# Patient Record
Sex: FEMALE | Race: WHITE | Employment: FULL TIME | URBAN - NONMETROPOLITAN AREA
[De-identification: names, ages, dates, MRNs, and addresses within clinical notes are randomized per-mention and may not be internally consistent; named-entity substitution may affect disease eponyms.]

---

## 2017-04-08 ENCOUNTER — APPOINTMENT (OUTPATIENT)
Dept: GENERAL RADIOLOGY | Age: 42
DRG: 189 | End: 2017-04-08
Payer: MEDICAID

## 2017-04-08 ENCOUNTER — HOSPITAL ENCOUNTER (INPATIENT)
Age: 42
LOS: 2 days | Discharge: HOME | DRG: 189 | End: 2017-04-10
Attending: EMERGENCY MEDICINE | Admitting: FAMILY MEDICINE
Payer: MEDICAID

## 2017-04-08 DIAGNOSIS — J96.01 ACUTE RESPIRATORY FAILURE WITH HYPOXEMIA (HCC): Primary | ICD-10-CM

## 2017-04-08 DIAGNOSIS — R06.02 SHORTNESS OF BREATH: ICD-10-CM

## 2017-04-08 DIAGNOSIS — Z72.0 TOBACCO ABUSE: ICD-10-CM

## 2017-04-08 DIAGNOSIS — J20.9 ACUTE BRONCHITIS WITH BRONCHOSPASM: ICD-10-CM

## 2017-04-08 PROBLEM — R09.02 HYPOXEMIA: Status: ACTIVE | Noted: 2017-04-08

## 2017-04-08 LAB
ALBUMIN SERPL-MCNC: 3.5 G/DL (ref 3.5–5.2)
ALP BLD-CCNC: 74 U/L (ref 35–104)
ALT SERPL-CCNC: 15 U/L (ref 5–33)
ANION GAP SERPL CALCULATED.3IONS-SCNC: 14 MMOL/L (ref 7–19)
AST SERPL-CCNC: 18 U/L (ref 5–32)
BASE EXCESS ARTERIAL: 3.4 MMOL/L (ref -2–2)
BASOPHILS ABSOLUTE: 0.1 K/UL (ref 0–0.2)
BASOPHILS RELATIVE PERCENT: 0.4 % (ref 0–1)
BILIRUB SERPL-MCNC: 0.4 MG/DL (ref 0.2–1.2)
BUN BLDV-MCNC: 12 MG/DL (ref 6–20)
CALCIUM SERPL-MCNC: 8.9 MG/DL (ref 8.6–10)
CARBOXYHEMOGLOBIN ARTERIAL: 4.8 % (ref 0–5)
CHLORIDE BLD-SCNC: 94 MMOL/L (ref 98–111)
CO2: 24 MMOL/L (ref 22–29)
CREAT SERPL-MCNC: 0.7 MG/DL (ref 0.5–0.9)
EOSINOPHILS ABSOLUTE: 0.3 K/UL (ref 0–0.6)
EOSINOPHILS RELATIVE PERCENT: 2.2 % (ref 0–5)
GFR NON-AFRICAN AMERICAN: >60
GLOBULIN: 3.7 G/DL
GLUCOSE BLD-MCNC: 135 MG/DL (ref 74–109)
HCO3 ARTERIAL: 28.5 MMOL/L (ref 22–26)
HCT VFR BLD CALC: 37.8 % (ref 37–47)
HEMOGLOBIN, ART, EXTENDED: 13.3 G/DL (ref 12–16)
HEMOGLOBIN: 13 G/DL (ref 12–16)
LACTIC ACID: 1.5 MG/DL (ref 0.5–1.9)
LYMPHOCYTES ABSOLUTE: 3.5 K/UL (ref 1.1–4.5)
LYMPHOCYTES RELATIVE PERCENT: 25.2 % (ref 20–40)
MAGNESIUM: 2 MG/DL (ref 1.6–2.6)
MCH RBC QN AUTO: 29.5 PG (ref 27–31)
MCHC RBC AUTO-ENTMCNC: 34.4 G/DL (ref 33–37)
MCV RBC AUTO: 85.7 FL (ref 81–99)
METHEMOGLOBIN ARTERIAL: 1.1 %
MONOCYTES ABSOLUTE: 0.6 K/UL (ref 0–0.9)
MONOCYTES RELATIVE PERCENT: 4.3 % (ref 0–10)
NEUTROPHILS ABSOLUTE: 9.4 K/UL (ref 1.5–7.5)
NEUTROPHILS RELATIVE PERCENT: 67.9 % (ref 50–65)
O2 CONTENT ARTERIAL: 16.4 ML/DL
O2 SAT, ARTERIAL: 88 %
O2 THERAPY: ABNORMAL
PCO2 ARTERIAL: 44 MMHG (ref 35–45)
PDW BLD-RTO: 13.3 % (ref 11.5–14.5)
PH ARTERIAL: 7.42 (ref 7.35–7.45)
PLATELET # BLD: 376 K/UL (ref 130–400)
PMV BLD AUTO: 8.8 FL (ref 7.4–10.4)
PO2 ARTERIAL: 57 MMHG (ref 80–100)
POTASSIUM SERPL-SCNC: 3.5 MMOL/L (ref 3.5–5)
POTASSIUM, WHOLE BLOOD: 3.4
PRO-BNP: 14 PG/ML (ref 0–450)
RAPID INFLUENZA  B AGN: NEGATIVE
RAPID INFLUENZA A AGN: NEGATIVE
RBC # BLD: 4.41 M/UL (ref 4.2–5.4)
SODIUM BLD-SCNC: 132 MMOL/L (ref 136–145)
TOTAL PROTEIN: 7.2 G/DL (ref 6.6–8.7)
TROPONIN: <0.01 NG/ML (ref 0–0.03)
WBC # BLD: 13.8 K/UL (ref 4.8–10.8)

## 2017-04-08 PROCEDURE — 2580000003 HC RX 258: Performed by: HOSPITALIST

## 2017-04-08 PROCEDURE — 6360000002 HC RX W HCPCS: Performed by: HOSPITALIST

## 2017-04-08 PROCEDURE — 99284 EMERGENCY DEPT VISIT MOD MDM: CPT | Performed by: EMERGENCY MEDICINE

## 2017-04-08 PROCEDURE — 6360000002 HC RX W HCPCS: Performed by: PHYSICIAN ASSISTANT

## 2017-04-08 PROCEDURE — 84132 ASSAY OF SERUM POTASSIUM: CPT

## 2017-04-08 PROCEDURE — 71010 XR CHEST PORTABLE: CPT

## 2017-04-08 PROCEDURE — 94640 AIRWAY INHALATION TREATMENT: CPT

## 2017-04-08 PROCEDURE — 6360000002 HC RX W HCPCS: Performed by: EMERGENCY MEDICINE

## 2017-04-08 PROCEDURE — 99285 EMERGENCY DEPT VISIT HI MDM: CPT

## 2017-04-08 PROCEDURE — 87804 INFLUENZA ASSAY W/OPTIC: CPT

## 2017-04-08 PROCEDURE — 83880 ASSAY OF NATRIURETIC PEPTIDE: CPT

## 2017-04-08 PROCEDURE — 6370000000 HC RX 637 (ALT 250 FOR IP): Performed by: HOSPITALIST

## 2017-04-08 PROCEDURE — 83735 ASSAY OF MAGNESIUM: CPT

## 2017-04-08 PROCEDURE — 2500000003 HC RX 250 WO HCPCS: Performed by: HOSPITALIST

## 2017-04-08 PROCEDURE — 93005 ELECTROCARDIOGRAM TRACING: CPT

## 2017-04-08 PROCEDURE — 1210000000 HC MED SURG R&B

## 2017-04-08 PROCEDURE — 82803 BLOOD GASES ANY COMBINATION: CPT

## 2017-04-08 PROCEDURE — 83605 ASSAY OF LACTIC ACID: CPT

## 2017-04-08 PROCEDURE — 84484 ASSAY OF TROPONIN QUANT: CPT

## 2017-04-08 PROCEDURE — 96374 THER/PROPH/DIAG INJ IV PUSH: CPT

## 2017-04-08 PROCEDURE — 6370000000 HC RX 637 (ALT 250 FOR IP): Performed by: PHYSICIAN ASSISTANT

## 2017-04-08 PROCEDURE — 80053 COMPREHEN METABOLIC PANEL: CPT

## 2017-04-08 PROCEDURE — 85025 COMPLETE CBC W/AUTO DIFF WBC: CPT

## 2017-04-08 PROCEDURE — 2700000000 HC OXYGEN THERAPY PER DAY

## 2017-04-08 PROCEDURE — 36415 COLL VENOUS BLD VENIPUNCTURE: CPT

## 2017-04-08 PROCEDURE — 2580000003 HC RX 258: Performed by: PHYSICIAN ASSISTANT

## 2017-04-08 PROCEDURE — 36600 WITHDRAWAL OF ARTERIAL BLOOD: CPT

## 2017-04-08 PROCEDURE — 87040 BLOOD CULTURE FOR BACTERIA: CPT

## 2017-04-08 PROCEDURE — 99222 1ST HOSP IP/OBS MODERATE 55: CPT | Performed by: HOSPITALIST

## 2017-04-08 RX ORDER — ALBUTEROL SULFATE 2.5 MG/3ML
2.5 SOLUTION RESPIRATORY (INHALATION) ONCE
Status: COMPLETED | OUTPATIENT
Start: 2017-04-08 | End: 2017-04-08

## 2017-04-08 RX ORDER — DULOXETIN HYDROCHLORIDE 60 MG/1
60 CAPSULE, DELAYED RELEASE ORAL DAILY
COMMUNITY

## 2017-04-08 RX ORDER — AMITRIPTYLINE HYDROCHLORIDE 75 MG/1
75 TABLET, FILM COATED ORAL NIGHTLY
COMMUNITY

## 2017-04-08 RX ORDER — IPRATROPIUM BROMIDE AND ALBUTEROL SULFATE 2.5; .5 MG/3ML; MG/3ML
1 SOLUTION RESPIRATORY (INHALATION)
Status: DISCONTINUED | OUTPATIENT
Start: 2017-04-08 | End: 2017-04-10 | Stop reason: HOSPADM

## 2017-04-08 RX ORDER — GABAPENTIN 300 MG/1
300 CAPSULE ORAL 3 TIMES DAILY
Status: DISCONTINUED | OUTPATIENT
Start: 2017-04-08 | End: 2017-04-10 | Stop reason: HOSPADM

## 2017-04-08 RX ORDER — DULOXETIN HYDROCHLORIDE 30 MG/1
60 CAPSULE, DELAYED RELEASE ORAL DAILY
Status: DISCONTINUED | OUTPATIENT
Start: 2017-04-08 | End: 2017-04-10 | Stop reason: HOSPADM

## 2017-04-08 RX ORDER — SODIUM CHLORIDE 0.9 % (FLUSH) 0.9 %
10 SYRINGE (ML) INJECTION EVERY 12 HOURS SCHEDULED
Status: DISCONTINUED | OUTPATIENT
Start: 2017-04-08 | End: 2017-04-10 | Stop reason: HOSPADM

## 2017-04-08 RX ORDER — GABAPENTIN 300 MG/1
300 CAPSULE ORAL 3 TIMES DAILY
COMMUNITY
End: 2017-11-28 | Stop reason: SDUPTHER

## 2017-04-08 RX ORDER — ACETAMINOPHEN 325 MG/1
650 TABLET ORAL EVERY 4 HOURS PRN
Status: DISCONTINUED | OUTPATIENT
Start: 2017-04-08 | End: 2017-04-10 | Stop reason: HOSPADM

## 2017-04-08 RX ORDER — METHYLPREDNISOLONE SODIUM SUCCINATE 40 MG/ML
40 INJECTION, POWDER, LYOPHILIZED, FOR SOLUTION INTRAMUSCULAR; INTRAVENOUS EVERY 6 HOURS
Status: DISCONTINUED | OUTPATIENT
Start: 2017-04-08 | End: 2017-04-09

## 2017-04-08 RX ORDER — METHYLPREDNISOLONE SODIUM SUCCINATE 125 MG/2ML
125 INJECTION, POWDER, LYOPHILIZED, FOR SOLUTION INTRAMUSCULAR; INTRAVENOUS ONCE
Status: COMPLETED | OUTPATIENT
Start: 2017-04-08 | End: 2017-04-08

## 2017-04-08 RX ORDER — LEVOFLOXACIN 5 MG/ML
750 INJECTION, SOLUTION INTRAVENOUS ONCE
Status: COMPLETED | OUTPATIENT
Start: 2017-04-08 | End: 2017-04-08

## 2017-04-08 RX ORDER — SODIUM CHLORIDE 0.9 % (FLUSH) 0.9 %
10 SYRINGE (ML) INJECTION PRN
Status: DISCONTINUED | OUTPATIENT
Start: 2017-04-08 | End: 2017-04-10 | Stop reason: HOSPADM

## 2017-04-08 RX ORDER — ONDANSETRON 2 MG/ML
4 INJECTION INTRAMUSCULAR; INTRAVENOUS EVERY 6 HOURS PRN
Status: DISCONTINUED | OUTPATIENT
Start: 2017-04-08 | End: 2017-04-10 | Stop reason: HOSPADM

## 2017-04-08 RX ADMIN — METHYLPREDNISOLONE SODIUM SUCCINATE 40 MG: 40 INJECTION, POWDER, FOR SOLUTION INTRAMUSCULAR; INTRAVENOUS at 17:08

## 2017-04-08 RX ADMIN — ACETAMINOPHEN 650 MG: 325 TABLET, FILM COATED ORAL at 18:59

## 2017-04-08 RX ADMIN — CEFTRIAXONE SODIUM 1 G: 1 INJECTION, POWDER, FOR SOLUTION INTRAMUSCULAR; INTRAVENOUS at 17:37

## 2017-04-08 RX ADMIN — METHYLPREDNISOLONE SODIUM SUCCINATE 125 MG: 125 INJECTION, POWDER, FOR SOLUTION INTRAMUSCULAR; INTRAVENOUS at 14:07

## 2017-04-08 RX ADMIN — GABAPENTIN 300 MG: 300 CAPSULE ORAL at 21:25

## 2017-04-08 RX ADMIN — DOXYCYCLINE 100 MG: 100 INJECTION, POWDER, LYOPHILIZED, FOR SOLUTION INTRAVENOUS at 17:57

## 2017-04-08 RX ADMIN — IPRATROPIUM BROMIDE AND ALBUTEROL SULFATE 1 AMPULE: .5; 3 SOLUTION RESPIRATORY (INHALATION) at 19:27

## 2017-04-08 RX ADMIN — IPRATROPIUM BROMIDE 0.5 MG: 0.5 SOLUTION RESPIRATORY (INHALATION) at 13:42

## 2017-04-08 RX ADMIN — ALBUTEROL SULFATE 2.5 MG: 2.5 SOLUTION RESPIRATORY (INHALATION) at 13:42

## 2017-04-08 RX ADMIN — METHYLPREDNISOLONE SODIUM SUCCINATE 40 MG: 40 INJECTION, POWDER, FOR SOLUTION INTRAMUSCULAR; INTRAVENOUS at 22:20

## 2017-04-08 RX ADMIN — AMITRIPTYLINE HYDROCHLORIDE 75 MG: 50 TABLET, FILM COATED ORAL at 21:25

## 2017-04-08 RX ADMIN — ALBUTEROL SULFATE 2.5 MG: 2.5 SOLUTION RESPIRATORY (INHALATION) at 13:41

## 2017-04-08 RX ADMIN — ONDANSETRON 4 MG: 2 INJECTION INTRAMUSCULAR; INTRAVENOUS at 18:59

## 2017-04-08 RX ADMIN — ENOXAPARIN SODIUM 40 MG: 40 INJECTION SUBCUTANEOUS at 18:54

## 2017-04-08 RX ADMIN — LEVOFLOXACIN 750 MG: 5 INJECTION, SOLUTION INTRAVENOUS at 15:55

## 2017-04-08 RX ADMIN — Medication 10 ML: at 21:25

## 2017-04-08 ASSESSMENT — ENCOUNTER SYMPTOMS
WHEEZING: 1
EYES NEGATIVE: 1
BACK PAIN: 1
DIARRHEA: 0
CHEST TIGHTNESS: 0
NAUSEA: 0
VOMITING: 0
RHINORRHEA: 0
SHORTNESS OF BREATH: 1
COUGH: 1

## 2017-04-08 ASSESSMENT — PAIN SCALES - GENERAL
PAINLEVEL_OUTOF10: 5
PAINLEVEL_OUTOF10: 6
PAINLEVEL_OUTOF10: 7

## 2017-04-09 ENCOUNTER — APPOINTMENT (OUTPATIENT)
Dept: GENERAL RADIOLOGY | Age: 42
DRG: 189 | End: 2017-04-09
Payer: MEDICAID

## 2017-04-09 LAB
ANION GAP SERPL CALCULATED.3IONS-SCNC: 14 MMOL/L (ref 7–19)
BUN BLDV-MCNC: 10 MG/DL (ref 6–20)
CALCIUM SERPL-MCNC: 9.1 MG/DL (ref 8.6–10)
CHLORIDE BLD-SCNC: 97 MMOL/L (ref 98–111)
CO2: 23 MMOL/L (ref 22–29)
CREAT SERPL-MCNC: 0.6 MG/DL (ref 0.5–0.9)
GFR NON-AFRICAN AMERICAN: >60
GLUCOSE BLD-MCNC: 181 MG/DL (ref 74–109)
HCT VFR BLD CALC: 41.2 % (ref 37–47)
HEMOGLOBIN: 13.7 G/DL (ref 12–16)
MCH RBC QN AUTO: 29 PG (ref 27–31)
MCHC RBC AUTO-ENTMCNC: 33.3 G/DL (ref 33–37)
MCV RBC AUTO: 87.1 FL (ref 81–99)
PDW BLD-RTO: 13.2 % (ref 11.5–14.5)
PLATELET # BLD: 368 K/UL (ref 130–400)
PMV BLD AUTO: 9.3 FL (ref 7.4–10.4)
POTASSIUM SERPL-SCNC: 4.2 MMOL/L (ref 3.5–5)
RBC # BLD: 4.73 M/UL (ref 4.2–5.4)
SODIUM BLD-SCNC: 134 MMOL/L (ref 136–145)
WBC # BLD: 13.8 K/UL (ref 4.8–10.8)

## 2017-04-09 PROCEDURE — 94664 DEMO&/EVAL PT USE INHALER: CPT

## 2017-04-09 PROCEDURE — 99232 SBSQ HOSP IP/OBS MODERATE 35: CPT | Performed by: HOSPITALIST

## 2017-04-09 PROCEDURE — 6370000000 HC RX 637 (ALT 250 FOR IP): Performed by: PHYSICIAN ASSISTANT

## 2017-04-09 PROCEDURE — 6360000002 HC RX W HCPCS: Performed by: PHYSICIAN ASSISTANT

## 2017-04-09 PROCEDURE — 94640 AIRWAY INHALATION TREATMENT: CPT

## 2017-04-09 PROCEDURE — 6360000002 HC RX W HCPCS: Performed by: HOSPITALIST

## 2017-04-09 PROCEDURE — 1210000000 HC MED SURG R&B

## 2017-04-09 PROCEDURE — 85027 COMPLETE CBC AUTOMATED: CPT

## 2017-04-09 PROCEDURE — 6370000000 HC RX 637 (ALT 250 FOR IP): Performed by: HOSPITALIST

## 2017-04-09 PROCEDURE — 80048 BASIC METABOLIC PNL TOTAL CA: CPT

## 2017-04-09 PROCEDURE — 2700000000 HC OXYGEN THERAPY PER DAY

## 2017-04-09 PROCEDURE — 36415 COLL VENOUS BLD VENIPUNCTURE: CPT

## 2017-04-09 PROCEDURE — 2580000003 HC RX 258: Performed by: PHYSICIAN ASSISTANT

## 2017-04-09 PROCEDURE — 2500000003 HC RX 250 WO HCPCS: Performed by: HOSPITALIST

## 2017-04-09 PROCEDURE — 71020 XR CHEST STANDARD TWO VW: CPT

## 2017-04-09 PROCEDURE — 2580000003 HC RX 258: Performed by: HOSPITALIST

## 2017-04-09 RX ORDER — FUROSEMIDE 10 MG/ML
40 INJECTION INTRAMUSCULAR; INTRAVENOUS ONCE
Status: COMPLETED | OUTPATIENT
Start: 2017-04-09 | End: 2017-04-09

## 2017-04-09 RX ORDER — METHYLPREDNISOLONE SODIUM SUCCINATE 40 MG/ML
40 INJECTION, POWDER, LYOPHILIZED, FOR SOLUTION INTRAMUSCULAR; INTRAVENOUS EVERY 8 HOURS
Status: DISCONTINUED | OUTPATIENT
Start: 2017-04-09 | End: 2017-04-10

## 2017-04-09 RX ADMIN — DOXYCYCLINE 100 MG: 100 INJECTION, POWDER, LYOPHILIZED, FOR SOLUTION INTRAVENOUS at 17:19

## 2017-04-09 RX ADMIN — GABAPENTIN 300 MG: 300 CAPSULE ORAL at 10:04

## 2017-04-09 RX ADMIN — GABAPENTIN 300 MG: 300 CAPSULE ORAL at 21:10

## 2017-04-09 RX ADMIN — ACETAMINOPHEN 650 MG: 325 TABLET, FILM COATED ORAL at 03:00

## 2017-04-09 RX ADMIN — FUROSEMIDE 40 MG: 10 INJECTION, SOLUTION INTRAMUSCULAR; INTRAVENOUS at 17:19

## 2017-04-09 RX ADMIN — IPRATROPIUM BROMIDE AND ALBUTEROL SULFATE 1 AMPULE: .5; 3 SOLUTION RESPIRATORY (INHALATION) at 11:06

## 2017-04-09 RX ADMIN — METHYLPREDNISOLONE SODIUM SUCCINATE 40 MG: 40 INJECTION, POWDER, FOR SOLUTION INTRAMUSCULAR; INTRAVENOUS at 21:11

## 2017-04-09 RX ADMIN — DOXYCYCLINE 100 MG: 100 INJECTION, POWDER, LYOPHILIZED, FOR SOLUTION INTRAVENOUS at 06:08

## 2017-04-09 RX ADMIN — ACETAMINOPHEN 650 MG: 325 TABLET, FILM COATED ORAL at 21:12

## 2017-04-09 RX ADMIN — METHYLPREDNISOLONE SODIUM SUCCINATE 40 MG: 40 INJECTION, POWDER, FOR SOLUTION INTRAMUSCULAR; INTRAVENOUS at 13:22

## 2017-04-09 RX ADMIN — ACETAMINOPHEN 650 MG: 325 TABLET, FILM COATED ORAL at 10:04

## 2017-04-09 RX ADMIN — Medication 10 ML: at 21:16

## 2017-04-09 RX ADMIN — IPRATROPIUM BROMIDE AND ALBUTEROL SULFATE 1 AMPULE: .5; 3 SOLUTION RESPIRATORY (INHALATION) at 07:28

## 2017-04-09 RX ADMIN — ONDANSETRON 4 MG: 2 INJECTION INTRAMUSCULAR; INTRAVENOUS at 10:03

## 2017-04-09 RX ADMIN — AMITRIPTYLINE HYDROCHLORIDE 75 MG: 50 TABLET, FILM COATED ORAL at 21:10

## 2017-04-09 RX ADMIN — ENOXAPARIN SODIUM 40 MG: 40 INJECTION SUBCUTANEOUS at 10:04

## 2017-04-09 RX ADMIN — DULOXETINE HYDROCHLORIDE 60 MG: 30 CAPSULE, DELAYED RELEASE ORAL at 10:04

## 2017-04-09 RX ADMIN — Medication 10 ML: at 10:05

## 2017-04-09 RX ADMIN — IPRATROPIUM BROMIDE AND ALBUTEROL SULFATE 1 AMPULE: .5; 3 SOLUTION RESPIRATORY (INHALATION) at 20:36

## 2017-04-09 RX ADMIN — ONDANSETRON 4 MG: 2 INJECTION INTRAMUSCULAR; INTRAVENOUS at 22:50

## 2017-04-09 RX ADMIN — IPRATROPIUM BROMIDE AND ALBUTEROL SULFATE 1 AMPULE: .5; 3 SOLUTION RESPIRATORY (INHALATION) at 14:50

## 2017-04-09 RX ADMIN — CEFTRIAXONE SODIUM 1 G: 1 INJECTION, POWDER, FOR SOLUTION INTRAMUSCULAR; INTRAVENOUS at 16:24

## 2017-04-09 RX ADMIN — METHYLPREDNISOLONE SODIUM SUCCINATE 40 MG: 40 INJECTION, POWDER, FOR SOLUTION INTRAMUSCULAR; INTRAVENOUS at 05:28

## 2017-04-09 RX ADMIN — GABAPENTIN 300 MG: 300 CAPSULE ORAL at 13:22

## 2017-04-09 ASSESSMENT — PAIN SCALES - GENERAL
PAINLEVEL_OUTOF10: 6
PAINLEVEL_OUTOF10: 9
PAINLEVEL_OUTOF10: 3
PAINLEVEL_OUTOF10: 9
PAINLEVEL_OUTOF10: 6

## 2017-04-10 VITALS
TEMPERATURE: 98.4 F | BODY MASS INDEX: 40.09 KG/M2 | SYSTOLIC BLOOD PRESSURE: 119 MMHG | DIASTOLIC BLOOD PRESSURE: 69 MMHG | HEIGHT: 70 IN | OXYGEN SATURATION: 93 % | HEART RATE: 102 BPM | RESPIRATION RATE: 16 BRPM | WEIGHT: 280 LBS

## 2017-04-10 PROCEDURE — 94640 AIRWAY INHALATION TREATMENT: CPT

## 2017-04-10 PROCEDURE — 6360000002 HC RX W HCPCS: Performed by: HOSPITALIST

## 2017-04-10 PROCEDURE — 6370000000 HC RX 637 (ALT 250 FOR IP): Performed by: HOSPITALIST

## 2017-04-10 PROCEDURE — 94761 N-INVAS EAR/PLS OXIMETRY MLT: CPT

## 2017-04-10 PROCEDURE — 2580000003 HC RX 258: Performed by: PHYSICIAN ASSISTANT

## 2017-04-10 PROCEDURE — 6370000000 HC RX 637 (ALT 250 FOR IP): Performed by: PHYSICIAN ASSISTANT

## 2017-04-10 PROCEDURE — 2500000003 HC RX 250 WO HCPCS: Performed by: HOSPITALIST

## 2017-04-10 PROCEDURE — 6360000002 HC RX W HCPCS: Performed by: PHYSICIAN ASSISTANT

## 2017-04-10 PROCEDURE — 2580000003 HC RX 258: Performed by: HOSPITALIST

## 2017-04-10 PROCEDURE — 99239 HOSP IP/OBS DSCHRG MGMT >30: CPT | Performed by: HOSPITALIST

## 2017-04-10 PROCEDURE — 2700000000 HC OXYGEN THERAPY PER DAY

## 2017-04-10 RX ORDER — PREDNISONE 10 MG/1
TABLET ORAL
Qty: 21 TABLET | Refills: 0 | Status: SHIPPED | OUTPATIENT
Start: 2017-04-10 | End: 2017-08-20 | Stop reason: ALTCHOICE

## 2017-04-10 RX ORDER — CEFDINIR 300 MG/1
300 CAPSULE ORAL 2 TIMES DAILY
Qty: 10 CAPSULE | Refills: 0 | Status: SHIPPED | OUTPATIENT
Start: 2017-04-10 | End: 2017-04-15

## 2017-04-10 RX ORDER — ALBUTEROL SULFATE 90 UG/1
2 AEROSOL, METERED RESPIRATORY (INHALATION) EVERY 6 HOURS PRN
Qty: 1 INHALER | Refills: 0 | Status: SHIPPED | OUTPATIENT
Start: 2017-04-10 | End: 2017-11-28 | Stop reason: ALTCHOICE

## 2017-04-10 RX ORDER — METHYLPREDNISOLONE SODIUM SUCCINATE 40 MG/ML
40 INJECTION, POWDER, LYOPHILIZED, FOR SOLUTION INTRAMUSCULAR; INTRAVENOUS EVERY 12 HOURS
Status: DISCONTINUED | OUTPATIENT
Start: 2017-04-11 | End: 2017-04-10 | Stop reason: HOSPADM

## 2017-04-10 RX ORDER — DOXYCYCLINE HYCLATE 100 MG
100 TABLET ORAL 2 TIMES DAILY
Qty: 10 TABLET | Refills: 0 | Status: SHIPPED | OUTPATIENT
Start: 2017-04-10 | End: 2017-04-15

## 2017-04-10 RX ORDER — PANTOPRAZOLE SODIUM 40 MG/1
40 TABLET, DELAYED RELEASE ORAL DAILY
Qty: 30 TABLET | Refills: 0 | Status: SHIPPED | OUTPATIENT
Start: 2017-04-10 | End: 2018-02-05 | Stop reason: ALTCHOICE

## 2017-04-10 RX ADMIN — ACETAMINOPHEN 650 MG: 325 TABLET, FILM COATED ORAL at 03:11

## 2017-04-10 RX ADMIN — GABAPENTIN 300 MG: 300 CAPSULE ORAL at 14:27

## 2017-04-10 RX ADMIN — METHYLPREDNISOLONE SODIUM SUCCINATE 40 MG: 40 INJECTION, POWDER, FOR SOLUTION INTRAMUSCULAR; INTRAVENOUS at 06:09

## 2017-04-10 RX ADMIN — METHYLPREDNISOLONE SODIUM SUCCINATE 40 MG: 40 INJECTION, POWDER, FOR SOLUTION INTRAMUSCULAR; INTRAVENOUS at 14:27

## 2017-04-10 RX ADMIN — DOXYCYCLINE 100 MG: 100 INJECTION, POWDER, LYOPHILIZED, FOR SOLUTION INTRAVENOUS at 06:08

## 2017-04-10 RX ADMIN — IPRATROPIUM BROMIDE AND ALBUTEROL SULFATE 1 AMPULE: .5; 3 SOLUTION RESPIRATORY (INHALATION) at 15:05

## 2017-04-10 RX ADMIN — Medication 10 ML: at 09:14

## 2017-04-10 RX ADMIN — DULOXETINE HYDROCHLORIDE 60 MG: 30 CAPSULE, DELAYED RELEASE ORAL at 09:14

## 2017-04-10 RX ADMIN — GABAPENTIN 300 MG: 300 CAPSULE ORAL at 09:14

## 2017-04-10 RX ADMIN — ENOXAPARIN SODIUM 40 MG: 40 INJECTION SUBCUTANEOUS at 09:14

## 2017-04-10 RX ADMIN — IPRATROPIUM BROMIDE AND ALBUTEROL SULFATE 1 AMPULE: .5; 3 SOLUTION RESPIRATORY (INHALATION) at 07:23

## 2017-04-10 RX ADMIN — IPRATROPIUM BROMIDE AND ALBUTEROL SULFATE 1 AMPULE: .5; 3 SOLUTION RESPIRATORY (INHALATION) at 10:47

## 2017-04-10 ASSESSMENT — PAIN SCALES - GENERAL
PAINLEVEL_OUTOF10: 9
PAINLEVEL_OUTOF10: 0

## 2017-04-11 LAB
EKG P AXIS: 67 DEGREES
EKG P-R INTERVAL: 164 MS
EKG Q-T INTERVAL: 374 MS
EKG QRS DURATION: 78 MS
EKG QTC CALCULATION (BAZETT): 431 MS
EKG T AXIS: 49 DEGREES

## 2017-04-13 LAB
BLOOD CULTURE, ROUTINE: NORMAL
CULTURE, BLOOD 2: NORMAL

## 2017-08-20 ENCOUNTER — HOSPITAL ENCOUNTER (EMERGENCY)
Age: 42
Discharge: HOME | End: 2017-08-20
Payer: MEDICAID

## 2017-08-20 ENCOUNTER — APPOINTMENT (OUTPATIENT)
Dept: CT IMAGING | Age: 42
End: 2017-08-20
Payer: MEDICAID

## 2017-08-20 VITALS
TEMPERATURE: 98.3 F | BODY MASS INDEX: 41.95 KG/M2 | DIASTOLIC BLOOD PRESSURE: 93 MMHG | SYSTOLIC BLOOD PRESSURE: 135 MMHG | HEART RATE: 98 BPM | WEIGHT: 293 LBS | OXYGEN SATURATION: 93 % | HEIGHT: 70 IN

## 2017-08-20 DIAGNOSIS — S05.01XA CONJUNCTIVAL ABRASION, RIGHT, INITIAL ENCOUNTER: ICD-10-CM

## 2017-08-20 DIAGNOSIS — H11.31 SUBCONJUNCTIVAL HEMORRHAGE OF RIGHT EYE: ICD-10-CM

## 2017-08-20 DIAGNOSIS — R51.9 ACUTE NONINTRACTABLE HEADACHE, UNSPECIFIED HEADACHE TYPE: Primary | ICD-10-CM

## 2017-08-20 PROCEDURE — 99282 EMERGENCY DEPT VISIT SF MDM: CPT | Performed by: NURSE PRACTITIONER

## 2017-08-20 PROCEDURE — 70450 CT HEAD/BRAIN W/O DYE: CPT

## 2017-08-20 PROCEDURE — 96372 THER/PROPH/DIAG INJ SC/IM: CPT

## 2017-08-20 PROCEDURE — 99284 EMERGENCY DEPT VISIT MOD MDM: CPT

## 2017-08-20 PROCEDURE — 6360000002 HC RX W HCPCS: Performed by: NURSE PRACTITIONER

## 2017-08-20 RX ORDER — PROMETHAZINE HYDROCHLORIDE 25 MG/ML
25 INJECTION, SOLUTION INTRAMUSCULAR; INTRAVENOUS ONCE
Status: COMPLETED | OUTPATIENT
Start: 2017-08-20 | End: 2017-08-20

## 2017-08-20 RX ORDER — TOBRAMYCIN 3 MG/ML
1 SOLUTION/ DROPS OPHTHALMIC EVERY 4 HOURS
Qty: 5 ML | Refills: 0 | Status: SHIPPED | OUTPATIENT
Start: 2017-08-20 | End: 2017-08-30

## 2017-08-20 RX ADMIN — HYDROMORPHONE HYDROCHLORIDE 1 MG: 1 INJECTION, SOLUTION INTRAMUSCULAR; INTRAVENOUS; SUBCUTANEOUS at 19:56

## 2017-08-20 RX ADMIN — PROMETHAZINE HYDROCHLORIDE 25 MG: 25 INJECTION INTRAMUSCULAR; INTRAVENOUS at 19:56

## 2017-08-20 ASSESSMENT — PAIN DESCRIPTION - LOCATION: LOCATION: EYE

## 2017-08-20 ASSESSMENT — PAIN SCALES - GENERAL
PAINLEVEL_OUTOF10: 9
PAINLEVEL_OUTOF10: 9
PAINLEVEL_OUTOF10: 8

## 2017-08-20 ASSESSMENT — PAIN DESCRIPTION - DESCRIPTORS: DESCRIPTORS: ACHING;BURNING

## 2017-08-20 ASSESSMENT — ENCOUNTER SYMPTOMS: RESPIRATORY NEGATIVE: 1

## 2017-08-20 ASSESSMENT — PAIN DESCRIPTION - PAIN TYPE: TYPE: ACUTE PAIN

## 2017-11-28 ENCOUNTER — OFFICE VISIT (OUTPATIENT)
Dept: NEUROSURGERY | Age: 42
End: 2017-11-28
Payer: MEDICAID

## 2017-11-28 VITALS
DIASTOLIC BLOOD PRESSURE: 87 MMHG | HEIGHT: 69 IN | BODY MASS INDEX: 43.4 KG/M2 | OXYGEN SATURATION: 92 % | WEIGHT: 293 LBS | SYSTOLIC BLOOD PRESSURE: 128 MMHG | HEART RATE: 94 BPM

## 2017-11-28 DIAGNOSIS — G62.9 POLYNEUROPATHY: Primary | ICD-10-CM

## 2017-11-28 PROCEDURE — 99204 OFFICE O/P NEW MOD 45 MIN: CPT | Performed by: PSYCHIATRY & NEUROLOGY

## 2017-11-28 RX ORDER — IBUPROFEN 200 MG
200 TABLET ORAL DAILY
Status: ON HOLD | COMMUNITY
End: 2018-04-09 | Stop reason: HOSPADM

## 2017-11-28 RX ORDER — GABAPENTIN 300 MG/1
600 CAPSULE ORAL 3 TIMES DAILY
Qty: 180 CAPSULE | Refills: 5 | Status: SHIPPED | OUTPATIENT
Start: 2017-11-28 | End: 2018-04-28 | Stop reason: SDUPTHER

## 2017-11-28 RX ORDER — POTASSIUM CHLORIDE 750 MG/1
1 TABLET, FILM COATED, EXTENDED RELEASE ORAL DAILY
Refills: 5 | COMMUNITY
Start: 2017-11-22 | End: 2021-11-14

## 2017-11-28 RX ORDER — FUROSEMIDE 20 MG/1
1 TABLET ORAL 2 TIMES DAILY PRN
Refills: 5 | COMMUNITY
Start: 2017-11-22

## 2017-11-28 NOTE — PROGRESS NOTES
nystagmus, conjugate eye movements, no ptosis  [x]Face symmetric  [x]Facial sensation intact  [x]Tongue midline no atrophy or fasciculations present  [x]Palate midline, hearing to finger rub normal bilaterally  [x]Shoulder shrug and SCM testing normal bilaterally  COMMENTS:   Motor   [x]5/5 strength x 4 extremities  [x]Normal bulk and tone  [x]No tremor present  [x]No rigidity or bradykinesia noted  COMMENTS:   Sensory  []Sensation intact to light touch, pin prick, vibration, and proprioception BLE  []Sensation intact to light touch, pin prick, vibration, and proprioception BUE  COMMENTS:Decreased LT, PP, VIB BLE   Coordination [x]FTN normal bilaterally   []HTS normal bilaterally  []CHER normal bilaterally. COMMENTS:   Reflexes  [x]Symmetric and non-pathological  [x]Toes down going bilaterally  [x]No clonus present  COMMENTS:   Gait                  [x]Normal steady gait    []Ataxic    []Spastic     []Magnetic     []Shuffling  COMMENTS:       LABS RECORD AND IMAGING REVIEW (As below and per HPI)      Lab Results   Component Value Date    WBC 13.8 (H) 04/09/2017    HGB 13.7 04/09/2017    HCT 41.2 04/09/2017    MCV 87.1 04/09/2017     04/09/2017     Lab Results   Component Value Date     (L) 04/09/2017    K 4.2 04/09/2017    CL 97 (L) 04/09/2017    CO2 23 04/09/2017    BUN 10 04/09/2017    CREATININE 0.6 04/09/2017    GLUCOSE 181 (H) 04/09/2017    CALCIUM 9.1 04/09/2017    PROT 7.2 04/08/2017    LABALBU 3.5 04/08/2017    BILITOT 0.4 04/08/2017    ALKPHOS 74 04/08/2017    AST 18 04/08/2017    ALT 15 04/08/2017    LABGLOM >60 04/09/2017    GLOB 3.7 04/08/2017       Ct Head Wo Contrast    Result Date: 8/20/2017  History: 72-year-old female evaluated for headache. Right eye bloodshot. Reference: None. Technique: Unenhanced CT imaging of the head/brain performed.  For this CT exam, one or more of the following dose reduction techniques was employed: -automated exposure control -mA and/or kVp adjustment for patient size -iterative reconstruction Total exam DLP 9139 mGy-cm Findings: Acute infarction - negative Hemorrhage - negative Mass - negative No acute infarction, hemorrhage, or extra-axial collection is identified. No mass or abnormal mass effect. The ventricles are normal in size and configuration. The basal cisterns are clear. There is no acute osseous abnormality and the visualized paranasal sinuses and mastoid air cells are clear. Impression: Negative noncontrast head CT. Signed by Dr Ileene Cheadle on 8/20/2017 7:00 PM      ASSESSMENT:    Frankie Ellis is a 38y.o. year old female here for neuropathy. PLAN:  1. Request NCS/EMG report from [facility]. 2.  Send additional labs. 3.  Continue cymbalta 60 mg, continue elavil 75 mg, increase neurontin to 600 mg tid monitor pedal edema. RLS felt less likely, will reassess at follow up, increasing neurontin will cover that as well at any rate.      Travis Gibbons DO  Board Certified Neurology

## 2017-12-01 ENCOUNTER — TELEPHONE (OUTPATIENT)
Dept: NEUROSURGERY | Age: 42
End: 2017-12-01

## 2017-12-01 DIAGNOSIS — G62.9 POLYNEUROPATHY: ICD-10-CM

## 2017-12-01 LAB
ALBUMIN SERPL-MCNC: 4.2 G/DL (ref 3.5–5.2)
ALP BLD-CCNC: 67 U/L (ref 35–104)
ALT SERPL-CCNC: 17 U/L (ref 5–33)
ANION GAP SERPL CALCULATED.3IONS-SCNC: 14 MMOL/L (ref 7–19)
AST SERPL-CCNC: 22 U/L (ref 5–32)
BASOPHILS ABSOLUTE: 0.1 K/UL (ref 0–0.2)
BASOPHILS RELATIVE PERCENT: 0.5 % (ref 0–1)
BILIRUB SERPL-MCNC: 0.4 MG/DL (ref 0.2–1.2)
BUN BLDV-MCNC: 9 MG/DL (ref 6–20)
C-REACTIVE PROTEIN: 0.99 MG/DL (ref 0–0.5)
CALCIUM SERPL-MCNC: 9 MG/DL (ref 8.6–10)
CHLORIDE BLD-SCNC: 99 MMOL/L (ref 98–111)
CO2: 26 MMOL/L (ref 22–29)
CREAT SERPL-MCNC: 0.7 MG/DL (ref 0.5–0.9)
EOSINOPHILS ABSOLUTE: 0.3 K/UL (ref 0–0.6)
EOSINOPHILS RELATIVE PERCENT: 2.7 % (ref 0–5)
GFR NON-AFRICAN AMERICAN: >60
GLUCOSE BLD-MCNC: 100 MG/DL (ref 74–109)
GLUCOSE FASTING: 98 MG/DL (ref 74–109)
GLUCOSE TOLERANCE TEST 1 HOUR: 194 MG/DL
GLUCOSE TOLERANCE TEST 2 HOUR: 169 MG/DL
HCT VFR BLD CALC: 42.2 % (ref 37–47)
HEMOGLOBIN: 14.6 G/DL (ref 12–16)
LYMPHOCYTES ABSOLUTE: 2.8 K/UL (ref 1.1–4.5)
LYMPHOCYTES RELATIVE PERCENT: 28.3 % (ref 20–40)
MCH RBC QN AUTO: 29.8 PG (ref 27–31)
MCHC RBC AUTO-ENTMCNC: 34.6 G/DL (ref 33–37)
MCV RBC AUTO: 86.1 FL (ref 81–99)
MONOCYTES ABSOLUTE: 0.4 K/UL (ref 0–0.9)
MONOCYTES RELATIVE PERCENT: 4.1 % (ref 0–10)
NEUTROPHILS ABSOLUTE: 6.3 K/UL (ref 1.5–7.5)
NEUTROPHILS RELATIVE PERCENT: 64.2 % (ref 50–65)
PDW BLD-RTO: 12.7 % (ref 11.5–14.5)
PLATELET # BLD: 328 K/UL (ref 130–400)
PMV BLD AUTO: 8.8 FL (ref 9.4–12.3)
POTASSIUM SERPL-SCNC: 3.9 MMOL/L (ref 3.5–5)
RBC # BLD: 4.9 M/UL (ref 4.2–5.4)
RHEUMATOID FACTOR: <10 IU/ML
SEDIMENTATION RATE, ERYTHROCYTE: 5 MM/HR (ref 0–20)
SODIUM BLD-SCNC: 139 MMOL/L (ref 136–145)
T4 FREE: 1.3 NG/DL (ref 0.9–1.7)
TOTAL PROTEIN: 7.3 G/DL (ref 6.6–8.7)
TSH SERPL DL<=0.05 MIU/L-ACNC: 0.17 UIU/ML (ref 0.27–4.2)
VITAMIN B-12: 459 PG/ML (ref 211–946)
WBC # BLD: 9.7 K/UL (ref 4.8–10.8)

## 2017-12-03 LAB
ALBUMIN SERPL-MCNC: 4.4 G/DL (ref 3.75–5.01)
ALPHA-1-GLOBULIN: 0.35 G/DL (ref 0.19–0.46)
ALPHA-2-GLOBULIN: 0.77 G/DL (ref 0.48–1.05)
ANA IGG, ELISA: NORMAL
BETA GLOBULIN: 1.06 G/DL (ref 0.48–1.1)
GAMMA GLOBULIN: 1.32 G/DL (ref 0.62–1.51)
LYME (B. BURGDORFERI) AB IGG WB: NEGATIVE
LYME AB IGM BY WB:: NEGATIVE
PROTEIN ELECTROPHORESIS, SERUM: NORMAL
SPE/IFE INTERPRETATION: NORMAL
TOTAL PROTEIN: 7.9 G/DL (ref 6–8.3)

## 2017-12-04 LAB — RPR: NORMAL

## 2017-12-05 LAB
ARSENIC BLOOD: <10 UG/L (ref 0–13)
LEAD LEVEL BLOOD: <2 UG/DL (ref 0–4.9)
MERCURY BLOOD: <3 UG/L (ref 0–10)

## 2018-02-05 ENCOUNTER — OFFICE VISIT (OUTPATIENT)
Dept: NEUROSURGERY | Age: 43
End: 2018-02-05
Payer: MEDICAID

## 2018-02-05 VITALS
SYSTOLIC BLOOD PRESSURE: 139 MMHG | OXYGEN SATURATION: 94 % | DIASTOLIC BLOOD PRESSURE: 80 MMHG | WEIGHT: 290 LBS | HEIGHT: 70 IN | BODY MASS INDEX: 41.52 KG/M2 | HEART RATE: 103 BPM

## 2018-02-05 DIAGNOSIS — G62.9 POLYNEUROPATHY: Primary | ICD-10-CM

## 2018-02-05 PROCEDURE — 99214 OFFICE O/P EST MOD 30 MIN: CPT | Performed by: PSYCHIATRY & NEUROLOGY
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
## 2018-02-08 ENCOUNTER — TELEPHONE (OUTPATIENT)
Dept: NEUROLOGY | Age: 43
End: 2018-02-08

## 2018-04-04 ENCOUNTER — APPOINTMENT (OUTPATIENT)
Dept: GENERAL RADIOLOGY | Age: 43
DRG: 193 | End: 2018-04-04
Payer: MEDICAID

## 2018-04-04 ENCOUNTER — HOSPITAL ENCOUNTER (INPATIENT)
Age: 43
LOS: 5 days | Discharge: HOME | DRG: 193 | End: 2018-04-09
Attending: FAMILY MEDICINE | Admitting: HOSPITALIST
Payer: MEDICAID

## 2018-04-04 DIAGNOSIS — R09.02 HYPOXIA: ICD-10-CM

## 2018-04-04 DIAGNOSIS — J18.9 PNEUMONIA DUE TO ORGANISM: Primary | ICD-10-CM

## 2018-04-04 PROBLEM — J96.90 RESPIRATORY FAILURE (HCC): Status: ACTIVE | Noted: 2018-04-04

## 2018-04-04 LAB
APTT: 26.7 SEC (ref 26–36.2)
BASE EXCESS ARTERIAL: 1.3 MMOL/L (ref -2–2)
BASOPHILS ABSOLUTE: 0 K/UL (ref 0–0.2)
BASOPHILS RELATIVE PERCENT: 0.3 % (ref 0–1)
CARBOXYHEMOGLOBIN ARTERIAL: 6.3 % (ref 0–5)
EOSINOPHILS ABSOLUTE: 0.3 K/UL (ref 0–0.6)
EOSINOPHILS RELATIVE PERCENT: 1.6 % (ref 0–5)
HCO3 ARTERIAL: 25.9 MMOL/L (ref 22–26)
HCT VFR BLD CALC: 39.3 % (ref 37–47)
HEMOGLOBIN, ART, EXTENDED: 12.9 G/DL (ref 12–16)
HEMOGLOBIN: 12.9 G/DL (ref 12–16)
INR BLD: 0.9 (ref 0.88–1.18)
LACTIC ACID: 2.7 MMOL/L (ref 0.5–1.9)
LYMPHOCYTES ABSOLUTE: 2.3 K/UL (ref 1.1–4.5)
LYMPHOCYTES RELATIVE PERCENT: 15.1 % (ref 20–40)
MCH RBC QN AUTO: 29.3 PG (ref 27–31)
MCHC RBC AUTO-ENTMCNC: 32.8 G/DL (ref 33–37)
MCV RBC AUTO: 89.1 FL (ref 81–99)
METHEMOGLOBIN ARTERIAL: 1.1 %
MONOCYTES ABSOLUTE: 0.6 K/UL (ref 0–0.9)
MONOCYTES RELATIVE PERCENT: 3.8 % (ref 0–10)
NEUTROPHILS ABSOLUTE: 12.1 K/UL (ref 1.5–7.5)
NEUTROPHILS RELATIVE PERCENT: 78.6 % (ref 50–65)
O2 CONTENT ARTERIAL: 15.2 ML/DL
O2 SAT, ARTERIAL: 83.9 %
O2 THERAPY: ABNORMAL
PCO2 ARTERIAL: 40 MMHG (ref 35–45)
PDW BLD-RTO: 13.5 % (ref 11.5–14.5)
PH ARTERIAL: 7.42 (ref 7.35–7.45)
PLATELET # BLD: 349 K/UL (ref 130–400)
PMV BLD AUTO: 9.2 FL (ref 9.4–12.3)
PO2 ARTERIAL: 48 MMHG (ref 80–100)
POTASSIUM, WHOLE BLOOD: 3.7
PROTHROMBIN TIME: 12 SEC (ref 12–14.6)
RBC # BLD: 4.41 M/UL (ref 4.2–5.4)
WBC # BLD: 15.4 K/UL (ref 4.8–10.8)

## 2018-04-04 PROCEDURE — 82803 BLOOD GASES ANY COMBINATION: CPT

## 2018-04-04 PROCEDURE — 85730 THROMBOPLASTIN TIME PARTIAL: CPT

## 2018-04-04 PROCEDURE — 94640 AIRWAY INHALATION TREATMENT: CPT

## 2018-04-04 PROCEDURE — 93005 ELECTROCARDIOGRAM TRACING: CPT

## 2018-04-04 PROCEDURE — 96365 THER/PROPH/DIAG IV INF INIT: CPT

## 2018-04-04 PROCEDURE — 36600 WITHDRAWAL OF ARTERIAL BLOOD: CPT

## 2018-04-04 PROCEDURE — 99285 EMERGENCY DEPT VISIT HI MDM: CPT | Performed by: FAMILY MEDICINE

## 2018-04-04 PROCEDURE — 83605 ASSAY OF LACTIC ACID: CPT

## 2018-04-04 PROCEDURE — 6360000002 HC RX W HCPCS: Performed by: FAMILY MEDICINE

## 2018-04-04 PROCEDURE — 96375 TX/PRO/DX INJ NEW DRUG ADDON: CPT

## 2018-04-04 PROCEDURE — 84132 ASSAY OF SERUM POTASSIUM: CPT

## 2018-04-04 PROCEDURE — 99285 EMERGENCY DEPT VISIT HI MDM: CPT

## 2018-04-04 PROCEDURE — 85610 PROTHROMBIN TIME: CPT

## 2018-04-04 PROCEDURE — 2140000000 HC CCU INTERMEDIATE R&B

## 2018-04-04 PROCEDURE — 2580000003 HC RX 258: Performed by: FAMILY MEDICINE

## 2018-04-04 PROCEDURE — 85025 COMPLETE CBC W/AUTO DIFF WBC: CPT

## 2018-04-04 PROCEDURE — 71045 X-RAY EXAM CHEST 1 VIEW: CPT

## 2018-04-04 PROCEDURE — 36415 COLL VENOUS BLD VENIPUNCTURE: CPT

## 2018-04-04 RX ORDER — ALBUTEROL SULFATE 2.5 MG/3ML
2.5 SOLUTION RESPIRATORY (INHALATION) EVERY 4 HOURS PRN
Status: DISCONTINUED | OUTPATIENT
Start: 2018-04-04 | End: 2018-04-09 | Stop reason: HOSPADM

## 2018-04-04 RX ORDER — METHYLPREDNISOLONE SODIUM SUCCINATE 125 MG/2ML
125 INJECTION, POWDER, LYOPHILIZED, FOR SOLUTION INTRAMUSCULAR; INTRAVENOUS ONCE
Status: COMPLETED | OUTPATIENT
Start: 2018-04-04 | End: 2018-04-04

## 2018-04-04 RX ORDER — ALBUTEROL SULFATE 2.5 MG/3ML
7.5 SOLUTION RESPIRATORY (INHALATION) ONCE
Status: COMPLETED | OUTPATIENT
Start: 2018-04-05 | End: 2018-04-05

## 2018-04-04 RX ADMIN — CEFTRIAXONE 1 G: 1 INJECTION, POWDER, FOR SOLUTION INTRAMUSCULAR; INTRAVENOUS at 23:34

## 2018-04-04 RX ADMIN — IPRATROPIUM BROMIDE 0.5 MG: 0.5 SOLUTION RESPIRATORY (INHALATION) at 22:49

## 2018-04-04 RX ADMIN — ALBUTEROL SULFATE 2.5 MG: 2.5 SOLUTION RESPIRATORY (INHALATION) at 22:49

## 2018-04-04 RX ADMIN — ALBUTEROL SULFATE 7.5 MG: 2.5 SOLUTION RESPIRATORY (INHALATION) at 23:35

## 2018-04-04 RX ADMIN — METHYLPREDNISOLONE SODIUM SUCCINATE 125 MG: 125 INJECTION, POWDER, FOR SOLUTION INTRAMUSCULAR; INTRAVENOUS at 23:07

## 2018-04-04 RX ADMIN — AZITHROMYCIN MONOHYDRATE 500 MG: 500 INJECTION, POWDER, LYOPHILIZED, FOR SOLUTION INTRAVENOUS at 23:34

## 2018-04-04 ASSESSMENT — ENCOUNTER SYMPTOMS
APNEA: 0
TROUBLE SWALLOWING: 0
ABDOMINAL DISTENTION: 0
BACK PAIN: 0
COUGH: 0
CONSTIPATION: 0
WHEEZING: 1
DIARRHEA: 0
ABDOMINAL PAIN: 0
VOMITING: 0
SHORTNESS OF BREATH: 1
SORE THROAT: 0
CHEST TIGHTNESS: 0

## 2018-04-04 ASSESSMENT — PAIN DESCRIPTION - PAIN TYPE: TYPE: ACUTE PAIN

## 2018-04-04 ASSESSMENT — PAIN SCALES - GENERAL: PAINLEVEL_OUTOF10: 8

## 2018-04-05 ENCOUNTER — APPOINTMENT (OUTPATIENT)
Dept: GENERAL RADIOLOGY | Age: 43
DRG: 193 | End: 2018-04-05
Payer: MEDICAID

## 2018-04-05 PROBLEM — E55.9 VITAMIN D DEFICIENCY: Status: ACTIVE | Noted: 2018-04-05

## 2018-04-05 PROBLEM — E03.9 HYPOTHYROIDISM: Status: ACTIVE | Noted: 2018-04-05

## 2018-04-05 PROBLEM — R73.9 HYPERGLYCEMIA: Status: ACTIVE | Noted: 2018-04-05

## 2018-04-05 LAB
ALBUMIN SERPL-MCNC: 3.9 G/DL (ref 3.5–5.2)
ALP BLD-CCNC: 79 U/L (ref 35–104)
ALT SERPL-CCNC: 9 U/L (ref 5–33)
ANION GAP SERPL CALCULATED.3IONS-SCNC: 10 MMOL/L (ref 7–19)
AST SERPL-CCNC: 8 U/L (ref 5–32)
BASE EXCESS ARTERIAL: -1.2 MMOL/L (ref -2–2)
BASE EXCESS ARTERIAL: -1.8 MMOL/L (ref -2–2)
BASOPHILS ABSOLUTE: 0 K/UL (ref 0–0.2)
BASOPHILS RELATIVE PERCENT: 0.2 % (ref 0–1)
BILIRUB SERPL-MCNC: 0.4 MG/DL (ref 0.2–1.2)
BILIRUBIN URINE: NEGATIVE
BLOOD, URINE: NEGATIVE
BUN BLDV-MCNC: 10 MG/DL (ref 6–20)
CALCIUM SERPL-MCNC: 9.4 MG/DL (ref 8.6–10)
CARBOXYHEMOGLOBIN ARTERIAL: 2.7 % (ref 0–5)
CARBOXYHEMOGLOBIN ARTERIAL: 3.4 % (ref 0–5)
CHLORIDE BLD-SCNC: 98 MMOL/L (ref 98–111)
CLARITY: ABNORMAL
CO2: 28 MMOL/L (ref 22–29)
COLOR: YELLOW
CREAT SERPL-MCNC: 0.7 MG/DL (ref 0.5–0.9)
EOSINOPHILS ABSOLUTE: 0.1 K/UL (ref 0–0.6)
EOSINOPHILS RELATIVE PERCENT: 0.5 % (ref 0–5)
GFR NON-AFRICAN AMERICAN: >60
GLUCOSE BLD-MCNC: 153 MG/DL (ref 74–109)
GLUCOSE URINE: NEGATIVE MG/DL
HCO3 ARTERIAL: 23.7 MMOL/L (ref 22–26)
HCO3 ARTERIAL: 25.3 MMOL/L (ref 22–26)
HCT VFR BLD CALC: 41.6 % (ref 37–47)
HEMOGLOBIN, ART, EXTENDED: 13.9 G/DL (ref 12–16)
HEMOGLOBIN, ART, EXTENDED: 14.1 G/DL (ref 12–16)
HEMOGLOBIN: 13.5 G/DL (ref 12–16)
KETONES, URINE: NEGATIVE MG/DL
LEUKOCYTE ESTERASE, URINE: NEGATIVE
LYMPHOCYTES ABSOLUTE: 1.7 K/UL (ref 1.1–4.5)
LYMPHOCYTES RELATIVE PERCENT: 9.5 % (ref 20–40)
MCH RBC QN AUTO: 28.8 PG (ref 27–31)
MCHC RBC AUTO-ENTMCNC: 32.5 G/DL (ref 33–37)
MCV RBC AUTO: 88.7 FL (ref 81–99)
METHEMOGLOBIN ARTERIAL: 1.1 %
METHEMOGLOBIN ARTERIAL: 1.3 %
MONOCYTES ABSOLUTE: 0.4 K/UL (ref 0–0.9)
MONOCYTES RELATIVE PERCENT: 2.3 % (ref 0–10)
NEUTROPHILS ABSOLUTE: 15.2 K/UL (ref 1.5–7.5)
NEUTROPHILS RELATIVE PERCENT: 86.6 % (ref 50–65)
NITRITE, URINE: NEGATIVE
O2 CONTENT ARTERIAL: 17.4 ML/DL
O2 CONTENT ARTERIAL: 17.8 ML/DL
O2 SAT, ARTERIAL: 87.9 %
O2 SAT, ARTERIAL: 91.2 %
O2 THERAPY: ABNORMAL
O2 THERAPY: ABNORMAL
PCO2 ARTERIAL: 42 MMHG (ref 35–45)
PCO2 ARTERIAL: 48 MMHG (ref 35–45)
PDW BLD-RTO: 13.5 % (ref 11.5–14.5)
PH ARTERIAL: 7.33 (ref 7.35–7.45)
PH ARTERIAL: 7.36 (ref 7.35–7.45)
PH UA: 6
PLATELET # BLD: 347 K/UL (ref 130–400)
PMV BLD AUTO: 8.9 FL (ref 9.4–12.3)
PO2 ARTERIAL: 56 MMHG (ref 80–100)
PO2 ARTERIAL: 60 MMHG (ref 80–100)
POTASSIUM REFLEX MAGNESIUM: 4.1 MMOL/L (ref 3.5–5)
POTASSIUM, WHOLE BLOOD: 4.2
POTASSIUM, WHOLE BLOOD: 4.7
PRO-BNP: 62 PG/ML (ref 0–450)
PROTEIN UA: NEGATIVE MG/DL
RBC # BLD: 4.69 M/UL (ref 4.2–5.4)
SODIUM BLD-SCNC: 136 MMOL/L (ref 136–145)
SPECIFIC GRAVITY UA: 1.02
TOTAL PROTEIN: 7 G/DL (ref 6.6–8.7)
TROPONIN: <0.01 NG/ML (ref 0–0.03)
UROBILINOGEN, URINE: 0.2 E.U./DL
WBC # BLD: 17.5 K/UL (ref 4.8–10.8)

## 2018-04-05 PROCEDURE — 6370000000 HC RX 637 (ALT 250 FOR IP): Performed by: HOSPITALIST

## 2018-04-05 PROCEDURE — 2580000003 HC RX 258: Performed by: INTERNAL MEDICINE

## 2018-04-05 PROCEDURE — 6360000002 HC RX W HCPCS: Performed by: INTERNAL MEDICINE

## 2018-04-05 PROCEDURE — 80053 COMPREHEN METABOLIC PANEL: CPT

## 2018-04-05 PROCEDURE — 87040 BLOOD CULTURE FOR BACTERIA: CPT

## 2018-04-05 PROCEDURE — 94640 AIRWAY INHALATION TREATMENT: CPT

## 2018-04-05 PROCEDURE — 99223 1ST HOSP IP/OBS HIGH 75: CPT | Performed by: INTERNAL MEDICINE

## 2018-04-05 PROCEDURE — 36415 COLL VENOUS BLD VENIPUNCTURE: CPT

## 2018-04-05 PROCEDURE — 2140000000 HC CCU INTERMEDIATE R&B

## 2018-04-05 PROCEDURE — 71046 X-RAY EXAM CHEST 2 VIEWS: CPT

## 2018-04-05 PROCEDURE — 84484 ASSAY OF TROPONIN QUANT: CPT

## 2018-04-05 PROCEDURE — 85025 COMPLETE CBC W/AUTO DIFF WBC: CPT

## 2018-04-05 PROCEDURE — 6360000002 HC RX W HCPCS: Performed by: HOSPITALIST

## 2018-04-05 PROCEDURE — 94644 CONT INHLJ TX 1ST HOUR: CPT

## 2018-04-05 PROCEDURE — 2700000000 HC OXYGEN THERAPY PER DAY

## 2018-04-05 PROCEDURE — 36600 WITHDRAWAL OF ARTERIAL BLOOD: CPT

## 2018-04-05 PROCEDURE — 81003 URINALYSIS AUTO W/O SCOPE: CPT

## 2018-04-05 PROCEDURE — 6370000000 HC RX 637 (ALT 250 FOR IP): Performed by: INTERNAL MEDICINE

## 2018-04-05 PROCEDURE — 84132 ASSAY OF SERUM POTASSIUM: CPT

## 2018-04-05 PROCEDURE — 82803 BLOOD GASES ANY COMBINATION: CPT

## 2018-04-05 PROCEDURE — 83880 ASSAY OF NATRIURETIC PEPTIDE: CPT

## 2018-04-05 RX ORDER — IPRATROPIUM BROMIDE AND ALBUTEROL SULFATE 2.5; .5 MG/3ML; MG/3ML
1 SOLUTION RESPIRATORY (INHALATION)
Status: DISCONTINUED | OUTPATIENT
Start: 2018-04-05 | End: 2018-04-09 | Stop reason: HOSPADM

## 2018-04-05 RX ORDER — METHYLPREDNISOLONE SODIUM SUCCINATE 125 MG/2ML
125 INJECTION, POWDER, LYOPHILIZED, FOR SOLUTION INTRAMUSCULAR; INTRAVENOUS EVERY 8 HOURS
Status: DISCONTINUED | OUTPATIENT
Start: 2018-04-05 | End: 2018-04-05

## 2018-04-05 RX ORDER — SODIUM CHLORIDE 0.9 % (FLUSH) 0.9 %
10 SYRINGE (ML) INJECTION EVERY 12 HOURS SCHEDULED
Status: DISCONTINUED | OUTPATIENT
Start: 2018-04-05 | End: 2018-04-06 | Stop reason: SDUPTHER

## 2018-04-05 RX ORDER — IBUPROFEN 200 MG
200 TABLET ORAL DAILY
Status: DISCONTINUED | OUTPATIENT
Start: 2018-04-05 | End: 2018-04-09 | Stop reason: HOSPADM

## 2018-04-05 RX ORDER — FUROSEMIDE 20 MG/1
20 TABLET ORAL 2 TIMES DAILY
Status: DISCONTINUED | OUTPATIENT
Start: 2018-04-05 | End: 2018-04-09 | Stop reason: HOSPADM

## 2018-04-05 RX ORDER — METHYLPREDNISOLONE SODIUM SUCCINATE 40 MG/ML
80 INJECTION, POWDER, LYOPHILIZED, FOR SOLUTION INTRAMUSCULAR; INTRAVENOUS EVERY 8 HOURS
Status: DISCONTINUED | OUTPATIENT
Start: 2018-04-06 | End: 2018-04-07

## 2018-04-05 RX ORDER — ZOLPIDEM TARTRATE 5 MG/1
5 TABLET ORAL NIGHTLY PRN
Status: DISCONTINUED | OUTPATIENT
Start: 2018-04-05 | End: 2018-04-09 | Stop reason: HOSPADM

## 2018-04-05 RX ORDER — METHYLPREDNISOLONE SODIUM SUCCINATE 125 MG/2ML
80 INJECTION, POWDER, LYOPHILIZED, FOR SOLUTION INTRAMUSCULAR; INTRAVENOUS EVERY 8 HOURS
Status: DISCONTINUED | OUTPATIENT
Start: 2018-04-05 | End: 2018-04-05 | Stop reason: SDUPTHER

## 2018-04-05 RX ORDER — GABAPENTIN 600 MG/1
600 TABLET ORAL 3 TIMES DAILY
Status: DISCONTINUED | OUTPATIENT
Start: 2018-04-05 | End: 2018-04-09 | Stop reason: HOSPADM

## 2018-04-05 RX ORDER — DULOXETIN HYDROCHLORIDE 60 MG/1
60 CAPSULE, DELAYED RELEASE ORAL DAILY
Status: DISCONTINUED | OUTPATIENT
Start: 2018-04-05 | End: 2018-04-09 | Stop reason: HOSPADM

## 2018-04-05 RX ORDER — NICOTINE 21 MG/24HR
1 PATCH, TRANSDERMAL 24 HOURS TRANSDERMAL DAILY
Status: DISCONTINUED | OUTPATIENT
Start: 2018-04-05 | End: 2018-04-09 | Stop reason: HOSPADM

## 2018-04-05 RX ORDER — ONDANSETRON 2 MG/ML
4 INJECTION INTRAMUSCULAR; INTRAVENOUS EVERY 6 HOURS PRN
Status: DISCONTINUED | OUTPATIENT
Start: 2018-04-05 | End: 2018-04-06 | Stop reason: SDUPTHER

## 2018-04-05 RX ORDER — ACETAMINOPHEN 325 MG/1
650 TABLET ORAL EVERY 4 HOURS PRN
Status: DISCONTINUED | OUTPATIENT
Start: 2018-04-05 | End: 2018-04-06 | Stop reason: SDUPTHER

## 2018-04-05 RX ORDER — GUAIFENESIN 600 MG/1
600 TABLET, EXTENDED RELEASE ORAL 4 TIMES DAILY
Status: DISCONTINUED | OUTPATIENT
Start: 2018-04-05 | End: 2018-04-09 | Stop reason: HOSPADM

## 2018-04-05 RX ORDER — SODIUM CHLORIDE 0.9 % (FLUSH) 0.9 %
10 SYRINGE (ML) INJECTION PRN
Status: DISCONTINUED | OUTPATIENT
Start: 2018-04-05 | End: 2018-04-06 | Stop reason: SDUPTHER

## 2018-04-05 RX ADMIN — ZOLPIDEM TARTRATE 5 MG: 5 TABLET ORAL at 21:42

## 2018-04-05 RX ADMIN — GUAIFENESIN 600 MG: 600 TABLET, EXTENDED RELEASE ORAL at 16:38

## 2018-04-05 RX ADMIN — FUROSEMIDE 20 MG: 20 TABLET ORAL at 16:38

## 2018-04-05 RX ADMIN — METHYLPREDNISOLONE SODIUM SUCCINATE 125 MG: 125 INJECTION, POWDER, FOR SOLUTION INTRAMUSCULAR; INTRAVENOUS at 15:28

## 2018-04-05 RX ADMIN — GABAPENTIN 600 MG: 600 TABLET, FILM COATED ORAL at 21:42

## 2018-04-05 RX ADMIN — GABAPENTIN 600 MG: 600 TABLET, FILM COATED ORAL at 13:45

## 2018-04-05 RX ADMIN — ACETAMINOPHEN 650 MG: 325 TABLET ORAL at 02:15

## 2018-04-05 RX ADMIN — IPRATROPIUM BROMIDE AND ALBUTEROL SULFATE 1 AMPULE: .5; 3 SOLUTION RESPIRATORY (INHALATION) at 11:12

## 2018-04-05 RX ADMIN — IPRATROPIUM BROMIDE AND ALBUTEROL SULFATE 1 AMPULE: .5; 3 SOLUTION RESPIRATORY (INHALATION) at 07:37

## 2018-04-05 RX ADMIN — ACETAMINOPHEN 650 MG: 325 TABLET ORAL at 13:45

## 2018-04-05 RX ADMIN — IBUPROFEN 200 MG: 200 TABLET, FILM COATED ORAL at 08:08

## 2018-04-05 RX ADMIN — IPRATROPIUM BROMIDE AND ALBUTEROL SULFATE 1 AMPULE: .5; 3 SOLUTION RESPIRATORY (INHALATION) at 15:50

## 2018-04-05 RX ADMIN — DULOXETINE HYDROCHLORIDE 60 MG: 60 CAPSULE, DELAYED RELEASE ORAL at 08:08

## 2018-04-05 RX ADMIN — AMITRIPTYLINE HYDROCHLORIDE 75 MG: 50 TABLET, FILM COATED ORAL at 21:42

## 2018-04-05 RX ADMIN — Medication 10 ML: at 08:09

## 2018-04-05 RX ADMIN — IPRATROPIUM BROMIDE AND ALBUTEROL SULFATE 1 AMPULE: .5; 3 SOLUTION RESPIRATORY (INHALATION) at 20:04

## 2018-04-05 RX ADMIN — Medication 10 ML: at 21:44

## 2018-04-05 RX ADMIN — METHYLPREDNISOLONE SODIUM SUCCINATE 80 MG: 125 INJECTION, POWDER, FOR SOLUTION INTRAMUSCULAR; INTRAVENOUS at 08:09

## 2018-04-05 RX ADMIN — GABAPENTIN 600 MG: 600 TABLET, FILM COATED ORAL at 08:07

## 2018-04-05 RX ADMIN — ENOXAPARIN SODIUM 40 MG: 100 INJECTION SUBCUTANEOUS at 08:08

## 2018-04-05 RX ADMIN — GUAIFENESIN 600 MG: 600 TABLET, EXTENDED RELEASE ORAL at 21:42

## 2018-04-05 RX ADMIN — METHYLPREDNISOLONE SODIUM SUCCINATE 80 MG: 40 INJECTION, POWDER, FOR SOLUTION INTRAMUSCULAR; INTRAVENOUS at 23:50

## 2018-04-05 ASSESSMENT — PAIN SCALES - GENERAL
PAINLEVEL_OUTOF10: 0
PAINLEVEL_OUTOF10: 8
PAINLEVEL_OUTOF10: 6
PAINLEVEL_OUTOF10: 0
PAINLEVEL_OUTOF10: 6
PAINLEVEL_OUTOF10: 0
PAINLEVEL_OUTOF10: 0

## 2018-04-06 LAB
ANION GAP SERPL CALCULATED.3IONS-SCNC: 12 MMOL/L (ref 7–19)
BASOPHILS ABSOLUTE: 0 K/UL (ref 0–0.2)
BASOPHILS RELATIVE PERCENT: 0.1 % (ref 0–1)
BUN BLDV-MCNC: 11 MG/DL (ref 6–20)
CALCIUM SERPL-MCNC: 9.3 MG/DL (ref 8.6–10)
CHLORIDE BLD-SCNC: 95 MMOL/L (ref 98–111)
CHOLESTEROL, TOTAL: 180 MG/DL (ref 160–199)
CO2: 26 MMOL/L (ref 22–29)
CREAT SERPL-MCNC: 0.7 MG/DL (ref 0.5–0.9)
EKG P AXIS: 75 DEGREES
EKG P-R INTERVAL: 160 MS
EKG Q-T INTERVAL: 308 MS
EKG QRS DURATION: 74 MS
EKG QTC CALCULATION (BAZETT): 402 MS
EKG T AXIS: 65 DEGREES
EOSINOPHILS ABSOLUTE: 0 K/UL (ref 0–0.6)
EOSINOPHILS RELATIVE PERCENT: 0 % (ref 0–5)
GFR NON-AFRICAN AMERICAN: >60
GLUCOSE BLD-MCNC: 282 MG/DL (ref 74–109)
GLUCOSE BLD-MCNC: 321 MG/DL (ref 70–99)
GLUCOSE BLD-MCNC: 350 MG/DL (ref 70–99)
GLUCOSE BLD-MCNC: 463 MG/DL (ref 70–99)
HBA1C MFR BLD: 6.2 %
HCG(URINE) PREGNANCY TEST: NEGATIVE
HCT VFR BLD CALC: 38.3 % (ref 37–47)
HDLC SERPL-MCNC: 41 MG/DL (ref 65–121)
HEMOGLOBIN: 12.6 G/DL (ref 12–16)
LDL CHOLESTEROL CALCULATED: ABNORMAL MG/DL
LDL CHOLESTEROL DIRECT: 93 MG/DL
LYMPHOCYTES ABSOLUTE: 1.8 K/UL (ref 1.1–4.5)
LYMPHOCYTES RELATIVE PERCENT: 6.9 % (ref 20–40)
MCH RBC QN AUTO: 29.2 PG (ref 27–31)
MCHC RBC AUTO-ENTMCNC: 32.9 G/DL (ref 33–37)
MCV RBC AUTO: 88.7 FL (ref 81–99)
MONOCYTES ABSOLUTE: 0.7 K/UL (ref 0–0.9)
MONOCYTES RELATIVE PERCENT: 2.8 % (ref 0–10)
NEUTROPHILS ABSOLUTE: 23.6 K/UL (ref 1.5–7.5)
NEUTROPHILS RELATIVE PERCENT: 88.4 % (ref 50–65)
PDW BLD-RTO: 13.4 % (ref 11.5–14.5)
PERFORMED ON: ABNORMAL
PLATELET # BLD: 380 K/UL (ref 130–400)
PMV BLD AUTO: 9.2 FL (ref 9.4–12.3)
POTASSIUM SERPL-SCNC: 4.5 MMOL/L (ref 3.5–5)
RBC # BLD: 4.32 M/UL (ref 4.2–5.4)
SODIUM BLD-SCNC: 133 MMOL/L (ref 136–145)
TRIGL SERPL-MCNC: 509 MG/DL (ref 0–149)
TROPONIN: <0.01 NG/ML (ref 0–0.03)
TROPONIN: <0.01 NG/ML (ref 0–0.03)
WBC # BLD: 26.7 K/UL (ref 4.8–10.8)

## 2018-04-06 PROCEDURE — 87086 URINE CULTURE/COLONY COUNT: CPT

## 2018-04-06 PROCEDURE — 2500000003 HC RX 250 WO HCPCS

## 2018-04-06 PROCEDURE — 82948 REAGENT STRIP/BLOOD GLUCOSE: CPT

## 2018-04-06 PROCEDURE — 36415 COLL VENOUS BLD VENIPUNCTURE: CPT

## 2018-04-06 PROCEDURE — 51702 INSERT TEMP BLADDER CATH: CPT

## 2018-04-06 PROCEDURE — 83721 ASSAY OF BLOOD LIPOPROTEIN: CPT

## 2018-04-06 PROCEDURE — 6360000002 HC RX W HCPCS: Performed by: FAMILY MEDICINE

## 2018-04-06 PROCEDURE — 80048 BASIC METABOLIC PNL TOTAL CA: CPT

## 2018-04-06 PROCEDURE — 85025 COMPLETE CBC W/AUTO DIFF WBC: CPT

## 2018-04-06 PROCEDURE — 2100000000 HC CCU R&B

## 2018-04-06 PROCEDURE — 6360000002 HC RX W HCPCS: Performed by: HOSPITALIST

## 2018-04-06 PROCEDURE — 93454 CORONARY ARTERY ANGIO S&I: CPT | Performed by: INTERNAL MEDICINE

## 2018-04-06 PROCEDURE — 2720000001 HC MISC SURG SUPPLY STERILE $51-500

## 2018-04-06 PROCEDURE — 2580000003 HC RX 258: Performed by: INTERNAL MEDICINE

## 2018-04-06 PROCEDURE — 6360000002 HC RX W HCPCS: Performed by: INTERNAL MEDICINE

## 2018-04-06 PROCEDURE — 4A023N7 MEASUREMENT OF CARDIAC SAMPLING AND PRESSURE, LEFT HEART, PERCUTANEOUS APPROACH: ICD-10-PCS | Performed by: INTERNAL MEDICINE

## 2018-04-06 PROCEDURE — 99254 IP/OBS CNSLTJ NEW/EST MOD 60: CPT | Performed by: INTERNAL MEDICINE

## 2018-04-06 PROCEDURE — C1894 INTRO/SHEATH, NON-LASER: HCPCS

## 2018-04-06 PROCEDURE — 2709999900 HC NON-CHARGEABLE SUPPLY

## 2018-04-06 PROCEDURE — 2720000000 HC MISC SURG SUPPLY STERILE $0-50

## 2018-04-06 PROCEDURE — 99291 CRITICAL CARE FIRST HOUR: CPT | Performed by: HOSPITALIST

## 2018-04-06 PROCEDURE — 80061 LIPID PANEL: CPT

## 2018-04-06 PROCEDURE — C1887 CATHETER, GUIDING: HCPCS

## 2018-04-06 PROCEDURE — 94640 AIRWAY INHALATION TREATMENT: CPT

## 2018-04-06 PROCEDURE — 6360000002 HC RX W HCPCS

## 2018-04-06 PROCEDURE — 2700000000 HC OXYGEN THERAPY PER DAY

## 2018-04-06 PROCEDURE — 6370000000 HC RX 637 (ALT 250 FOR IP): Performed by: INTERNAL MEDICINE

## 2018-04-06 PROCEDURE — 6370000000 HC RX 637 (ALT 250 FOR IP): Performed by: HOSPITALIST

## 2018-04-06 PROCEDURE — B2111ZZ FLUOROSCOPY OF MULTIPLE CORONARY ARTERIES USING LOW OSMOLAR CONTRAST: ICD-10-PCS | Performed by: INTERNAL MEDICINE

## 2018-04-06 PROCEDURE — 83036 HEMOGLOBIN GLYCOSYLATED A1C: CPT

## 2018-04-06 PROCEDURE — 81025 URINE PREGNANCY TEST: CPT

## 2018-04-06 PROCEDURE — C1769 GUIDE WIRE: HCPCS

## 2018-04-06 PROCEDURE — 84484 ASSAY OF TROPONIN QUANT: CPT

## 2018-04-06 PROCEDURE — 2500000003 HC RX 250 WO HCPCS: Performed by: HOSPITALIST

## 2018-04-06 RX ORDER — SODIUM CHLORIDE 0.9 % (FLUSH) 0.9 %
10 SYRINGE (ML) INJECTION EVERY 12 HOURS SCHEDULED
Status: DISCONTINUED | OUTPATIENT
Start: 2018-04-06 | End: 2018-04-06 | Stop reason: SDUPTHER

## 2018-04-06 RX ORDER — DEXTROSE MONOHYDRATE 50 MG/ML
100 INJECTION, SOLUTION INTRAVENOUS PRN
Status: DISCONTINUED | OUTPATIENT
Start: 2018-04-06 | End: 2018-04-09 | Stop reason: HOSPADM

## 2018-04-06 RX ORDER — DEXTROSE MONOHYDRATE 25 G/50ML
12.5 INJECTION, SOLUTION INTRAVENOUS PRN
Status: DISCONTINUED | OUTPATIENT
Start: 2018-04-06 | End: 2018-04-09 | Stop reason: HOSPADM

## 2018-04-06 RX ORDER — NITROGLYCERIN 0.4 MG/1
0.4 TABLET SUBLINGUAL EVERY 5 MIN PRN
Status: DISCONTINUED | OUTPATIENT
Start: 2018-04-06 | End: 2018-04-09 | Stop reason: HOSPADM

## 2018-04-06 RX ORDER — ASPIRIN 81 MG/1
324 TABLET, CHEWABLE ORAL DAILY
Status: DISCONTINUED | OUTPATIENT
Start: 2018-04-06 | End: 2018-04-09 | Stop reason: HOSPADM

## 2018-04-06 RX ORDER — SODIUM CHLORIDE 0.9 % (FLUSH) 0.9 %
10 SYRINGE (ML) INJECTION EVERY 12 HOURS SCHEDULED
Status: DISCONTINUED | OUTPATIENT
Start: 2018-04-06 | End: 2018-04-09 | Stop reason: HOSPADM

## 2018-04-06 RX ORDER — NITROGLYCERIN 20 MG/100ML
5 INJECTION INTRAVENOUS CONTINUOUS
Status: DISCONTINUED | OUTPATIENT
Start: 2018-04-06 | End: 2018-04-09 | Stop reason: HOSPADM

## 2018-04-06 RX ORDER — ACETAMINOPHEN 325 MG/1
650 TABLET ORAL EVERY 4 HOURS PRN
Status: DISCONTINUED | OUTPATIENT
Start: 2018-04-06 | End: 2018-04-09 | Stop reason: HOSPADM

## 2018-04-06 RX ORDER — ONDANSETRON 2 MG/ML
4 INJECTION INTRAMUSCULAR; INTRAVENOUS EVERY 6 HOURS PRN
Status: DISCONTINUED | OUTPATIENT
Start: 2018-04-06 | End: 2018-04-09 | Stop reason: HOSPADM

## 2018-04-06 RX ORDER — ASPIRIN 81 MG/1
TABLET, CHEWABLE ORAL
Status: DISCONTINUED
Start: 2018-04-06 | End: 2018-04-07

## 2018-04-06 RX ORDER — NITROGLYCERIN 0.4 MG/1
TABLET SUBLINGUAL
Status: DISCONTINUED
Start: 2018-04-06 | End: 2018-04-07

## 2018-04-06 RX ORDER — NICOTINE POLACRILEX 4 MG
15 LOZENGE BUCCAL PRN
Status: DISCONTINUED | OUTPATIENT
Start: 2018-04-06 | End: 2018-04-09 | Stop reason: HOSPADM

## 2018-04-06 RX ORDER — DIPHENHYDRAMINE HCL 25 MG
25 TABLET ORAL
Status: COMPLETED | OUTPATIENT
Start: 2018-04-06 | End: 2018-04-06

## 2018-04-06 RX ORDER — SODIUM CHLORIDE 0.9 % (FLUSH) 0.9 %
10 SYRINGE (ML) INJECTION PRN
Status: DISCONTINUED | OUTPATIENT
Start: 2018-04-06 | End: 2018-04-06 | Stop reason: SDUPTHER

## 2018-04-06 RX ORDER — DIAZEPAM 5 MG/1
5 TABLET ORAL
Status: COMPLETED | OUTPATIENT
Start: 2018-04-06 | End: 2018-04-06

## 2018-04-06 RX ORDER — FUROSEMIDE 10 MG/ML
40 INJECTION INTRAMUSCULAR; INTRAVENOUS ONCE
Status: COMPLETED | OUTPATIENT
Start: 2018-04-06 | End: 2018-04-06

## 2018-04-06 RX ORDER — PANTOPRAZOLE SODIUM 40 MG/1
40 TABLET, DELAYED RELEASE ORAL
Status: DISCONTINUED | OUTPATIENT
Start: 2018-04-06 | End: 2018-04-09 | Stop reason: HOSPADM

## 2018-04-06 RX ORDER — SODIUM CHLORIDE 9 MG/ML
INJECTION, SOLUTION INTRAVENOUS CONTINUOUS
Status: DISCONTINUED | OUTPATIENT
Start: 2018-04-06 | End: 2018-04-09 | Stop reason: HOSPADM

## 2018-04-06 RX ORDER — SODIUM CHLORIDE 0.9 % (FLUSH) 0.9 %
10 SYRINGE (ML) INJECTION PRN
Status: DISCONTINUED | OUTPATIENT
Start: 2018-04-06 | End: 2018-04-09 | Stop reason: HOSPADM

## 2018-04-06 RX ADMIN — ALBUTEROL SULFATE 2.5 MG: 2.5 SOLUTION RESPIRATORY (INHALATION) at 13:26

## 2018-04-06 RX ADMIN — GUAIFENESIN 600 MG: 600 TABLET, EXTENDED RELEASE ORAL at 14:27

## 2018-04-06 RX ADMIN — GABAPENTIN 600 MG: 600 TABLET, FILM COATED ORAL at 14:27

## 2018-04-06 RX ADMIN — Medication 10 ML: at 21:05

## 2018-04-06 RX ADMIN — METHYLPREDNISOLONE SODIUM SUCCINATE 80 MG: 40 INJECTION, POWDER, FOR SOLUTION INTRAMUSCULAR; INTRAVENOUS at 08:52

## 2018-04-06 RX ADMIN — FUROSEMIDE 20 MG: 20 TABLET ORAL at 08:52

## 2018-04-06 RX ADMIN — DULOXETINE HYDROCHLORIDE 60 MG: 60 CAPSULE, DELAYED RELEASE ORAL at 08:52

## 2018-04-06 RX ADMIN — ZOLPIDEM TARTRATE 5 MG: 5 TABLET ORAL at 21:11

## 2018-04-06 RX ADMIN — PANTOPRAZOLE SODIUM 40 MG: 40 TABLET, DELAYED RELEASE ORAL at 17:29

## 2018-04-06 RX ADMIN — FUROSEMIDE 40 MG: 10 INJECTION, SOLUTION INTRAMUSCULAR; INTRAVENOUS at 14:26

## 2018-04-06 RX ADMIN — IPRATROPIUM BROMIDE AND ALBUTEROL SULFATE 1 AMPULE: .5; 3 SOLUTION RESPIRATORY (INHALATION) at 06:56

## 2018-04-06 RX ADMIN — GABAPENTIN 600 MG: 600 TABLET, FILM COATED ORAL at 08:52

## 2018-04-06 RX ADMIN — AMITRIPTYLINE HYDROCHLORIDE 75 MG: 50 TABLET, FILM COATED ORAL at 21:05

## 2018-04-06 RX ADMIN — ACETAMINOPHEN 650 MG: 325 TABLET ORAL at 14:26

## 2018-04-06 RX ADMIN — GUAIFENESIN 600 MG: 600 TABLET, EXTENDED RELEASE ORAL at 08:52

## 2018-04-06 RX ADMIN — DIAZEPAM 5 MG: 5 TABLET ORAL at 15:27

## 2018-04-06 RX ADMIN — GABAPENTIN 600 MG: 600 TABLET, FILM COATED ORAL at 21:05

## 2018-04-06 RX ADMIN — INSULIN LISPRO 18.5 UNITS: 100 INJECTION, SOLUTION INTRAVENOUS; SUBCUTANEOUS at 11:55

## 2018-04-06 RX ADMIN — NITROGLYCERIN 5 MCG/MIN: 20 INJECTION INTRAVENOUS at 14:19

## 2018-04-06 RX ADMIN — METHYLPREDNISOLONE SODIUM SUCCINATE 80 MG: 40 INJECTION, POWDER, FOR SOLUTION INTRAMUSCULAR; INTRAVENOUS at 17:29

## 2018-04-06 RX ADMIN — IPRATROPIUM BROMIDE AND ALBUTEROL SULFATE 1 AMPULE: .5; 3 SOLUTION RESPIRATORY (INHALATION) at 18:18

## 2018-04-06 RX ADMIN — IBUPROFEN 200 MG: 200 TABLET, FILM COATED ORAL at 08:52

## 2018-04-06 RX ADMIN — Medication 30 ML: at 17:55

## 2018-04-06 RX ADMIN — ASPIRIN 81 MG CHEWABLE TABLET 324 MG: 81 TABLET CHEWABLE at 14:57

## 2018-04-06 RX ADMIN — ENOXAPARIN SODIUM 40 MG: 100 INJECTION SUBCUTANEOUS at 08:52

## 2018-04-06 RX ADMIN — INSULIN LISPRO 11 UNITS: 100 INJECTION, SOLUTION INTRAVENOUS; SUBCUTANEOUS at 21:03

## 2018-04-06 RX ADMIN — Medication 10 ML: at 08:52

## 2018-04-06 RX ADMIN — FUROSEMIDE 20 MG: 20 TABLET ORAL at 17:29

## 2018-04-06 RX ADMIN — DIPHENHYDRAMINE HCL 25 MG: 25 TABLET ORAL at 15:27

## 2018-04-06 RX ADMIN — GUAIFENESIN 600 MG: 600 TABLET, EXTENDED RELEASE ORAL at 17:29

## 2018-04-06 RX ADMIN — IPRATROPIUM BROMIDE AND ALBUTEROL SULFATE 1 AMPULE: .5; 3 SOLUTION RESPIRATORY (INHALATION) at 10:32

## 2018-04-06 RX ADMIN — GUAIFENESIN 600 MG: 600 TABLET, EXTENDED RELEASE ORAL at 21:05

## 2018-04-06 RX ADMIN — SODIUM CHLORIDE: 9 INJECTION, SOLUTION INTRAVENOUS at 15:15

## 2018-04-06 ASSESSMENT — PAIN DESCRIPTION - LOCATION: LOCATION: BACK

## 2018-04-06 ASSESSMENT — PAIN SCALES - GENERAL
PAINLEVEL_OUTOF10: 8
PAINLEVEL_OUTOF10: 0
PAINLEVEL_OUTOF10: 0
PAINLEVEL_OUTOF10: 4

## 2018-04-06 ASSESSMENT — PAIN DESCRIPTION - PAIN TYPE: TYPE: CHRONIC PAIN

## 2018-04-07 ENCOUNTER — APPOINTMENT (OUTPATIENT)
Dept: GENERAL RADIOLOGY | Age: 43
DRG: 193 | End: 2018-04-07
Payer: MEDICAID

## 2018-04-07 LAB
ANION GAP SERPL CALCULATED.3IONS-SCNC: 12 MMOL/L (ref 7–19)
BASOPHILS ABSOLUTE: 0 K/UL (ref 0–0.2)
BASOPHILS RELATIVE PERCENT: 0.1 % (ref 0–1)
BUN BLDV-MCNC: 19 MG/DL (ref 6–20)
CALCIUM SERPL-MCNC: 9.1 MG/DL (ref 8.6–10)
CHLORIDE BLD-SCNC: 98 MMOL/L (ref 98–111)
CO2: 29 MMOL/L (ref 22–29)
CREAT SERPL-MCNC: 0.7 MG/DL (ref 0.5–0.9)
EOSINOPHILS ABSOLUTE: 0 K/UL (ref 0–0.6)
EOSINOPHILS RELATIVE PERCENT: 0 % (ref 0–5)
GFR NON-AFRICAN AMERICAN: >60
GLUCOSE BLD-MCNC: 196 MG/DL (ref 70–99)
GLUCOSE BLD-MCNC: 208 MG/DL (ref 74–109)
GLUCOSE BLD-MCNC: 325 MG/DL (ref 70–99)
GLUCOSE BLD-MCNC: 390 MG/DL (ref 70–99)
GLUCOSE BLD-MCNC: 436 MG/DL (ref 70–99)
HCT VFR BLD CALC: 39.4 % (ref 37–47)
HEMOGLOBIN: 12.9 G/DL (ref 12–16)
LV EF: 54 %
LVEF MODALITY: NORMAL
LYMPHOCYTES ABSOLUTE: 2.1 K/UL (ref 1.1–4.5)
LYMPHOCYTES RELATIVE PERCENT: 9.6 % (ref 20–40)
MCH RBC QN AUTO: 29.2 PG (ref 27–31)
MCHC RBC AUTO-ENTMCNC: 32.7 G/DL (ref 33–37)
MCV RBC AUTO: 89.1 FL (ref 81–99)
MONOCYTES ABSOLUTE: 0.7 K/UL (ref 0–0.9)
MONOCYTES RELATIVE PERCENT: 3.2 % (ref 0–10)
NEUTROPHILS ABSOLUTE: 19.1 K/UL (ref 1.5–7.5)
NEUTROPHILS RELATIVE PERCENT: 85.1 % (ref 50–65)
PDW BLD-RTO: 13.4 % (ref 11.5–14.5)
PERFORMED ON: ABNORMAL
PLATELET # BLD: 417 K/UL (ref 130–400)
PMV BLD AUTO: 9 FL (ref 9.4–12.3)
POTASSIUM SERPL-SCNC: 4.3 MMOL/L (ref 3.5–5)
RBC # BLD: 4.42 M/UL (ref 4.2–5.4)
SODIUM BLD-SCNC: 139 MMOL/L (ref 136–145)
TROPONIN: <0.01 NG/ML (ref 0–0.03)
WBC # BLD: 22.4 K/UL (ref 4.8–10.8)

## 2018-04-07 PROCEDURE — 36415 COLL VENOUS BLD VENIPUNCTURE: CPT

## 2018-04-07 PROCEDURE — 84484 ASSAY OF TROPONIN QUANT: CPT

## 2018-04-07 PROCEDURE — 71046 X-RAY EXAM CHEST 2 VIEWS: CPT

## 2018-04-07 PROCEDURE — 99233 SBSQ HOSP IP/OBS HIGH 50: CPT | Performed by: HOSPITALIST

## 2018-04-07 PROCEDURE — 93005 ELECTROCARDIOGRAM TRACING: CPT

## 2018-04-07 PROCEDURE — 2580000003 HC RX 258: Performed by: INTERNAL MEDICINE

## 2018-04-07 PROCEDURE — 6370000000 HC RX 637 (ALT 250 FOR IP): Performed by: INTERNAL MEDICINE

## 2018-04-07 PROCEDURE — 80048 BASIC METABOLIC PNL TOTAL CA: CPT

## 2018-04-07 PROCEDURE — 1210000000 HC MED SURG R&B

## 2018-04-07 PROCEDURE — 85025 COMPLETE CBC W/AUTO DIFF WBC: CPT

## 2018-04-07 PROCEDURE — 94375 RESPIRATORY FLOW VOLUME LOOP: CPT

## 2018-04-07 PROCEDURE — 6360000002 HC RX W HCPCS: Performed by: HOSPITALIST

## 2018-04-07 PROCEDURE — 2700000000 HC OXYGEN THERAPY PER DAY

## 2018-04-07 PROCEDURE — 99232 SBSQ HOSP IP/OBS MODERATE 35: CPT | Performed by: INTERNAL MEDICINE

## 2018-04-07 PROCEDURE — 93306 TTE W/DOPPLER COMPLETE: CPT

## 2018-04-07 PROCEDURE — 94640 AIRWAY INHALATION TREATMENT: CPT

## 2018-04-07 PROCEDURE — 6370000000 HC RX 637 (ALT 250 FOR IP): Performed by: HOSPITALIST

## 2018-04-07 PROCEDURE — 82948 REAGENT STRIP/BLOOD GLUCOSE: CPT

## 2018-04-07 RX ORDER — AZITHROMYCIN 250 MG/1
250 TABLET, FILM COATED ORAL DAILY
Status: DISCONTINUED | OUTPATIENT
Start: 2018-04-07 | End: 2018-04-09 | Stop reason: HOSPADM

## 2018-04-07 RX ORDER — CEFDINIR 300 MG/1
300 CAPSULE ORAL EVERY 12 HOURS SCHEDULED
Status: DISCONTINUED | OUTPATIENT
Start: 2018-04-07 | End: 2018-04-09 | Stop reason: HOSPADM

## 2018-04-07 RX ORDER — PREDNISONE 20 MG/1
20 TABLET ORAL 2 TIMES DAILY
Status: DISCONTINUED | OUTPATIENT
Start: 2018-04-07 | End: 2018-04-09 | Stop reason: HOSPADM

## 2018-04-07 RX ORDER — FLUCONAZOLE 150 MG/1
150 TABLET ORAL NIGHTLY
Status: DISCONTINUED | OUTPATIENT
Start: 2018-04-07 | End: 2018-04-09 | Stop reason: HOSPADM

## 2018-04-07 RX ORDER — FUROSEMIDE 10 MG/ML
40 INJECTION INTRAMUSCULAR; INTRAVENOUS ONCE
Status: DISCONTINUED | OUTPATIENT
Start: 2018-04-07 | End: 2018-04-07

## 2018-04-07 RX ADMIN — Medication 10 ML: at 21:19

## 2018-04-07 RX ADMIN — IPRATROPIUM BROMIDE AND ALBUTEROL SULFATE 1 AMPULE: .5; 3 SOLUTION RESPIRATORY (INHALATION) at 14:24

## 2018-04-07 RX ADMIN — GABAPENTIN 600 MG: 600 TABLET, FILM COATED ORAL at 21:18

## 2018-04-07 RX ADMIN — FUROSEMIDE 20 MG: 20 TABLET ORAL at 07:57

## 2018-04-07 RX ADMIN — PREDNISONE 20 MG: 20 TABLET ORAL at 21:19

## 2018-04-07 RX ADMIN — PANTOPRAZOLE SODIUM 40 MG: 40 TABLET, DELAYED RELEASE ORAL at 05:35

## 2018-04-07 RX ADMIN — PREDNISONE 20 MG: 20 TABLET ORAL at 17:30

## 2018-04-07 RX ADMIN — GUAIFENESIN 600 MG: 600 TABLET, EXTENDED RELEASE ORAL at 11:43

## 2018-04-07 RX ADMIN — CEFDINIR 300 MG: 300 CAPSULE ORAL at 21:19

## 2018-04-07 RX ADMIN — FLUCONAZOLE 150 MG: 150 TABLET ORAL at 23:21

## 2018-04-07 RX ADMIN — AZITHROMYCIN 250 MG: 250 TABLET, FILM COATED ORAL at 17:29

## 2018-04-07 RX ADMIN — GABAPENTIN 600 MG: 600 TABLET, FILM COATED ORAL at 14:46

## 2018-04-07 RX ADMIN — INSULIN LISPRO 16 UNITS: 100 INJECTION, SOLUTION INTRAVENOUS; SUBCUTANEOUS at 17:30

## 2018-04-07 RX ADMIN — IPRATROPIUM BROMIDE AND ALBUTEROL SULFATE 1 AMPULE: .5; 3 SOLUTION RESPIRATORY (INHALATION) at 18:45

## 2018-04-07 RX ADMIN — AMITRIPTYLINE HYDROCHLORIDE 75 MG: 50 TABLET, FILM COATED ORAL at 21:19

## 2018-04-07 RX ADMIN — GUAIFENESIN 600 MG: 600 TABLET, EXTENDED RELEASE ORAL at 07:56

## 2018-04-07 RX ADMIN — IPRATROPIUM BROMIDE AND ALBUTEROL SULFATE 1 AMPULE: .5; 3 SOLUTION RESPIRATORY (INHALATION) at 11:17

## 2018-04-07 RX ADMIN — PANTOPRAZOLE SODIUM 40 MG: 40 TABLET, DELAYED RELEASE ORAL at 15:46

## 2018-04-07 RX ADMIN — IBUPROFEN 200 MG: 200 TABLET, FILM COATED ORAL at 08:43

## 2018-04-07 RX ADMIN — METHYLPREDNISOLONE SODIUM SUCCINATE 80 MG: 40 INJECTION, POWDER, FOR SOLUTION INTRAMUSCULAR; INTRAVENOUS at 08:30

## 2018-04-07 RX ADMIN — Medication 30 ML: at 05:35

## 2018-04-07 RX ADMIN — Medication 10 ML: at 08:00

## 2018-04-07 RX ADMIN — ZOLPIDEM TARTRATE 5 MG: 5 TABLET ORAL at 21:19

## 2018-04-07 RX ADMIN — IPRATROPIUM BROMIDE AND ALBUTEROL SULFATE 1 AMPULE: .5; 3 SOLUTION RESPIRATORY (INHALATION) at 06:37

## 2018-04-07 RX ADMIN — GUAIFENESIN 600 MG: 600 TABLET, EXTENDED RELEASE ORAL at 17:12

## 2018-04-07 RX ADMIN — INSULIN LISPRO 11 UNITS: 100 INJECTION, SOLUTION INTRAVENOUS; SUBCUTANEOUS at 21:21

## 2018-04-07 RX ADMIN — METHYLPREDNISOLONE SODIUM SUCCINATE 80 MG: 40 INJECTION, POWDER, FOR SOLUTION INTRAMUSCULAR; INTRAVENOUS at 00:00

## 2018-04-07 RX ADMIN — INSULIN LISPRO 14 UNITS: 100 INJECTION, SOLUTION INTRAVENOUS; SUBCUTANEOUS at 11:43

## 2018-04-07 RX ADMIN — GABAPENTIN 600 MG: 600 TABLET, FILM COATED ORAL at 07:57

## 2018-04-07 RX ADMIN — DULOXETINE HYDROCHLORIDE 60 MG: 60 CAPSULE, DELAYED RELEASE ORAL at 07:56

## 2018-04-07 RX ADMIN — FUROSEMIDE 20 MG: 20 TABLET ORAL at 17:12

## 2018-04-07 RX ADMIN — GUAIFENESIN 600 MG: 600 TABLET, EXTENDED RELEASE ORAL at 21:18

## 2018-04-07 ASSESSMENT — PAIN DESCRIPTION - ONSET: ONSET: SUDDEN

## 2018-04-07 ASSESSMENT — PAIN DESCRIPTION - PROGRESSION: CLINICAL_PROGRESSION: RAPIDLY IMPROVING

## 2018-04-07 ASSESSMENT — PAIN SCALES - GENERAL
PAINLEVEL_OUTOF10: 10
PAINLEVEL_OUTOF10: 0
PAINLEVEL_OUTOF10: 0
PAINLEVEL_OUTOF10: 3

## 2018-04-07 ASSESSMENT — PAIN DESCRIPTION - ORIENTATION: ORIENTATION: RIGHT;MID

## 2018-04-07 ASSESSMENT — PAIN DESCRIPTION - FREQUENCY: FREQUENCY: CONTINUOUS

## 2018-04-07 ASSESSMENT — PAIN DESCRIPTION - LOCATION: LOCATION: CHEST

## 2018-04-07 ASSESSMENT — PAIN DESCRIPTION - DESCRIPTORS: DESCRIPTORS: SQUEEZING

## 2018-04-07 ASSESSMENT — PAIN DESCRIPTION - PAIN TYPE: TYPE: ACUTE PAIN

## 2018-04-08 LAB
GLUCOSE BLD-MCNC: 156 MG/DL (ref 70–99)
GLUCOSE BLD-MCNC: 170 MG/DL (ref 70–99)
GLUCOSE BLD-MCNC: 241 MG/DL (ref 70–99)
GLUCOSE BLD-MCNC: 268 MG/DL (ref 70–99)
PERFORMED ON: ABNORMAL
URINE CULTURE, ROUTINE: NORMAL

## 2018-04-08 PROCEDURE — 6370000000 HC RX 637 (ALT 250 FOR IP): Performed by: INTERNAL MEDICINE

## 2018-04-08 PROCEDURE — 99233 SBSQ HOSP IP/OBS HIGH 50: CPT | Performed by: HOSPITALIST

## 2018-04-08 PROCEDURE — 2700000000 HC OXYGEN THERAPY PER DAY

## 2018-04-08 PROCEDURE — 1210000000 HC MED SURG R&B

## 2018-04-08 PROCEDURE — 82948 REAGENT STRIP/BLOOD GLUCOSE: CPT

## 2018-04-08 PROCEDURE — 2580000003 HC RX 258: Performed by: INTERNAL MEDICINE

## 2018-04-08 PROCEDURE — 6370000000 HC RX 637 (ALT 250 FOR IP): Performed by: HOSPITALIST

## 2018-04-08 PROCEDURE — 94640 AIRWAY INHALATION TREATMENT: CPT

## 2018-04-08 RX ADMIN — GUAIFENESIN 600 MG: 600 TABLET, EXTENDED RELEASE ORAL at 10:01

## 2018-04-08 RX ADMIN — AMITRIPTYLINE HYDROCHLORIDE 75 MG: 50 TABLET, FILM COATED ORAL at 22:11

## 2018-04-08 RX ADMIN — GABAPENTIN 600 MG: 600 TABLET, FILM COATED ORAL at 14:40

## 2018-04-08 RX ADMIN — CEFDINIR 300 MG: 300 CAPSULE ORAL at 22:12

## 2018-04-08 RX ADMIN — PREDNISONE 20 MG: 20 TABLET ORAL at 22:12

## 2018-04-08 RX ADMIN — INSULIN LISPRO 8 UNITS: 100 INJECTION, SOLUTION INTRAVENOUS; SUBCUTANEOUS at 18:16

## 2018-04-08 RX ADMIN — PANTOPRAZOLE SODIUM 40 MG: 40 TABLET, DELAYED RELEASE ORAL at 14:40

## 2018-04-08 RX ADMIN — PREDNISONE 20 MG: 20 TABLET ORAL at 10:02

## 2018-04-08 RX ADMIN — FUROSEMIDE 20 MG: 20 TABLET ORAL at 18:15

## 2018-04-08 RX ADMIN — Medication 10 ML: at 22:13

## 2018-04-08 RX ADMIN — Medication 10 ML: at 10:05

## 2018-04-08 RX ADMIN — CEFDINIR 300 MG: 300 CAPSULE ORAL at 10:01

## 2018-04-08 RX ADMIN — FUROSEMIDE 20 MG: 20 TABLET ORAL at 10:01

## 2018-04-08 RX ADMIN — INSULIN LISPRO 4 UNITS: 100 INJECTION, SOLUTION INTRAVENOUS; SUBCUTANEOUS at 14:43

## 2018-04-08 RX ADMIN — FLUCONAZOLE 150 MG: 150 TABLET ORAL at 22:13

## 2018-04-08 RX ADMIN — ZOLPIDEM TARTRATE 5 MG: 5 TABLET ORAL at 22:19

## 2018-04-08 RX ADMIN — IBUPROFEN 200 MG: 200 TABLET, FILM COATED ORAL at 09:59

## 2018-04-08 RX ADMIN — DULOXETINE HYDROCHLORIDE 60 MG: 60 CAPSULE, DELAYED RELEASE ORAL at 09:59

## 2018-04-08 RX ADMIN — GUAIFENESIN 600 MG: 600 TABLET, EXTENDED RELEASE ORAL at 14:41

## 2018-04-08 RX ADMIN — IPRATROPIUM BROMIDE AND ALBUTEROL SULFATE 1 AMPULE: .5; 3 SOLUTION RESPIRATORY (INHALATION) at 20:46

## 2018-04-08 RX ADMIN — GUAIFENESIN 600 MG: 600 TABLET, EXTENDED RELEASE ORAL at 22:12

## 2018-04-08 RX ADMIN — IPRATROPIUM BROMIDE AND ALBUTEROL SULFATE 1 AMPULE: .5; 3 SOLUTION RESPIRATORY (INHALATION) at 06:58

## 2018-04-08 RX ADMIN — AZITHROMYCIN 250 MG: 250 TABLET, FILM COATED ORAL at 10:01

## 2018-04-08 RX ADMIN — IPRATROPIUM BROMIDE AND ALBUTEROL SULFATE 1 AMPULE: .5; 3 SOLUTION RESPIRATORY (INHALATION) at 14:56

## 2018-04-08 RX ADMIN — GUAIFENESIN 600 MG: 600 TABLET, EXTENDED RELEASE ORAL at 18:16

## 2018-04-08 RX ADMIN — IPRATROPIUM BROMIDE AND ALBUTEROL SULFATE 1 AMPULE: .5; 3 SOLUTION RESPIRATORY (INHALATION) at 11:31

## 2018-04-08 RX ADMIN — GABAPENTIN 600 MG: 600 TABLET, FILM COATED ORAL at 22:12

## 2018-04-08 RX ADMIN — GABAPENTIN 600 MG: 600 TABLET, FILM COATED ORAL at 09:59

## 2018-04-08 RX ADMIN — PANTOPRAZOLE SODIUM 40 MG: 40 TABLET, DELAYED RELEASE ORAL at 10:01

## 2018-04-08 ASSESSMENT — PAIN SCALES - GENERAL
PAINLEVEL_OUTOF10: 3
PAINLEVEL_OUTOF10: 4

## 2018-04-09 VITALS
TEMPERATURE: 98.7 F | RESPIRATION RATE: 17 BRPM | BODY MASS INDEX: 41.95 KG/M2 | DIASTOLIC BLOOD PRESSURE: 79 MMHG | HEIGHT: 70 IN | OXYGEN SATURATION: 95 % | HEART RATE: 96 BPM | SYSTOLIC BLOOD PRESSURE: 132 MMHG | WEIGHT: 293 LBS

## 2018-04-09 LAB
ANION GAP SERPL CALCULATED.3IONS-SCNC: 16 MMOL/L (ref 7–19)
BUN BLDV-MCNC: 17 MG/DL (ref 6–20)
CALCIUM SERPL-MCNC: 8.7 MG/DL (ref 8.6–10)
CHLORIDE BLD-SCNC: 92 MMOL/L (ref 98–111)
CO2: 27 MMOL/L (ref 22–29)
CREAT SERPL-MCNC: 0.7 MG/DL (ref 0.5–0.9)
EKG P AXIS: 71 DEGREES
EKG P AXIS: 76 DEGREES
EKG P-R INTERVAL: 162 MS
EKG P-R INTERVAL: 164 MS
EKG Q-T INTERVAL: 306 MS
EKG Q-T INTERVAL: 352 MS
EKG QRS DURATION: 76 MS
EKG QRS DURATION: 78 MS
EKG QTC CALCULATION (BAZETT): 393 MS
EKG QTC CALCULATION (BAZETT): 397 MS
EKG T AXIS: 53 DEGREES
EKG T AXIS: 63 DEGREES
GFR NON-AFRICAN AMERICAN: >60
GLUCOSE BLD-MCNC: 210 MG/DL (ref 70–99)
GLUCOSE BLD-MCNC: 284 MG/DL (ref 74–109)
PERFORMED ON: ABNORMAL
POTASSIUM SERPL-SCNC: 4 MMOL/L (ref 3.5–5)
SODIUM BLD-SCNC: 135 MMOL/L (ref 136–145)

## 2018-04-09 PROCEDURE — 94640 AIRWAY INHALATION TREATMENT: CPT

## 2018-04-09 PROCEDURE — 94761 N-INVAS EAR/PLS OXIMETRY MLT: CPT

## 2018-04-09 PROCEDURE — 6370000000 HC RX 637 (ALT 250 FOR IP): Performed by: HOSPITALIST

## 2018-04-09 PROCEDURE — 36415 COLL VENOUS BLD VENIPUNCTURE: CPT

## 2018-04-09 PROCEDURE — 6370000000 HC RX 637 (ALT 250 FOR IP): Performed by: INTERNAL MEDICINE

## 2018-04-09 PROCEDURE — 99239 HOSP IP/OBS DSCHRG MGMT >30: CPT | Performed by: HOSPITALIST

## 2018-04-09 PROCEDURE — 2580000003 HC RX 258: Performed by: INTERNAL MEDICINE

## 2018-04-09 PROCEDURE — 99231 SBSQ HOSP IP/OBS SF/LOW 25: CPT | Performed by: INTERNAL MEDICINE

## 2018-04-09 PROCEDURE — 82948 REAGENT STRIP/BLOOD GLUCOSE: CPT

## 2018-04-09 PROCEDURE — 80048 BASIC METABOLIC PNL TOTAL CA: CPT

## 2018-04-09 PROCEDURE — 2700000000 HC OXYGEN THERAPY PER DAY

## 2018-04-09 RX ORDER — NICOTINE 21 MG/24HR
1 PATCH, TRANSDERMAL 24 HOURS TRANSDERMAL DAILY
Qty: 30 PATCH | Refills: 3 | COMMUNITY
Start: 2018-04-09 | End: 2021-11-14

## 2018-04-09 RX ORDER — PREDNISONE 20 MG/1
20 TABLET ORAL 2 TIMES DAILY
Qty: 20 TABLET | Refills: 0 | Status: SHIPPED | OUTPATIENT
Start: 2018-04-09 | End: 2018-04-19

## 2018-04-09 RX ORDER — AZITHROMYCIN 250 MG/1
250 TABLET, FILM COATED ORAL DAILY
Qty: 5 TABLET | Refills: 0 | Status: SHIPPED | OUTPATIENT
Start: 2018-04-09 | End: 2018-04-19

## 2018-04-09 RX ORDER — GUAIFENESIN 600 MG/1
600 TABLET, EXTENDED RELEASE ORAL 4 TIMES DAILY
Qty: 20 TABLET | Refills: 0 | Status: SHIPPED | OUTPATIENT
Start: 2018-04-09 | End: 2021-11-14

## 2018-04-09 RX ORDER — ZOLPIDEM TARTRATE 5 MG/1
5 TABLET ORAL NIGHTLY PRN
Qty: 3 TABLET | Refills: 0 | Status: SHIPPED | OUTPATIENT
Start: 2018-04-09 | End: 2018-04-23

## 2018-04-09 RX ORDER — CEFDINIR 300 MG/1
300 CAPSULE ORAL EVERY 12 HOURS SCHEDULED
Qty: 20 CAPSULE | Refills: 0 | Status: SHIPPED | OUTPATIENT
Start: 2018-04-09 | End: 2018-04-19

## 2018-04-09 RX ORDER — ASPIRIN 81 MG/1
324 TABLET, CHEWABLE ORAL DAILY
Qty: 30 TABLET | Refills: 3 | Status: SHIPPED | OUTPATIENT
Start: 2018-04-09 | End: 2021-11-14

## 2018-04-09 RX ADMIN — PREDNISONE 20 MG: 20 TABLET ORAL at 08:35

## 2018-04-09 RX ADMIN — METFORMIN HYDROCHLORIDE 500 MG: 500 TABLET, FILM COATED ORAL at 08:47

## 2018-04-09 RX ADMIN — PANTOPRAZOLE SODIUM 40 MG: 40 TABLET, DELAYED RELEASE ORAL at 06:12

## 2018-04-09 RX ADMIN — IBUPROFEN 200 MG: 200 TABLET, FILM COATED ORAL at 08:35

## 2018-04-09 RX ADMIN — AZITHROMYCIN 250 MG: 250 TABLET, FILM COATED ORAL at 08:35

## 2018-04-09 RX ADMIN — Medication 10 ML: at 08:36

## 2018-04-09 RX ADMIN — IPRATROPIUM BROMIDE AND ALBUTEROL SULFATE 1 AMPULE: .5; 3 SOLUTION RESPIRATORY (INHALATION) at 07:29

## 2018-04-09 RX ADMIN — INSULIN LISPRO 6 UNITS: 100 INJECTION, SOLUTION INTRAVENOUS; SUBCUTANEOUS at 08:37

## 2018-04-09 RX ADMIN — ASPIRIN 81 MG CHEWABLE TABLET 324 MG: 81 TABLET CHEWABLE at 08:35

## 2018-04-09 RX ADMIN — GABAPENTIN 600 MG: 600 TABLET, FILM COATED ORAL at 08:35

## 2018-04-09 RX ADMIN — CEFDINIR 300 MG: 300 CAPSULE ORAL at 08:35

## 2018-04-09 RX ADMIN — IPRATROPIUM BROMIDE AND ALBUTEROL SULFATE 1 AMPULE: .5; 3 SOLUTION RESPIRATORY (INHALATION) at 11:18

## 2018-04-09 RX ADMIN — GUAIFENESIN 600 MG: 600 TABLET, EXTENDED RELEASE ORAL at 08:35

## 2018-04-09 RX ADMIN — DULOXETINE HYDROCHLORIDE 60 MG: 60 CAPSULE, DELAYED RELEASE ORAL at 08:35

## 2018-04-09 RX ADMIN — FUROSEMIDE 20 MG: 20 TABLET ORAL at 08:36

## 2018-04-09 ASSESSMENT — PAIN SCALES - GENERAL: PAINLEVEL_OUTOF10: 2

## 2018-04-10 LAB
BLOOD CULTURE, ROUTINE: NORMAL
BLOOD CULTURE, ROUTINE: NORMAL
CULTURE, BLOOD 2: NORMAL
CULTURE, BLOOD 2: NORMAL

## 2018-04-30 RX ORDER — GABAPENTIN 300 MG/1
CAPSULE ORAL
Qty: 180 CAPSULE | Refills: 5 | Status: SHIPPED | OUTPATIENT
Start: 2018-04-30 | End: 2018-10-24 | Stop reason: SDUPTHER

## 2018-10-01 ENCOUNTER — OFFICE VISIT (OUTPATIENT)
Dept: NEUROSURGERY | Age: 43
End: 2018-10-01
Payer: MEDICAID

## 2018-10-01 VITALS
WEIGHT: 293 LBS | OXYGEN SATURATION: 98 % | SYSTOLIC BLOOD PRESSURE: 124 MMHG | HEIGHT: 70 IN | DIASTOLIC BLOOD PRESSURE: 76 MMHG | HEART RATE: 95 BPM | BODY MASS INDEX: 41.95 KG/M2

## 2018-10-01 DIAGNOSIS — G62.9 POLYNEUROPATHY: Primary | ICD-10-CM

## 2018-10-01 PROCEDURE — 99214 OFFICE O/P EST MOD 30 MIN: CPT | Performed by: PSYCHIATRY & NEUROLOGY

## 2019-04-06 DIAGNOSIS — G60.9 IDIOPATHIC POLYNEUROPATHY: ICD-10-CM

## 2019-04-08 ENCOUNTER — OFFICE VISIT (OUTPATIENT)
Dept: NEUROSURGERY | Age: 44
End: 2019-04-08
Payer: MEDICAID

## 2019-04-08 VITALS
SYSTOLIC BLOOD PRESSURE: 124 MMHG | WEIGHT: 293 LBS | HEIGHT: 70 IN | OXYGEN SATURATION: 96 % | DIASTOLIC BLOOD PRESSURE: 76 MMHG | HEART RATE: 95 BPM | BODY MASS INDEX: 41.95 KG/M2

## 2019-04-08 DIAGNOSIS — G60.9 IDIOPATHIC POLYNEUROPATHY: ICD-10-CM

## 2019-04-08 DIAGNOSIS — G62.9 POLYNEUROPATHY: Primary | ICD-10-CM

## 2019-04-08 PROCEDURE — 99214 OFFICE O/P EST MOD 30 MIN: CPT | Performed by: PSYCHIATRY & NEUROLOGY

## 2019-04-08 RX ORDER — GABAPENTIN 300 MG/1
CAPSULE ORAL
Qty: 180 CAPSULE | Refills: 5 | Status: SHIPPED | OUTPATIENT
Start: 2019-04-08 | End: 2021-11-14

## 2019-04-08 RX ORDER — GABAPENTIN 300 MG/1
CAPSULE ORAL
Qty: 210 CAPSULE | Refills: 5 | Status: SHIPPED | OUTPATIENT
Start: 2019-04-08 | End: 2019-09-28 | Stop reason: SDUPTHER

## 2019-04-08 NOTE — TELEPHONE ENCOUNTER
Requested Prescriptions     Pending Prescriptions Disp Refills    gabapentin (NEURONTIN) 300 MG capsule [Pharmacy Med Name: GABAPENTIN 300 MG CAPS 300 CAP] 180 capsule 5     Sig: TAKE TWO CAPSULES THREE TIMES A DAY     Last OV  none  Next OV  today  Last Rx  10/24/18  Pauline Leonora   4/6/19
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
## 2019-09-28 DIAGNOSIS — G60.9 IDIOPATHIC POLYNEUROPATHY: ICD-10-CM

## 2019-09-30 RX ORDER — GABAPENTIN 300 MG/1
CAPSULE ORAL
Qty: 210 CAPSULE | Refills: 5 | Status: SHIPPED | OUTPATIENT
Start: 2019-10-05 | End: 2020-03-24

## 2020-03-23 NOTE — TELEPHONE ENCOUNTER
Requested Prescriptions     Pending Prescriptions Disp Refills    gabapentin (NEURONTIN) 300 MG capsule [Pharmacy Med Name: GABAPENTIN 300 MG CAPS 300 CAP] 210 capsule 5     Sig: TAKE 2 CAPSULES BY MOUTH IN THE MORNING AND AT NOON. TAKE TAKE 3 CAPSULES BY MOUTH IN THE EVENING.        Last Office Visit:  4/8/2019  Next Office Visit:  None   Last Medication Refill: 10/5/19 with 5 refills   Vaughn Foy up to date:  3/23/20    *RX updated to reflect  3/23/20 fill date*

## 2020-03-24 RX ORDER — GABAPENTIN 300 MG/1
CAPSULE ORAL
Qty: 210 CAPSULE | Refills: 5 | Status: SHIPPED | OUTPATIENT
Start: 2020-03-24 | End: 2020-09-09

## 2020-06-15 ENCOUNTER — TELEPHONE (OUTPATIENT)
Dept: BARIATRICS/WEIGHT MGMT | Facility: CLINIC | Age: 45
End: 2020-06-15

## 2020-06-16 ENCOUNTER — OFFICE VISIT (OUTPATIENT)
Dept: BARIATRICS/WEIGHT MGMT | Facility: CLINIC | Age: 45
End: 2020-06-16

## 2020-06-16 ENCOUNTER — OFFICE VISIT (OUTPATIENT)
Dept: BARIATRICS/WEIGHT MGMT | Facility: HOSPITAL | Age: 45
End: 2020-06-16

## 2020-06-16 VITALS
SYSTOLIC BLOOD PRESSURE: 149 MMHG | OXYGEN SATURATION: 92 % | TEMPERATURE: 98.7 F | HEART RATE: 107 BPM | BODY MASS INDEX: 47.09 KG/M2 | WEIGHT: 293 LBS | DIASTOLIC BLOOD PRESSURE: 82 MMHG | HEIGHT: 66 IN

## 2020-06-16 DIAGNOSIS — E66.01 CLASS 3 SEVERE OBESITY DUE TO EXCESS CALORIES WITHOUT SERIOUS COMORBIDITY WITH BODY MASS INDEX (BMI) OF 50.0 TO 59.9 IN ADULT (HCC): Primary | ICD-10-CM

## 2020-06-16 DIAGNOSIS — G47.33 OBSTRUCTIVE SLEEP APNEA SYNDROME: ICD-10-CM

## 2020-06-16 DIAGNOSIS — I10 ESSENTIAL HYPERTENSION: ICD-10-CM

## 2020-06-16 PROBLEM — G47.30 SLEEP APNEA: Status: ACTIVE | Noted: 2020-06-16

## 2020-06-16 PROBLEM — E66.813 CLASS 3 SEVERE OBESITY DUE TO EXCESS CALORIES WITHOUT SERIOUS COMORBIDITY WITH BODY MASS INDEX (BMI) OF 50.0 TO 59.9 IN ADULT: Status: ACTIVE | Noted: 2020-06-16

## 2020-06-16 PROCEDURE — 99204 OFFICE O/P NEW MOD 45 MIN: CPT | Performed by: SURGERY

## 2020-06-16 RX ORDER — GABAPENTIN 300 MG/1
600 CAPSULE ORAL 3 TIMES DAILY
COMMUNITY

## 2020-06-16 RX ORDER — FUROSEMIDE 20 MG/1
20 TABLET ORAL 2 TIMES DAILY
COMMUNITY

## 2020-06-16 RX ORDER — DULOXETIN HYDROCHLORIDE 60 MG/1
60 CAPSULE, DELAYED RELEASE ORAL DAILY
COMMUNITY
End: 2023-03-02

## 2020-06-16 RX ORDER — LISINOPRIL 10 MG/1
10 TABLET ORAL 2 TIMES DAILY
COMMUNITY

## 2020-06-16 NOTE — PROGRESS NOTES
"Nutrition Services    Patient Name:  Donna Swain  YOB: 1975  MRN: [id number]  Admit Date:  (Not on file)    NUTRITION BARIATRIC/MWL NOTE     Visit 1  Initial Assessment   BA#   MWL    Anthropometrics   Height: 66.25 in  Weight: 319 lbs 9.6 oz  BMI: 51.2    Nutrition Recall  Eating _1-2_meals daily- only 1 most of the time  Meeting protein daily goal  Snacking-sometimes  Limited sweet intake-not a big sweet eater  Drinking carbonated beverages-several per day  Drinking less than 64 fluid ounces-seldom drinks any  Replacing __________ meal with protein shake daily     Education    Goal Setting and Information Packet  4 meal per day diet plan  4 meal per day sample menu  \"Perfect Protein, Fiber in Foods, and Reducing Fat\"  Reinforce Nutritional Needs for Surgery  Reinforce Nutritional Needs for MWL    Nutrition Goals   Eat __4___ meals per day with protein  Eat protein first at meals  Protein goal: 65gms   discussed protein guidelines for shakes and bar  Eliminate snacks  Healthier food choices  Portion control / Use smaller plate or measuring cup   Eliminate soda   Increase fluid intake to 64 ounces per day    Electronically signed by:  Cinthia Lee  06/16/20 11:32   "

## 2020-06-16 NOTE — PROGRESS NOTES
Patient Care Team:  Lorenzo Ramires MD as PCP - General (General Practice)    Reason for Visit:  Surgical Weight loss    Subjective     Patient is a 45 y.o. female presents with morbid obesity and her Body mass index is 51.2 kg/m².     She is here for discussion of surgical weight loss options.  She stated she has been with the disease of obesity for year(s).  She stated she suffers from obstructive sleep apnea, hypertension and morbid obesity due to her weight gain.  She stated that weight loss helps alleviate these symptoms.   She stated that she has tried multiple diet regimens including counting calories to help with weight loss.  She stated that she has attempted these conservative methods for weight loss without maintaining long term success.  Today she would like to discuss surgical weight loss options such as the Laparoscopic Sleeve Gastrectomy or the Laparoscopic R - Y Gastric Bypass.     Review of Systems  General ROS: positive for  - fatigue and weight gain  Psychological ROS: negative  Ophthalmic ROS: negative  Allergy and Immunology ROS: negative  Hematological and Lymphatic ROS: negative  Endocrine ROS: negative  Respiratory ROS: no cough, shortness of breath, or wheezing  Cardiovascular ROS: no chest pain or dyspnea on exertion  positive for - edema  Gastrointestinal ROS: no abdominal pain, change in bowel habits, or black or bloody stools  Genito-Urinary ROS: no dysuria, trouble voiding, or hematuria  Musculoskeletal ROS: positive for - joint pain    History  Past Medical History:   Diagnosis Date   • Ankle swelling    • Back pain    • Hemorrhoids    • History of stomach ulcers    • Hypertension    • Hyperthyroidism    • Neuropathy    • Sleep apnea     doesn't use c pap. feels like she is suffocating   • Thyroid goiter      Past Surgical History:   Procedure Laterality Date   • CHOLECYSTECTOMY      lap   • MULTIPLE TOOTH EXTRACTIONS     • OVARY SURGERY     • TUBAL ABDOMINAL LIGATION       Family  History   Problem Relation Age of Onset   • Cancer Mother    • Cancer Father    • COPD Father    • Diabetes Maternal Grandfather      Social History     Tobacco Use   • Smoking status: Current Every Day Smoker     Packs/day: 1.00     Types: Cigarettes   • Smokeless tobacco: Never Used   Substance Use Topics   • Alcohol use: Not Currently   • Drug use: Not Currently       (Not in a hospital admission)  Allergies:  Patient has no known allergies.      Current Outpatient Medications:   •  Amitriptyline HCl (ELAVIL PO), Take  by mouth., Disp: , Rfl:   •  DULoxetine (CYMBALTA) 60 MG capsule, Take 60 mg by mouth Daily., Disp: , Rfl:   •  furosemide (LASIX) 20 MG tablet, Take 20 mg by mouth 2 (Two) Times a Day., Disp: , Rfl:   •  gabapentin (NEURONTIN) 300 MG capsule, Take 300 mg by mouth., Disp: , Rfl:   •  lisinopril (PRINIVIL,ZESTRIL) 10 MG tablet, Take 10 mg by mouth Daily. Takes 2 daily, Disp: , Rfl:     Objective     Vital Signs  Temp:  [98.7 °F (37.1 °C)] 98.7 °F (37.1 °C)  Heart Rate:  [107] 107  BP: (149)/(82) 149/82  Body mass index is 51.2 kg/m².      06/16/20  0841   Weight: (!) 145 kg (319 lb 9.6 oz)       General Appearance:  awake, alert, oriented, in no acute distress  Lungs:  Normal expansion.  Clear to auscultation.  No rales, rhonchi, or wheezing.  Heart:  Heart sounds are normal.  Regular rate and rhythm without murmur, gallop or rub.  Abdomen:  Soft, non-tender, normal bowel sounds; no bruits, organomegaly or masses.  Abnormal shape: obese  Extremities: trace pedal edema and no calf pain      Results Review:   None        Assessment/Plan   Encounter Diagnoses   Name Primary?   • Class 3 severe obesity due to excess calories without serious comorbidity with body mass index (BMI) of 50.0 to 59.9 in adult (CMS/HCC) Yes   • Obstructive sleep apnea syndrome    • Essential hypertension        I believe this patient will be a good candidate for weight loss surgery.    She has chosen laparoscopic sleeve  "gastrectomy. I agree with this decision.  I have discussed the Alvaro - Y Gastric Bypass, laparoscopic sleeve gastrectomy and the Laparoscopic Gastric Band procedures to provide the alternatives which also includes non surgical weight loss options as well.  We discussed the benefits of the surgeries including the benefit of weight loss and the possible reversal of co-morbid conditions associated with morbid obesity. I explained to her that prior to making a definitive decision on the type of surgery she will require a study of the upper GI system.  I explained to her why the EGD is recommended.  She has been provided a structured dietary regimen based off of her behavior.  I discussed with the patient the etiology of the disease of obesity and the potential comorbid conditions associated with this disease.  She was instructed to follow the dietary regimen and follow-up with our program in 1 month's time with any additional questions as they may arise during this time.  We emphasized on focusing on proteins and meals high in fiber as well as adequate hydration that exceed 64 ounces of water daily.    I explained that I anticipate the patient to lose 8 pounds prior to her next monthly visit.  I have also explained that they need to record or document when they are going to have the \"cheat day\".  The patient states that her obstructive sleep apnea and hypertension have remained stable on the current treatment regimen.  I anticipate improvement if not reversal of these comorbid conditions with reversal of her morbid obesity.  I discussed the patient's findings and my recommendations with patient.     I have also recommended that she obtain completion of a medically supervised weight loss program prior to surgery consideration.    Dr. Luis Bates MD [facility]    06/16/20  09:24  Patient Care Team:  Lorenzo Ramires MD as PCP - General (General Practice)    "

## 2020-07-13 ENCOUNTER — OFFICE VISIT (OUTPATIENT)
Dept: BARIATRICS/WEIGHT MGMT | Facility: CLINIC | Age: 45
End: 2020-07-13

## 2020-07-13 ENCOUNTER — HOSPITAL ENCOUNTER (OUTPATIENT)
Dept: CARDIOLOGY | Facility: HOSPITAL | Age: 45
Discharge: HOME | End: 2020-07-13
Admitting: NURSE PRACTITIONER

## 2020-07-13 VITALS
OXYGEN SATURATION: 98 % | HEIGHT: 66 IN | SYSTOLIC BLOOD PRESSURE: 123 MMHG | WEIGHT: 293 LBS | HEART RATE: 91 BPM | DIASTOLIC BLOOD PRESSURE: 77 MMHG | TEMPERATURE: 99.3 F | BODY MASS INDEX: 47.09 KG/M2

## 2020-07-13 DIAGNOSIS — I10 ESSENTIAL HYPERTENSION: ICD-10-CM

## 2020-07-13 DIAGNOSIS — Z71.6 ENCOUNTER FOR TOBACCO USE CESSATION COUNSELING: ICD-10-CM

## 2020-07-13 DIAGNOSIS — E66.01 CLASS 3 SEVERE OBESITY DUE TO EXCESS CALORIES WITHOUT SERIOUS COMORBIDITY WITH BODY MASS INDEX (BMI) OF 50.0 TO 59.9 IN ADULT (HCC): ICD-10-CM

## 2020-07-13 DIAGNOSIS — E66.01 CLASS 3 SEVERE OBESITY DUE TO EXCESS CALORIES WITHOUT SERIOUS COMORBIDITY WITH BODY MASS INDEX (BMI) OF 50.0 TO 59.9 IN ADULT (HCC): Primary | ICD-10-CM

## 2020-07-13 DIAGNOSIS — F17.200 NICOTINE DEPENDENCE WITH CURRENT USE: ICD-10-CM

## 2020-07-13 DIAGNOSIS — Z01.818 ENCOUNTER FOR OTHER PREPROCEDURAL EXAMINATION: ICD-10-CM

## 2020-07-13 DIAGNOSIS — G47.33 OBSTRUCTIVE SLEEP APNEA SYNDROME: ICD-10-CM

## 2020-07-13 PROCEDURE — 93005 ELECTROCARDIOGRAM TRACING: CPT | Performed by: NURSE PRACTITIONER

## 2020-07-13 PROCEDURE — 99214 OFFICE O/P EST MOD 30 MIN: CPT | Performed by: NURSE PRACTITIONER

## 2020-07-13 PROCEDURE — 93010 ELECTROCARDIOGRAM REPORT: CPT | Performed by: INTERNAL MEDICINE

## 2020-07-13 NOTE — PROGRESS NOTES
Patient Care Team:  Lorenzo Ramires MD as PCP - General (General Practice)    Reason for Visit: Surgical Weight Loss (Visit 2)       Subjective        Donna Swain is a 45 y.o. year old female who is here today for follow-up and continued medical management of morbid obesity. Her current Body mass index is 50.43 kg/m². She states she suffers from high blood pressure, sleep apnea, and morbid obesity due to weight gain. She is currently following the 4 meals/day diet prescription. oDnna Swain previously agreed to incorporate the prescription as provided, while also increasing physical exercise. Patient states she has been successful at avoiding sodas, eating 3 meals per day, eliminate carbohydrates after lunch, and getting adequate water intake. She has struggled with eating 4th meal daily as she is not hungry. She currently smokes one pack of cigarettes per day. She has not been exercising. Donna Swain also states she has been drinking 80 ounces of water and getting 30 grams of protein intake per day. She has lost 5 lbs of weight since her last visit.    Review of Systems   Constitutional: Negative.    HENT: Negative.    Eyes: Negative.    Respiratory:        Snoring   Cardiovascular: Positive for leg swelling.   Gastrointestinal: Negative.    Endocrine: Negative.    Genitourinary: Negative.    Musculoskeletal: Negative.    Skin: Negative.    Neurological: Negative.    Hematological: Negative.    Psychiatric/Behavioral: Negative.         History  Past Medical History:   Diagnosis Date   • Ankle swelling    • Back pain    • Hemorrhoids    • History of stomach ulcers    • Hypertension    • Hyperthyroidism    • Neuropathy    • Sleep apnea     doesn't use c pap. feels like she is suffocating   • Thyroid goiter      Past Surgical History:   Procedure Laterality Date   • CHOLECYSTECTOMY      lap   • MULTIPLE TOOTH EXTRACTIONS     • OVARY SURGERY     • TUBAL ABDOMINAL LIGATION       Family History   Problem Relation Age of Onset    • Cancer Mother    • Cancer Father    • COPD Father    • Diabetes Maternal Grandfather      Social History     Tobacco Use   • Smoking status: Current Every Day Smoker     Packs/day: 1.00     Types: Cigarettes   • Smokeless tobacco: Never Used   Substance Use Topics   • Alcohol use: Not Currently   • Drug use: Not Currently       (Not in a hospital admission)  Allergies:  Patient has no known allergies.      Current Outpatient Medications:   •  Amitriptyline HCl (ELAVIL PO), Take  by mouth., Disp: , Rfl:   •  DULoxetine (CYMBALTA) 60 MG capsule, Take 60 mg by mouth Daily., Disp: , Rfl:   •  furosemide (LASIX) 20 MG tablet, Take 20 mg by mouth 2 (Two) Times a Day., Disp: , Rfl:   •  gabapentin (NEURONTIN) 300 MG capsule, Take 300 mg by mouth., Disp: , Rfl:   •  lisinopril (PRINIVIL,ZESTRIL) 10 MG tablet, Take 10 mg by mouth Daily. Takes 2 daily, Disp: , Rfl:     Objective     Vital Signs  Temp:  [99.3 °F (37.4 °C)] 99.3 °F (37.4 °C)  Heart Rate:  [91] 91  BP: (123)/(77) 123/77  Body mass index is 50.43 kg/m².      07/13/20  0921   Weight: (!) 143 kg (314 lb 12.8 oz)       Physical Exam   Constitutional: She is oriented to person, place, and time. She appears well-developed and well-nourished.   HENT:   Head: Normocephalic and atraumatic.   Eyes: Conjunctivae and EOM are normal.   Neck: Normal range of motion. Neck supple.   Cardiovascular: Normal rate, regular rhythm and normal heart sounds.   Pulmonary/Chest: Effort normal. She has wheezes (expiratory in all lobes, anterior and posterior).   Abdominal: Soft. Bowel sounds are normal.   Obese   Musculoskeletal: Normal range of motion. She exhibits edema (bilateral lower extremities with 1+ pitting edema).   Neurological: She is alert and oriented to person, place, and time.   Skin: Skin is warm and dry.   Psychiatric: She has a normal mood and affect. Her behavior is normal.       Results Review:   None      Assessment/Plan   Encounter Diagnoses   Name Primary?   •  Class 3 severe obesity due to excess calories without serious comorbidity with body mass index (BMI) of 50.0 to 59.9 in adult (CMS/[facility]) Yes   • Essential hypertension    • Obstructive sleep apnea syndrome    • Nicotine dependence with current use    • Encounter for tobacco use cessation counseling    • Encounter for other preprocedural examination          1. Donna Swain was seen today for follow-up, obesity, nutrition counseling and weight loss. She has lost 5 lbs of weight since her last visit, making her Body mass index is 50.43 kg/m².. Today we discussed consistency with healthy changes in lifestyle, diet, and exercise for long term success. Donna Swain had received handouts to her explaining the recommendation on portion sizes/appetite control/reading nutrition labels. Intensive behavioral therapy for obesity was done today as well.    Goals for this month are: get EKG today, get EGD records sent to us, increase protein intake, and continue to work towards following the meal prescription as provided focusing on eating 4 meals/day with vegetables at every meal, eliminating carbohydrates after lunch, and getting adequate water and protein intake. Patient encouraged to call with questions and/or struggles as they may arise prior to next scheduled appointment.    2. Current comorbid conditions of hypertension and DEBRA associated with her morbid obesity are reported to be stable on her current treatment regimen and medications. We anticipate the comorbid conditions to improve as we address her morbid obesity.    3. Donna Swain  reports that she has been smoking cigarettes. She has been smoking about 1.00 pack per day. She has never used smokeless tobacco.. I have educated her on the risk of diseases from using tobacco products such as cancer, COPD and heart diease. I advised her to quit and she is willing to quit. We have discussed the following method/s for tobacco cessation:  Counseling OTC Cessation Products  Prescription Medicaiton.  Together we have set a quit date for 3 months.  She will follow up with me in 1 month or sooner to check on her progress. I spent 3  minutes counseling the patient.    4. Pre-procedural Examination - EKG and Psychology evaluation ordered today.    A total of 25 minutes was spent face to face with this patient and over half of the time was spent on counseling and coordination of care for the disease of obesity. We specifically reviewed the dietary prescription and I made recommendations toward increasing exercise as tolerated as well as focusing on training their behavior toward storing less.    Pre-op testing:    Seminar - Completed  EGD - had in [place], she will get us records  H. Pylori - will look at records   H. Pylori Stool -  N/A    Psychological Evaluation - Ordered, but not yet completed    EKG - Ordered, but not yet completed    Cardiology - If EKG abnormal, cardiology will be ordered     TSH - Needed, but not yet ordered  Nicotine - Needed, but not yet ordered    Pulmonary Clearance - N/A   Drug Tests - N/A    Dietitian - Completed     Follow up in 1 month for a weight recheck.    Lizbeth Valdovinos, LORENA    07/13/20  10:00  Patient Care Team:  Lorenzo Ramires MD as PCP - General (General Practice)

## 2020-07-17 DIAGNOSIS — R94.31 ECG ABNORMALITY: ICD-10-CM

## 2020-07-17 DIAGNOSIS — E66.01 CLASS 3 SEVERE OBESITY DUE TO EXCESS CALORIES WITHOUT SERIOUS COMORBIDITY WITH BODY MASS INDEX (BMI) OF 50.0 TO 59.9 IN ADULT (HCC): ICD-10-CM

## 2020-07-17 DIAGNOSIS — Z01.818 ENCOUNTER FOR OTHER PREPROCEDURAL EXAMINATION: Primary | ICD-10-CM

## 2020-07-17 NOTE — PROGRESS NOTES
Patient is already established with a cardiologist in [place]. So we updated the referral to Dr Aries Strange. Patient voiced an understanding.

## 2020-08-12 ENCOUNTER — TELEPHONE (OUTPATIENT)
Dept: BARIATRICS/WEIGHT MGMT | Facility: CLINIC | Age: 45
End: 2020-08-12

## 2020-08-21 ENCOUNTER — TELEPHONE (OUTPATIENT)
Dept: BARIATRICS/WEIGHT MGMT | Facility: CLINIC | Age: 45
End: 2020-08-21

## 2020-08-21 NOTE — TELEPHONE ENCOUNTER
Attempted to call the patient August 21, 2020 at 11:30 AM.  The patient had no identifying information on her voice message and therefore could not leave a message.

## 2020-09-09 RX ORDER — GABAPENTIN 300 MG/1
CAPSULE ORAL
Qty: 210 CAPSULE | Refills: 5 | Status: SHIPPED | OUTPATIENT
Start: 2020-09-11 | End: 2021-03-05

## 2020-09-09 NOTE — TELEPHONE ENCOUNTER
Requested Prescriptions     Pending Prescriptions Disp Refills    gabapentin (NEURONTIN) 300 MG capsule [Pharmacy Med Name: GABAPENTIN 300 MG CAPS 300 CAP] 210 capsule 5     Sig: TAKE 2 CAPSULES BY MOUTH IN THE MORNING AND AT NOON. TAKE TAKE 3 CAPSULES BY MOUTH IN THE EVENING.        Last Office Visit:  10/15/2019  Next Office Visit:  Visit date not found  Last Medication Refill:  3/24/20 with 5 refills   Kamron Winkler up to date: 9/9/20     *RX updated to reflect  9/11/20  fill date*

## 2021-03-05 DIAGNOSIS — G60.9 IDIOPATHIC POLYNEUROPATHY: ICD-10-CM

## 2021-03-05 NOTE — TELEPHONE ENCOUNTER
Requested Prescriptions     Pending Prescriptions Disp Refills    gabapentin (NEURONTIN) 300 MG capsule [Pharmacy Med Name: GABAPENTIN 300 MG CAPS 300 Capsule] 210 capsule 5     Sig: TAKE 2 CAPSULES BY MOUTH IN THE MORNING AND AT NOON.  THEN TAKE 3 CAPSULES BY MOUTH IN THE EVENING       Last Office Visit:  4/8/19  Next Office Visit:  Visit date not found  Last Medication Refill: 9/11/20 with 5 refills    Natacha Bright up to date:  3//21    *RX updated to reflect   3/5/21  fill date*

## 2021-03-06 RX ORDER — GABAPENTIN 300 MG/1
CAPSULE ORAL
Qty: 210 CAPSULE | Refills: 5 | Status: ON HOLD | OUTPATIENT
Start: 2021-03-06 | End: 2021-10-22 | Stop reason: HOSPADM

## 2021-10-19 ENCOUNTER — APPOINTMENT (OUTPATIENT)
Dept: CT IMAGING | Age: 46
DRG: 074 | End: 2021-10-19
Payer: MEDICAID

## 2021-10-19 ENCOUNTER — APPOINTMENT (OUTPATIENT)
Dept: GENERAL RADIOLOGY | Age: 46
DRG: 074 | End: 2021-10-19
Payer: MEDICAID

## 2021-10-19 ENCOUNTER — HOSPITAL ENCOUNTER (INPATIENT)
Age: 46
LOS: 3 days | Discharge: HOME | DRG: 074 | End: 2021-10-22
Attending: EMERGENCY MEDICINE | Admitting: INTERNAL MEDICINE
Payer: MEDICAID

## 2021-10-19 DIAGNOSIS — M86.172 ACUTE OSTEOMYELITIS OF LEFT FOOT (HCC): Primary | ICD-10-CM

## 2021-10-19 PROBLEM — M86.9 OSTEOMYELITIS (HCC): Status: ACTIVE | Noted: 2021-10-19

## 2021-10-19 LAB
ALBUMIN SERPL-MCNC: 3.5 G/DL (ref 3.5–5.2)
ALP BLD-CCNC: 86 U/L (ref 35–104)
ALT SERPL-CCNC: 7 U/L (ref 5–33)
ANION GAP SERPL CALCULATED.3IONS-SCNC: 13 MMOL/L (ref 7–19)
AST SERPL-CCNC: 7 U/L (ref 5–32)
BASOPHILS ABSOLUTE: 0 K/UL (ref 0–0.2)
BASOPHILS RELATIVE PERCENT: 0.2 % (ref 0–1)
BILIRUB SERPL-MCNC: 0.3 MG/DL (ref 0.2–1.2)
BUN BLDV-MCNC: 6 MG/DL (ref 6–20)
C-REACTIVE PROTEIN: 16.42 MG/DL (ref 0–0.5)
CALCIUM SERPL-MCNC: 9.3 MG/DL (ref 8.6–10)
CHLORIDE BLD-SCNC: 95 MMOL/L (ref 98–111)
CO2: 26 MMOL/L (ref 22–29)
CREAT SERPL-MCNC: 0.6 MG/DL (ref 0.5–0.9)
EOSINOPHILS ABSOLUTE: 0.4 K/UL (ref 0–0.6)
EOSINOPHILS RELATIVE PERCENT: 2.9 % (ref 0–5)
GFR AFRICAN AMERICAN: >59
GFR NON-AFRICAN AMERICAN: >60
GLUCOSE BLD-MCNC: 119 MG/DL (ref 70–99)
GLUCOSE BLD-MCNC: 155 MG/DL (ref 70–99)
GLUCOSE BLD-MCNC: 177 MG/DL (ref 74–109)
HBA1C MFR BLD: 7.2 % (ref 4–6)
HCT VFR BLD CALC: 36.2 % (ref 37–47)
HEMOGLOBIN: 11.7 G/DL (ref 12–16)
IMMATURE GRANULOCYTES #: 0 K/UL
LACTIC ACID, SEPSIS: 2.5 MG/DL (ref 0.5–1.9)
LYMPHOCYTES ABSOLUTE: 2.7 K/UL (ref 1.1–4.5)
LYMPHOCYTES RELATIVE PERCENT: 21.3 % (ref 20–40)
MCH RBC QN AUTO: 27.8 PG (ref 27–31)
MCHC RBC AUTO-ENTMCNC: 32.3 G/DL (ref 33–37)
MCV RBC AUTO: 86 FL (ref 81–99)
MONOCYTES ABSOLUTE: 0.6 K/UL (ref 0–0.9)
MONOCYTES RELATIVE PERCENT: 5 % (ref 0–10)
NEUTROPHILS ABSOLUTE: 8.8 K/UL (ref 1.5–7.5)
NEUTROPHILS RELATIVE PERCENT: 70.3 % (ref 50–65)
PDW BLD-RTO: 14.6 % (ref 11.5–14.5)
PERFORMED ON: ABNORMAL
PERFORMED ON: ABNORMAL
PLATELET # BLD: 425 K/UL (ref 130–400)
PMV BLD AUTO: 8.8 FL (ref 9.4–12.3)
POTASSIUM SERPL-SCNC: 3.7 MMOL/L (ref 3.5–5)
RBC # BLD: 4.21 M/UL (ref 4.2–5.4)
SARS-COV-2, NAAT: NOT DETECTED
SEDIMENTATION RATE, ERYTHROCYTE: 83 MM/HR (ref 0–20)
SODIUM BLD-SCNC: 134 MMOL/L (ref 136–145)
TOTAL PROTEIN: 7.5 G/DL (ref 6.6–8.7)
WBC # BLD: 12.5 K/UL (ref 4.8–10.8)

## 2021-10-19 PROCEDURE — 6360000004 HC RX CONTRAST MEDICATION: Performed by: EMERGENCY MEDICINE

## 2021-10-19 PROCEDURE — 87040 BLOOD CULTURE FOR BACTERIA: CPT

## 2021-10-19 PROCEDURE — 2580000003 HC RX 258: Performed by: EMERGENCY MEDICINE

## 2021-10-19 PROCEDURE — 2700000000 HC OXYGEN THERAPY PER DAY

## 2021-10-19 PROCEDURE — 86140 C-REACTIVE PROTEIN: CPT

## 2021-10-19 PROCEDURE — 6360000002 HC RX W HCPCS: Performed by: EMERGENCY MEDICINE

## 2021-10-19 PROCEDURE — 82947 ASSAY GLUCOSE BLOOD QUANT: CPT

## 2021-10-19 PROCEDURE — 85652 RBC SED RATE AUTOMATED: CPT

## 2021-10-19 PROCEDURE — 6370000000 HC RX 637 (ALT 250 FOR IP): Performed by: NURSE PRACTITIONER

## 2021-10-19 PROCEDURE — 80053 COMPREHEN METABOLIC PANEL: CPT

## 2021-10-19 PROCEDURE — 73701 CT LOWER EXTREMITY W/DYE: CPT

## 2021-10-19 PROCEDURE — 85025 COMPLETE CBC W/AUTO DIFF WBC: CPT

## 2021-10-19 PROCEDURE — 2580000003 HC RX 258: Performed by: NURSE PRACTITIONER

## 2021-10-19 PROCEDURE — 1210000000 HC MED SURG R&B

## 2021-10-19 PROCEDURE — 99283 EMERGENCY DEPT VISIT LOW MDM: CPT

## 2021-10-19 PROCEDURE — 83036 HEMOGLOBIN GLYCOSYLATED A1C: CPT

## 2021-10-19 PROCEDURE — 73630 X-RAY EXAM OF FOOT: CPT

## 2021-10-19 PROCEDURE — 36415 COLL VENOUS BLD VENIPUNCTURE: CPT

## 2021-10-19 PROCEDURE — 87635 SARS-COV-2 COVID-19 AMP PRB: CPT

## 2021-10-19 PROCEDURE — 6360000002 HC RX W HCPCS: Performed by: NURSE PRACTITIONER

## 2021-10-19 PROCEDURE — 73600 X-RAY EXAM OF ANKLE: CPT

## 2021-10-19 PROCEDURE — 83605 ASSAY OF LACTIC ACID: CPT

## 2021-10-19 RX ORDER — HYDROCODONE BITARTRATE AND ACETAMINOPHEN 5; 325 MG/1; MG/1
1 TABLET ORAL EVERY 4 HOURS PRN
Status: DISCONTINUED | OUTPATIENT
Start: 2021-10-19 | End: 2021-10-22

## 2021-10-19 RX ORDER — SODIUM CHLORIDE 9 MG/ML
25 INJECTION, SOLUTION INTRAVENOUS PRN
Status: DISCONTINUED | OUTPATIENT
Start: 2021-10-19 | End: 2021-10-22 | Stop reason: HOSPADM

## 2021-10-19 RX ORDER — NICOTINE POLACRILEX 4 MG
15 LOZENGE BUCCAL PRN
Status: DISCONTINUED | OUTPATIENT
Start: 2021-10-19 | End: 2021-10-22 | Stop reason: HOSPADM

## 2021-10-19 RX ORDER — DEXTROSE MONOHYDRATE 50 MG/ML
100 INJECTION, SOLUTION INTRAVENOUS PRN
Status: DISCONTINUED | OUTPATIENT
Start: 2021-10-19 | End: 2021-10-22 | Stop reason: HOSPADM

## 2021-10-19 RX ORDER — POTASSIUM CHLORIDE 750 MG/1
10 TABLET, EXTENDED RELEASE ORAL DAILY
Status: DISCONTINUED | OUTPATIENT
Start: 2021-10-19 | End: 2021-10-19

## 2021-10-19 RX ORDER — SODIUM CHLORIDE 0.9 % (FLUSH) 0.9 %
5-40 SYRINGE (ML) INJECTION EVERY 12 HOURS SCHEDULED
Status: DISCONTINUED | OUTPATIENT
Start: 2021-10-19 | End: 2021-10-22 | Stop reason: HOSPADM

## 2021-10-19 RX ORDER — DEXTROSE MONOHYDRATE 25 G/50ML
12.5 INJECTION, SOLUTION INTRAVENOUS PRN
Status: DISCONTINUED | OUTPATIENT
Start: 2021-10-19 | End: 2021-10-22 | Stop reason: HOSPADM

## 2021-10-19 RX ORDER — GUAIFENESIN 600 MG/1
600 TABLET, EXTENDED RELEASE ORAL 4 TIMES DAILY
Status: DISCONTINUED | OUTPATIENT
Start: 2021-10-19 | End: 2021-10-19

## 2021-10-19 RX ORDER — DULOXETIN HYDROCHLORIDE 30 MG/1
60 CAPSULE, DELAYED RELEASE ORAL DAILY
Status: DISCONTINUED | OUTPATIENT
Start: 2021-10-19 | End: 2021-10-22 | Stop reason: HOSPADM

## 2021-10-19 RX ORDER — MORPHINE SULFATE 2 MG/ML
2 INJECTION, SOLUTION INTRAMUSCULAR; INTRAVENOUS
Status: DISCONTINUED | OUTPATIENT
Start: 2021-10-19 | End: 2021-10-21

## 2021-10-19 RX ORDER — ASPIRIN 81 MG/1
324 TABLET, CHEWABLE ORAL DAILY
Status: DISCONTINUED | OUTPATIENT
Start: 2021-10-19 | End: 2021-10-19

## 2021-10-19 RX ORDER — MORPHINE SULFATE 4 MG/ML
4 INJECTION, SOLUTION INTRAMUSCULAR; INTRAVENOUS
Status: DISCONTINUED | OUTPATIENT
Start: 2021-10-19 | End: 2021-10-21

## 2021-10-19 RX ORDER — ACETAMINOPHEN 325 MG/1
650 TABLET ORAL EVERY 6 HOURS PRN
Status: DISCONTINUED | OUTPATIENT
Start: 2021-10-19 | End: 2021-10-22 | Stop reason: HOSPADM

## 2021-10-19 RX ORDER — POLYETHYLENE GLYCOL 3350 17 G/17G
17 POWDER, FOR SOLUTION ORAL DAILY PRN
Status: DISCONTINUED | OUTPATIENT
Start: 2021-10-19 | End: 2021-10-22 | Stop reason: HOSPADM

## 2021-10-19 RX ORDER — HYDROMORPHONE HYDROCHLORIDE 1 MG/ML
1 INJECTION, SOLUTION INTRAMUSCULAR; INTRAVENOUS; SUBCUTANEOUS ONCE
Status: COMPLETED | OUTPATIENT
Start: 2021-10-19 | End: 2021-10-19

## 2021-10-19 RX ORDER — SODIUM CHLORIDE 0.9 % (FLUSH) 0.9 %
5-40 SYRINGE (ML) INJECTION PRN
Status: DISCONTINUED | OUTPATIENT
Start: 2021-10-19 | End: 2021-10-22 | Stop reason: HOSPADM

## 2021-10-19 RX ORDER — ACETAMINOPHEN 650 MG/1
650 SUPPOSITORY RECTAL EVERY 6 HOURS PRN
Status: DISCONTINUED | OUTPATIENT
Start: 2021-10-19 | End: 2021-10-22 | Stop reason: HOSPADM

## 2021-10-19 RX ORDER — HYDROCODONE BITARTRATE AND ACETAMINOPHEN 5; 325 MG/1; MG/1
2 TABLET ORAL EVERY 4 HOURS PRN
Status: DISCONTINUED | OUTPATIENT
Start: 2021-10-19 | End: 2021-10-22

## 2021-10-19 RX ORDER — NICOTINE 21 MG/24HR
1 PATCH, TRANSDERMAL 24 HOURS TRANSDERMAL DAILY
Status: DISCONTINUED | OUTPATIENT
Start: 2021-10-19 | End: 2021-10-22 | Stop reason: HOSPADM

## 2021-10-19 RX ORDER — ONDANSETRON 2 MG/ML
4 INJECTION INTRAMUSCULAR; INTRAVENOUS EVERY 6 HOURS PRN
Status: DISCONTINUED | OUTPATIENT
Start: 2021-10-19 | End: 2021-10-22 | Stop reason: HOSPADM

## 2021-10-19 RX ORDER — ONDANSETRON 4 MG/1
4 TABLET, ORALLY DISINTEGRATING ORAL EVERY 8 HOURS PRN
Status: DISCONTINUED | OUTPATIENT
Start: 2021-10-19 | End: 2021-10-22 | Stop reason: HOSPADM

## 2021-10-19 RX ORDER — GABAPENTIN 300 MG/1
600 CAPSULE ORAL 3 TIMES DAILY
Status: DISCONTINUED | OUTPATIENT
Start: 2021-10-19 | End: 2021-10-22 | Stop reason: HOSPADM

## 2021-10-19 RX ORDER — AMITRIPTYLINE HYDROCHLORIDE 75 MG/1
75 TABLET, FILM COATED ORAL NIGHTLY
Status: DISCONTINUED | OUTPATIENT
Start: 2021-10-19 | End: 2021-10-22 | Stop reason: HOSPADM

## 2021-10-19 RX ORDER — INSULIN GLARGINE 100 [IU]/ML
15 INJECTION, SOLUTION SUBCUTANEOUS NIGHTLY
Status: DISCONTINUED | OUTPATIENT
Start: 2021-10-19 | End: 2021-10-22 | Stop reason: HOSPADM

## 2021-10-19 RX ADMIN — MORPHINE SULFATE 4 MG: 4 INJECTION, SOLUTION INTRAMUSCULAR; INTRAVENOUS at 21:45

## 2021-10-19 RX ADMIN — INSULIN GLARGINE 15 UNITS: 100 INJECTION, SOLUTION SUBCUTANEOUS at 21:37

## 2021-10-19 RX ADMIN — AMITRIPTYLINE HYDROCHLORIDE 75 MG: 75 TABLET, FILM COATED ORAL at 21:37

## 2021-10-19 RX ADMIN — PIPERACILLIN AND TAZOBACTAM 3375 MG: 3; .375 INJECTION, POWDER, LYOPHILIZED, FOR SOLUTION INTRAVENOUS at 19:59

## 2021-10-19 RX ADMIN — IOPAMIDOL 90 ML: 755 INJECTION, SOLUTION INTRAVENOUS at 08:20

## 2021-10-19 RX ADMIN — PIPERACILLIN SODIUM AND TAZOBACTAM SODIUM 4500 MG: 4; .5 INJECTION, POWDER, LYOPHILIZED, FOR SOLUTION INTRAVENOUS at 11:09

## 2021-10-19 RX ADMIN — SODIUM CHLORIDE, PRESERVATIVE FREE 10 ML: 5 INJECTION INTRAVENOUS at 21:39

## 2021-10-19 RX ADMIN — HYDROMORPHONE HYDROCHLORIDE 1 MG: 1 INJECTION, SOLUTION INTRAMUSCULAR; INTRAVENOUS; SUBCUTANEOUS at 11:12

## 2021-10-19 RX ADMIN — HYDROCODONE BITARTRATE AND ACETAMINOPHEN 2 TABLET: 5; 325 TABLET ORAL at 19:59

## 2021-10-19 RX ADMIN — MORPHINE SULFATE 2 MG: 2 INJECTION, SOLUTION INTRAMUSCULAR; INTRAVENOUS at 16:37

## 2021-10-19 RX ADMIN — DAPTOMYCIN 600 MG: 500 INJECTION, POWDER, LYOPHILIZED, FOR SOLUTION INTRAVENOUS at 11:46

## 2021-10-19 RX ADMIN — GABAPENTIN 600 MG: 300 CAPSULE ORAL at 21:37

## 2021-10-19 ASSESSMENT — ENCOUNTER SYMPTOMS
ABDOMINAL DISTENTION: 0
COLOR CHANGE: 0
NAUSEA: 0
PHOTOPHOBIA: 0
ALLERGIC/IMMUNOLOGIC NEGATIVE: 1
CONSTIPATION: 0
SHORTNESS OF BREATH: 0
EYES NEGATIVE: 1
ABDOMINAL PAIN: 0
RESPIRATORY NEGATIVE: 1
CHEST TIGHTNESS: 0
ABDOMINAL PAIN: 1
VOMITING: 0
DIARRHEA: 0

## 2021-10-19 ASSESSMENT — PAIN SCALES - GENERAL
PAINLEVEL_OUTOF10: 9
PAINLEVEL_OUTOF10: 10
PAINLEVEL_OUTOF10: 6
PAINLEVEL_OUTOF10: 10
PAINLEVEL_OUTOF10: 6

## 2021-10-19 ASSESSMENT — PAIN DESCRIPTION - LOCATION: LOCATION: FOOT

## 2021-10-19 ASSESSMENT — PAIN DESCRIPTION - ORIENTATION: ORIENTATION: LEFT
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
## 2021-10-19 NOTE — PROGRESS NOTES
Pharmacy Consult      Mary Kay Bal is a 42 y.o. female for whom pharmacy has been consulted to dose Daptomycin. Patient Active Problem List   Diagnosis    Acute respiratory failure with hypoxemia (HCC)    Tobacco abuse    Acute bronchitis    Respiratory failure (HCC)    Vitamin D deficiency    Hypothyroidism    Hyperglycemia    Pneumonia due to organism    Hypoxia    Chest pain    Osteomyelitis (HCC)       Allergies:  Vancomycin     Recent Labs     10/19/21  0736   CREATININE 0.6       Ht/Wt:   Ht Readings from Last 1 Encounters:   10/19/21 5' 10\" (1.778 m)        Wt Readings from Last 1 Encounters:   10/19/21 300 lb (136.1 kg)         Estimated Creatinine Clearance: 180 mL/min (based on SCr of 0.6 mg/dL). Assessment/Plan:    Daptomycin 600mg (6mg/kg x Adj. Body Weight 95.5kg = 573mg rounded to nearest 100) IVPB every 24 hours. Thank you for the consult. Will continue to follow.     Electronically signed by James Parikh, Pharmacy Intern on 10/19/2021 at 10:52 AM
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
## 2021-10-20 ENCOUNTER — APPOINTMENT (OUTPATIENT)
Dept: ULTRASOUND IMAGING | Age: 46
DRG: 074 | End: 2021-10-20
Payer: MEDICAID

## 2021-10-20 ENCOUNTER — APPOINTMENT (OUTPATIENT)
Dept: MRI IMAGING | Age: 46
DRG: 074 | End: 2021-10-20
Payer: MEDICAID

## 2021-10-20 LAB
ALBUMIN SERPL-MCNC: 3.7 G/DL (ref 3.5–5.2)
ALP BLD-CCNC: 88 U/L (ref 35–104)
ALT SERPL-CCNC: 7 U/L (ref 5–33)
ANION GAP SERPL CALCULATED.3IONS-SCNC: 10 MMOL/L (ref 7–19)
AST SERPL-CCNC: 9 U/L (ref 5–32)
BASOPHILS ABSOLUTE: 0 K/UL (ref 0–0.2)
BASOPHILS RELATIVE PERCENT: 0.4 % (ref 0–1)
BILIRUB SERPL-MCNC: 0.3 MG/DL (ref 0.2–1.2)
BUN BLDV-MCNC: 8 MG/DL (ref 6–20)
CALCIUM SERPL-MCNC: 9 MG/DL (ref 8.6–10)
CHLORIDE BLD-SCNC: 95 MMOL/L (ref 98–111)
CO2: 31 MMOL/L (ref 22–29)
CREAT SERPL-MCNC: 0.6 MG/DL (ref 0.5–0.9)
EOSINOPHILS ABSOLUTE: 0.5 K/UL (ref 0–0.6)
EOSINOPHILS RELATIVE PERCENT: 4.3 % (ref 0–5)
GFR AFRICAN AMERICAN: >59
GFR NON-AFRICAN AMERICAN: >60
GLUCOSE BLD-MCNC: 130 MG/DL (ref 70–99)
GLUCOSE BLD-MCNC: 150 MG/DL (ref 74–109)
GLUCOSE BLD-MCNC: 159 MG/DL (ref 70–99)
GLUCOSE BLD-MCNC: 163 MG/DL (ref 70–99)
GLUCOSE BLD-MCNC: 182 MG/DL (ref 70–99)
HBA1C MFR BLD: 7.2 % (ref 4–6)
HCT VFR BLD CALC: 37.6 % (ref 37–47)
HEMOGLOBIN: 11.6 G/DL (ref 12–16)
IMMATURE GRANULOCYTES #: 0 K/UL
LYMPHOCYTES ABSOLUTE: 2.4 K/UL (ref 1.1–4.5)
LYMPHOCYTES RELATIVE PERCENT: 21.3 % (ref 20–40)
MCH RBC QN AUTO: 27.5 PG (ref 27–31)
MCHC RBC AUTO-ENTMCNC: 30.9 G/DL (ref 33–37)
MCV RBC AUTO: 89.1 FL (ref 81–99)
MONOCYTES ABSOLUTE: 0.6 K/UL (ref 0–0.9)
MONOCYTES RELATIVE PERCENT: 5 % (ref 0–10)
NEUTROPHILS ABSOLUTE: 7.6 K/UL (ref 1.5–7.5)
NEUTROPHILS RELATIVE PERCENT: 68.7 % (ref 50–65)
PDW BLD-RTO: 14.5 % (ref 11.5–14.5)
PERFORMED ON: ABNORMAL
PLATELET # BLD: 423 K/UL (ref 130–400)
PMV BLD AUTO: 8.9 FL (ref 9.4–12.3)
POTASSIUM REFLEX MAGNESIUM: 4.2 MMOL/L (ref 3.5–5)
RBC # BLD: 4.22 M/UL (ref 4.2–5.4)
SODIUM BLD-SCNC: 136 MMOL/L (ref 136–145)
TOTAL PROTEIN: 7.2 G/DL (ref 6.6–8.7)
WBC # BLD: 11.1 K/UL (ref 4.8–10.8)

## 2021-10-20 PROCEDURE — 0S9N3ZX DRAINAGE OF LEFT METATARSAL-PHALANGEAL JOINT, PERCUTANEOUS APPROACH, DIAGNOSTIC: ICD-10-PCS | Performed by: RADIOLOGY

## 2021-10-20 PROCEDURE — 87075 CULTR BACTERIA EXCEPT BLOOD: CPT

## 2021-10-20 PROCEDURE — 6360000004 HC RX CONTRAST MEDICATION: Performed by: PODIATRIST

## 2021-10-20 PROCEDURE — 97165 OT EVAL LOW COMPLEX 30 MIN: CPT

## 2021-10-20 PROCEDURE — 87015 SPECIMEN INFECT AGNT CONCNTJ: CPT

## 2021-10-20 PROCEDURE — 80053 COMPREHEN METABOLIC PANEL: CPT

## 2021-10-20 PROCEDURE — 82947 ASSAY GLUCOSE BLOOD QUANT: CPT

## 2021-10-20 PROCEDURE — A9577 INJ MULTIHANCE: HCPCS | Performed by: PODIATRIST

## 2021-10-20 PROCEDURE — 6370000000 HC RX 637 (ALT 250 FOR IP): Performed by: NURSE PRACTITIONER

## 2021-10-20 PROCEDURE — 6360000002 HC RX W HCPCS: Performed by: EMERGENCY MEDICINE

## 2021-10-20 PROCEDURE — 83036 HEMOGLOBIN GLYCOSYLATED A1C: CPT

## 2021-10-20 PROCEDURE — 85025 COMPLETE CBC W/AUTO DIFF WBC: CPT

## 2021-10-20 PROCEDURE — 1210000000 HC MED SURG R&B

## 2021-10-20 PROCEDURE — 97535 SELF CARE MNGMENT TRAINING: CPT

## 2021-10-20 PROCEDURE — 36415 COLL VENOUS BLD VENIPUNCTURE: CPT

## 2021-10-20 PROCEDURE — 2580000003 HC RX 258: Performed by: EMERGENCY MEDICINE

## 2021-10-20 PROCEDURE — 6360000002 HC RX W HCPCS: Performed by: NURSE PRACTITIONER

## 2021-10-20 PROCEDURE — 2580000003 HC RX 258: Performed by: NURSE PRACTITIONER

## 2021-10-20 PROCEDURE — C1729 CATH, DRAINAGE: HCPCS

## 2021-10-20 PROCEDURE — 2700000000 HC OXYGEN THERAPY PER DAY

## 2021-10-20 PROCEDURE — 87205 SMEAR GRAM STAIN: CPT

## 2021-10-20 PROCEDURE — 87070 CULTURE OTHR SPECIMN AEROBIC: CPT

## 2021-10-20 PROCEDURE — 73720 MRI LWR EXTREMITY W/O&W/DYE: CPT

## 2021-10-20 RX ORDER — LISINOPRIL 10 MG/1
10 TABLET ORAL 2 TIMES DAILY
COMMUNITY

## 2021-10-20 RX ADMIN — DULOXETINE HYDROCHLORIDE 60 MG: 30 CAPSULE, DELAYED RELEASE ORAL at 08:46

## 2021-10-20 RX ADMIN — HYDROCODONE BITARTRATE AND ACETAMINOPHEN 2 TABLET: 5; 325 TABLET ORAL at 19:44

## 2021-10-20 RX ADMIN — MORPHINE SULFATE 4 MG: 4 INJECTION, SOLUTION INTRAMUSCULAR; INTRAVENOUS at 21:03

## 2021-10-20 RX ADMIN — SODIUM CHLORIDE, PRESERVATIVE FREE 10 ML: 5 INJECTION INTRAVENOUS at 08:46

## 2021-10-20 RX ADMIN — AMITRIPTYLINE HYDROCHLORIDE 75 MG: 75 TABLET, FILM COATED ORAL at 19:44

## 2021-10-20 RX ADMIN — GABAPENTIN 600 MG: 300 CAPSULE ORAL at 19:44

## 2021-10-20 RX ADMIN — HYDROCODONE BITARTRATE AND ACETAMINOPHEN 2 TABLET: 5; 325 TABLET ORAL at 11:08

## 2021-10-20 RX ADMIN — DAPTOMYCIN 600 MG: 500 INJECTION, POWDER, LYOPHILIZED, FOR SOLUTION INTRAVENOUS at 11:12

## 2021-10-20 RX ADMIN — HYDROCODONE BITARTRATE AND ACETAMINOPHEN 2 TABLET: 5; 325 TABLET ORAL at 02:22

## 2021-10-20 RX ADMIN — PIPERACILLIN AND TAZOBACTAM 3375 MG: 3; .375 INJECTION, POWDER, LYOPHILIZED, FOR SOLUTION INTRAVENOUS at 11:09

## 2021-10-20 RX ADMIN — PIPERACILLIN AND TAZOBACTAM 3375 MG: 3; .375 INJECTION, POWDER, LYOPHILIZED, FOR SOLUTION INTRAVENOUS at 02:22

## 2021-10-20 RX ADMIN — SODIUM CHLORIDE, PRESERVATIVE FREE 10 ML: 5 INJECTION INTRAVENOUS at 19:45

## 2021-10-20 RX ADMIN — INSULIN GLARGINE 15 UNITS: 100 INJECTION, SOLUTION SUBCUTANEOUS at 19:45

## 2021-10-20 RX ADMIN — GABAPENTIN 600 MG: 300 CAPSULE ORAL at 15:05

## 2021-10-20 RX ADMIN — PIPERACILLIN AND TAZOBACTAM 3375 MG: 3; .375 INJECTION, POWDER, LYOPHILIZED, FOR SOLUTION INTRAVENOUS at 19:45

## 2021-10-20 RX ADMIN — MORPHINE SULFATE 2 MG: 2 INJECTION, SOLUTION INTRAMUSCULAR; INTRAVENOUS at 15:59

## 2021-10-20 RX ADMIN — GADOBENATE DIMEGLUMINE 20 ML: 529 INJECTION, SOLUTION INTRAVENOUS at 10:26

## 2021-10-20 RX ADMIN — GABAPENTIN 600 MG: 300 CAPSULE ORAL at 08:46

## 2021-10-20 ASSESSMENT — ENCOUNTER SYMPTOMS
NAUSEA: 0
DIARRHEA: 0
CHEST TIGHTNESS: 0
VOMITING: 0
ABDOMINAL PAIN: 0
PHOTOPHOBIA: 0
SHORTNESS OF BREATH: 0
COLOR CHANGE: 0

## 2021-10-20 ASSESSMENT — PAIN DESCRIPTION - LOCATION: LOCATION: FOOT

## 2021-10-20 ASSESSMENT — PAIN SCALES - GENERAL
PAINLEVEL_OUTOF10: 8
PAINLEVEL_OUTOF10: 7
PAINLEVEL_OUTOF10: 8
PAINLEVEL_OUTOF10: 7

## 2021-10-20 ASSESSMENT — PAIN - FUNCTIONAL ASSESSMENT: PAIN_FUNCTIONAL_ASSESSMENT: PREVENTS OR INTERFERES SOME ACTIVE ACTIVITIES AND ADLS

## 2021-10-20 ASSESSMENT — PAIN DESCRIPTION - ORIENTATION: ORIENTATION: LEFT

## 2021-10-20 ASSESSMENT — PAIN DESCRIPTION - DESCRIPTORS: DESCRIPTORS: DISCOMFORT;ACHING

## 2021-10-20 NOTE — PROGRESS NOTES
[facility] Hospitalists      Patient:  Reymundo Salcedo  YOB: 1975  Date of Service: 10/20/2021  MRN: [id number]   Acct: [de-identified]   Primary Care Physician: Andrade Williamson  Advance Directive: Full Code  Admit Date: 10/19/2021       Hospital Day: 1  Portions of this note have been copied forward, however, changed to reflect the most current clinical status of this patient. CHIEF COMPLAINT Increased pain, swelling, and drainage from left foot wound    SUBJECTIVE:  Patient is worried about blood pressure medication. Pain and swelling unchanged. CUMULATIVE HOSPITAL COURSE:  The patient is a 42 y.o. female with an ongoing foot wound who presented to [facility] ED complaining of increased pain, swelling, and drainage from left foot wound. The patient stated she first injured her left foot years ago and required repair with hardware. In Feb 2021 she noted repeat injury and her hardware come loose and worked its way out. At that time the patient stated she was treated with IV antibiotics for MRSA infection. Since then the patient has had multiple procedures with Dr. Anum Greenfield. The patient stated she has been seeing wound care every Monday. She also stated she has discussed the worsening pain and swelling. She stated the morning of admission she alarmed when she could not get her socks and shoes on as her foot was more swollen. She also stated that the drainage seemed like more than normal. She rated her pain as 5/10 at rest, with any movement she stated it increased to 8/10. ED work up revealed Multiple foot and ankle abscesses with findings of septic arthritis and osteomyelitis. Patient admitted to hospitalist service with orthopedic consult. Orthopedic surgery ordered MRI which again is worrisome for osteomylitis and abscesses. Ultrasound guided aspiration ordered. Review of Systems:   Review of Systems   Constitutional: Negative for activity change, chills and fever. Eyes: Negative for photophobia and visual disturbance. Respiratory: Negative for chest tightness and shortness of breath. Cardiovascular: Positive for leg swelling. Negative for chest pain and palpitations. Gastrointestinal: Negative for abdominal pain, diarrhea, nausea and vomiting. Endocrine: Negative for polyphagia and polyuria. Genitourinary: Negative for difficulty urinating, frequency and urgency. Musculoskeletal: Positive for arthralgias and joint swelling. Skin: Positive for wound. Negative for color change. Neurological: Negative for dizziness, light-headedness and headaches. Psychiatric/Behavioral: Negative for agitation and confusion. 14 point review of systems is negative except as specifically addressed above. Objective:   VITALS:  /82   Pulse 98   Temp 97.7 °F (36.5 °C) (Temporal)   Resp 20   Ht 5' 10\" (1.778 m)   Wt 300 lb (136.1 kg)   SpO2 91%   BMI 43.05 kg/m²   24HR INTAKE/OUTPUT:    Intake/Output Summary (Last 24 hours) at 10/20/2021 1311  Last data filed at 10/20/2021 1118  Gross per 24 hour   Intake 10 ml   Output 600 ml   Net -590 ml       Physical Exam  Constitutional:       Appearance: She is obese. HENT:      Head: Normocephalic. Mouth/Throat:      Mouth: Mucous membranes are moist.      Pharynx: Oropharynx is clear. Eyes:      Extraocular Movements: Extraocular movements intact. Conjunctiva/sclera: Conjunctivae normal.      Pupils: Pupils are equal, round, and reactive to light. Cardiovascular:      Rate and Rhythm: Normal rate and regular rhythm. Pulses: Normal pulses. Heart sounds: Normal heart sounds. Pulmonary:      Effort: Pulmonary effort is normal.      Breath sounds: Normal breath sounds. Abdominal:      General: Bowel sounds are normal. There is no distension. Palpations: Abdomen is soft. Tenderness: There is no abdominal tenderness. There is no guarding. Musculoskeletal:         General: Swelling and tenderness present.       Cervical back: Normal last 72 hours. INR: No results for input(s): INR in the last 72 hours. UA:No results for input(s): NITRITE, COLORU, PHUR, LABCAST, WBCUA, RBCUA, MUCUS, TRICHOMONAS, YEAST, BACTERIA, CLARITYU, SPECGRAV, LEUKOCYTESUR, UROBILINOGEN, BILIRUBINUR, BLOODU, GLUCOSEU, AMORPHOUS in the last 72 hours. Invalid input(s): Pinkie Mcburney  A1C:   Recent Labs     10/20/21  0218   LABA1C 7.2*     ABG:No results for input(s): PHART, WOL7KFF, PO2ART, AAP3CYK, BEART, HGBAE, A8AFAMZX, CARBOXHGBART in the last 72 hours. RAD:   XR ANKLE LEFT (2 VIEWS)    Result Date: 10/19/2021  1. Findings highly concerning for osteomyelitis and septic arthritis of the second-fourth tarsometatarsal joints with bony destruction and debris. 2. There appears to be an oblique fracture of the second metatarsal base involving the proximal articular surface with associated widening of the space between the first and second metatarsals, concerning for ligamentous injury. 3. Marked diffuse soft tissue swelling. There is subcutaneous emphysema/tract at the plantar lateral foot. Correlate with ability to probe to bone. 4. See separately dictated same-day CT. Signed by Dr Jayce Mckeon    XR FOOT LEFT (MIN 3 VIEWS)    Result Date: 10/19/2021  1. Findings highly concerning for osteomyelitis and septic arthritis of the second-fourth tarsometatarsal joints with bony destruction and debris. 2. There appears to be an oblique fracture of the second metatarsal base involving the proximal articular surface with associated widening of the space between the first and second metatarsals, concerning for ligamentous injury. 3. Marked diffuse soft tissue swelling. There is subcutaneous emphysema/tract at the plantar lateral foot. Correlate with ability to probe to bone. 4. See separately dictated same-day CT.  Signed by Dr Lyndon Urbina    Result Date: 10/19/2021  Impression: Multiple foot and ankle abscesses with findings of septic arthritis and osteomyelitis as described above. Signed by Dr Seda Shaw    MRI FOOT LEFT W WO CONTRAST    Result Date: 10/20/2021  1. Extensive osteomyelitis, abscesses, tenosynovitis and myositis as described above. Most proximal aspect of the osteomyelitis appears to be the distal calcaneus and talus. 2. Tibiotalar joint effusion could be reactive or seen with septic arthritis. Signed by Dr Mahesh Hilton       Micro:   10/20/2021 11:18 AM - JamesEusebio Incoming Lab Results From [facility]    Specimen Information: Blood        Component Collected Lab   Blood Culture, Routine 10/19/2021  7:30 AM [facility]   No Growth to date. Tariq Bass change in status will be called. 10/20/2021 11:18 AM - Eusebio Ramirez Incoming Lab Results From [facility]    Specimen Information: Blood        Component Collected Lab   Culture, Blood 2 10/19/2021  7:36 AM [facility]   No Growth to date. Tariq Bass change in status will be called. 10/20/2021  3:10 PM - Eusebio Ramirez Incoming Lab Results From [facility] [place] [facility], Body Fluid - 10/20/2021  Result Information    Status:  In process (Collected: 10/20/2021 14:30)         Assessment/Plan   Active Problems:    Osteomyelitis ([address]              -ortho on board              -CT and xrays very concerning for extensive osteomylitis   -MRI indaicating the same   -US guided aspiration ordered per ortho              -continue Datomycin and Zosyn              -Monitor and control glucose                          -A1c elevated                          -Accu checks                          -SSI                          -Lantus                          -carb controlled diet              -follow blood cultures obtained in ED              -monitor CBC and CMP closely     Antibiotic: daptomycin    DVT Prophylaxis: JOVI Pedroza - CNP, 10/20/2021 1:11 PM
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
## 2021-10-20 NOTE — PROGRESS NOTES
Orthopedic Foot/Ankle Progress Note    Subjective      Hospital Course: Stable        Objective  Left foot exam remains unchanged. She does have an open wound laterally which does probe to bone. She has no fluctuance no crepitus of soft tissue no odor or new drainage. Vital signs in last 24 hours:  /82   Pulse 98   Temp 97.7 °F (36.5 °C) (Temporal)   Resp 20   Ht 5' 10\" (1.778 m)   Wt 300 lb (136.1 kg)   SpO2 91%   BMI 43.05 kg/m²     .   Data Review    Recent Results (from the past 24 hour(s))   POCT Glucose    Collection Time: 10/19/21  4:59 PM   Result Value Ref Range    POC Glucose 119 (H) 70 - 99 mg/dl    Performed on [employer]    POCT Glucose    Collection Time: 10/19/21  9:16 PM   Result Value Ref Range    POC Glucose 155 (H) 70 - 99 mg/dl    Performed on [employer]    Comprehensive Metabolic Panel w/ Reflex to MG    Collection Time: 10/20/21  2:18 AM   Result Value Ref Range    Sodium 136 136 - 145 mmol/L    Potassium reflex Magnesium 4.2 3.5 - 5.0 mmol/L    Chloride 95 (L) 98 - 111 mmol/L    CO2 31 (H) 22 - 29 mmol/L    Anion Gap 10 7 - 19 mmol/L    Glucose 150 (H) 74 - 109 mg/dL    BUN 8 6 - 20 mg/dL    CREATININE 0.6 0.5 - 0.9 mg/dL    GFR Non-African American >60 >60    GFR African American >59 >59    Calcium 9.0 8.6 - 10.0 mg/dL    Total Protein 7.2 6.6 - 8.7 g/dL    Albumin 3.7 3.5 - 5.2 g/dL    Total Bilirubin 0.3 0.2 - 1.2 mg/dL    Alkaline Phosphatase 88 35 - 104 U/L    ALT 7 5 - 33 U/L    AST 9 5 - 32 U/L   CBC auto differential    Collection Time: 10/20/21  2:18 AM   Result Value Ref Range    WBC 11.1 (H) 4.8 - 10.8 K/uL    RBC 4.22 4.20 - 5.40 M/uL    Hemoglobin 11.6 (L) 12.0 - 16.0 g/dL    Hematocrit 37.6 37.0 - 47.0 %    MCV 89.1 81.0 - 99.0 fL    MCH 27.5 27.0 - 31.0 pg    MCHC 30.9 (L) 33.0 - 37.0 g/dL    RDW 14.5 11.5 - 14.5 %    Platelets 147 (H) 725 - 400 K/uL    MPV 8.9 (L) 9.4 - 12.3 fL    Neutrophils % 68.7 (H) 50.0 - 65.0 %    Lymphocytes % 21.3 20.0 - 40.0 %    Monocytes % 5.0 0.0 - 10.0 %    Eosinophils % 4.3 0.0 - 5.0 %    Basophils % 0.4 0.0 - 1.0 %    Neutrophils Absolute 7.6 (H) 1.5 - 7.5 K/uL    Immature Granulocytes # 0.0 K/uL    Lymphocytes Absolute 2.4 1.1 - 4.5 K/uL    Monocytes Absolute 0.60 0.00 - 0.90 K/uL    Eosinophils Absolute 0.50 0.00 - 0.60 K/uL    Basophils Absolute 0.00 0.00 - 0.20 K/uL   Hemoglobin A1C    Collection Time: 10/20/21  2:18 AM   Result Value Ref Range    Hemoglobin A1C 7.2 (H) 4.0 - 6.0 %            Assessment      Open wound with osteomyelitis fifth metatarsal left  Charcot neuroarthropathy left versus extensive osteomyelitis through midfoot left    Neuropathy    Plan    Nonweightbearing. I did discuss the MRI with radiology and they are willing to do an ultrasound-guided aspiration to see if it is actually purulence through her midfoot. Again if she has extensive osteomyelitis through her midfoot I would I would recommend amputation. She has extensive bone destruction through her midfoot and surgical reconstruction would be lengthy and high risk in her situation. We will follow closely.     Weight Bearing: None     LOS: 1 day     Elan Gaming II, DPM  10/20/2021, 12:22 PM
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
## 2021-10-20 NOTE — PROGRESS NOTES
Comprehensive Nutrition Assessment    Type and Reason for Visit:  Initial, Positive Nutrition Screen, Wound    Nutrition Recommendations/Plan:   Monitor for diet advance and need of diet    Nutrition Assessment:  +NS for non-healing wound and poor appetite. Pt high risk for nutrition compromise d/t poor appetite and current NPO status. Wt hx stable. Will monitor for diet advance and need of nutrition intervention. Malnutrition Assessment:  Malnutrition Status:   At risk for malnutrition (Comment)    Context:  Acute Illness     Findings of the 6 clinical characteristics of malnutrition:  Energy Intake:  Mild decrease in energy intake (Comment)  Weight Loss:  No significant weight loss     Body Fat Loss:  Unable to assess     Muscle Mass Loss:  Unable to assess    Fluid Accumulation:  7 - Moderate to Severe Extremities   Strength:  Not Performed    Estimated Daily Nutrient Needs:  Energy (kcal):  1394-2408 kcals/day; Weight Used for Energy Requirements:  Current (8-11 kcals/day)     Protein (g):  102-136 g/pro/day; Weight Used for Protein Requirements:  Ideal (1.5-2 g/kg)        Fluid (ml/day):  9267-6139 mL/fluid/day; Method Used for Fluid Requirements:  1 ml/kcal      Nutrition Related Findings:  +4 LLE pitting edema      Wounds:  Deep Tissue Injury       Current Nutrition Therapies:    Diet NPO    Anthropometric Measures:  · Height: 5' 10\" (177.8 cm)  · Current Body Weight: 300 lb (136.1 kg)   · Admission Body Weight: 300 lb (136.1 kg)    · Usual Body Weight: 314 lb (142.4 kg) (7/2020)     · Ideal Body Weight: 150 lbs  · BMI: 43   · BMI Categories: Obese Class 3 (BMI 40.0 or greater)       Nutrition Diagnosis:   · Inadequate oral intake related to increase demand for energy/nutrients as evidenced by wounds    Nutrition Interventions:   Food and/or Nutrient Delivery:  Continue NPO  Nutrition Education/Counseling:  Education not indicated   Coordination of Nutrition Care:  Continue to monitor while inpatient    Goals:  Pt will tolerate advance to po diet to meet nutritional needs. Nutrition Monitoring and Evaluation:   Food/Nutrient Intake Outcomes:  Diet Advancement/Tolerance  Physical Signs/Symptoms Outcomes:  Biochemical Data, Nutrition Focused Physical Findings, Weight, Fluid Status or Edema, Skin     Discharge Planning:     Too soon to determine     Electronically signed by Mario Roberto MS, RD, LD on 10/20/21 at 10:10 AM CDT    Contact: [phone]

## 2021-10-20 NOTE — PLAN OF CARE
Nutrition Problem #1: Inadequate oral intake  Intervention: Food and/or Nutrient Delivery: Continue NPO  Nutritional Goals: Pt will tolerate advance to po diet to meet nutritional needs.

## 2021-10-21 LAB
ALBUMIN SERPL-MCNC: 3.4 G/DL (ref 3.5–5.2)
ALP BLD-CCNC: 83 U/L (ref 35–104)
ALT SERPL-CCNC: 6 U/L (ref 5–33)
ANION GAP SERPL CALCULATED.3IONS-SCNC: 8 MMOL/L (ref 7–19)
AST SERPL-CCNC: 9 U/L (ref 5–32)
BASOPHILS ABSOLUTE: 0 K/UL (ref 0–0.2)
BASOPHILS RELATIVE PERCENT: 0.3 % (ref 0–1)
BILIRUB SERPL-MCNC: <0.2 MG/DL (ref 0.2–1.2)
BUN BLDV-MCNC: 10 MG/DL (ref 6–20)
CALCIUM SERPL-MCNC: 8.6 MG/DL (ref 8.6–10)
CHLORIDE BLD-SCNC: 98 MMOL/L (ref 98–111)
CO2: 30 MMOL/L (ref 22–29)
CREAT SERPL-MCNC: 0.5 MG/DL (ref 0.5–0.9)
EOSINOPHILS ABSOLUTE: 0.5 K/UL (ref 0–0.6)
EOSINOPHILS RELATIVE PERCENT: 5.7 % (ref 0–5)
GFR AFRICAN AMERICAN: >59
GFR NON-AFRICAN AMERICAN: >60
GLUCOSE BLD-MCNC: 135 MG/DL (ref 74–109)
GLUCOSE BLD-MCNC: 164 MG/DL (ref 70–99)
GLUCOSE BLD-MCNC: 171 MG/DL (ref 70–99)
GLUCOSE BLD-MCNC: 185 MG/DL (ref 70–99)
GLUCOSE BLD-MCNC: 195 MG/DL (ref 70–99)
HCT VFR BLD CALC: 35.6 % (ref 37–47)
HEMOGLOBIN: 10.9 G/DL (ref 12–16)
IMMATURE GRANULOCYTES #: 0 K/UL
LYMPHOCYTES ABSOLUTE: 2.3 K/UL (ref 1.1–4.5)
LYMPHOCYTES RELATIVE PERCENT: 26.1 % (ref 20–40)
MCH RBC QN AUTO: 27.5 PG (ref 27–31)
MCHC RBC AUTO-ENTMCNC: 30.6 G/DL (ref 33–37)
MCV RBC AUTO: 89.7 FL (ref 81–99)
MONOCYTES ABSOLUTE: 0.5 K/UL (ref 0–0.9)
MONOCYTES RELATIVE PERCENT: 5.5 % (ref 0–10)
NEUTROPHILS ABSOLUTE: 5.4 K/UL (ref 1.5–7.5)
NEUTROPHILS RELATIVE PERCENT: 61.9 % (ref 50–65)
PDW BLD-RTO: 14.3 % (ref 11.5–14.5)
PERFORMED ON: ABNORMAL
PLATELET # BLD: 395 K/UL (ref 130–400)
PMV BLD AUTO: 8.7 FL (ref 9.4–12.3)
POTASSIUM REFLEX MAGNESIUM: 4.6 MMOL/L (ref 3.5–5)
RBC # BLD: 3.97 M/UL (ref 4.2–5.4)
SODIUM BLD-SCNC: 136 MMOL/L (ref 136–145)
TOTAL PROTEIN: 6.9 G/DL (ref 6.6–8.7)
WBC # BLD: 8.8 K/UL (ref 4.8–10.8)

## 2021-10-21 PROCEDURE — 36415 COLL VENOUS BLD VENIPUNCTURE: CPT

## 2021-10-21 PROCEDURE — 85025 COMPLETE CBC W/AUTO DIFF WBC: CPT

## 2021-10-21 PROCEDURE — 2580000003 HC RX 258: Performed by: EMERGENCY MEDICINE

## 2021-10-21 PROCEDURE — 1210000000 HC MED SURG R&B

## 2021-10-21 PROCEDURE — 6360000002 HC RX W HCPCS: Performed by: EMERGENCY MEDICINE

## 2021-10-21 PROCEDURE — 2700000000 HC OXYGEN THERAPY PER DAY

## 2021-10-21 PROCEDURE — 82947 ASSAY GLUCOSE BLOOD QUANT: CPT

## 2021-10-21 PROCEDURE — 80053 COMPREHEN METABOLIC PANEL: CPT

## 2021-10-21 PROCEDURE — 6360000002 HC RX W HCPCS: Performed by: NURSE PRACTITIONER

## 2021-10-21 PROCEDURE — 6370000000 HC RX 637 (ALT 250 FOR IP): Performed by: NURSE PRACTITIONER

## 2021-10-21 PROCEDURE — 2580000003 HC RX 258: Performed by: NURSE PRACTITIONER

## 2021-10-21 RX ORDER — MORPHINE SULFATE 4 MG/ML
4 INJECTION, SOLUTION INTRAMUSCULAR; INTRAVENOUS EVERY 4 HOURS PRN
Status: DISCONTINUED | OUTPATIENT
Start: 2021-10-21 | End: 2021-10-22

## 2021-10-21 RX ORDER — MORPHINE SULFATE 2 MG/ML
2 INJECTION, SOLUTION INTRAMUSCULAR; INTRAVENOUS EVERY 4 HOURS PRN
Status: DISCONTINUED | OUTPATIENT
Start: 2021-10-21 | End: 2021-10-22

## 2021-10-21 RX ADMIN — MORPHINE SULFATE 4 MG: 4 INJECTION, SOLUTION INTRAMUSCULAR; INTRAVENOUS at 18:23

## 2021-10-21 RX ADMIN — MORPHINE SULFATE 4 MG: 4 INJECTION, SOLUTION INTRAMUSCULAR; INTRAVENOUS at 22:22

## 2021-10-21 RX ADMIN — GABAPENTIN 600 MG: 300 CAPSULE ORAL at 13:18

## 2021-10-21 RX ADMIN — GABAPENTIN 600 MG: 300 CAPSULE ORAL at 08:56

## 2021-10-21 RX ADMIN — DAPTOMYCIN 600 MG: 500 INJECTION, POWDER, LYOPHILIZED, FOR SOLUTION INTRAVENOUS at 13:18

## 2021-10-21 RX ADMIN — HYDROCODONE BITARTRATE AND ACETAMINOPHEN 2 TABLET: 5; 325 TABLET ORAL at 21:20

## 2021-10-21 RX ADMIN — AMITRIPTYLINE HYDROCHLORIDE 75 MG: 75 TABLET, FILM COATED ORAL at 21:20

## 2021-10-21 RX ADMIN — HYDROCODONE BITARTRATE AND ACETAMINOPHEN 2 TABLET: 5; 325 TABLET ORAL at 01:25

## 2021-10-21 RX ADMIN — INSULIN LISPRO 1 UNITS: 100 INJECTION, SOLUTION INTRAVENOUS; SUBCUTANEOUS at 08:56

## 2021-10-21 RX ADMIN — DULOXETINE HYDROCHLORIDE 60 MG: 30 CAPSULE, DELAYED RELEASE ORAL at 08:56

## 2021-10-21 RX ADMIN — PIPERACILLIN AND TAZOBACTAM 3375 MG: 3; .375 INJECTION, POWDER, LYOPHILIZED, FOR SOLUTION INTRAVENOUS at 18:20

## 2021-10-21 RX ADMIN — HYDROCODONE BITARTRATE AND ACETAMINOPHEN 2 TABLET: 5; 325 TABLET ORAL at 06:27

## 2021-10-21 RX ADMIN — HYDROCODONE BITARTRATE AND ACETAMINOPHEN 2 TABLET: 5; 325 TABLET ORAL at 10:44

## 2021-10-21 RX ADMIN — HYDROCODONE BITARTRATE AND ACETAMINOPHEN 2 TABLET: 5; 325 TABLET ORAL at 17:12

## 2021-10-21 RX ADMIN — INSULIN LISPRO 1 UNITS: 100 INJECTION, SOLUTION INTRAVENOUS; SUBCUTANEOUS at 17:15

## 2021-10-21 RX ADMIN — PIPERACILLIN AND TAZOBACTAM 3375 MG: 3; .375 INJECTION, POWDER, LYOPHILIZED, FOR SOLUTION INTRAVENOUS at 10:31

## 2021-10-21 RX ADMIN — MORPHINE SULFATE 4 MG: 4 INJECTION, SOLUTION INTRAMUSCULAR; INTRAVENOUS at 13:18

## 2021-10-21 RX ADMIN — ENOXAPARIN SODIUM 40 MG: 40 INJECTION SUBCUTANEOUS at 15:33

## 2021-10-21 RX ADMIN — SODIUM CHLORIDE, PRESERVATIVE FREE 10 ML: 5 INJECTION INTRAVENOUS at 08:57

## 2021-10-21 RX ADMIN — INSULIN GLARGINE 15 UNITS: 100 INJECTION, SOLUTION SUBCUTANEOUS at 22:15

## 2021-10-21 RX ADMIN — PIPERACILLIN AND TAZOBACTAM 3375 MG: 3; .375 INJECTION, POWDER, LYOPHILIZED, FOR SOLUTION INTRAVENOUS at 02:29

## 2021-10-21 RX ADMIN — GABAPENTIN 600 MG: 300 CAPSULE ORAL at 21:20

## 2021-10-21 RX ADMIN — MORPHINE SULFATE 2 MG: 2 INJECTION, SOLUTION INTRAMUSCULAR; INTRAVENOUS at 09:08

## 2021-10-21 ASSESSMENT — ENCOUNTER SYMPTOMS
GASTROINTESTINAL NEGATIVE: 1
CHEST TIGHTNESS: 0
PHOTOPHOBIA: 0
DIARRHEA: 0
RESPIRATORY NEGATIVE: 1
SHORTNESS OF BREATH: 0
ABDOMINAL PAIN: 0
COLOR CHANGE: 0
VOMITING: 0
NAUSEA: 0
EYES NEGATIVE: 1

## 2021-10-21 ASSESSMENT — PAIN SCALES - GENERAL
PAINLEVEL_OUTOF10: 8
PAINLEVEL_OUTOF10: 8
PAINLEVEL_OUTOF10: 6
PAINLEVEL_OUTOF10: 8
PAINLEVEL_OUTOF10: 7
PAINLEVEL_OUTOF10: 8
PAINLEVEL_OUTOF10: 8
PAINLEVEL_OUTOF10: 9
PAINLEVEL_OUTOF10: 9

## 2021-10-21 NOTE — PROGRESS NOTES
Progress Note  Progress Note  10/21/2021 1:30 PM  Subjective:   Admit Date: 10/19/2021  PCP: Patsy Bartlett    Patient resting in bed. No new complaints. Continues to have swelling to left lower extremity. Continued wound. Aspiration of left foot completed yesterday. No organism seen on culture. Patient non weightbearing to LLE. Knee scooter at bedside. Patient would like to discuss amputation. Is wondering at what level of the extremity the amputation would be. Review of Systems   Constitutional: Negative for appetite change, chills, diaphoresis, fatigue and fever. HENT: Negative. Eyes: Negative. Respiratory: Negative. Gastrointestinal: Negative. Musculoskeletal: Positive for joint swelling. Skin: Positive for wound. Neurological: Negative. Hematological: Negative. Psychiatric/Behavioral: Negative. ADULT DIET;  Regular; 4 carb choices (60 gm/meal)    Intake/Output Summary (Last 24 hours) at 10/21/2021 1330  Last data filed at 10/21/2021 1032  Gross per 24 hour   Intake 570 ml   Output 1000 ml   Net -430 ml     Medications:   dextrose      sodium chloride       Current Facility-Administered Medications   Medication Dose Route Frequency Provider Last Rate Last Admin    morphine (PF) injection 2 mg  2 mg IntraVENous Q4H PRN [facility], APRINGRID - CNP        Or    morphine injection 4 mg  4 mg IntraVENous Q4H PRN [facility] - CNP   4 mg at 10/21/21 1318    DAPTOmycin (CUBICIN) 600 mg in sodium chloride 0.9 % 62 mL IVPB  600 mg IntraVENous Q24H Jojo Hines  mL/hr at 10/21/21 1318 600 mg at 10/21/21 1318    amitriptyline (ELAVIL) tablet 75 mg  75 mg Oral Nightly [facility], APRN - CNP   75 mg at 10/20/21 1944    DULoxetine (CYMBALTA) extended release capsule 60 mg  60 mg Oral Daily [facility], APRN - CNP   60 mg at 10/21/21 0856    gabapentin (NEURONTIN) capsule 600 mg  600 mg Oral TID [facility] JOVI - CNP   600 mg at 10/21/21 1318    nicotine (NICODERM CQ) 14 MG/24HR 1 patch  1 patch TransDERmal Daily Garrisonmatteo Larios APRN - CNP        glucose (GLUTOSE) 40 % oral gel 15 g  15 g Oral PRN Garrisonmatteo Larios, APRN - CNP        dextrose 50 % IV solution  12.5 g IntraVENous PRN Tayler Larios, APRN - CNP        glucagon (rDNA) injection 1 mg  1 mg IntraMUSCular PRN Tayler Larios, APRN - CNP        dextrose 5 % solution  100 mL/hr IntraVENous PRN Garrisonmatteo Dawsone, APRN - CNP        sodium chloride flush 0.9 % injection 5-40 mL  5-40 mL IntraVENous 2 times per day Garrisonmatteo Larios, APRN - CNP   10 mL at 10/21/21 0857    sodium chloride flush 0.9 % injection 5-40 mL  5-40 mL IntraVENous PRN Garrisonmatteo Larios, APRN - CNP        0.9 % sodium chloride infusion  25 mL IntraVENous PRN Tayler Larios, APRN - CNP        enoxaparin (LOVENOX) injection 40 mg  40 mg SubCUTAneous Q24H JOVI Neumann - LATANYA        ondansetron (ZOFRAN-ODT) disintegrating tablet 4 mg  4 mg Oral Q8H PRN Tayler Larios, APRN - CNP        Or    ondansetron (ZOFRAN) injection 4 mg  4 mg IntraVENous Q6H PRN Tayler Larios, APRN - CNP        polyethylene glycol (GLYCOLAX) packet 17 g  17 g Oral Daily PRN Tayler Larios, APRN - CNP        acetaminophen (TYLENOL) tablet 650 mg  650 mg Oral Q6H PRN Tayler Larios, APRN - CNP        Or    acetaminophen (TYLENOL) suppository 650 mg  650 mg Rectal Q6H PRN Garrisonmatteo Larios, APRN - CNP        piperacillin-tazobactam (ZOSYN) 3,375 mg in dextrose 5 % 50 mL IVPB extended infusion (mini-bag)  3,375 mg IntraVENous Q8H Tayler Larios, APRN - CNP 12.5 mL/hr at 10/21/21 1031 3,375 mg at 10/21/21 1031    insulin glargine (LANTUS) injection vial 15 Units  15 Units SubCUTAneous Nightly Keara Miller APRN - CNP   15 Units at 10/20/21 1945    insulin lispro (HUMALOG) injection vial 0-6 Units  0-6 Units SubCUTAneous TID  JOVI Dewitt - CNP   1 Units at 10/21/21 0856    insulin lispro (HUMALOG) injection vial 0-3 Units  0-3 Units SubCUTAneous Nightly Solomon Edgemont, APRN - CNP   1 Units at 10/19/21 2139    HYDROcodone-acetaminophen (NORCO) 5-325 MG per tablet 1 tablet  1 tablet Oral Q4H PRN Solomon Edgemont, APRN - CNP        Or    HYDROcodone-acetaminophen (NORCO) 5-325 MG per tablet 2 tablet  2 tablet Oral Q4H PRN Solomon Edgemont, APRN - CNP   2 tablet at 10/21/21 1044        Labs:   @LABRCNT(WBC:3,RBC:3,HGB:3,HCT:3,MCV:3,MCH:3,MCHC:3,PLT:3)@  @LABRCNT(NA:3,K:3,ANIONGAP:3,CL:3,CO2:3,BUN:3,CREATININE:3,GLUCOSE:3,CALCIUM:3,GFR:3)@  @LABRCNT(MG:3,PHOS:3)@  Recent Labs     10/19/21  0736 10/20/21  0218 10/21/21  0309   AST 7 9 9   ALT 7 7 6   BILITOT 0.3 0.3 <0.2   ALKPHOS 86 88 83     ABGs:No results for input(s): PHART, POK8TCJ, PO2ART, LVQ0NSF, BEART, HGBAE, N1LSBQVR, CARBOXHGBART, 02THERAPY in the last 72 hours. HgBA1c:   Recent Labs     10/19/21  0746 10/20/21  0218   LABA1C 7.2* 7.2*     FLP:  @BRIEFLAB(CHLPL,TRIG,HDL,LDLCALC,LDLDIRECT,LABVLDL)@  TSH:    Lab Results   Component Value Date    TSH 0.168 12/01/2017     Troponin T: No results for input(s): TROPONINI in the last 72 hours. INR: No results for input(s): INR in the last 72 hours. Objective:   Physical Exam  Constitutional:       Appearance: Normal appearance. HENT:      Head: Normocephalic and atraumatic. Mouth/Throat:      Mouth: Mucous membranes are moist.   Eyes:      Extraocular Movements: Extraocular movements intact. Pupils: Pupils are equal, round, and reactive to light. Pulmonary:      Effort: Pulmonary effort is normal.   Musculoskeletal:         General: Deformity present. Left lower leg: Edema present. Neurological:      Mental Status: She is alert. Psychiatric:         Mood and Affect: Mood normal.         Thought Content: Thought content normal.       Moderate edema and deformity LLE. Continued wound at 5th metatarsal base, left foot.       Vitals: BP (!) 140/82   Pulse 87   Temp 97.2 °F (36.2 °C) (Temporal)   Resp 16   Ht 5' 10\" (1.778 m)   Wt 299 lb 5 oz (135.8 kg)   SpO2 96%   BMI 42.95 kg/m²       Assessment and Plan: Active Problems:    Osteomyelitis ([address]  Resolved Problems:    * No resolved hospital problems. *      Advance Directive: Full Code      Patients condition discussed. Labs reviewed. Aspiration results reviewed. Discussed case with Dr. Santiago Davidson and Dr. Obie Marie. Treatment options include continued non weightbearing to the left lower extremity, wound care, and oral versus long term iv antibiotics per Infectious diease versus below the knee amputation. Risk and benefits of each discussed and reviewed.              JOVI Hyde MD

## 2021-10-21 NOTE — PLAN OF CARE
Problem: Falls - Risk of:  Goal: Will remain free from falls  Description: Will remain free from falls  Outcome: Ongoing  Goal: Absence of physical injury  Description: Absence of physical injury  Outcome: Ongoing     Problem: Pain:  Goal: Pain level will decrease  Description: Pain level will decrease  Outcome: Ongoing  Goal: Control of acute pain  Description: Control of acute pain  Outcome: Ongoing  Goal: Control of chronic pain  Description: Control of chronic pain  Outcome: Ongoing  Goal: Patient's pain/discomfort is manageable  Description: Patient's pain/discomfort is manageable  Outcome: Ongoing     Problem: Nutrition  Goal: Optimal nutrition therapy  Outcome: Ongoing     Problem: Infection:  Goal: Will remain free from infection  Description: Will remain free from infection  Outcome: Ongoing     Problem: Safety:  Goal: Free from accidental physical injury  Description: Free from accidental physical injury  Outcome: Ongoing  Goal: Free from intentional harm  Description: Free from intentional harm  Outcome: Ongoing     Problem: Daily Care:  Goal: Daily care needs are met  Description: Daily care needs are met  Outcome: Ongoing     Problem: Skin Integrity:  Goal: Skin integrity will stabilize  Description: Skin integrity will stabilize  Outcome: Ongoing     Problem: Discharge Planning:  Goal: Patients continuum of care needs are met  Description: Patients continuum of care needs are met  Outcome: Ongoing

## 2021-10-21 NOTE — PROGRESS NOTES
mg in dextrose 5 % 50 mL IVPB extended infusion (mini-bag), Q8H  insulin glargine (LANTUS) injection vial 15 Units, Nightly  insulin lispro (HUMALOG) injection vial 0-6 Units, TID WC  insulin lispro (HUMALOG) injection vial 0-3 Units, Nightly  HYDROcodone-acetaminophen (NORCO) 5-325 MG per tablet 1 tablet, Q4H PRN   Or  HYDROcodone-acetaminophen (NORCO) 5-325 MG per tablet 2 tablet, Q4H PRN  morphine (PF) injection 2 mg, Q2H PRN   Or  morphine injection 4 mg, Q2H PRN       Review of Systems see HPI    VitalSigns:  BP (!) 140/82   Pulse 87   Temp 97.2 °F (36.2 °C) (Temporal)   Resp 16   Ht 5' 10\" (1.778 m)   Wt 299 lb 5 oz (135.8 kg)   SpO2 96%   BMI 42.95 kg/m²      Physical Exam  Line/IV (peripheral) site: No erythema, warmth, induration, or tenderness. Lungs without crackles  Foot dressing clean and dry  Skin without rash    Lab Results:  CBC:   Recent Labs     10/19/21  0736 10/20/21  0218 10/21/21  0309   WBC 12.5* 11.1* 8.8   HGB 11.7* 11.6* 10.9*   * 423* 395     BMP:  Recent Labs     10/19/21  0736 10/20/21  0218 10/21/21  0309   * 136 136   K 3.7 4.2 4.6   CL 95* 95* 98   CO2 26 31* 30*   BUN 6 8 10   CREATININE 0.6 0.6 0.5   GLUCOSE 177* 150* 135*     CultureResults:  Blood cultures October 19, 2021-2 sets no growth  Body fluid culture (foot abscess)-no growth to date    Radiology:     Ultrasound-guided aspiration from right foot:  Impression   Impression:   Successful ultrasound-guided of the heterogeneous fluid collection in   the dorsum of the proximal foot. Signed by Dr Juan Munguia     MRI right foot:  Impression   1. Extensive osteomyelitis, abscesses, tenosynovitis and myositis as   described above. Most proximal aspect of the osteomyelitis appears to   be the distal calcaneus and talus. 2. Tibiotalar joint effusion could be reactive or seen with septic   arthritis. Signed by Dr Dinah Ye     Additional Studies Reviewed:  None    Impression:  1.   Open wound with probable osteomyelitis left midfoot area  2.   Possible Charcot changes versus osteomyelitis versus the combination    Recommendations:  Continue empiric daptomycin and Zosyn  Await ultrasound-guided aspirate  Continue nonweightbearing  Continue local care  Podiatry working with her about possible options for management    Hunter Trinh MD

## 2021-10-21 NOTE — PROGRESS NOTES
[facility] Hospitalists      Patient:  Shoaib Chavez  YOB: 1975  Date of Service: 10/21/2021  MRN: [id number]   Acct: [de-identified]   Primary Care Physician: Maddie Canales  Advance Directive: Full Code  Admit Date: 10/19/2021       Hospital Day: 2  Portions of this note have been copied forward, however, changed to reflect the most current clinical status of this patient. CHIEF COMPLAINT Increased pain, swelling, and drainage from left foot wound    SUBJECTIVE:  Patient states her pain is better controlled today. CUMULATIVE HOSPITAL COURSE:  The patient is a 42 y.o. female with an ongoing foot wound who presented to [facility] ED complaining of increased pain, swelling, and drainage from left foot wound. The patient stated she first injured her left foot years ago and required repair with hardware. In Feb 2021 she noted repeat injury and her hardware come loose and worked its way out. At that time the patient stated she was treated with IV antibiotics for MRSA infection. Since then the patient has had multiple procedures with Dr. Zach Lozano. The patient stated she has been seeing wound care every Monday. She also stated she has discussed the worsening pain and swelling. She stated the morning of admission she alarmed when she could not get her socks and shoes on as her foot was more swollen. She also stated that the drainage seemed like more than normal. She rated her pain as 5/10 at rest, with any movement she stated it increased to 8/10. ED work up revealed Multiple foot and ankle abscesses with findings of septic arthritis and osteomyelitis. Patient admitted to hospitalist service with orthopedic consult. Orthopedic surgery ordered MRI which again is worrisome for osteomylitis and abscesses. Ultrasound guided aspiration completed on 10/20, awaiting results. ID folloing and will continue zosyn and daptomycin at this time.       Review of Systems:   Review of Systems   Constitutional: Negative for activity change, chills and fever. Eyes: Negative for photophobia and visual disturbance. Respiratory: Negative for chest tightness and shortness of breath. Cardiovascular: Positive for leg swelling. Negative for chest pain and palpitations. Gastrointestinal: Negative for abdominal pain, diarrhea, nausea and vomiting. Endocrine: Negative for polyphagia and polyuria. Genitourinary: Negative for difficulty urinating, frequency and urgency. Musculoskeletal: Positive for arthralgias and joint swelling. Skin: Positive for wound. Negative for color change. Neurological: Negative for dizziness, light-headedness and headaches. Psychiatric/Behavioral: Negative for agitation and confusion. 14 point review of systems is negative except as specifically addressed above. Objective:   VITALS:  BP (!) 140/82   Pulse 87   Temp 97.2 °F (36.2 °C) (Temporal)   Resp 16   Ht 5' 10\" (1.778 m)   Wt 299 lb 5 oz (135.8 kg)   SpO2 96%   BMI 42.95 kg/m²   24HR INTAKE/OUTPUT:      Intake/Output Summary (Last 24 hours) at 10/21/2021 1036  Last data filed at 10/21/2021 1032  Gross per 24 hour   Intake 570 ml   Output 1600 ml   Net -1030 ml       Physical Exam  Constitutional:       Appearance: She is obese. HENT:      Head: Normocephalic. Mouth/Throat:      Mouth: Mucous membranes are moist.      Pharynx: Oropharynx is clear. Eyes:      Extraocular Movements: Extraocular movements intact. Conjunctiva/sclera: Conjunctivae normal.      Pupils: Pupils are equal, round, and reactive to light. Cardiovascular:      Rate and Rhythm: Normal rate and regular rhythm. Pulses: Normal pulses. Heart sounds: Normal heart sounds. Pulmonary:      Effort: Pulmonary effort is normal.      Breath sounds: Normal breath sounds. Abdominal:      General: Bowel sounds are normal. There is no distension. Palpations: Abdomen is soft. Tenderness: There is no abdominal tenderness. There is no guarding.    Musculoskeletal: General: Swelling and tenderness present. Cervical back: Normal range of motion and neck supple. Right lower le+ Edema present. Left lower leg: 3+ Edema present. Feet:    Feet:      Comments: Non healing wound to the out left foot, no drainage noted at this time, 3+ pitting edema  Skin:     General: Skin is warm and dry. Capillary Refill: Capillary refill takes less than 2 seconds. Neurological:      General: No focal deficit present. Mental Status: She is alert. Psychiatric:         Mood and Affect: Mood is anxious. Affect is not tearful. Behavior: Behavior normal.         Medications:      dextrose      sodium chloride        daptomycin (CUBICIN) IVPB  600 mg IntraVENous Q24H    amitriptyline  75 mg Oral Nightly    DULoxetine  60 mg Oral Daily    gabapentin  600 mg Oral TID    nicotine  1 patch TransDERmal Daily    sodium chloride flush  5-40 mL IntraVENous 2 times per day    enoxaparin  40 mg SubCUTAneous Q24H    piperacillin-tazobactam  3,375 mg IntraVENous Q8H    insulin glargine  15 Units SubCUTAneous Nightly    insulin lispro  0-6 Units SubCUTAneous TID WC    insulin lispro  0-3 Units SubCUTAneous Nightly     glucose, dextrose, glucagon (rDNA), dextrose, sodium chloride flush, sodium chloride, ondansetron **OR** ondansetron, polyethylene glycol, acetaminophen **OR** acetaminophen, HYDROcodone 5 mg - acetaminophen **OR** HYDROcodone 5 mg - acetaminophen, morphine **OR** morphine  ADULT DIET;  Regular; 4 carb choices (60 gm/meal)     Lab and other Data:     Recent Labs     10/19/21  0736 10/20/21  0218 10/21/21  0309   WBC 12.5* 11.1* 8.8   HGB 11.7* 11.6* 10.9*   * 423* 395     Recent Labs     10/19/21  0736 10/20/21  0218 10/21/21  0309   * 136 136   K 3.7 4.2 4.6   CL 95* 95* 98   CO2 26 31* 30*   BUN 6 8 10   CREATININE 0.6 0.6 0.5   GLUCOSE 177* 150* 135*     Recent Labs     10/19/21  0736 10/20/21  0218 10/21/21  0309   AST 7 9 9   ALT 7 7 6   BILITOT 0.3 0.3 <0.2   ALKPHOS 86 88 83     Troponin T: No results for input(s): TROPONINI in the last 72 hours. Pro-BNP: No results for input(s): BNP in the last 72 hours. INR: No results for input(s): INR in the last 72 hours. UA:No results for input(s): NITRITE, COLORU, PHUR, LABCAST, WBCUA, RBCUA, MUCUS, TRICHOMONAS, YEAST, BACTERIA, CLARITYU, SPECGRAV, LEUKOCYTESUR, UROBILINOGEN, BILIRUBINUR, BLOODU, GLUCOSEU, AMORPHOUS in the last 72 hours. Invalid input(s): Lorrie Standing  A1C:   Recent Labs     10/20/21  0218   LABA1C 7.2*     ABG:No results for input(s): PHART, ANI5LMP, PO2ART, KHL6IZL, BEART, HGBAE, D5DTAKEE, CARBOXHGBART in the last 72 hours. RAD:   XR ANKLE LEFT (2 VIEWS)    Result Date: 10/19/2021  1. Findings highly concerning for osteomyelitis and septic arthritis of the second-fourth tarsometatarsal joints with bony destruction and debris. 2. There appears to be an oblique fracture of the second metatarsal base involving the proximal articular surface with associated widening of the space between the first and second metatarsals, concerning for ligamentous injury. 3. Marked diffuse soft tissue swelling. There is subcutaneous emphysema/tract at the plantar lateral foot. Correlate with ability to probe to bone. 4. See separately dictated same-day CT. Signed by Dr Katia Curran    XR FOOT LEFT (MIN 3 VIEWS)    Result Date: 10/19/2021  1. Findings highly concerning for osteomyelitis and septic arthritis of the second-fourth tarsometatarsal joints with bony destruction and debris. 2. There appears to be an oblique fracture of the second metatarsal base involving the proximal articular surface with associated widening of the space between the first and second metatarsals, concerning for ligamentous injury. 3. Marked diffuse soft tissue swelling. There is subcutaneous emphysema/tract at the plantar lateral foot. Correlate with ability to probe to bone.  4. See separately dictated same-day CT. Signed by Dr Sean Jeans    Result Date: 10/19/2021  Impression: Multiple foot and ankle abscesses with findings of septic arthritis and osteomyelitis as described above. Signed by Dr Karyn Flores    MRI FOOT LEFT W WO CONTRAST    Result Date: 10/20/2021  1. Extensive osteomyelitis, abscesses, tenosynovitis and myositis as described above. Most proximal aspect of the osteomyelitis appears to be the distal calcaneus and talus. 2. Tibiotalar joint effusion could be reactive or seen with septic arthritis. Signed by Dr Beth Bartholomew       Micro:   10/20/2021 11:18 AM - James, Kyin Incoming Lab Results From [facility]    Specimen Information: Blood        Component Collected Lab   Blood Culture, Routine 10/19/2021  7:30 AM [facility]   No Growth to date. Jacy Cast change in status will be called. 10/20/2021 11:18 AM - James, Kyin Incoming Lab Results From [facility]    Specimen Information: Blood        Component Collected Lab   Culture, Blood 2 10/19/2021  7:36 AM [facility]   No Growth to date. Jacy Cast change in status will be called. 10/20/2021  3:10 PM - James, Kyin Incoming Lab Results From [employer] [facility], Body Fluid - 10/20/2021  Result Information    Status:  In process (Collected: 10/20/2021 14:30)         Assessment/Plan   Active Problems:    Osteomyelitis ([address]              -ortho on board              -CT and xrays very concerning for extensive osteomylitis   -MRI indaicating the same   -US guided aspiration completed 10/20, awaiting culture results              -continue Datomycin and Zosyn              -Monitor and control glucose                          -A1c elevated                          -Accu checks                          -SSI                          -Lantus                          -carb controlled diet              -follow blood cultures obtained in ED              -monitor CBC and CMP closely     Antibiotic: daptomycin    DVT Prophylaxis: Yessi Cowan, JOVI - CNP, 10/21/2021 10:36 AM

## 2021-10-21 NOTE — CONSULTS
INFECTIOUS DISEASES CONSULT NOTE    Patient:  Gigi Orr 42 y.o. female  ROOM # [unfilled]  YOB: 1975  MRN: [id number]  CSN:  [id number]  Admit date: 10/19/2021   Admitting Physician: Haridk Guzman MD  Primary Care Physician: Jean Pop  REFERRING PROVIDER: No ref. provider found    Reason for Consultation: Osteomyelitis    History of Present Illness/Chief Complaint: Pleasant 44-year-old woman. She indicates about 4 years ago she had fractured a bone in the lateral aspect of her left foot. She required placement of hardware. She indicates the hardware had broken and worked its way partially out earlier this year. She has been followed by podiatry at [facility]. She indicates around March the hardware was removed. She describes getting antibiotics for infection for a few months. She has been off antibiotics the past few months. She indicates there is remained an open area that his been debrided and drained periodically. I got the impression she developed some increasing swelling and redness in the foot. She was admitted to [facility].  She has seen podiatry here. She has had MRI scan outlined below which appears to show osteomyelitis and abscess. She has been on treatment with daptomycin and Zosyn. Infectious disease asked to evaluate and offer recommendations. She indicates podiatry saw her today and will reevaluate her tomorrow. They are considering surgery.     Current Scheduled Medications:    daptomycin (CUBICIN) IVPB  600 mg IntraVENous Q24H    amitriptyline  75 mg Oral Nightly    DULoxetine  60 mg Oral Daily    gabapentin  600 mg Oral TID    nicotine  1 patch TransDERmal Daily    sodium chloride flush  5-40 mL IntraVENous 2 times per day    enoxaparin  40 mg SubCUTAneous Q24H    piperacillin-tazobactam  3,375 mg IntraVENous Q8H    insulin glargine  15 Units SubCUTAneous Nightly    insulin lispro  0-6 Units SubCUTAneous TID     insulin lispro 0-3 Units SubCUTAneous Nightly     Current PRN Medications:  glucose, dextrose, glucagon (rDNA), dextrose, sodium chloride flush, sodium chloride, ondansetron **OR** ondansetron, polyethylene glycol, acetaminophen **OR** acetaminophen, HYDROcodone 5 mg - acetaminophen **OR** HYDROcodone 5 mg - acetaminophen, morphine **OR** morphine    Allergies: Allergies   Allergen Reactions    Vancomycin Hives       Past Medical History: Thyroid dysfunction. Neuropathy. Tobacco use. Thyroid nodule. She reports no history of diabetes or heart disease. Past Surgical History:  section. Cholecystectomy. Previous foot surgery as outlined above. Ovary removal.  Tubal ligation. Social History: She smokes 1 pack/day. She reports no alcohol use or illicit drug use. Family History: Colon cancer. High blood pressure. Diabetes. Exposure History: No close contacts have been ill. Review of Systems: No chest pain or chest pressure  No cough or shortness of breath  No nausea or vomiting  No diarrhea  No urinary symptoms    Vital Signs:  /75   Pulse 97   Temp 97.5 °F (36.4 °C) (Temporal)   Resp 20   Ht 5' 10\" (1.778 m)   Wt 300 lb (136.1 kg)   SpO2 95%   BMI 43.05 kg/m²  Temp (24hrs), Av.1 °F (36.2 °C), Min:96.4 °F (35.8 °C), Max:97.7 °F (36.5 °C)    Physical Exam:   Vital signs reviewed. Alert, pleasant, no distress  Lungs clear to auscultation with crackles  Heart regular rhythm without murmur  Abdomen is soft and nontender. Obese. Left foot with open area midfoot. There is a little bit of slightly cloudy serous drainage. There was some faint erythema. No crepitance. No discrete area of fluctuance. No lymphangitis.   Trace bilateral lower extremity lymphedema    Lab Results:  CBC:   Recent Labs     10/19/21  0736 10/20/21  0218   WBC 12.5* 11.1*   HGB 11.7* 11.6*   HCT 36.2* 37.6   * 423*   LYMPHOPCT 21.3 21.3   MONOPCT 5.0 5.0     CMP:   Recent Labs     10/19/21  0736 10/20/21  0218   * 136   K 3.7 4.2   CL 95* 95*   CO2 26 31*   BUN 6 8   CREATININE 0.6 0.6   CALCIUM 9.3 9.0   BILITOT 0.3 0.3   ALKPHOS 86 88   ALT 7 7   AST 7 9   GLUCOSE 177* 150*     Culture:   Blood cultures x2 yesterday no growth    Radiology:   MRI of the left foot done today:  Impression   1. Extensive osteomyelitis, abscesses, tenosynovitis and myositis as   described above. Most proximal aspect of the osteomyelitis appears to   be the distal calcaneus and talus. 2. Tibiotalar joint effusion could be reactive or seen with septic   arthritis.    Signed by Dr Puneet Alvarado       Additional Studies Reviewed:     Impression:   Open wound with osteomyelitis left midfoot area  Possible Charcot changes versus osteomyelitis versus combination in the left midfoot    Recommendations:    Continue daptomycin  Continue Zosyn  Review with podiatry  Continue nonweightbearing  Continue to follow    Elena Harkins MD  10/20/21  7:37 PM

## 2021-10-22 VITALS
WEIGHT: 293 LBS | HEART RATE: 85 BPM | OXYGEN SATURATION: 94 % | HEIGHT: 70 IN | TEMPERATURE: 96.8 F | DIASTOLIC BLOOD PRESSURE: 87 MMHG | SYSTOLIC BLOOD PRESSURE: 138 MMHG | RESPIRATION RATE: 18 BRPM | BODY MASS INDEX: 41.95 KG/M2

## 2021-10-22 LAB
ALBUMIN SERPL-MCNC: 3.3 G/DL (ref 3.5–5.2)
ALP BLD-CCNC: 91 U/L (ref 35–104)
ALT SERPL-CCNC: 7 U/L (ref 5–33)
ANION GAP SERPL CALCULATED.3IONS-SCNC: 9 MMOL/L (ref 7–19)
AST SERPL-CCNC: 9 U/L (ref 5–32)
BASOPHILS ABSOLUTE: 0 K/UL (ref 0–0.2)
BASOPHILS RELATIVE PERCENT: 0.5 % (ref 0–1)
BILIRUB SERPL-MCNC: <0.2 MG/DL (ref 0.2–1.2)
BUN BLDV-MCNC: 9 MG/DL (ref 6–20)
CALCIUM SERPL-MCNC: 8.9 MG/DL (ref 8.6–10)
CHLORIDE BLD-SCNC: 98 MMOL/L (ref 98–111)
CO2: 31 MMOL/L (ref 22–29)
CREAT SERPL-MCNC: 0.7 MG/DL (ref 0.5–0.9)
EOSINOPHILS ABSOLUTE: 0.5 K/UL (ref 0–0.6)
EOSINOPHILS RELATIVE PERCENT: 7.2 % (ref 0–5)
GFR AFRICAN AMERICAN: >59
GFR NON-AFRICAN AMERICAN: >60
GLUCOSE BLD-MCNC: 134 MG/DL (ref 70–99)
GLUCOSE BLD-MCNC: 152 MG/DL (ref 74–109)
GLUCOSE BLD-MCNC: 157 MG/DL (ref 70–99)
HCT VFR BLD CALC: 35.7 % (ref 37–47)
HEMOGLOBIN: 11 G/DL (ref 12–16)
IMMATURE GRANULOCYTES #: 0 K/UL
LYMPHOCYTES ABSOLUTE: 2.4 K/UL (ref 1.1–4.5)
LYMPHOCYTES RELATIVE PERCENT: 31.5 % (ref 20–40)
MCH RBC QN AUTO: 27.5 PG (ref 27–31)
MCHC RBC AUTO-ENTMCNC: 30.8 G/DL (ref 33–37)
MCV RBC AUTO: 89.3 FL (ref 81–99)
MONOCYTES ABSOLUTE: 0.4 K/UL (ref 0–0.9)
MONOCYTES RELATIVE PERCENT: 4.7 % (ref 0–10)
NEUTROPHILS ABSOLUTE: 4.2 K/UL (ref 1.5–7.5)
NEUTROPHILS RELATIVE PERCENT: 55.7 % (ref 50–65)
PDW BLD-RTO: 14.1 % (ref 11.5–14.5)
PERFORMED ON: ABNORMAL
PERFORMED ON: ABNORMAL
PLATELET # BLD: 419 K/UL (ref 130–400)
PMV BLD AUTO: 8.7 FL (ref 9.4–12.3)
POTASSIUM REFLEX MAGNESIUM: 4.3 MMOL/L (ref 3.5–5)
RBC # BLD: 4 M/UL (ref 4.2–5.4)
SODIUM BLD-SCNC: 138 MMOL/L (ref 136–145)
TOTAL PROTEIN: 6.7 G/DL (ref 6.6–8.7)
WBC # BLD: 7.5 K/UL (ref 4.8–10.8)

## 2021-10-22 PROCEDURE — 82947 ASSAY GLUCOSE BLOOD QUANT: CPT

## 2021-10-22 PROCEDURE — 80053 COMPREHEN METABOLIC PANEL: CPT

## 2021-10-22 PROCEDURE — 6370000000 HC RX 637 (ALT 250 FOR IP): Performed by: INTERNAL MEDICINE

## 2021-10-22 PROCEDURE — 2580000003 HC RX 258: Performed by: EMERGENCY MEDICINE

## 2021-10-22 PROCEDURE — 6360000002 HC RX W HCPCS: Performed by: EMERGENCY MEDICINE

## 2021-10-22 PROCEDURE — 85025 COMPLETE CBC W/AUTO DIFF WBC: CPT

## 2021-10-22 PROCEDURE — 93971 EXTREMITY STUDY: CPT

## 2021-10-22 PROCEDURE — 6370000000 HC RX 637 (ALT 250 FOR IP): Performed by: NURSE PRACTITIONER

## 2021-10-22 PROCEDURE — 6360000002 HC RX W HCPCS: Performed by: NURSE PRACTITIONER

## 2021-10-22 PROCEDURE — 2580000003 HC RX 258: Performed by: NURSE PRACTITIONER

## 2021-10-22 PROCEDURE — 36415 COLL VENOUS BLD VENIPUNCTURE: CPT

## 2021-10-22 RX ORDER — HYDROCODONE BITARTRATE AND ACETAMINOPHEN 5; 325 MG/1; MG/1
1 TABLET ORAL EVERY 6 HOURS PRN
Qty: 12 TABLET | Refills: 0 | Status: SHIPPED | OUTPATIENT
Start: 2021-10-22 | End: 2021-10-25

## 2021-10-22 RX ORDER — CLINDAMYCIN HYDROCHLORIDE 300 MG/1
600 CAPSULE ORAL EVERY 8 HOURS SCHEDULED
Status: DISCONTINUED | OUTPATIENT
Start: 2021-10-22 | End: 2021-10-22 | Stop reason: HOSPADM

## 2021-10-22 RX ORDER — LEVOFLOXACIN 750 MG/1
750 TABLET ORAL DAILY
Qty: 14 TABLET | Refills: 1 | Status: SHIPPED | OUTPATIENT
Start: 2021-10-22 | End: 2021-11-19

## 2021-10-22 RX ORDER — CLINDAMYCIN HYDROCHLORIDE 300 MG/1
600 CAPSULE ORAL EVERY 8 HOURS SCHEDULED
Qty: 84 CAPSULE | Refills: 1 | Status: SHIPPED | OUTPATIENT
Start: 2021-10-22 | End: 2021-11-19

## 2021-10-22 RX ORDER — HYDROCODONE BITARTRATE AND ACETAMINOPHEN 5; 325 MG/1; MG/1
1 TABLET ORAL EVERY 6 HOURS PRN
Status: DISCONTINUED | OUTPATIENT
Start: 2021-10-22 | End: 2021-10-22 | Stop reason: HOSPADM

## 2021-10-22 RX ORDER — LEVOFLOXACIN 750 MG/1
750 TABLET ORAL DAILY
Status: DISCONTINUED | OUTPATIENT
Start: 2021-10-22 | End: 2021-10-22 | Stop reason: HOSPADM

## 2021-10-22 RX ADMIN — HYDROCODONE BITARTRATE AND ACETAMINOPHEN 1 TABLET: 5; 325 TABLET ORAL at 14:39

## 2021-10-22 RX ADMIN — HYDROCODONE BITARTRATE AND ACETAMINOPHEN 2 TABLET: 5; 325 TABLET ORAL at 10:31

## 2021-10-22 RX ADMIN — SODIUM CHLORIDE, PRESERVATIVE FREE 10 ML: 5 INJECTION INTRAVENOUS at 08:36

## 2021-10-22 RX ADMIN — DULOXETINE HYDROCHLORIDE 60 MG: 30 CAPSULE, DELAYED RELEASE ORAL at 08:36

## 2021-10-22 RX ADMIN — MORPHINE SULFATE 4 MG: 4 INJECTION, SOLUTION INTRAMUSCULAR; INTRAVENOUS at 08:36

## 2021-10-22 RX ADMIN — GABAPENTIN 600 MG: 300 CAPSULE ORAL at 14:39

## 2021-10-22 RX ADMIN — GABAPENTIN 600 MG: 300 CAPSULE ORAL at 08:36

## 2021-10-22 RX ADMIN — DAPTOMYCIN 600 MG: 500 INJECTION, POWDER, LYOPHILIZED, FOR SOLUTION INTRAVENOUS at 10:27

## 2021-10-22 RX ADMIN — HYDROCODONE BITARTRATE AND ACETAMINOPHEN 2 TABLET: 5; 325 TABLET ORAL at 06:35

## 2021-10-22 RX ADMIN — ENOXAPARIN SODIUM 40 MG: 40 INJECTION SUBCUTANEOUS at 14:39

## 2021-10-22 RX ADMIN — PIPERACILLIN AND TAZOBACTAM 3375 MG: 3; .375 INJECTION, POWDER, LYOPHILIZED, FOR SOLUTION INTRAVENOUS at 11:09

## 2021-10-22 RX ADMIN — PIPERACILLIN AND TAZOBACTAM 3375 MG: 3; .375 INJECTION, POWDER, LYOPHILIZED, FOR SOLUTION INTRAVENOUS at 03:07

## 2021-10-22 ASSESSMENT — PAIN SCALES - GENERAL
PAINLEVEL_OUTOF10: 8

## 2021-10-22 NOTE — PLAN OF CARE
Problem: Falls - Risk of:  Goal: Will remain free from falls  Description: Will remain free from falls  Outcome: Ongoing  Goal: Absence of physical injury  Description: Absence of physical injury  Outcome: Ongoing     Problem: Pain:  Goal: Pain level will decrease  Description: Pain level will decrease  Outcome: Ongoing  Goal: Control of acute pain  Description: Control of acute pain  Outcome: Ongoing  Goal: Control of chronic pain  Description: Control of chronic pain  Outcome: Ongoing  Goal: Patient's pain/discomfort is manageable  Description: Patient's pain/discomfort is manageable  Outcome: Ongoing     Problem: Nutrition  Goal: Optimal nutrition therapy  Outcome: Ongoing     Problem: Infection:  Goal: Will remain free from infection  Description: Will remain free from infection  Outcome: Ongoing     Problem: Safety:  Goal: Free from accidental physical injury  Description: Free from accidental physical injury  Outcome: Ongoing  Goal: Free from intentional harm  Description: Free from intentional harm  Outcome: Ongoing     Problem: Daily Care:  Goal: Daily care needs are met  Description: Daily care needs are met  Outcome: Ongoing     Problem: Skin Integrity:  Goal: Skin integrity will stabilize  Description: Skin integrity will stabilize  Outcome: Ongoing     Problem: Discharge Planning:  Goal: Patients continuum of care needs are met  Description: Patients continuum of care needs are met  Outcome: Ongoing     Problem: ABCDS Injury Assessment  Goal: Absence of physical injury  Outcome: Ongoing

## 2021-10-22 NOTE — DISCHARGE SUMMARY
[place], [place], [address]  DEPARTMENT OF HOSPITALIST MEDICINE    DISCHARGE SUMMARY:        Matthias Gonzales  :  1975  MRN:  [id number]    Admit date:  10/19/2021  Discharge date:  10/22/2021      Admitting Physician:  Zachary Phillips MD    Advance Directive: Full Code    Consults Made:   IP CONSULT TO ORTHOPEDIC SURGERY  IP CONSULT TO INFECTIOUS DISEASES      Primary Care Physician:  Marlin Mckeon    Discharge Diagnoses: Active Problems:    Osteomyelitis ([address]  Resolved Problems:    * No resolved hospital problems. *          Pertinent Labs:  CBC with DIFF:  Recent Labs     10/20/21  0218 10/21/21  0309 10/22/21  0328   WBC 11.1* 8.8 7.5   RBC 4.22 3.97* 4.00*   HGB 11.6* 10.9* 11.0*   HCT 37.6 35.6* 35.7*   MCV 89.1 89.7 89.3   MCH 27.5 27.5 27.5   MCHC 30.9* 30.6* 30.8*   RDW 14.5 14.3 14.1   * 395 419*   MPV 8.9* 8.7* 8.7*   NEUTOPHILPCT 68.7* 61.9 55.7   LYMPHOPCT 21.3 26.1 31.5   MONOPCT 5.0 5.5 4.7   EOSRELPCT 4.3 5.7* 7.2*   BASOPCT 0.4 0.3 0.5   NEUTROABS 7.6* 5.4 4.2   LYMPHSABS 2.4 2.3 2.4   MONOSABS 0.60 0.50 0.40   EOSABS 0.50 0.50 0.50   BASOSABS 0.00 0.00 0.00       CMP/BMP:  Recent Labs     10/20/21  0218 10/21/21  0309 10/22/21  0328    136 138   K 4.2 4.6 4.3   CL 95* 98 98   CO2 31* 30* 31*   ANIONGAP 10 8 9   GLUCOSE 150* 135* 152*   BUN 8 10 9   CREATININE 0.6 0.5 0.7   LABGLOM >60 >60 >60   CALCIUM 9.0 8.6 8.9   PROT 7.2 6.9 6.7   LABALBU 3.7 3.4* 3.3*   BILITOT 0.3 <0.2 <0.2   ALKPHOS 88 83 91   ALT 7 6 7   AST 9 9 9         CRP:  No results for input(s): CRP in the last 72 hours. Sed Rate:  No results for input(s): SEDRATE in the last 72 hours.       HgBA1c:  No components found for: HGBA1C  FLP:    Lab Results   Component Value Date    TRIG 509 2018    HDL 41 2018    LDLCALC see below 2018    LDLDIRECT 93 2018     TSH:    Lab Results   Component Value Date    TSH 0.168 2017     Troponin T: No results for input(s): TROPONINI in the last 72 hours. Pro-BNP: No results for input(s): BNP in the last 72 hours. INR: No results for input(s): INR in the last 72 hours. ABGs:   Lab Results   Component Value Date    PHART 7.360 04/05/2018    PO2ART 60.0 04/05/2018    QXY5PQS 42.0 04/05/2018     UA:No results for input(s): NITRITE, COLORU, PHUR, LABCAST, WBCUA, RBCUA, MUCUS, TRICHOMONAS, YEAST, BACTERIA, CLARITYU, SPECGRAV, LEUKOCYTESUR, UROBILINOGEN, BILIRUBINUR, BLOODU, GLUCOSEU, AMORPHOUS in the last 72 hours. Invalid input(s): Willaim Furnish      Culture Results:    No results for input(s): CXSURG in the last 720 hours. Blood Culture Recent:   Recent Labs     10/19/21  0730   BC No Growth to date. Any change in status will be called. Cultures:   No results for input(s): CULTURE in the last 72 hours. No results for input(s): BC, Elige Lal in the last 72 hours. No results for input(s): CXSURG in the last 72 hours. No results for input(s): MG, PHOS in the last 72 hours. Recent Labs     10/20/21  0218 10/21/21  0309 10/22/21  0328   AST 9 9 9   ALT 7 6 7   BILITOT 0.3 <0.2 <0.2   ALKPHOS 88 83 91           Significant Diagnostic Studies:   XR ANKLE LEFT (2 VIEWS)    Result Date: 10/19/2021  EXAM: 1. XR ANKLE LEFT (2 VIEWS), 2. XR FOOT LEFT (MIN 3 VIEWS) DATE: 10/19/2021 1:30 PM HISTORY: 46 years, Female, infection, left foot pain and swelling. History of hardware removal, infection, wound at the lateral aspect that continues to drain. COMPARISON: Same day CT TECHNIQUE:  1. Three-view left ankle. AP, oblique, lateral. 3 images. 2. Three-view left foot. DP, oblique, lateral. 3 images. FINDINGS:  Left ankle. Left foot. Distal tibia and fibula appear intact. Talar dome appears intact. Ankle mortise anatomic. Limited evaluation for joint effusion due to diffuse soft tissue swelling. No radiodense foreign body at the ankle. Subcutaneous emphysema/tract at the lateral plantar foot.  There is bony erosion and debris at the second-fifth tarsometatarsal joints. There appears to be an oblique fracture of the second metatarsal base involving the proximal articular surface. Widening of the space between the first and second metatarsals. Marked soft tissue swelling of the distal foot. No radiodense foreign body. Suspected postoperative changes of the first interphalangeal joint. 1. Findings highly concerning for osteomyelitis and septic arthritis of the second-fourth tarsometatarsal joints with bony destruction and debris. 2. There appears to be an oblique fracture of the second metatarsal base involving the proximal articular surface with associated widening of the space between the first and second metatarsals, concerning for ligamentous injury. 3. Marked diffuse soft tissue swelling. There is subcutaneous emphysema/tract at the plantar lateral foot. Correlate with ability to probe to bone. 4. See separately dictated same-day CT. Signed by Dr Katia Curran    XR FOOT LEFT (MIN 3 VIEWS)    Result Date: 10/19/2021  EXAM: 1. XR ANKLE LEFT (2 VIEWS), 2. XR FOOT LEFT (MIN 3 VIEWS) DATE: 10/19/2021 1:30 PM HISTORY: 46 years, Female, infection, left foot pain and swelling. History of hardware removal, infection, wound at the lateral aspect that continues to drain. COMPARISON: Same day CT TECHNIQUE:  1. Three-view left ankle. AP, oblique, lateral. 3 images. 2. Three-view left foot. DP, oblique, lateral. 3 images. FINDINGS:  Left ankle. Left foot. Distal tibia and fibula appear intact. Talar dome appears intact. Ankle mortise anatomic. Limited evaluation for joint effusion due to diffuse soft tissue swelling. No radiodense foreign body at the ankle. Subcutaneous emphysema/tract at the lateral plantar foot. There is bony erosion and debris at the second-fifth tarsometatarsal joints. There appears to be an oblique fracture of the second metatarsal base involving the proximal articular surface. Widening of the space between the first and second metatarsals. Marked soft tissue swelling of the distal foot. No radiodense foreign body. Suspected postoperative changes of the first interphalangeal joint. 1. Findings highly concerning for osteomyelitis and septic arthritis of the second-fourth tarsometatarsal joints with bony destruction and debris. 2. There appears to be an oblique fracture of the second metatarsal base involving the proximal articular surface with associated widening of the space between the first and second metatarsals, concerning for ligamentous injury. 3. Marked diffuse soft tissue swelling. There is subcutaneous emphysema/tract at the plantar lateral foot. Correlate with ability to probe to bone. 4. See separately dictated same-day CT. Signed by Dr Shabana Eduardo    Result Date: 10/19/2021  Exam:   CT FOOT LEFT W CONTRAST  Date:  10/19/2021 History:  Female, age  42 years; infection COMPARISON:  None. Radiation dose equals  mGy-cm. Automated exposure control dose reduction technique was implemented. Findings : There are multiple peripherally enhancing fluid collections in the foot predominantly involving the dorsum and lateral aspect. For instance, dorsolaterally to the talus, there is a 3.2 x 2 cm fluid collection and a 1.6 x 1.1 cm abscess (image 37, series 8). Anterior to the inferior tibia, there is also a loculated 3.2 x 1.3 cm (image 50, series 8). The medial mid foot also shows loculated fluid signal overlying the medial cuneiform, measuring 1.7 x 1.1 cm (image 65, series 8). The base of the fourth metatarsal bone is associated with the located fluid signal measuring 2.6 x 1.6 cm dorsally (image 33, series 8). These findings are associated with lytic changes involving the second, third and fourth metatarsal bases. Lytic changes are also seen in the medial, intermediate, lateral cuneiforms. These are most consistent with septic arthritis and osteomyelitis.  There is a soft tissue defect along the plantar aspect lateral foot (image 23, series 8). Diffuse skin thickening and edema. Impression: Multiple foot and ankle abscesses with findings of septic arthritis and osteomyelitis as described above. Signed by Dr Sandy Torrez    MRI FOOT LEFT W WO CONTRAST    Result Date: 10/20/2021  EXAM: MRI FOOT LEFT W WO CONTRAST -- 10/20/2021 10:25 AM HISTORY: 42 years, Female, abscess, osteomyelitis COMPARISON: Foot radiographs 10/19/2021 TECHNIQUE: Unenhanced and enhanced MRI sequences performed. FINDINGS: Multifocal abscess in the midfoot, predominantly centered at the second and third metatarsophalangeal joints. This is difficult to measure due to its irregular configuration, but largest contiguous area measures about 3.0 x 4.0 x 4.7 cm (AP by transverse by craniocaudal). The next two largest drainable collections are at the lateral dorsal foot measuring 3 cm and 2.7 cm. These appear to track toward the subtalar joint. Additional foci of abscesses throughout the foot. Associated fluid in multiple tendon sheaths suggesting tenosynovitis. Abnormal enhancement and edema of the musculature consistent with myositis. Abnormal bone marrow signal throughout the proximal metatarsals and tarsals. There is decreased T1 signal at the dorsal lateral calcaneus, concerning for osteomyelitis. There is decreased T1 signal of the distal talus, also concerning for osteomyelitis. Normal bone marrow signal in the distal tibia and fibula. Fluid in the tibiotalar joint, could represent septic arthritis or be reactive. 1. Extensive osteomyelitis, abscesses, tenosynovitis and myositis as described above. Most proximal aspect of the osteomyelitis appears to be the distal calcaneus and talus. 2. Tibiotalar joint effusion could be reactive or seen with septic arthritis. Signed by Dr Gerald Rivas    Result Date: 10/20/2021  Exam:   US FINE NEEDLE ASPIRATION  Date:  10/20/2021 History:  Female, age  42 years; abscess. COMPARISON:  MRI foot same day. Findings : The risks and benefits of ultrasound-guided fluid drainage were described to the patient. Questions were answered. Verbal and written consents were obtained. Under ultrasound guidance, a heterogeneous fluid signal overlying the ankle and measuring 4.1 x 1.5 cm was identified. Timeout was taken confirming the patient's name, date of birth, the procedure, the entry site, and list of allergies. The entry site within the left dorsum of the foot was marked. This was sterilely prepped and draped. Local anesthetic was administered. Utilizing a 5 Grenadian 10 cm catheter, the fluid collection was entered. Despite multiple attempts, no fluid could be aspirated through the syringe. Subsequently, sterile saline was injected with a syringe into the fluid collection. Subsequently, 3 syringes of approximately 3 mL each were aspirated from fluid collection and sent to microbiology. The fluid collection also showed fragments of whitish color particles. Impression: Successful ultrasound-guided of the heterogeneous fluid collection in the dorsum of the proximal foot. Signed by Dr Sandra Delvalle Course:   Cumulative hospital course, as quoted below; \"The patient is a 42 y. o. female with an ongoing foot wound who presented to [facility] ED complaining of increased pain, swelling, and drainage from left foot wound. The patient stated she first injured her left foot years ago and required repair with hardware. In Feb 2021 she noted repeat injury and her hardware come loose and worked its way out. At that time the patient stated she was treated with IV antibiotics for MRSA infection. Since then the patient has had multiple procedures with Dr. Zach Lozano. The patient stated she has been seeing wound care every Monday. She also stated she has discussed the worsening pain and swelling.  She stated the morning of admission she alarmed when she could not get her socks and shoes on as her distress. Appearance: Normal appearance. She is obese. She is not ill-appearing, toxic-appearing or diaphoretic. HENT:      Head: Normocephalic and atraumatic. Right Ear: External ear normal.      Left Ear: External ear normal.      Nose: Nose normal. No congestion or rhinorrhea. Mouth/Throat:      Mouth: Mucous membranes are moist.      Pharynx: Oropharynx is clear. No oropharyngeal exudate or posterior oropharyngeal erythema. Eyes:      General: No scleral icterus. Right eye: No discharge. Left eye: No discharge. Extraocular Movements: Extraocular movements intact. Conjunctiva/sclera: Conjunctivae normal.      Pupils: Pupils are equal, round, and reactive to light. Cardiovascular:      Rate and Rhythm: Normal rate and regular rhythm. Pulses: Normal pulses. Heart sounds: Normal heart sounds. No murmur heard. No friction rub. No gallop. Pulmonary:      Effort: Pulmonary effort is normal. No respiratory distress. Breath sounds: Normal breath sounds. No stridor. No wheezing, rhonchi or rales. Chest:      Chest wall: No tenderness. Abdominal:      General: Bowel sounds are normal. There is no distension. Palpations: Abdomen is soft. Tenderness: There is no abdominal tenderness. There is no guarding or rebound. Musculoskeletal:         General: No swelling, tenderness, deformity or signs of injury. Normal range of motion. Cervical back: Normal range of motion and neck supple. No rigidity or tenderness. No muscular tenderness. Right lower leg: Edema present. Left lower leg: Edema present. Comments: Left lower extremity/ lateral laurie-foot nonhealing wound in place. Skin:     General: Skin is warm and dry. Capillary Refill: Capillary refill takes less than 2 seconds. Coloration: Skin is not jaundiced or pale. Findings: No bruising, erythema, lesion or rash.    Neurological:      General: No focal deficit present. Mental Status: She is alert and oriented to person, place, and time. Cranial Nerves: No cranial nerve deficit. Sensory: No sensory deficit. Motor: No weakness. Coordination: Coordination normal.   Psychiatric:         Mood and Affect: Mood normal.         Behavior: Behavior normal.         Thought Content: Thought content normal.         Judgment: Judgment normal.         Discharge Medications:       Bakari Ambrocio [facility] Medication Instructions [facility]:[id number]    Printed on:10/22/21 0516   Medication Information                      amitriptyline (ELAVIL) 75 MG tablet  Take 75 mg by mouth nightly             aspirin 81 MG chewable tablet  Take 4 tablets by mouth daily             clindamycin (CLEOCIN) 300 MG capsule  Take 2 capsules by mouth every 8 hours for 28 days             DULoxetine (CYMBALTA) 60 MG extended release capsule  Take 60 mg by mouth daily             furosemide (LASIX) 20 MG tablet  Take 1 tablet by mouth 2 times daily as needed              gabapentin (NEURONTIN) 300 MG capsule  TAKE TWO CAPSULES THREE TIMES A DAY             guaiFENesin (MUCINEX) 600 MG extended release tablet  Take 1 tablet by mouth 4 times daily             HYDROcodone-acetaminophen (NORCO) 5-325 MG per tablet  Take 1 tablet by mouth every 6 hours as needed for Pain for up to 3 days. levoFLOXacin (LEVAQUIN) 750 MG tablet  Take 1 tablet by mouth daily for 28 doses             lisinopril (PRINIVIL;ZESTRIL) 10 MG tablet  Take 10 mg by mouth 2 times daily             metFORMIN (GLUCOPHAGE) 500 MG tablet  Take 1 tablet by mouth 2 times daily (with meals)             nicotine (NICODERM CQ) 14 MG/24HR  Place 1 patch onto the skin daily             potassium chloride (KLOR-CON) 10 MEQ extended release tablet  Take 1 tablet by mouth daily                 Discharge Instructions: Follow up with Tariq Alvarado in 7 days. Take medications as directed. Resume activity as tolerated.     Diet: ADULT DIET; Regular; 4 carb choices (60 gm/meal)  ADULT ORAL NUTRITION SUPPLEMENT; Breakfast; Diabetic Oral Supplement        DISCHARGE STATUS:    Condition: Fair  Disposition: Patient is medically stable and will be discharged home    Extended Emergency Contact Information  Primary Emergency Contact: Joana Ambrocio [address] Phone: [phone]  Relation: Parent  Secondary Emergency Contact: Erin Santillan [address] Phone: [phone]  Relation: Child       Time Spent on discharge is  35 mins in the examination, evaluation, counseling and review of medications and discharge plan. Electronically signed by   Edwige Griffin MD, MPH, MD,   Internal Medicine Hospitalist   10/22/2021 4:48 PM      Thank you Jeanine Chavez for the opportunity to be involved in this patient's care. If you have any questions or concerns please feel free to contact me at [phone]        EMR Dragon/Transcription disclaimer:   Much of this encounter note is an electronic transcription/translation of spoken language to printed text.  The electronic translation of spoken language may permit erroneous, or at times, nonsensical words or phrases to be inadvertently transcribed; although attempts have made to review the note for such errors, some may still exist.

## 2021-10-22 NOTE — PLAN OF CARE
Problem: Nutrition  Goal: Optimal nutrition therapy  10/22/2021 1048 by Reese Briseno RD, LD  Outcome: Ongoing  10/22/2021 0100 by Kyara Sorensen RN  Outcome: Ongoing   Nutrition Problem #1: Inadequate oral intake  Intervention: Food and/or Nutrient Delivery: Continue Current Diet, Start Oral Nutrition Supplement  Nutritional Goals: New goal: PO intake >50%; wt stable; wound healing

## 2021-10-22 NOTE — PROGRESS NOTES
Infectious Diseases Progress Note    Patient:  Bob Henson  YOB: 1975  MRN: [id number]   Admit date: 10/19/2021   Admitting Physician: Shravan Wei MD  Primary Care Physician: Aundrea Cleary    Chief Complaint/Interval History: She is without fever or chills. No nausea, diarrhea, or rash. She has had a PICC line before. She knows what it is like to take IV antibiotic treatment at home. Talked at length with Allen Cotter of podiatry last night. He leans toward the changes on plain film to be more Charcot joint than osteomyelitis. Osteomyelitis cannot be excluded definitively. She does have an open area lateral aspect left midfoot, there is no purulent drainage, no induration, and no fluctuance. In/Out    Intake/Output Summary (Last 24 hours) at 10/22/2021 1523  Last data filed at 10/22/2021 1439  Gross per 24 hour   Intake 1641 ml   Output 900 ml   Net 741 ml     Allergies:    Allergies   Allergen Reactions    Vancomycin Hives     Current Meds: HYDROcodone-acetaminophen (NORCO) 5-325 MG per tablet 1 tablet, Q6H PRN  DAPTOmycin (CUBICIN) 600 mg in sodium chloride 0.9 % 62 mL IVPB, Q24H  amitriptyline (ELAVIL) tablet 75 mg, Nightly  DULoxetine (CYMBALTA) extended release capsule 60 mg, Daily  gabapentin (NEURONTIN) capsule 600 mg, TID  nicotine (NICODERM CQ) 14 MG/24HR 1 patch, Daily  glucose (GLUTOSE) 40 % oral gel 15 g, PRN  dextrose 50 % IV solution, PRN  glucagon (rDNA) injection 1 mg, PRN  dextrose 5 % solution, PRN  sodium chloride flush 0.9 % injection 5-40 mL, 2 times per day  sodium chloride flush 0.9 % injection 5-40 mL, PRN  0.9 % sodium chloride infusion, PRN  enoxaparin (LOVENOX) injection 40 mg, Q24H  ondansetron (ZOFRAN-ODT) disintegrating tablet 4 mg, Q8H PRN   Or  ondansetron (ZOFRAN) injection 4 mg, Q6H PRN  polyethylene glycol (GLYCOLAX) packet 17 g, Daily PRN  acetaminophen (TYLENOL) tablet 650 mg, Q6H PRN   Or  acetaminophen (TYLENOL) suppository 650 mg, Q6H PRN  piperacillin-tazobactam (ZOSYN) 3,375 mg in dextrose 5 % 50 mL IVPB extended infusion (mini-bag), Q8H  insulin glargine (LANTUS) injection vial 15 Units, Nightly  insulin lispro (HUMALOG) injection vial 0-6 Units, TID WC  insulin lispro (HUMALOG) injection vial 0-3 Units, Nightly      Review of Systems see HPI. No cardiopulmonary symptoms. VitalSigns:  /87   Pulse 85   Temp 96.8 °F (36 °C) (Temporal)   Resp 18   Ht 5' 10\" (1.778 m)   Wt 300 lb (136.1 kg)   SpO2 94%   BMI 43.05 kg/m²      Physical Exam  Line/IV site: No erythema, warmth, induration, or tenderness. Examination of the left foot shows an open area lateral left midfoot. It appears to be open about 7 mm in diameter. Suspect it will track to bone. With palpation there is no purulent drainage. No induration. No warmth. No significant surrounding erythema. Lab Results:  CBC:   Recent Labs     10/20/21  0218 10/21/21  0309 10/22/21  0328   WBC 11.1* 8.8 7.5   HGB 11.6* 10.9* 11.0*   * 395 419*     BMP:  Recent Labs     10/20/21  0218 10/21/21  0309 10/22/21  0328    136 138   K 4.2 4.6 4.3   CL 95* 98 98   CO2 31* 30* 31*   BUN 8 10 9   CREATININE 0.6 0.5 0.7   GLUCOSE 150* 135* 152*     CultureResults:  Body fluid culture October 20-moderate white blood cells. No organisms. Aerobic and anaerobic cultures no growth to date. Radiology:   MRI left foot with and without contrast:  Impression   1. Extensive osteomyelitis, abscesses, tenosynovitis and myositis as   described above. Most proximal aspect of the osteomyelitis appears to   be the distal calcaneus and talus. 2. Tibiotalar joint effusion could be reactive or seen with septic   arthritis. Signed by Dr Ted Christopher     Additional Studies Reviewed:  None    Impression:  Pleasant 65-year-old woman. She has extensive osteomyelitis/phlegmon left midfoot, versus Charcot joint, versus combination.   Aspirate does not give us any microbiology to guide treatment. She has extensive osteomyelitis I doubt a long course of IV antibiotics in the absence of any type of surgical debridement would offer her a high likelihood of cure. Oral antibiotics with good absorption and good bone penetration would be a consideration, but may not have as much potency as a course of IV antibiotic treatment. The aspirate might suggest Charcot changes as opposed to infection/osteomyelitis, but cannot exclude infection with certainty. Talked with Allen Cotter about debridement with deep culture and histopathology. We discussed possible empiric IV versus oral treatment. Below the knee amputation was also in the discussion as she may not be able to regain. Useful functional foot. Podiatry not recommending any surgery at present. Talked with the patient about proceeding with an empiric course of IV antibiotics and discussed risks and benefits. Also explained oral antibiotics would be a consideration, but most aggressive way to treat infection and standard of care would be IV antibiotics. If she is willing to accept a higher risk of treatment failure from infection standpoint and also is willing to accept the specific risks of fluoroquinolone therapy (tendon soreness, tendon weakening, or tendon rupture) we could consider a course of oral antibiotic treatment levofloxacin and clindamycin. She has had a PICC line previously. She would like to avoid additional IV antibiotic treatment. She accepts risks. She is comfortable with a trial of oral antibiotic treatment.   Her nurse [address] was present throughout my visit with her today    Recommendations:  Discontinue daptomycin and Zosyn  Begin levofloxacin 750 mg orally daily for 2 weeks-1 refill  Begin clindamycin 600 mg orally every 8 hours for 2 weeks-1 refill  She should continue follow-up with podiatry  Like to see her back in 2-3 weeks to see how she is doing with her antibiotic treatment  Would be of see sooner if any new or

## 2021-10-22 NOTE — PROGRESS NOTES
Orthopedic Foot/Ankle Progress Note    Subjective      Hospital Course: stable. Complains of left leg swellng and pain. Has remained non weight bearing        Objective      Vital signs in last 24 hours:  /78   Pulse 91   Temp 97.3 °F (36.3 °C) (Temporal)   Resp 18   Ht 5' 10\" (1.778 m)   Wt 300 lb (136.1 kg)   SpO2 91%   BMI 43.05 kg/m²     . left foot wound probes about 9mm. No purulence or crepitus of soft tissue    Does has edema to the foot and calf and pain with calf palpation.   No definite homans sign    Data Review    Recent Results (from the past 24 hour(s))   POCT Glucose    Collection Time: 10/21/21  7:25 AM   Result Value Ref Range    POC Glucose 171 (H) 70 - 99 mg/dl    Performed on [employer]    POCT Glucose    Collection Time: 10/21/21  4:25 PM   Result Value Ref Range    POC Glucose 164 (H) 70 - 99 mg/dl    Performed on [employer]    POCT Glucose    Collection Time: 10/21/21  8:52 PM   Result Value Ref Range    POC Glucose 185 (H) 70 - 99 mg/dl    Performed on [employer]    Comprehensive Metabolic Panel w/ Reflex to MG    Collection Time: 10/22/21  3:28 AM   Result Value Ref Range    Sodium 138 136 - 145 mmol/L    Potassium reflex Magnesium 4.3 3.5 - 5.0 mmol/L    Chloride 98 98 - 111 mmol/L    CO2 31 (H) 22 - 29 mmol/L    Anion Gap 9 7 - 19 mmol/L    Glucose 152 (H) 74 - 109 mg/dL    BUN 9 6 - 20 mg/dL    CREATININE 0.7 0.5 - 0.9 mg/dL    GFR Non-African American >60 >60    GFR African American >59 >59    Calcium 8.9 8.6 - 10.0 mg/dL    Total Protein 6.7 6.6 - 8.7 g/dL    Albumin 3.3 (L) 3.5 - 5.2 g/dL    Total Bilirubin <0.2 0.2 - 1.2 mg/dL    Alkaline Phosphatase 91 35 - 104 U/L    ALT 7 5 - 33 U/L    AST 9 5 - 32 U/L   CBC auto differential    Collection Time: 10/22/21  3:28 AM   Result Value Ref Range    WBC 7.5 4.8 - 10.8 K/uL    RBC 4.00 (L) 4.20 - 5.40 M/uL    Hemoglobin 11.0 (L) 12.0 - 16.0 g/dL    Hematocrit 35.7 (L) 37.0 - 47.0 %    MCV 89.3 81.0 - 99.0 fL    MCH 27.5 27.0 - 31.0 pg    MCHC 30.8 (L) 33.0 - 37.0 g/dL    RDW 14.1 11.5 - 14.5 %    Platelets 494 (H) 724 - 400 K/uL    MPV 8.7 (L) 9.4 - 12.3 fL    Neutrophils % 55.7 50.0 - 65.0 %    Lymphocytes % 31.5 20.0 - 40.0 %    Monocytes % 4.7 0.0 - 10.0 %    Eosinophils % 7.2 (H) 0.0 - 5.0 %    Basophils % 0.5 0.0 - 1.0 %    Neutrophils Absolute 4.2 1.5 - 7.5 K/uL    Immature Granulocytes # 0.0 K/uL    Lymphocytes Absolute 2.4 1.1 - 4.5 K/uL    Monocytes Absolute 0.40 0.00 - 0.90 K/uL    Eosinophils Absolute 0.50 0.00 - 0.60 K/uL    Basophils Absolute 0.00 0.00 - 0.20 K/uL            Assessment      Wound left foot  Charcot neuroarthropathy  Neuropathy  Possible chronic osteomyelitis 5th metatarsal    Plan    We discussed her options at length this am.  I discussed her case with Dr. Marlo Navas yesterday. I would recommend non weightbearing for charcot and continued local wound care with oral antibiotics. Can continue to be managed by Dr. Beverly Chen in [facility]. We also discussed IV antibiotic course and its risks and benefits. We also discussed Below knee amputation. I do not feel like she needs to progress to that at this time, with the aspiration findings. Will follow from a distance at this point. Happy to perform an operative debridement with bone culture and biopsy if deemed necessary.      LOS: 3 days     Tisha Avendaño II, DPM  10/22/2021, 7:08 AM

## 2021-10-22 NOTE — PROGRESS NOTES
Vascular preliminary results. Left lower extremity venous duplex scan performed today. No evidence of DVT, SVT, or reflux noted in limited areas visualized at this time. Final report pending.

## 2021-10-22 NOTE — PROGRESS NOTES
[facility] Progress Note    Patient:  Gigi Orr  YOB: 1975  Date of Service: 10/22/2021  MRN: [id number]   Acct: [de-identified]   Primary Care Physician: Jean Pop  Advance Directive: Full Code  Admit Date: 10/19/2021       Hospital Day: 3        CHIEF COMPLAINT:     Chief Complaint   Patient presents with    Post-op Problem    Foot Pain     left       10/22/2021 1:57 PM  Subjective / Interval History:   10/22/2021  Patient seen and examined this AM.  Doing well. No new complaints. Denies any left lower extremity pain. Laying comfortably in bed no acute distress. No fever, chills reported. Family at bedside. Review of Systems:   Review of Systems  ROS: 14 point review of systems is negative except as specifically addressed above. ADULT DIET;  Regular; 4 carb choices (60 gm/meal)  ADULT ORAL NUTRITION SUPPLEMENT; Breakfast; Diabetic Oral Supplement    Intake/Output Summary (Last 24 hours) at 10/22/2021 1357  Last data filed at 10/22/2021 1243  Gross per 24 hour   Intake 1401 ml   Output 900 ml   Net 501 ml       Medications:   dextrose      sodium chloride       Current Facility-Administered Medications   Medication Dose Route Frequency Provider Last Rate Last Admin    HYDROcodone-acetaminophen (NORCO) 5-325 MG per tablet 1 tablet  1 tablet Oral Q6H PRN Hardik Guzman MD        DAPTOmycin (CUBICIN) 600 mg in sodium chloride 0.9 % 62 mL IVPB  600 mg IntraVENous Q24H Lucretia Tenorio MD   Stopped at 10/22/21 1057    amitriptyline (ELAVIL) tablet 75 mg  75 mg Oral Nightly Dionne Later, APRN - CNP   75 mg at 10/21/21 2120    DULoxetine (CYMBALTA) extended release capsule 60 mg  60 mg Oral Daily Keara Miller, APRN - CNP   60 mg at 10/22/21 0836    gabapentin (NEURONTIN) capsule 600 mg  600 mg Oral TID Dionne Later, APRN - CNP   600 mg at 10/22/21 0836    nicotine (NICODERM CQ) 14 MG/24HR 1 patch  1 patch TransDERmal Daily Dionne Later, APRN - CNP  glucose (GLUTOSE) 40 % oral gel 15 g  15 g Oral PRN Gaviota Xieus, APRN - CNP        dextrose 50 % IV solution  12.5 g IntraVENous PRN Gavioat Marus, JOVI - CNP        glucagon (rDNA) injection 1 mg  1 mg IntraMUSCular PRN Howardkatja Xieus, APRN - CNP        dextrose 5 % solution  100 mL/hr IntraVENous PRN Gaviota Marus, APRN - CNP        sodium chloride flush 0.9 % injection 5-40 mL  5-40 mL IntraVENous 2 times per day Gaviota Brice, APRN - CNP   10 mL at 10/22/21 0836    sodium chloride flush 0.9 % injection 5-40 mL  5-40 mL IntraVENous PRN Howardkatja Xieus, APRN - CNP        0.9 % sodium chloride infusion  25 mL IntraVENous PRN Howardkatja Xie, JOVI - CNP        enoxaparin (LOVENOX) injection 40 mg  40 mg SubCUTAneous Q24H Gaviota Brice, JOVI - CNP   40 mg at 10/21/21 1533    ondansetron (ZOFRAN-ODT) disintegrating tablet 4 mg  4 mg Oral Q8H PRN Howardkatja Marus, JOVI - CNP        Or    ondansetron (ZOFRAN) injection 4 mg  4 mg IntraVENous Q6H PRN Gaviota Brice, APRN - CNP        polyethylene glycol (GLYCOLAX) packet 17 g  17 g Oral Daily PRN Gaviota Brice, APRN - LATANYA        acetaminophen (TYLENOL) tablet 650 mg  650 mg Oral Q6H PRN Gaviota Brice, APRN - CNP        Or    acetaminophen (TYLENOL) suppository 650 mg  650 mg Rectal Q6H PRN Gaviota Brice, JOVI - CNP        piperacillin-tazobactam (ZOSYN) 3,375 mg in dextrose 5 % 50 mL IVPB extended infusion (mini-bag)  3,375 mg IntraVENous Q8H Gaviota Brice, JOVI Moyer CNP 12.5 mL/hr at 10/22/21 1109 3,375 mg at 10/22/21 1109    insulin glargine (LANTUS) injection vial 15 Units  15 Units SubCUTAneous Nightly JOVI Neumann CNP   15 Units at 10/21/21 2215    insulin lispro (HUMALOG) injection vial 0-6 Units  0-6 Units SubCUTAneous TID  JOVI Che CNP   1 Units at 10/21/21 1715    insulin lispro (HUMALOG) injection vial 0-3 Units  0-3 Units SubCUTAneous Nightly COMPARISON: Same day CT TECHNIQUE:  1. Three-view left ankle. AP, oblique, lateral. 3 images. 2. Three-view left foot. DP, oblique, lateral. 3 images. FINDINGS:  Left ankle. Left foot. Distal tibia and fibula appear intact. Talar dome appears intact. Ankle mortise anatomic. Limited evaluation for joint effusion due to diffuse soft tissue swelling. No radiodense foreign body at the ankle. Subcutaneous emphysema/tract at the lateral plantar foot. There is bony erosion and debris at the second-fifth tarsometatarsal joints. There appears to be an oblique fracture of the second metatarsal base involving the proximal articular surface. Widening of the space between the first and second metatarsals. Marked soft tissue swelling of the distal foot. No radiodense foreign body. Suspected postoperative changes of the first interphalangeal joint. 1. Findings highly concerning for osteomyelitis and septic arthritis of the second-fourth tarsometatarsal joints with bony destruction and debris. 2. There appears to be an oblique fracture of the second metatarsal base involving the proximal articular surface with associated widening of the space between the first and second metatarsals, concerning for ligamentous injury. 3. Marked diffuse soft tissue swelling. There is subcutaneous emphysema/tract at the plantar lateral foot. Correlate with ability to probe to bone. 4. See separately dictated same-day CT. Signed by Dr Dinah Ye    XR FOOT LEFT (MIN 3 VIEWS)    Result Date: 10/19/2021  EXAM: 1. XR ANKLE LEFT (2 VIEWS), 2. XR FOOT LEFT (MIN 3 VIEWS) DATE: 10/19/2021 1:30 PM HISTORY: 46 years, Female, infection, left foot pain and swelling. History of hardware removal, infection, wound at the lateral aspect that continues to drain. COMPARISON: Same day CT TECHNIQUE:  1. Three-view left ankle. AP, oblique, lateral. 3 images. 2. Three-view left foot. DP, oblique, lateral. 3 images. FINDINGS:  Left ankle. Left foot.  Distal tibia and fibula appear intact. Talar dome appears intact. Ankle mortise anatomic. Limited evaluation for joint effusion due to diffuse soft tissue swelling. No radiodense foreign body at the ankle. Subcutaneous emphysema/tract at the lateral plantar foot. There is bony erosion and debris at the second-fifth tarsometatarsal joints. There appears to be an oblique fracture of the second metatarsal base involving the proximal articular surface. Widening of the space between the first and second metatarsals. Marked soft tissue swelling of the distal foot. No radiodense foreign body. Suspected postoperative changes of the first interphalangeal joint. 1. Findings highly concerning for osteomyelitis and septic arthritis of the second-fourth tarsometatarsal joints with bony destruction and debris. 2. There appears to be an oblique fracture of the second metatarsal base involving the proximal articular surface with associated widening of the space between the first and second metatarsals, concerning for ligamentous injury. 3. Marked diffuse soft tissue swelling. There is subcutaneous emphysema/tract at the plantar lateral foot. Correlate with ability to probe to bone. 4. See separately dictated same-day CT. Signed by Dr Jone Shields    Result Date: 10/19/2021  Exam:   CT FOOT LEFT W CONTRAST  Date:  10/19/2021 History:  Female, age  42 years; infection COMPARISON:  None. Radiation dose equals  mGy-cm. Automated exposure control dose reduction technique was implemented. Findings : There are multiple peripherally enhancing fluid collections in the foot predominantly involving the dorsum and lateral aspect. For instance, dorsolaterally to the talus, there is a 3.2 x 2 cm fluid collection and a 1.6 x 1.1 cm abscess (image 37, series 8). Anterior to the inferior tibia, there is also a loculated 3.2 x 1.3 cm (image 50, series 8).  The medial mid foot also shows loculated fluid signal overlying the medial cuneiform, measuring 1.7 x 1.1 cm (image 65, series 8). The base of the fourth metatarsal bone is associated with the located fluid signal measuring 2.6 x 1.6 cm dorsally (image 33, series 8). These findings are associated with lytic changes involving the second, third and fourth metatarsal bases. Lytic changes are also seen in the medial, intermediate, lateral cuneiforms. These are most consistent with septic arthritis and osteomyelitis. There is a soft tissue defect along the plantar aspect lateral foot (image 23, series 8). Diffuse skin thickening and edema. Impression: Multiple foot and ankle abscesses with findings of septic arthritis and osteomyelitis as described above. Signed by Dr Saulo Rodriguez    MRI FOOT LEFT W WO CONTRAST    Result Date: 10/20/2021  EXAM: MRI FOOT LEFT W WO CONTRAST -- 10/20/2021 10:25 AM HISTORY: 42 years, Female, abscess, osteomyelitis COMPARISON: Foot radiographs 10/19/2021 TECHNIQUE: Unenhanced and enhanced MRI sequences performed. FINDINGS: Multifocal abscess in the midfoot, predominantly centered at the second and third metatarsophalangeal joints. This is difficult to measure due to its irregular configuration, but largest contiguous area measures about 3.0 x 4.0 x 4.7 cm (AP by transverse by craniocaudal). The next two largest drainable collections are at the lateral dorsal foot measuring 3 cm and 2.7 cm. These appear to track toward the subtalar joint. Additional foci of abscesses throughout the foot. Associated fluid in multiple tendon sheaths suggesting tenosynovitis. Abnormal enhancement and edema of the musculature consistent with myositis. Abnormal bone marrow signal throughout the proximal metatarsals and tarsals. There is decreased T1 signal at the dorsal lateral calcaneus, concerning for osteomyelitis. There is decreased T1 signal of the distal talus, also concerning for osteomyelitis. Normal bone marrow signal in the distal tibia and fibula.  Fluid in the tibiotalar joint, could represent septic arthritis or be reactive. 1. Extensive osteomyelitis, abscesses, tenosynovitis and myositis as described above. Most proximal aspect of the osteomyelitis appears to be the distal calcaneus and talus. 2. Tibiotalar joint effusion could be reactive or seen with septic arthritis. Signed by Dr Moran Batch    Result Date: 10/20/2021  Exam:   US FINE NEEDLE ASPIRATION  Date:  10/20/2021 History:  Female, age  42 years; abscess. COMPARISON:  MRI foot same day. Findings : The risks and benefits of ultrasound-guided fluid drainage were described to the patient. Questions were answered. Verbal and written consents were obtained. Under ultrasound guidance, a heterogeneous fluid signal overlying the ankle and measuring 4.1 x 1.5 cm was identified. Timeout was taken confirming the patient's name, date of birth, the procedure, the entry site, and list of allergies. The entry site within the left dorsum of the foot was marked. This was sterilely prepped and draped. Local anesthetic was administered. Utilizing a 5 Chinese 10 cm catheter, the fluid collection was entered. Despite multiple attempts, no fluid could be aspirated through the syringe. Subsequently, sterile saline was injected with a syringe into the fluid collection. Subsequently, 3 syringes of approximately 3 mL each were aspirated from fluid collection and sent to microbiology. The fluid collection also showed fragments of whitish color particles. Impression: Successful ultrasound-guided of the heterogeneous fluid collection in the dorsum of the proximal foot.  Signed by Dr Joseph Contreras      Objective:   Vitals:   /87   Pulse 85   Temp 96.8 °F (36 °C) (Temporal)   Resp 18   Ht 5' 10\" (1.778 m)   Wt 300 lb (136.1 kg)   SpO2 94%   BMI 43.05 kg/m²       Patient Vitals for the past 24 hrs:   BP Temp Temp src Pulse Resp SpO2 Weight   10/22/21 1208 138/87 96.8 °F (36 °C) Temporal 85 18 94 % --   10/22/21 0625 128/78 97.3 °F (36.3 °C) Temporal 91 18 91 % --   10/22/21 0111 122/75 97.4 °F (36.3 °C) Temporal 85 18 90 % 300 lb (136.1 kg)   10/21/21 1922 108/72 98.2 °F (36.8 °C) Temporal 84 20 93 % --       24HR INTAKE/OUTPUT:      Intake/Output Summary (Last 24 hours) at 10/22/2021 1357  Last data filed at 10/22/2021 1243  Gross per 24 hour   Intake 1401 ml   Output 900 ml   Net 501 ml       Physical Exam  Vitals and nursing note reviewed. Constitutional:       General: She is not in acute distress. Appearance: Normal appearance. She is obese. She is not ill-appearing, toxic-appearing or diaphoretic. HENT:      Head: Normocephalic and atraumatic. Right Ear: External ear normal.      Left Ear: External ear normal.      Nose: Nose normal. No congestion or rhinorrhea. Mouth/Throat:      Mouth: Mucous membranes are moist.      Pharynx: Oropharynx is clear. No oropharyngeal exudate or posterior oropharyngeal erythema. Eyes:      General: No scleral icterus. Right eye: No discharge. Left eye: No discharge. Extraocular Movements: Extraocular movements intact. Conjunctiva/sclera: Conjunctivae normal.      Pupils: Pupils are equal, round, and reactive to light. Neck:      Vascular: No carotid bruit. Cardiovascular:      Rate and Rhythm: Normal rate and regular rhythm. Pulses: Normal pulses. Heart sounds: Normal heart sounds. No murmur heard. No friction rub. No gallop. Pulmonary:      Effort: Pulmonary effort is normal. No respiratory distress. Breath sounds: Normal breath sounds. No stridor. No wheezing, rhonchi or rales. Chest:      Chest wall: No tenderness. Abdominal:      General: Bowel sounds are normal. There is no distension. Palpations: Abdomen is soft. Tenderness: There is no abdominal tenderness. There is no guarding or rebound.    Musculoskeletal:         General: No swelling, tenderness, deformity or signs of injury. Normal range of motion. Cervical back: Normal range of motion and neck supple. No rigidity. No muscular tenderness. Right lower leg: Edema present. Left lower leg: Edema present. Comments: Left lower extremity/ lateral foot nonhealing wound in place. Skin:     General: Skin is warm and dry. Capillary Refill: Capillary refill takes less than 2 seconds. Coloration: Skin is not jaundiced or pale. Findings: No bruising, erythema, lesion or rash. Neurological:      General: No focal deficit present. Mental Status: She is alert and oriented to person, place, and time. Cranial Nerves: No cranial nerve deficit. Sensory: No sensory deficit. Motor: No weakness. Coordination: Coordination normal.   Psychiatric:         Mood and Affect: Mood normal.         Behavior: Behavior normal.         Thought Content: Thought content normal.         Judgment: Judgment normal.         Assessment/plan:     Patient Active Problem List    Diagnosis Date Noted    Osteomyelitis ([facility] [address] 10/19/2021    Hypoxia     Chest pain     Vitamin D deficiency 04/05/2018    Hypothyroidism 04/05/2018    Hyperglycemia 04/05/2018    Pneumonia due to organism     Respiratory failure ([address] 04/04/2018    Acute respiratory failure with hypoxemia ([address] 04/08/2017    Tobacco abuse 04/08/2017    Acute bronchitis 04/08/2017       Hospital Problems         Last Modified POA    Osteomyelitis ([address] 10/19/2021 Yes          Active Problems:    Osteomyelitis ([address]  Resolved Problems:    * No resolved hospital problems. *        Brief Summary  Cumulative hospital course, as quoted below; \"The patient is a 42 y. o. female with an ongoing foot wound who presented to [facility] ED complaining of increased pain, swelling, and drainage from left foot wound. The patient stated she first injured her left foot years ago and required repair with hardware.    In Feb 2021 she noted repeat injury and her hardware come loose and worked its way out. At that time the patient stated she was treated with IV antibiotics for MRSA infection. Since then the patient has had multiple procedures with Dr. Ana Fernandez. The patient stated she has been seeing wound care every Monday. She also stated she has discussed the worsening pain and swelling. She stated the morning of admission she alarmed when she could not get her socks and shoes on as her foot was more swollen. She also stated that the drainage seemed like more than normal. She rated her pain as 5/10 at rest, with any movement she stated it increased to 8/10. ED work up revealed Multiple foot and ankle abscesses with findings of septic arthritis and osteomyelitis. Patient admitted to hospitalist service with orthopedic consult. Orthopedic surgery ordered MRI which again is worrisome for osteomylitis and abscesses. Ultrasound guided aspiration completed on 10/20, awaiting results. ID folloing and will continue zosyn and daptomycin at this time. \"       Left lower extremity osteomyelitis  · Orthopedic/podiatry on board  · Status post ultrasound-guided aspiration (10/20/2021)-cultures pending  · ID following  · Antibiotics as per ID  · Currently on: Daptomycin and piperacillin-tazobactam    History of Diabetes Mellitus II   Hemoglobin A1C: 7.2% (10/19/2021)   Inpatient Regimens to include;  o - Insulin Glargine (Lantus) 15 units subcu nightly  o - Insulin Lispro (Humalog) on a Low dose sliding scale   Monitor POC glucose, and adjust insulin regimen accordingly based on daily insulin requirement. Continue management of other chronic medical conditions - see above and orders. Advance Directive: Full Code    ADULT DIET;  Regular; 4 carb choices (60 gm/meal)  ADULT ORAL NUTRITION SUPPLEMENT; Breakfast; Diabetic Oral Supplement         Consults Made:   IP CONSULT TO ORTHOPEDIC SURGERY  IP CONSULT TO INFECTIOUS DISEASES    DVT prophylaxis: Enoxaparin      Discharge planning: tbd    Time Spent is 25 mins in the examination, evaluation, counseling and review of medications, assessment and plan.      Electronically signed by   Yolie Rocha MD, MPH, MD,   Internal Medicine Hospitalist   10/22/2021 1:57 PM

## 2021-10-24 LAB
ANAEROBIC CULTURE: NORMAL
BLOOD CULTURE, ROUTINE: NORMAL
BODY FLUID CULTURE, STERILE: NORMAL
CULTURE, BLOOD 2: NORMAL
GRAM STAIN RESULT: NORMAL

## 2021-11-14 ENCOUNTER — HOSPITAL ENCOUNTER (EMERGENCY)
Age: 46
Discharge: HOME | End: 2021-11-14
Attending: EMERGENCY MEDICINE
Payer: MEDICAID

## 2021-11-14 ENCOUNTER — APPOINTMENT (OUTPATIENT)
Dept: GENERAL RADIOLOGY | Age: 46
End: 2021-11-14
Payer: MEDICAID

## 2021-11-14 VITALS
RESPIRATION RATE: 17 BRPM | BODY MASS INDEX: 41.95 KG/M2 | WEIGHT: 293 LBS | TEMPERATURE: 98.1 F | OXYGEN SATURATION: 94 % | SYSTOLIC BLOOD PRESSURE: 134 MMHG | DIASTOLIC BLOOD PRESSURE: 74 MMHG | HEIGHT: 70 IN | HEART RATE: 104 BPM

## 2021-11-14 DIAGNOSIS — M79.672 LEFT FOOT PAIN: Primary | ICD-10-CM

## 2021-11-14 LAB
ALBUMIN SERPL-MCNC: 3.8 G/DL (ref 3.5–5.2)
ALP BLD-CCNC: 76 U/L (ref 35–104)
ALT SERPL-CCNC: 13 U/L (ref 5–33)
ANION GAP SERPL CALCULATED.3IONS-SCNC: 9 MMOL/L (ref 7–19)
AST SERPL-CCNC: 15 U/L (ref 5–32)
BASOPHILS ABSOLUTE: 0 K/UL (ref 0–0.2)
BASOPHILS RELATIVE PERCENT: 0.4 % (ref 0–1)
BILIRUB SERPL-MCNC: <0.2 MG/DL (ref 0.2–1.2)
BUN BLDV-MCNC: 13 MG/DL (ref 6–20)
C-REACTIVE PROTEIN: 1.24 MG/DL (ref 0–0.5)
CALCIUM SERPL-MCNC: 9.1 MG/DL (ref 8.6–10)
CHLORIDE BLD-SCNC: 102 MMOL/L (ref 98–111)
CO2: 26 MMOL/L (ref 22–29)
CREAT SERPL-MCNC: 0.7 MG/DL (ref 0.5–0.9)
EOSINOPHILS ABSOLUTE: 0.4 K/UL (ref 0–0.6)
EOSINOPHILS RELATIVE PERCENT: 4.1 % (ref 0–5)
GFR AFRICAN AMERICAN: >59
GFR NON-AFRICAN AMERICAN: >60
GLUCOSE BLD-MCNC: 175 MG/DL (ref 74–109)
HCT VFR BLD CALC: 40.7 % (ref 37–47)
HEMOGLOBIN: 13.1 G/DL (ref 12–16)
IMMATURE GRANULOCYTES #: 0 K/UL
LYMPHOCYTES ABSOLUTE: 3.3 K/UL (ref 1.1–4.5)
LYMPHOCYTES RELATIVE PERCENT: 32.9 % (ref 20–40)
MCH RBC QN AUTO: 28.1 PG (ref 27–31)
MCHC RBC AUTO-ENTMCNC: 32.2 G/DL (ref 33–37)
MCV RBC AUTO: 87.3 FL (ref 81–99)
MONOCYTES ABSOLUTE: 0.5 K/UL (ref 0–0.9)
MONOCYTES RELATIVE PERCENT: 4.5 % (ref 0–10)
NEUTROPHILS ABSOLUTE: 5.8 K/UL (ref 1.5–7.5)
NEUTROPHILS RELATIVE PERCENT: 57.9 % (ref 50–65)
PDW BLD-RTO: 15 % (ref 11.5–14.5)
PLATELET # BLD: 288 K/UL (ref 130–400)
PMV BLD AUTO: 8.9 FL (ref 9.4–12.3)
POTASSIUM REFLEX MAGNESIUM: 4.2 MMOL/L (ref 3.5–5)
RBC # BLD: 4.66 M/UL (ref 4.2–5.4)
SEDIMENTATION RATE, ERYTHROCYTE: 11 MM/HR (ref 0–20)
SODIUM BLD-SCNC: 137 MMOL/L (ref 136–145)
TOTAL PROTEIN: 7.3 G/DL (ref 6.6–8.7)
WBC # BLD: 10 K/UL (ref 4.8–10.8)

## 2021-11-14 PROCEDURE — 85025 COMPLETE CBC W/AUTO DIFF WBC: CPT

## 2021-11-14 PROCEDURE — 73630 X-RAY EXAM OF FOOT: CPT

## 2021-11-14 PROCEDURE — 86140 C-REACTIVE PROTEIN: CPT

## 2021-11-14 PROCEDURE — 99282 EMERGENCY DEPT VISIT SF MDM: CPT

## 2021-11-14 PROCEDURE — 85652 RBC SED RATE AUTOMATED: CPT

## 2021-11-14 PROCEDURE — 96374 THER/PROPH/DIAG INJ IV PUSH: CPT

## 2021-11-14 PROCEDURE — 6360000002 HC RX W HCPCS: Performed by: EMERGENCY MEDICINE

## 2021-11-14 PROCEDURE — 36415 COLL VENOUS BLD VENIPUNCTURE: CPT

## 2021-11-14 PROCEDURE — 80053 COMPREHEN METABOLIC PANEL: CPT

## 2021-11-14 PROCEDURE — 96375 TX/PRO/DX INJ NEW DRUG ADDON: CPT

## 2021-11-14 RX ORDER — MORPHINE SULFATE 4 MG/ML
4 INJECTION, SOLUTION INTRAMUSCULAR; INTRAVENOUS ONCE
Status: COMPLETED | OUTPATIENT
Start: 2021-11-14 | End: 2021-11-14

## 2021-11-14 RX ORDER — ONDANSETRON 2 MG/ML
4 INJECTION INTRAMUSCULAR; INTRAVENOUS ONCE
Status: COMPLETED | OUTPATIENT
Start: 2021-11-14 | End: 2021-11-14

## 2021-11-14 RX ADMIN — MORPHINE SULFATE 4 MG: 4 INJECTION INTRAVENOUS at 03:08

## 2021-11-14 RX ADMIN — ONDANSETRON 4 MG: 2 INJECTION INTRAMUSCULAR; INTRAVENOUS at 03:07

## 2021-11-14 ASSESSMENT — PAIN DESCRIPTION - DESCRIPTORS: DESCRIPTORS: SHARP;BURNING

## 2021-11-14 ASSESSMENT — PAIN DESCRIPTION - LOCATION: LOCATION: FOOT

## 2021-11-14 ASSESSMENT — ENCOUNTER SYMPTOMS
NAUSEA: 0
BACK PAIN: 0
ROS SKIN COMMENTS: FOOT PAIN
DIARRHEA: 0
VOMITING: 0
RHINORRHEA: 0
SHORTNESS OF BREATH: 0
SORE THROAT: 0
ABDOMINAL PAIN: 0

## 2021-11-14 ASSESSMENT — PAIN DESCRIPTION - ORIENTATION: ORIENTATION: LEFT

## 2021-11-14 ASSESSMENT — PAIN SCALES - GENERAL
PAINLEVEL_OUTOF10: 10
PAINLEVEL_OUTOF10: 10

## 2021-11-14 NOTE — ED NOTES
Upon leaving the ED the patient's daughter approached the [facility] desk and stated she was unhappy with the level of care her mother received. She stated it was unacceptable to be here for 5 hours and only get some xrays and blood work done. I explained that my physician had a ton of patients to see and she was doing the best that she could. Patient's daughter said it was still unacceptable.      Summer Salvage  11/14/21 [id number]
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
## 2022-12-27 ENCOUNTER — HOSPITAL ENCOUNTER (INPATIENT)
Age: 47
LOS: 1 days | Discharge: LEFT BEFORE DISCHARGE | DRG: 872 | End: 2022-12-28
Attending: EMERGENCY MEDICINE | Admitting: HOSPITALIST
Payer: MEDICAID

## 2022-12-27 ENCOUNTER — APPOINTMENT (OUTPATIENT)
Dept: GENERAL RADIOLOGY | Age: 47
DRG: 872 | End: 2022-12-27
Payer: MEDICAID

## 2022-12-27 DIAGNOSIS — S91.302D OPEN WOUND OF LEFT FOOT, SUBSEQUENT ENCOUNTER: Primary | ICD-10-CM

## 2022-12-27 DIAGNOSIS — Z78.9 FAILURE OF OUTPATIENT TREATMENT: ICD-10-CM

## 2022-12-27 DIAGNOSIS — M14.672 CHARCOT'S JOINT OF LEFT FOOT: ICD-10-CM

## 2022-12-27 DIAGNOSIS — A41.9 SEPSIS WITHOUT ACUTE ORGAN DYSFUNCTION, DUE TO UNSPECIFIED ORGANISM (HCC): ICD-10-CM

## 2022-12-27 PROBLEM — Z16.20 THERAPY FAILURE DUE TO ANTIBIOTIC RESISTANCE: Status: ACTIVE | Noted: 2022-12-27

## 2022-12-27 LAB
ALBUMIN SERPL-MCNC: 4.1 G/DL (ref 3.5–5.2)
ALP BLD-CCNC: 90 U/L (ref 35–104)
ALT SERPL-CCNC: 19 U/L (ref 5–33)
ANION GAP SERPL CALCULATED.3IONS-SCNC: 12 MMOL/L (ref 7–19)
AST SERPL-CCNC: 16 U/L (ref 5–32)
BASOPHILS ABSOLUTE: 0 K/UL (ref 0–0.2)
BASOPHILS RELATIVE PERCENT: 0.3 % (ref 0–1)
BILIRUB SERPL-MCNC: <0.2 MG/DL (ref 0.2–1.2)
BUN BLDV-MCNC: 12 MG/DL (ref 6–20)
C-REACTIVE PROTEIN: 1.37 MG/DL (ref 0–0.5)
CALCIUM SERPL-MCNC: 9.2 MG/DL (ref 8.6–10)
CHLORIDE BLD-SCNC: 98 MMOL/L (ref 98–111)
CO2: 27 MMOL/L (ref 22–29)
CREAT SERPL-MCNC: 0.6 MG/DL (ref 0.5–0.9)
EOSINOPHILS ABSOLUTE: 0.3 K/UL (ref 0–0.6)
EOSINOPHILS RELATIVE PERCENT: 1.6 % (ref 0–5)
GFR SERPL CREATININE-BSD FRML MDRD: >60 ML/MIN/{1.73_M2}
GLUCOSE BLD-MCNC: 160 MG/DL (ref 70–99)
GLUCOSE BLD-MCNC: 161 MG/DL (ref 74–109)
HCT VFR BLD CALC: 40.1 % (ref 37–47)
HEMOGLOBIN: 12.8 G/DL (ref 12–16)
IMMATURE GRANULOCYTES #: 0.1 K/UL
LACTIC ACID: 1.8 MMOL/L (ref 0.5–1.9)
LACTIC ACID: 2.9 MMOL/L (ref 0.5–1.9)
LYMPHOCYTES ABSOLUTE: 3.2 K/UL (ref 1.1–4.5)
LYMPHOCYTES RELATIVE PERCENT: 20.6 % (ref 20–40)
MCH RBC QN AUTO: 29.3 PG (ref 27–31)
MCHC RBC AUTO-ENTMCNC: 31.9 G/DL (ref 33–37)
MCV RBC AUTO: 91.8 FL (ref 81–99)
MONOCYTES ABSOLUTE: 0.6 K/UL (ref 0–0.9)
MONOCYTES RELATIVE PERCENT: 3.8 % (ref 0–10)
NEUTROPHILS ABSOLUTE: 11.2 K/UL (ref 1.5–7.5)
NEUTROPHILS RELATIVE PERCENT: 73.2 % (ref 50–65)
PDW BLD-RTO: 15.9 % (ref 11.5–14.5)
PERFORMED ON: ABNORMAL
PLATELET # BLD: 312 K/UL (ref 130–400)
PMV BLD AUTO: 9 FL (ref 9.4–12.3)
POTASSIUM REFLEX MAGNESIUM: 4.5 MMOL/L (ref 3.5–5)
RBC # BLD: 4.37 M/UL (ref 4.2–5.4)
SARS-COV-2, NAAT: NOT DETECTED
SEDIMENTATION RATE, ERYTHROCYTE: 10 MM/HR (ref 0–20)
SODIUM BLD-SCNC: 137 MMOL/L (ref 136–145)
TOTAL PROTEIN: 7.1 G/DL (ref 6.6–8.7)
WBC # BLD: 15.3 K/UL (ref 4.8–10.8)

## 2022-12-27 PROCEDURE — 87635 SARS-COV-2 COVID-19 AMP PRB: CPT

## 2022-12-27 PROCEDURE — 83605 ASSAY OF LACTIC ACID: CPT

## 2022-12-27 PROCEDURE — 73630 X-RAY EXAM OF FOOT: CPT | Performed by: RADIOLOGY

## 2022-12-27 PROCEDURE — 86140 C-REACTIVE PROTEIN: CPT

## 2022-12-27 PROCEDURE — 36415 COLL VENOUS BLD VENIPUNCTURE: CPT

## 2022-12-27 PROCEDURE — 6360000002 HC RX W HCPCS: Performed by: EMERGENCY MEDICINE

## 2022-12-27 PROCEDURE — 73630 X-RAY EXAM OF FOOT: CPT

## 2022-12-27 PROCEDURE — 2580000003 HC RX 258: Performed by: EMERGENCY MEDICINE

## 2022-12-27 PROCEDURE — 6370000000 HC RX 637 (ALT 250 FOR IP): Performed by: HOSPITALIST

## 2022-12-27 PROCEDURE — 2580000003 HC RX 258: Performed by: HOSPITALIST

## 2022-12-27 PROCEDURE — 1210000000 HC MED SURG R&B

## 2022-12-27 PROCEDURE — 87040 BLOOD CULTURE FOR BACTERIA: CPT

## 2022-12-27 PROCEDURE — 80053 COMPREHEN METABOLIC PANEL: CPT

## 2022-12-27 PROCEDURE — 85025 COMPLETE CBC W/AUTO DIFF WBC: CPT

## 2022-12-27 PROCEDURE — 85652 RBC SED RATE AUTOMATED: CPT

## 2022-12-27 PROCEDURE — 6360000002 HC RX W HCPCS: Performed by: HOSPITALIST

## 2022-12-27 PROCEDURE — 99285 EMERGENCY DEPT VISIT HI MDM: CPT

## 2022-12-27 RX ORDER — SODIUM CHLORIDE 0.9 % (FLUSH) 0.9 %
5-40 SYRINGE (ML) INJECTION EVERY 12 HOURS SCHEDULED
Status: DISCONTINUED | OUTPATIENT
Start: 2022-12-27 | End: 2022-12-28 | Stop reason: HOSPADM

## 2022-12-27 RX ORDER — MORPHINE SULFATE 4 MG/ML
4 INJECTION, SOLUTION INTRAMUSCULAR; INTRAVENOUS ONCE
Status: COMPLETED | OUTPATIENT
Start: 2022-12-27 | End: 2022-12-27

## 2022-12-27 RX ORDER — SODIUM CHLORIDE 9 MG/ML
INJECTION, SOLUTION INTRAVENOUS PRN
Status: DISCONTINUED | OUTPATIENT
Start: 2022-12-27 | End: 2022-12-28 | Stop reason: HOSPADM

## 2022-12-27 RX ORDER — ONDANSETRON 4 MG/1
4 TABLET, ORALLY DISINTEGRATING ORAL EVERY 8 HOURS PRN
Status: DISCONTINUED | OUTPATIENT
Start: 2022-12-27 | End: 2022-12-28 | Stop reason: HOSPADM

## 2022-12-27 RX ORDER — SODIUM CHLORIDE 0.9 % (FLUSH) 0.9 %
5-40 SYRINGE (ML) INJECTION PRN
Status: DISCONTINUED | OUTPATIENT
Start: 2022-12-27 | End: 2022-12-28 | Stop reason: HOSPADM

## 2022-12-27 RX ORDER — INSULIN GLARGINE 100 [IU]/ML
15 INJECTION, SOLUTION SUBCUTANEOUS NIGHTLY
Status: DISCONTINUED | OUTPATIENT
Start: 2022-12-27 | End: 2022-12-28 | Stop reason: HOSPADM

## 2022-12-27 RX ORDER — MONTELUKAST SODIUM 10 MG/1
10 TABLET ORAL NIGHTLY
COMMUNITY
Start: 2022-11-19

## 2022-12-27 RX ORDER — ACETAMINOPHEN 325 MG/1
650 TABLET ORAL EVERY 4 HOURS PRN
Status: DISCONTINUED | OUTPATIENT
Start: 2022-12-27 | End: 2022-12-28 | Stop reason: HOSPADM

## 2022-12-27 RX ORDER — INSULIN LISPRO 100 [IU]/ML
0-4 INJECTION, SOLUTION INTRAVENOUS; SUBCUTANEOUS NIGHTLY
Status: DISCONTINUED | OUTPATIENT
Start: 2022-12-27 | End: 2022-12-28 | Stop reason: HOSPADM

## 2022-12-27 RX ORDER — GABAPENTIN 300 MG/1
600 CAPSULE ORAL 3 TIMES DAILY
Status: DISCONTINUED | OUTPATIENT
Start: 2022-12-27 | End: 2022-12-28 | Stop reason: HOSPADM

## 2022-12-27 RX ORDER — SODIUM CHLORIDE, SODIUM LACTATE, POTASSIUM CHLORIDE, AND CALCIUM CHLORIDE .6; .31; .03; .02 G/100ML; G/100ML; G/100ML; G/100ML
2100 INJECTION, SOLUTION INTRAVENOUS ONCE
Status: COMPLETED | OUTPATIENT
Start: 2022-12-27 | End: 2022-12-27

## 2022-12-27 RX ORDER — ONDANSETRON 2 MG/ML
4 INJECTION INTRAMUSCULAR; INTRAVENOUS ONCE
Status: COMPLETED | OUTPATIENT
Start: 2022-12-27 | End: 2022-12-27

## 2022-12-27 RX ORDER — ONDANSETRON 2 MG/ML
4 INJECTION INTRAMUSCULAR; INTRAVENOUS EVERY 6 HOURS PRN
Status: DISCONTINUED | OUTPATIENT
Start: 2022-12-27 | End: 2022-12-28 | Stop reason: HOSPADM

## 2022-12-27 RX ORDER — LINEZOLID 2 MG/ML
600 INJECTION, SOLUTION INTRAVENOUS EVERY 12 HOURS
Status: DISCONTINUED | OUTPATIENT
Start: 2022-12-27 | End: 2022-12-28 | Stop reason: HOSPADM

## 2022-12-27 RX ORDER — MOXIFLOXACIN HYDROCHLORIDE 400 MG/1
400 TABLET ORAL 2 TIMES DAILY
COMMUNITY
Start: 2022-11-30

## 2022-12-27 RX ORDER — LISINOPRIL 10 MG/1
10 TABLET ORAL 2 TIMES DAILY
Status: DISCONTINUED | OUTPATIENT
Start: 2022-12-27 | End: 2022-12-28 | Stop reason: HOSPADM

## 2022-12-27 RX ORDER — INSULIN LISPRO 100 [IU]/ML
0-8 INJECTION, SOLUTION INTRAVENOUS; SUBCUTANEOUS
Status: DISCONTINUED | OUTPATIENT
Start: 2022-12-28 | End: 2022-12-28 | Stop reason: HOSPADM

## 2022-12-27 RX ORDER — POTASSIUM CHLORIDE 7.45 MG/ML
10 INJECTION INTRAVENOUS PRN
Status: DISCONTINUED | OUTPATIENT
Start: 2022-12-27 | End: 2022-12-28 | Stop reason: HOSPADM

## 2022-12-27 RX ORDER — POTASSIUM CHLORIDE 20 MEQ/1
40 TABLET, EXTENDED RELEASE ORAL PRN
Status: DISCONTINUED | OUTPATIENT
Start: 2022-12-27 | End: 2022-12-28 | Stop reason: HOSPADM

## 2022-12-27 RX ORDER — 0.9 % SODIUM CHLORIDE 0.9 %
2100 INTRAVENOUS SOLUTION INTRAVENOUS ONCE
Status: COMPLETED | OUTPATIENT
Start: 2022-12-27 | End: 2022-12-28

## 2022-12-27 RX ORDER — CALCIUM CARBONATE 200(500)MG
500 TABLET,CHEWABLE ORAL 3 TIMES DAILY PRN
Status: DISCONTINUED | OUTPATIENT
Start: 2022-12-27 | End: 2022-12-28 | Stop reason: HOSPADM

## 2022-12-27 RX ORDER — POLYETHYLENE GLYCOL 3350 17 G/17G
17 POWDER, FOR SOLUTION ORAL DAILY PRN
Status: DISCONTINUED | OUTPATIENT
Start: 2022-12-27 | End: 2022-12-28 | Stop reason: HOSPADM

## 2022-12-27 RX ORDER — MECOBALAMIN 5000 MCG
5 TABLET,DISINTEGRATING ORAL NIGHTLY PRN
Status: DISCONTINUED | OUTPATIENT
Start: 2022-12-27 | End: 2022-12-28 | Stop reason: HOSPADM

## 2022-12-27 RX ORDER — ENOXAPARIN SODIUM 100 MG/ML
30 INJECTION SUBCUTANEOUS 2 TIMES DAILY
Status: DISCONTINUED | OUTPATIENT
Start: 2022-12-27 | End: 2022-12-28 | Stop reason: HOSPADM

## 2022-12-27 RX ORDER — ACETAMINOPHEN 650 MG/1
650 SUPPOSITORY RECTAL EVERY 4 HOURS PRN
Status: DISCONTINUED | OUTPATIENT
Start: 2022-12-27 | End: 2022-12-28 | Stop reason: HOSPADM

## 2022-12-27 RX ORDER — DULOXETIN HYDROCHLORIDE 60 MG/1
60 CAPSULE, DELAYED RELEASE ORAL DAILY
Status: DISCONTINUED | OUTPATIENT
Start: 2022-12-28 | End: 2022-12-27

## 2022-12-27 RX ORDER — MAGNESIUM SULFATE IN WATER 40 MG/ML
2000 INJECTION, SOLUTION INTRAVENOUS PRN
Status: DISCONTINUED | OUTPATIENT
Start: 2022-12-27 | End: 2022-12-28 | Stop reason: HOSPADM

## 2022-12-27 RX ADMIN — MORPHINE SULFATE 4 MG: 4 INJECTION, SOLUTION INTRAMUSCULAR; INTRAVENOUS at 21:47

## 2022-12-27 RX ADMIN — SODIUM CHLORIDE 1000 ML: 9 INJECTION, SOLUTION INTRAVENOUS at 22:59

## 2022-12-27 RX ADMIN — ENOXAPARIN SODIUM 30 MG: 100 INJECTION SUBCUTANEOUS at 23:48

## 2022-12-27 RX ADMIN — LISINOPRIL 10 MG: 10 TABLET ORAL at 23:14

## 2022-12-27 RX ADMIN — ONDANSETRON 4 MG: 2 INJECTION INTRAMUSCULAR; INTRAVENOUS at 21:46

## 2022-12-27 RX ADMIN — SODIUM CHLORIDE, POTASSIUM CHLORIDE, SODIUM LACTATE AND CALCIUM CHLORIDE 1000 ML: 600; 310; 30; 20 INJECTION, SOLUTION INTRAVENOUS at 21:28

## 2022-12-27 RX ADMIN — LINEZOLID 600 MG: 600 INJECTION, SOLUTION INTRAVENOUS at 23:46

## 2022-12-27 RX ADMIN — PIPERACILLIN AND TAZOBACTAM 3375 MG: 3; .375 INJECTION, POWDER, FOR SOLUTION INTRAVENOUS at 23:11

## 2022-12-27 ASSESSMENT — ENCOUNTER SYMPTOMS
EYE PAIN: 0
ABDOMINAL PAIN: 0
EYE REDNESS: 0
VOMITING: 0
DIARRHEA: 0
COUGH: 0
SHORTNESS OF BREATH: 0
RHINORRHEA: 0
VOICE CHANGE: 0

## 2022-12-27 ASSESSMENT — PAIN SCALES - GENERAL: PAINLEVEL_OUTOF10: 8

## 2022-12-28 VITALS
SYSTOLIC BLOOD PRESSURE: 143 MMHG | DIASTOLIC BLOOD PRESSURE: 98 MMHG | HEART RATE: 104 BPM | WEIGHT: 293 LBS | HEIGHT: 70 IN | TEMPERATURE: 99.2 F | BODY MASS INDEX: 41.95 KG/M2 | RESPIRATION RATE: 26 BRPM | OXYGEN SATURATION: 92 %

## 2022-12-28 PROBLEM — E04.1 THYROID NODULE: Status: ACTIVE | Noted: 2022-12-28

## 2022-12-28 PROBLEM — Z91.14 NONCOMPLIANCE WITH CPAP TREATMENT: Status: ACTIVE | Noted: 2022-12-28

## 2022-12-28 PROBLEM — E66.01 MORBID OBESITY DUE TO EXCESS CALORIES (HCC): Status: ACTIVE | Noted: 2022-12-28

## 2022-12-28 PROBLEM — G47.33 OSA ON CPAP: Status: ACTIVE | Noted: 2022-12-28

## 2022-12-28 PROBLEM — Z99.89 OSA ON CPAP: Status: ACTIVE | Noted: 2022-12-28

## 2022-12-28 PROBLEM — G62.9 NEUROPATHY: Status: ACTIVE | Noted: 2022-12-28

## 2022-12-28 PROBLEM — Z91.199 NONCOMPLIANCE WITH CPAP TREATMENT: Status: ACTIVE | Noted: 2022-12-28

## 2022-12-28 LAB
ANION GAP SERPL CALCULATED.3IONS-SCNC: 10 MMOL/L (ref 7–19)
BASE EXCESS ARTERIAL: 1.5 MMOL/L (ref -2–2)
BASOPHILS ABSOLUTE: 0.1 K/UL (ref 0–0.2)
BASOPHILS RELATIVE PERCENT: 0.4 % (ref 0–1)
BUN BLDV-MCNC: 15 MG/DL (ref 6–20)
CALCIUM SERPL-MCNC: 9.1 MG/DL (ref 8.6–10)
CARBOXYHEMOGLOBIN ARTERIAL: 3.5 % (ref 0–5)
CHLORIDE BLD-SCNC: 96 MMOL/L (ref 98–111)
CO2: 27 MMOL/L (ref 22–29)
CREAT SERPL-MCNC: 0.7 MG/DL (ref 0.5–0.9)
EOSINOPHILS ABSOLUTE: 0.2 K/UL (ref 0–0.6)
EOSINOPHILS RELATIVE PERCENT: 1.6 % (ref 0–5)
GFR SERPL CREATININE-BSD FRML MDRD: >60 ML/MIN/{1.73_M2}
GLUCOSE BLD-MCNC: 107 MG/DL (ref 70–99)
GLUCOSE BLD-MCNC: 189 MG/DL (ref 70–99)
GLUCOSE BLD-MCNC: 211 MG/DL (ref 74–109)
HCO3 ARTERIAL: 33.1 MMOL/L (ref 22–26)
HCT VFR BLD CALC: 40.2 % (ref 37–47)
HEMOGLOBIN, ART, EXTENDED: 12.8 G/DL (ref 12–16)
HEMOGLOBIN: 12.7 G/DL (ref 12–16)
IMMATURE GRANULOCYTES #: 0.1 K/UL
LYMPHOCYTES ABSOLUTE: 2.5 K/UL (ref 1.1–4.5)
LYMPHOCYTES RELATIVE PERCENT: 18 % (ref 20–40)
MCH RBC QN AUTO: 29.4 PG (ref 27–31)
MCHC RBC AUTO-ENTMCNC: 31.6 G/DL (ref 33–37)
MCV RBC AUTO: 93.1 FL (ref 81–99)
METHEMOGLOBIN ARTERIAL: 1 %
MONOCYTES ABSOLUTE: 0.6 K/UL (ref 0–0.9)
MONOCYTES RELATIVE PERCENT: 4.4 % (ref 0–10)
NEUTROPHILS ABSOLUTE: 10.5 K/UL (ref 1.5–7.5)
NEUTROPHILS RELATIVE PERCENT: 75 % (ref 50–65)
O2 CONTENT ARTERIAL: 15.8 ML/DL
O2 SAT, ARTERIAL: 87.9 %
O2 THERAPY: ABNORMAL
PCO2 ARTERIAL: 95 MMHG (ref 35–45)
PDW BLD-RTO: 15.9 % (ref 11.5–14.5)
PERFORMED ON: ABNORMAL
PERFORMED ON: ABNORMAL
PH ARTERIAL: 7.15 (ref 7.35–7.45)
PLATELET # BLD: 304 K/UL (ref 130–400)
PMV BLD AUTO: 9.2 FL (ref 9.4–12.3)
PO2 ARTERIAL: 65 MMHG (ref 80–100)
POTASSIUM REFLEX MAGNESIUM: 4.9 MMOL/L (ref 3.5–5)
POTASSIUM, WHOLE BLOOD: 5.1
RBC # BLD: 4.32 M/UL (ref 4.2–5.4)
SODIUM BLD-SCNC: 133 MMOL/L (ref 136–145)
WBC # BLD: 14 K/UL (ref 4.8–10.8)

## 2022-12-28 PROCEDURE — 94660 CPAP INITIATION&MGMT: CPT

## 2022-12-28 PROCEDURE — 82947 ASSAY GLUCOSE BLOOD QUANT: CPT

## 2022-12-28 PROCEDURE — 6360000002 HC RX W HCPCS: Performed by: HOSPITALIST

## 2022-12-28 PROCEDURE — 82803 BLOOD GASES ANY COMBINATION: CPT

## 2022-12-28 PROCEDURE — 85025 COMPLETE CBC W/AUTO DIFF WBC: CPT

## 2022-12-28 PROCEDURE — 94760 N-INVAS EAR/PLS OXIMETRY 1: CPT

## 2022-12-28 PROCEDURE — 6360000002 HC RX W HCPCS

## 2022-12-28 PROCEDURE — 1210000000 HC MED SURG R&B

## 2022-12-28 PROCEDURE — 2700000000 HC OXYGEN THERAPY PER DAY

## 2022-12-28 PROCEDURE — 36415 COLL VENOUS BLD VENIPUNCTURE: CPT

## 2022-12-28 PROCEDURE — 80048 BASIC METABOLIC PNL TOTAL CA: CPT

## 2022-12-28 PROCEDURE — 36600 WITHDRAWAL OF ARTERIAL BLOOD: CPT

## 2022-12-28 RX ORDER — HALOPERIDOL 5 MG/ML
5 INJECTION INTRAMUSCULAR ONCE
Status: COMPLETED | OUTPATIENT
Start: 2022-12-28 | End: 2022-12-28

## 2022-12-28 RX ORDER — HALOPERIDOL 5 MG/ML
5 INJECTION INTRAMUSCULAR EVERY 6 HOURS PRN
Status: DISCONTINUED | OUTPATIENT
Start: 2022-12-28 | End: 2022-12-28 | Stop reason: HOSPADM

## 2022-12-28 RX ORDER — HALOPERIDOL 5 MG/ML
INJECTION INTRAMUSCULAR
Status: COMPLETED
Start: 2022-12-28 | End: 2022-12-28

## 2022-12-28 RX ADMIN — HALOPERIDOL LACTATE 5 MG: 5 INJECTION, SOLUTION INTRAMUSCULAR at 08:35

## 2022-12-28 RX ADMIN — ONDANSETRON 4 MG: 2 INJECTION INTRAMUSCULAR; INTRAVENOUS at 08:42

## 2022-12-28 RX ADMIN — ONDANSETRON 4 MG: 2 INJECTION INTRAMUSCULAR; INTRAVENOUS at 01:47

## 2022-12-28 RX ADMIN — HALOPERIDOL 5 MG: 5 INJECTION INTRAMUSCULAR at 08:35

## 2022-12-28 ASSESSMENT — ENCOUNTER SYMPTOMS
BACK PAIN: 1
NAUSEA: 1
SHORTNESS OF BREATH: 0
DIARRHEA: 0
RHINORRHEA: 1
WHEEZING: 0
CONSTIPATION: 0
COUGH: 1
ABDOMINAL PAIN: 0
VOMITING: 0

## 2022-12-28 NOTE — ED NOTES
Pt placed on 2L NC due to sats of 89% on RA--states she wears O2 at home sometimes     Deana Arrington, KARL  12/27/22 8606
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
## 2022-12-28 NOTE — PROGRESS NOTES
Patient continuously setting off bed alarm getting out of bed without assistance, and repeatedly pulling off oxygen. SpO2 dropped, had to increase O2 to 4L.  Electronically signed by Calixto Franco RN on 12/28/2022 at 6:19 AM

## 2022-12-28 NOTE — PROGRESS NOTES
4 Eyes Skin Assessment    Christine Saenz is being assessed upon: Admission    I agree that I, Diana Zhang RN, along with Kasandra Casey RN (either 2 RN's or 1 LPN and 1 RN) have performed a thorough Head to Toe Skin Assessment on the patient. ALL assessment sites listed below have been assessed. Areas assessed by both nurses:     [x]   Head, Face, and Ears   [x]   Shoulders, Back, and Chest  [x]   Arms, Elbows, and Hands   [x]   Coccyx, Sacrum, and Ischium  [x]   Legs, Feet, and Heels    Does the Patient have Skin Breakdown? Yes, wound(s) noted upon assessment. It is the responsibility of the Primary Nurse to assure that the following documentation, preventions, orders, and consults are complete on the above noted wound(s): Wound LDA initiated. LDA Flowsheet Documentation includes the [place]-wound, Wound Assessment, Measurements, Dressing Treatment, Drainage, and Color. Picture of Left Foot wound uploaded in chart.     Kamran Prevention initiated: NA  Wound Care Orders initiated: Yes    [address]  nurse consulted for Pressure Injury (Stage 3,4, Unstageable, DTI, NWPT, and Complex wounds) and New or Established Ostomies: Yes        Primary Nurse eSignature: Diana Zhang RN on 12/28/2022 at 12:54 AM      Co-Signer eSignature: Electronically signed by Kasandra Casey RN on 12/28/22 at 1:31 AM CST

## 2022-12-28 NOTE — PROGRESS NOTES
Melvin Christianson arrived to room # 502. Presented with: therapy failure due to antibiotic resistance  Mental Status: Patient is oriented, alert, coherent, logical, thought processes intact, and able to concentrate and follow conversation. Vitals:    12/27/22 2219   BP: 128/79   Pulse: 95   Resp: 20   Temp: 98.4 °F (36.9 °C)   SpO2: 93%     Patient safety contract and falls prevention contract reviewed with patient Yes. Oriented Patient to room. Call light within reach. Yes.   Needs, issues or concerns expressed at this time: no.      Electronically signed by Ivania Damon RN on 12/28/2022 at 1:14 AM

## 2022-12-28 NOTE — DISCHARGE SUMMARY
[address] [facility] AMA    Per H&P    Mrs. Jay Crump is a pleasant but apparently disinterested  American lady of 41 years. She suffers from Morbid Obesity and Charcot Foot. She states that she is disabled as per her Charcot Foot, she used to work as a . She is a smoker of 1 PPD since age 17 and not interested in cessation. She states that she has had hives from vancomycin. Her past medical history is most significant for the Charcot Foot as while she has not had a recent surgery for this she has undergone multiple antibiotic courses for chronic wound infection. She is followed by ID and most recently completed a course of Vancomycin. She is no longer on TCA or SSRI, and has not had Zyvox before for her infection. She did recently have a Bone Scan and MRI as per EP report, and these were reportedly negative for Osteo Myelitis. Her ED team ordered her Zosyn and Vancomycin. As she was unable to have Vancomycin she was started on to Zyvox, The ID specialist was consulted for assistance with the treatment of her infection. She has not had surgery within the last year. Her family called in to report that she needs CPAP and that she ha had problems in the past from not wearing it. The patient reports that she uses it sometimes at home. (When first seen in the ED she glanced at the examiner quickly and then went back to texting on phone. While arriving on the medical [place], rapid response was called as patient was noted to be hypoxic in the 60s and refusing to wear her BiPAP. Patient was obtunded with incoherent speech with some agitation. Haldol 5 mg IV was given to help with agitation, ABG was obtained which showed severe respiratory acidosis with pH of 7.15 and CO2 of 95. Patient was subsequently able to come down, and BiPAP was placed on. Couple hours after on BiPAP, patient was more alert and conversant, as well as persistent on leaving the hospital AMA.     Discussion was held with myself as well as nurse present, explained potential damage related to patient's old if not adequately treated medically. Patient can have reaccumulation of CO2, worsening mentation, respiratory distress and possible death. Also evaluation for reason for admission has not been completed at this time. Patient stated she understands all of that in the regardless of what was told she is still leaving the hospital AMA. Patient signed [facility] paperwork in the hospital.      Physical examination  General, obese, coherent and able to make a decision. Respiratory: Some mild distress with some wheezing. Abdomen: No use of accessory muscles with breathing. Truncal obesity.   Lower extremity: In boot

## 2022-12-28 NOTE — PROGRESS NOTES
12/28/22 0846   Encounter Summary   Encounter Overview/Reason  Spiritual/Emotional Needs   Service Provided For: Patient   Referral/Consult From: Other (comment)  (Rapid Response)   Support System Unknown   Complexity of Encounter High   Begin Time 0830   End Time  0845   Total Time Calculated 15 min   Crisis   Type Rapid Response   Spiritual/Emotional needs   Type Spiritual Support   Assessment/Intervention/Outcome   Assessment Anxious   Intervention Sustaining Presence/Ministry of presence;Prayer (assurance of)/Harmony  (silent prayer)   Outcome Less anxious, Less agitated; Other (comment)  (Patient recovery due to nursing/medical care.)   Plan and Referrals   Plan/Referrals Continue Support (comment)  (I will check on patient later today or pass to another .)
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
## 2022-12-28 NOTE — PROGRESS NOTES
Patient noncompliant, pt continuously pulling CPAP and or her nasal cannula off. Pt instructed on importance of keeping O2 on, pt still keeps taking it off. MD made aware.  Electronically signed by Diana Zhang RN on 12/28/2022 at 4:17 AM

## 2022-12-28 NOTE — PROGRESS NOTES
Merritt BARAJAS able to arouse patient prior to transport to  324. Attempted to place CPAP on patient prior to transport. Patient refused and kept ripping CPAP off. Maintained 4L of O2 via NC. Patient was alert and agitated upon arrival to [facility]. Attempted to place CPAP on patient. Patient states,\"NOOOO\" and ripped CPAP off. Patient was then placed on 4L NC at this time. Lazarus Butts RN was notified of this occurrence.

## 2022-12-29 LAB
BLOOD CULTURE, ROUTINE: NORMAL
CULTURE, BLOOD 2: NORMAL

## 2023-02-12 ENCOUNTER — HOSPITAL ENCOUNTER (EMERGENCY)
Facility: HOSPITAL | Age: 48
Discharge: HOME | End: 2023-02-12
Attending: STUDENT IN AN ORGANIZED HEALTH CARE EDUCATION/TRAINING PROGRAM | Admitting: STUDENT IN AN ORGANIZED HEALTH CARE EDUCATION/TRAINING PROGRAM
Payer: COMMERCIAL

## 2023-02-12 VITALS
WEIGHT: 282 LBS | SYSTOLIC BLOOD PRESSURE: 148 MMHG | HEIGHT: 66 IN | HEART RATE: 89 BPM | RESPIRATION RATE: 18 BRPM | DIASTOLIC BLOOD PRESSURE: 84 MMHG | OXYGEN SATURATION: 99 % | TEMPERATURE: 98 F | BODY MASS INDEX: 45.32 KG/M2

## 2023-02-12 DIAGNOSIS — M54.2 NECK PAIN: Primary | ICD-10-CM

## 2023-02-12 PROCEDURE — 99282 EMERGENCY DEPT VISIT SF MDM: CPT

## 2023-02-12 RX ORDER — TIZANIDINE HYDROCHLORIDE 4 MG/1
4 CAPSULE, GELATIN COATED ORAL 3 TIMES DAILY
Qty: 15 CAPSULE | Refills: 0 | Status: SHIPPED | OUTPATIENT
Start: 2023-02-12 | End: 2023-02-17

## 2023-02-12 RX ORDER — IBUPROFEN 600 MG/1
600 TABLET ORAL EVERY 8 HOURS PRN
Qty: 15 TABLET | Refills: 0 | Status: SHIPPED | OUTPATIENT
Start: 2023-02-12 | End: 2023-02-17

## 2023-02-12 NOTE — ED PROVIDER NOTES
Subjective   History of Present Illness   Patient presents due to neck pain.  She fell a couple weeks ago and hit her head.  Her primary care doctor ordered a CT that was normal.  She does not have a headache or visual disturbance or nausea or vomiting.  She describes a shooting pain that sometimes goes up on the right side of her neck that is transient.  Her doctor told to take ibuprofen but her complaint is that the pains are so transient that she does not have time to take something to help them.  Occurs 4-5 times a day.  Positional.  She has had no numbness or tingling anywhere or weakness of her arms or legs or difficulty walking.    Review of Systems   Constitutional: Negative for chills and fever.   Respiratory: Negative for cough and shortness of breath.    Cardiovascular: Negative for chest pain and palpitations.   Gastrointestinal: Negative for abdominal pain and vomiting.   Genitourinary: Negative for difficulty urinating and dysuria.   Neurological: Negative for syncope and light-headedness.       Past Medical History:   Diagnosis Date   • Ankle swelling    • Back pain    • Diabetes (HCC)    • Hemorrhoids    • History of stomach ulcers    • Hypertension    • Hyperthyroidism    • Neuropathy    • Sleep apnea     doesn't use c pap. feels like she is suffocating   • Thyroid goiter        Allergies   Allergen Reactions   • Vancomycin Hives       Past Surgical History:   Procedure Laterality Date   • CHOLECYSTECTOMY      lap   • MULTIPLE TOOTH EXTRACTIONS     • OVARY SURGERY     • TUBAL ABDOMINAL LIGATION         Family History   Problem Relation Age of Onset   • Cancer Mother    • Cancer Father    • COPD Father    • Diabetes Maternal Grandfather        Social History     Socioeconomic History   • Marital status: Single   Tobacco Use   • Smoking status: Every Day     Packs/day: 1.00     Types: Cigarettes   • Smokeless tobacco: Never   Substance and Sexual Activity   • Alcohol use: Not Currently   • Drug use:  Not Currently           Objective   Physical Exam  Vitals reviewed.   Constitutional:       General: She is not in acute distress.  HENT:      Head: Normocephalic and atraumatic.   Eyes:      Extraocular Movements: Extraocular movements intact.      Conjunctiva/sclera: Conjunctivae normal.   Neck:      Comments: Right paraspinal tenderness to palpation.  Normal range of motion does not elicit pain.  Cardiovascular:      Pulses: Normal pulses.      Heart sounds: Normal heart sounds.   Pulmonary:      Effort: Pulmonary effort is normal. No respiratory distress.   Abdominal:      General: Abdomen is flat. There is no distension.      Tenderness: There is no abdominal tenderness.   Musculoskeletal:      Cervical back: Normal range of motion and neck supple.   Skin:     General: Skin is warm and dry.   Neurological:      General: No focal deficit present.      Mental Status: She is alert. Mental status is at baseline.      Comments: Right upper extremity: 5/5 strength with handgrip and flexion/extension of shoulders, elbows.   Light touch sensation intact and equal when compared to the left upper extremity.    Left upper extremity: 5/5 strength with handgrip and flexion/extension of shoulders, elbows.   Light touch sensation intact and equal when compared to the right upper extremity.    Right lower extremity: 5/5 strength with flexion/extension of hips, knees, and dorsi/plantarflexion of ankles. Able to wiggle toes.   Light touch sensation intact and equal when compared to the left lower extremity.    Left lower extremity: 5/5 strength with flexion/extension of hips, knees, and dorsi/plantarflexion of ankles. Able to wiggle toes.   Light touch sensation intact and equal when compared to the right lower extremity.    Light sensation intact in bilateral face. CN 2-12 normal. Gait normal without ataxia or limp.      Psychiatric:         Behavior: Behavior normal.         Thought Content: Thought content normal.          Procedures           ED Course                                           MDM   Donna Swain is a 47 y.o. female with PMH above who presents to the Emergency Department with shooting pains.  Consistent with arthritic pains, possibly aggravated by an injury.  She does not have signs of neck fracture and it has been over a week since she fell; I doubt the utility of a CT scan since she is been without neurologic deficit since the fall, has had negative CT head imaging, and has a history of physical very consistent with a peripheral nerve pain.  I offered CT of her C-spine however she states that she is mostly here because she wants some assistance with pain and after discussion of the risk and benefit she declines a CT.  We discussed that routine anti-inflammatories could be tried for a few days and she confirms the absence of kidney disease.  I also will prescribe a muscle relaxer.  Steroids do not have good evidence that she has a foot wound that could possibly have impeded healing from this so we will defer this for now.  Strict return precautions discussed.    Final diagnosis: Neck pain.    All questions answered. Patient/family was understanding and in agreement with today's assessment and plan. The patient was monitored during their stay in the ED and dispositioned without acute event.    Electronically signed by:  Jan Evans MD 2/13/2023 09:21 CST      Note: Dragon medical dictation software was used in the creation of this note.        Final diagnoses:   Neck pain       ED Disposition  ED Disposition     ED Disposition   Discharge    Condition   Stable    Comment   --             PATIENT CONNECTION - [facility] [address]  [phone]  Schedule an appointment as soon as possible for a visit            Medication List      New Prescriptions    ibuprofen 600 MG tablet  Commonly known as: ADVIL,MOTRIN  Take 1 tablet by mouth Every 8 (Eight) Hours As Needed for Mild Pain or Moderate Pain  for up to 5 days.     TiZANidine 4 MG capsule  Commonly known as: Zanaflex  Take 1 capsule by mouth 3 (Three) Times a Day for 5 days.           Where to Get Your Medications      These medications were sent to [id number] - [place], [place] - [address] [facility] [address] [facility] AT [address] - [phone]  - [phone] FX  [address] [place] [id number]    Phone: [phone]   · ibuprofen 600 MG tablet  · TiZANidine 4 MG capsule          Jan Evans MD  02/13/23 0903

## 2023-02-12 NOTE — DISCHARGE INSTRUCTIONS
Take the zanaflex and ibuprofen as prescribed. You can also take 800mg twice a day instead with the ibuprofen if you prefer.  Please come back for weakness or numbness or tingling of any of your extremities or difficulty walking.  Otherwise follow-up with your doctor for any continued pain.

## 2023-03-02 ENCOUNTER — PRE-ADMISSION TESTING (OUTPATIENT)
Dept: PREADMISSION TESTING | Facility: HOSPITAL | Age: 48
End: 2023-03-02
Payer: COMMERCIAL

## 2023-03-02 VITALS
HEART RATE: 101 BPM | DIASTOLIC BLOOD PRESSURE: 86 MMHG | RESPIRATION RATE: 20 BRPM | BODY MASS INDEX: 44.41 KG/M2 | OXYGEN SATURATION: 96 % | WEIGHT: 293 LBS | SYSTOLIC BLOOD PRESSURE: 129 MMHG | HEIGHT: 68 IN

## 2023-03-02 LAB
ANION GAP SERPL CALCULATED.3IONS-SCNC: 10 MMOL/L (ref 5–15)
BUN SERPL-MCNC: 10 MG/DL (ref 6–20)
BUN/CREAT SERPL: 15.2 (ref 7–25)
CALCIUM SPEC-SCNC: 9.2 MG/DL (ref 8.6–10.5)
CHLORIDE SERPL-SCNC: 100 MMOL/L (ref 98–107)
CO2 SERPL-SCNC: 27 MMOL/L (ref 22–29)
CREAT SERPL-MCNC: 0.66 MG/DL (ref 0.57–1)
DEPRECATED RDW RBC AUTO: 48.5 FL (ref 37–54)
EGFRCR SERPLBLD CKD-EPI 2021: 110.4 ML/MIN/1.73
ERYTHROCYTE [DISTWIDTH] IN BLOOD BY AUTOMATED COUNT: 15.9 % (ref 12.3–15.4)
GLUCOSE SERPL-MCNC: 131 MG/DL (ref 65–99)
HCT VFR BLD AUTO: 43.5 % (ref 34–46.6)
HGB BLD-MCNC: 13.4 G/DL (ref 12–15.9)
MCH RBC QN AUTO: 25.9 PG (ref 26.6–33)
MCHC RBC AUTO-ENTMCNC: 30.8 G/DL (ref 31.5–35.7)
MCV RBC AUTO: 84.1 FL (ref 79–97)
PLATELET # BLD AUTO: 311 10*3/MM3 (ref 140–450)
PMV BLD AUTO: 9.1 FL (ref 6–12)
POTASSIUM SERPL-SCNC: 4.1 MMOL/L (ref 3.5–5.2)
RBC # BLD AUTO: 5.17 10*6/MM3 (ref 3.77–5.28)
SODIUM SERPL-SCNC: 137 MMOL/L (ref 136–145)
WBC NRBC COR # BLD: 13.64 10*3/MM3 (ref 3.4–10.8)

## 2023-03-02 PROCEDURE — 85027 COMPLETE CBC AUTOMATED: CPT

## 2023-03-02 PROCEDURE — 36415 COLL VENOUS BLD VENIPUNCTURE: CPT

## 2023-03-02 PROCEDURE — 80048 BASIC METABOLIC PNL TOTAL CA: CPT

## 2023-03-02 PROCEDURE — 93005 ELECTROCARDIOGRAM TRACING: CPT

## 2023-03-02 PROCEDURE — 93010 ELECTROCARDIOGRAM REPORT: CPT | Performed by: INTERNAL MEDICINE

## 2023-03-02 RX ORDER — IBUPROFEN 800 MG/1
800 TABLET ORAL EVERY 6 HOURS PRN
COMMUNITY

## 2023-03-02 NOTE — DISCHARGE INSTRUCTIONS
Before you come to the hospital        Arrival time: AS DIRECTED BY OFFICE     YOU MAY TAKE THE FOLLOWING MEDICATION(S) THE MORNING OF SURGERY WITH A SIP OF WATER: GABAPENTIN      ***PLEASE HOLD YOUR LISINOPRIL 24 HOURS PRIOR TO SURGERY***             ALL OTHER HOME MEDICATION CHECK WITH YOUR PHYSICIAN (especially if   you are taking diabetes medicines or blood thinners)    Do not take any Erectile Dysfunction medications (EX: CIALIS, VIAGRA) 24 hours prior to surgery.      If you were given and instructed to use a germ- killing soap, use as directed the night before surgery and again the morning of surgery or as directed by your surgeon. (Use one-half of the bottle with each shower.)   See attached information for How to Use Chlorhexidine for Bathing if applicable.            Eating and drinking restrictions prior to scheduled arrival time    2 Hours before arrival time STOP   Drinking Clear liquids (water, apple juice-no pulp)     6 Hours before arrival time STOP   Milk or drinks that contain milk, full liquids    6 Hours before arrival time STOP   Light meals or foods, such as toast or cereal    8 Hours before arrival time STOP   Heavy foods, such as meat, fried foods, or fatty foods    (It is extremely important that you follow these guidelines to prevent delay or cancelation of your procedure)     Clear Liquids  Water and flavored water                                                                      Clear Fruit juices, such as cranberry juice and apple juice.  Black coffee (NO cream of any kind, including powdered).  Plain tea  Clear bouillon or broth.  Flavored gelatin.  Soda.  Gatorade or Powerade.  Full liquid examples  Juices that have pulp.  Frozen ice pops that contain fruit pieces.  Coffee with creamer  Milk.  Yogurt.                MANAGING PAIN AFTER SURGERY    We know you are probably wondering what your pain will be like after surgery.  Following surgery it is unrealistic to expect you will not  have pain.   Pain is how our bodies let us know that something is wrong or cautions us to be careful.  That said, our goal is to make your pain tolerable.    Methods we may use to treat your pain include (oral or IV medications, PCAs, epidurals, nerve blocks, etc.)   While some procedures require IV pain medications for a short time after surgery, transitioning to pain medications by mouth allows for better management of pain.   Your nurse will encourage you to take oral pain medications whenever possible.  IV medications work almost immediately, but only last a short while.  Taking medications by mouth allows for a more constant level of medication in your blood stream for a longer period of time.      Once your pain is out of control it is harder to get back under control.  It is important you are aware when your next dose of pain medication is due.  If you are admitted, your nurse may write the time of your next dose on the white board in your room to help you remember.      We are interested in your pain and encourage you to inform us about aggravating factors during your visit.   Many times a simple repositioning every few hours can make a big difference.    If your physician says it is okay, do not let your pain prevent you from getting out of bed. Be sure to call your nurse for assistance prior to getting up so you do not fall.      Before surgery, please decide your tolerable pain goal.  These faces help describe the pain ratings we use on a 0-10 scale.   Be prepared to tell us your goal and whether or not you take pain or anxiety medications at home.          Preparing for Surgery  Preparing for surgery is an important part of your care. It can make things go more smoothly and help you avoid complications. The steps leading up to surgery may vary among hospitals. Follow all instructions given to you by your health care providers. Ask questions if you do not understand something. Talk about any concerns that  you have.  Here are some questions to consider asking before your surgery:  If my surgery is not an emergency (is elective), when would be the best time to have the surgery?  What arrangements do I need to make for work, home, or school?  What will my recovery be like? How long will it be before I can return to normal activities?  Will I need to prepare my home? Will I need to arrange care for me or my children?  Should I expect to have pain after surgery? What are my pain management options? Are there nonmedical options that I can try for pain?  Tell a health care provider about:  Any allergies you have.  All medicines you are taking, including vitamins, herbs, eye drops, creams, and over-the-counter medicines.  Any problems you or family members have had with anesthetic medicines.  Any blood disorders you have.  Any surgeries you have had.  Any medical conditions you have.  Whether you are pregnant or may be pregnant.  What are the risks?  The risks and complications of surgery depend on the specific procedure that you have. Discuss all the risks with your health care providers before your surgery. Ask about common surgical complications, which may include:  Infection.  Bleeding or a need for blood replacement (transfusion).  Allergic reactions to medicines.  Damage to surrounding nerves, tissues, or structures.  A blood clot.  Scarring.  Failure of the surgery to correct the problem.  Follow these instructions before the procedure:  Several days or weeks before your procedure  You may have a physical exam by your primary health care provider to make sure it is safe for you to have surgery.  You may have testing. This may include a chest X-ray, blood and urine tests, electrocardiogram (ECG), or other testing.  Ask your health care provider about:  Changing or stopping your regular medicines. This is especially important if you are taking diabetes medicines or blood thinners.  Taking medicines such as aspirin and  ibuprofen. These medicines can thin your blood. Do not take these medicines unless your health care provider tells you to take them.  Taking over-the-counter medicines, vitamins, herbs, and supplements.  Do not use any products that contain nicotine or tobacco, such as cigarettes and e-cigarettes. If you need help quitting, ask your health care provider.  Avoid alcohol.  Ask your health care provider if there are exercises you can do to prepare for surgery.  Eat a healthy diet.   Plan to have someone take you home from the hospital or clinic.  Plan to have a responsible adult care for you for at least 24 hours after you leave the hospital or clinic. This is important.  The day before your procedure  You may be given antibiotic medicine to take by mouth to help prevent infection. Take it as told by your health care provider.  You may be asked to shower with a germ-killing soap.  Follow instructions from your health care provider about eating and drinking restrictions. This includes gum, mints and hard candy.  Pack comfortable clothes according to your procedure.   The day of your procedure  You may need to take another shower with a germ-killing soap before you leave home in the morning.  With a small sip of water, take only the medicines that you are told to take.  Remove all jewelry including rings.   Leave anything you consider valuable at home except hearing aids if needed.  You do not need to bring your home medications into the hospital.   Do not wear any makeup, nail polish, powder, deodorant, lotion, hair accessories, or anything on your skin or body except your clothes.  If you will be staying in the hospital, bring a case to hold your glasses, contacts, or dentures. You may also want to bring your robe and non-skid footwear.       (Do not use denture adhesives since you will be asked to remove them during  surgery).   If you wear oxygen at home, bring it with you the day of surgery.  If instructed by your  health care provider, bring your sleep apnea device with you on the day of your surgery (if this applies to you).  You may want to leave your suitcase and sleep apnea device in the car until after surgery.   Arrive at the hospital as scheduled.  Bring a friend or family member with you who can help to answer questions and be present while you meet with your health care provider.  At the hospital  When you arrive at the hospital:  Go to registration located at the main entrance of the hospital. You will be registered and given a beeper and a sticker sheet. Take the stickers to the Outpatient nurses desk and place in the black tray. This is to notify staff that you have arrived. Then return to the lobby to wait.   When your beeper lights up and vibrates proceed through the double doors, under the stairs, and a member of the Outpatient Surgery staff will escort you to your preoperative room.  You may have to wear compression sleeves. These help to prevent blood clots and reduce swelling in your legs.  An IV may be inserted into one of your veins.              In the operating room, you may be given one or more of the following:        A medicine to help you relax (sedative).        A medicine to numb the area (local anesthetic).        A medicine to make you fall asleep (general anesthetic).        A medicine that is injected into an area of your body to numb everything below the                      injection site (regional anesthetic).  You may be given an antibiotic through your IV to help prevent infection.  Your surgical site will be marked or identified.    Contact a health care provider if you:  Develop a fever of more than 100.4°F (38°C) or other feelings of illness during the 48 hours before your surgery.  Have symptoms that get worse.  Have questions or concerns about your surgery.  Summary  Preparing for surgery can make the procedure go more smoothly and lower your risk of complications.  Before surgery,  make a list of questions and concerns to discuss with your surgeon. Ask about the risks and possible complications.  In the days or weeks before your surgery, follow all instructions from your health care provider. You may need to stop smoking, avoid alcohol, follow eating restrictions, and change or stop your regular medicines.  Contact your surgeon if you develop a fever or other signs of illness during the few days before your surgery.  This information is not intended to replace advice given to you by your health care provider. Make sure you discuss any questions you have with your health care provider.  Document Revised: 12/21/2018 Document Reviewed: 10/23/2018  Elsevier Patient Education © 2021 [employer]

## 2023-03-03 LAB
QT INTERVAL: 338 MS
QTC INTERVAL: 436 MS

## 2023-03-07 ENCOUNTER — HOSPITAL ENCOUNTER (OUTPATIENT)
Facility: HOSPITAL | Age: 48
Setting detail: HOSPITAL OUTPATIENT SURGERY
Discharge: HOME | End: 2023-03-07
Attending: PODIATRIST | Admitting: PODIATRIST
Payer: COMMERCIAL

## 2023-03-07 ENCOUNTER — APPOINTMENT (OUTPATIENT)
Dept: GENERAL RADIOLOGY | Facility: HOSPITAL | Age: 48
End: 2023-03-07
Payer: COMMERCIAL

## 2023-03-07 ENCOUNTER — ANESTHESIA (OUTPATIENT)
Dept: PERIOP | Facility: HOSPITAL | Age: 48
End: 2023-03-07
Payer: COMMERCIAL

## 2023-03-07 ENCOUNTER — ANESTHESIA EVENT (OUTPATIENT)
Dept: PERIOP | Facility: HOSPITAL | Age: 48
End: 2023-03-07
Payer: COMMERCIAL

## 2023-03-07 VITALS
TEMPERATURE: 97.2 F | HEART RATE: 80 BPM | OXYGEN SATURATION: 91 % | RESPIRATION RATE: 16 BRPM | DIASTOLIC BLOOD PRESSURE: 68 MMHG | SYSTOLIC BLOOD PRESSURE: 124 MMHG

## 2023-03-07 DIAGNOSIS — L08.9 FOOT INFECTION: ICD-10-CM

## 2023-03-07 DIAGNOSIS — M86.472 CHRONIC OSTEOMYELITIS OF LEFT FOOT WITH DRAINING SINUS: Primary | ICD-10-CM

## 2023-03-07 LAB
GLUCOSE BLDC GLUCOMTR-MCNC: 110 MG/DL (ref 70–130)
GLUCOSE BLDC GLUCOMTR-MCNC: 117 MG/DL (ref 70–130)

## 2023-03-07 PROCEDURE — 87205 SMEAR GRAM STAIN: CPT | Performed by: PODIATRIST

## 2023-03-07 PROCEDURE — 82962 GLUCOSE BLOOD TEST: CPT

## 2023-03-07 PROCEDURE — 25010000002 ONDANSETRON PER 1 MG: Performed by: NURSE ANESTHETIST, CERTIFIED REGISTERED

## 2023-03-07 PROCEDURE — 76000 FLUOROSCOPY <1 HR PHYS/QHP: CPT

## 2023-03-07 PROCEDURE — 87186 SC STD MICRODIL/AGAR DIL: CPT | Performed by: PODIATRIST

## 2023-03-07 PROCEDURE — 88311 DECALCIFY TISSUE: CPT | Performed by: PODIATRIST

## 2023-03-07 PROCEDURE — 25010000002 ROPIVACAINE PER 1 MG: Performed by: PODIATRIST

## 2023-03-07 PROCEDURE — 88305 TISSUE EXAM BY PATHOLOGIST: CPT | Performed by: PODIATRIST

## 2023-03-07 PROCEDURE — 25010000002 PROPOFOL 10 MG/ML EMULSION: Performed by: NURSE ANESTHETIST, CERTIFIED REGISTERED

## 2023-03-07 PROCEDURE — 25010000002 MIDAZOLAM PER 1 MG: Performed by: NURSE ANESTHETIST, CERTIFIED REGISTERED

## 2023-03-07 PROCEDURE — 87070 CULTURE OTHR SPECIMN AEROBIC: CPT | Performed by: PODIATRIST

## 2023-03-07 PROCEDURE — 25010000002 FENTANYL CITRATE (PF) 50 MCG/ML SOLUTION: Performed by: NURSE ANESTHETIST, CERTIFIED REGISTERED

## 2023-03-07 PROCEDURE — 73620 X-RAY EXAM OF FOOT: CPT

## 2023-03-07 PROCEDURE — 87147 CULTURE TYPE IMMUNOLOGIC: CPT | Performed by: PODIATRIST

## 2023-03-07 PROCEDURE — 25010000002 CEFAZOLIN PER 500 MG: Performed by: PODIATRIST

## 2023-03-07 PROCEDURE — 87176 TISSUE HOMOGENIZATION CULTR: CPT | Performed by: PODIATRIST

## 2023-03-07 RX ORDER — SODIUM CHLORIDE 0.9 % (FLUSH) 0.9 %
3-10 SYRINGE (ML) INJECTION AS NEEDED
Status: DISCONTINUED | OUTPATIENT
Start: 2023-03-07 | End: 2023-03-07 | Stop reason: HOSPADM

## 2023-03-07 RX ORDER — FENTANYL CITRATE 50 UG/ML
25 INJECTION, SOLUTION INTRAMUSCULAR; INTRAVENOUS
Status: DISCONTINUED | OUTPATIENT
Start: 2023-03-07 | End: 2023-03-07 | Stop reason: HOSPADM

## 2023-03-07 RX ORDER — PROPOFOL 10 MG/ML
VIAL (ML) INTRAVENOUS AS NEEDED
Status: DISCONTINUED | OUTPATIENT
Start: 2023-03-07 | End: 2023-03-07 | Stop reason: SURG

## 2023-03-07 RX ORDER — SODIUM CHLORIDE, SODIUM LACTATE, POTASSIUM CHLORIDE, CALCIUM CHLORIDE 600; 310; 30; 20 MG/100ML; MG/100ML; MG/100ML; MG/100ML
100 INJECTION, SOLUTION INTRAVENOUS CONTINUOUS PRN
Status: DISCONTINUED | OUTPATIENT
Start: 2023-03-07 | End: 2023-03-07 | Stop reason: HOSPADM

## 2023-03-07 RX ORDER — SODIUM CHLORIDE 9 MG/ML
40 INJECTION, SOLUTION INTRAVENOUS AS NEEDED
Status: DISCONTINUED | OUTPATIENT
Start: 2023-03-07 | End: 2023-03-07 | Stop reason: HOSPADM

## 2023-03-07 RX ORDER — FENTANYL CITRATE 50 UG/ML
INJECTION, SOLUTION INTRAMUSCULAR; INTRAVENOUS AS NEEDED
Status: DISCONTINUED | OUTPATIENT
Start: 2023-03-07 | End: 2023-03-07 | Stop reason: SURG

## 2023-03-07 RX ORDER — ONDANSETRON 2 MG/ML
INJECTION INTRAMUSCULAR; INTRAVENOUS AS NEEDED
Status: DISCONTINUED | OUTPATIENT
Start: 2023-03-07 | End: 2023-03-07 | Stop reason: SURG

## 2023-03-07 RX ORDER — OXYCODONE AND ACETAMINOPHEN 7.5; 325 MG/1; MG/1
1 TABLET ORAL EVERY 4 HOURS PRN
Qty: 24 TABLET | Refills: 0 | Status: SHIPPED | OUTPATIENT
Start: 2023-03-07

## 2023-03-07 RX ORDER — ONDANSETRON 2 MG/ML
4 INJECTION INTRAMUSCULAR; INTRAVENOUS ONCE AS NEEDED
Status: DISCONTINUED | OUTPATIENT
Start: 2023-03-07 | End: 2023-03-07 | Stop reason: HOSPADM

## 2023-03-07 RX ORDER — SODIUM CHLORIDE 0.9 % (FLUSH) 0.9 %
10 SYRINGE (ML) INJECTION AS NEEDED
Status: DISCONTINUED | OUTPATIENT
Start: 2023-03-07 | End: 2023-03-07 | Stop reason: HOSPADM

## 2023-03-07 RX ORDER — SODIUM CHLORIDE, SODIUM LACTATE, POTASSIUM CHLORIDE, CALCIUM CHLORIDE 600; 310; 30; 20 MG/100ML; MG/100ML; MG/100ML; MG/100ML
100 INJECTION, SOLUTION INTRAVENOUS CONTINUOUS
Status: DISCONTINUED | OUTPATIENT
Start: 2023-03-07 | End: 2023-03-07 | Stop reason: HOSPADM

## 2023-03-07 RX ORDER — DROPERIDOL 2.5 MG/ML
0.62 INJECTION, SOLUTION INTRAMUSCULAR; INTRAVENOUS ONCE AS NEEDED
Status: DISCONTINUED | OUTPATIENT
Start: 2023-03-07 | End: 2023-03-07 | Stop reason: HOSPADM

## 2023-03-07 RX ORDER — MAGNESIUM HYDROXIDE 1200 MG/15ML
LIQUID ORAL AS NEEDED
Status: DISCONTINUED | OUTPATIENT
Start: 2023-03-07 | End: 2023-03-07 | Stop reason: HOSPADM

## 2023-03-07 RX ORDER — SULFAMETHOXAZOLE AND TRIMETHOPRIM 800; 160 MG/1; MG/1
1 TABLET ORAL 2 TIMES DAILY
Qty: 20 TABLET | Refills: 0 | Status: SHIPPED | OUTPATIENT
Start: 2023-03-07

## 2023-03-07 RX ORDER — ROPIVACAINE HYDROCHLORIDE 2 MG/ML
INJECTION, SOLUTION EPIDURAL; INFILTRATION; PERINEURAL AS NEEDED
Status: DISCONTINUED | OUTPATIENT
Start: 2023-03-07 | End: 2023-03-07 | Stop reason: HOSPADM

## 2023-03-07 RX ORDER — CEFAZOLIN SODIUM IN 0.9 % NACL 3 G/100 ML
3 INTRAVENOUS SOLUTION, PIGGYBACK (ML) INTRAVENOUS ONCE
Status: COMPLETED | OUTPATIENT
Start: 2023-03-07 | End: 2023-03-07

## 2023-03-07 RX ORDER — SODIUM CHLORIDE, SODIUM LACTATE, POTASSIUM CHLORIDE, CALCIUM CHLORIDE 600; 310; 30; 20 MG/100ML; MG/100ML; MG/100ML; MG/100ML
1000 INJECTION, SOLUTION INTRAVENOUS CONTINUOUS
Status: DISCONTINUED | OUTPATIENT
Start: 2023-03-07 | End: 2023-03-07 | Stop reason: HOSPADM

## 2023-03-07 RX ORDER — NALOXONE HYDROCHLORIDE 4 MG/.1ML
1 SPRAY NASAL AS NEEDED
Qty: 1 EACH | Refills: 0 | Status: SHIPPED | OUTPATIENT
Start: 2023-03-07

## 2023-03-07 RX ORDER — LIDOCAINE HYDROCHLORIDE 10 MG/ML
0.5 INJECTION, SOLUTION EPIDURAL; INFILTRATION; INTRACAUDAL; PERINEURAL ONCE AS NEEDED
Status: DISCONTINUED | OUTPATIENT
Start: 2023-03-07 | End: 2023-03-07 | Stop reason: HOSPADM

## 2023-03-07 RX ORDER — ACETAMINOPHEN 500 MG
1000 TABLET ORAL ONCE
Status: COMPLETED | OUTPATIENT
Start: 2023-03-07 | End: 2023-03-07

## 2023-03-07 RX ORDER — OXYCODONE AND ACETAMINOPHEN 10; 325 MG/1; MG/1
1 TABLET ORAL ONCE AS NEEDED
Status: DISCONTINUED | OUTPATIENT
Start: 2023-03-07 | End: 2023-03-07 | Stop reason: HOSPADM

## 2023-03-07 RX ORDER — SODIUM CHLORIDE 0.9 % (FLUSH) 0.9 %
10 SYRINGE (ML) INJECTION EVERY 12 HOURS SCHEDULED
Status: DISCONTINUED | OUTPATIENT
Start: 2023-03-07 | End: 2023-03-07 | Stop reason: HOSPADM

## 2023-03-07 RX ORDER — SODIUM CHLORIDE 0.9 % (FLUSH) 0.9 %
3 SYRINGE (ML) INJECTION EVERY 12 HOURS SCHEDULED
Status: DISCONTINUED | OUTPATIENT
Start: 2023-03-07 | End: 2023-03-07 | Stop reason: HOSPADM

## 2023-03-07 RX ORDER — LIDOCAINE HYDROCHLORIDE 20 MG/ML
INJECTION, SOLUTION EPIDURAL; INFILTRATION; INTRACAUDAL; PERINEURAL AS NEEDED
Status: DISCONTINUED | OUTPATIENT
Start: 2023-03-07 | End: 2023-03-07 | Stop reason: SURG

## 2023-03-07 RX ORDER — MIDAZOLAM HYDROCHLORIDE 1 MG/ML
1 INJECTION INTRAMUSCULAR; INTRAVENOUS
Status: DISCONTINUED | OUTPATIENT
Start: 2023-03-07 | End: 2023-03-07 | Stop reason: HOSPADM

## 2023-03-07 RX ORDER — LABETALOL HYDROCHLORIDE 5 MG/ML
5 INJECTION, SOLUTION INTRAVENOUS
Status: DISCONTINUED | OUTPATIENT
Start: 2023-03-07 | End: 2023-03-07 | Stop reason: HOSPADM

## 2023-03-07 RX ORDER — NALOXONE HCL 0.4 MG/ML
0.4 VIAL (ML) INJECTION AS NEEDED
Status: DISCONTINUED | OUTPATIENT
Start: 2023-03-07 | End: 2023-03-07 | Stop reason: HOSPADM

## 2023-03-07 RX ORDER — ROCURONIUM BROMIDE 10 MG/ML
INJECTION, SOLUTION INTRAVENOUS AS NEEDED
Status: DISCONTINUED | OUTPATIENT
Start: 2023-03-07 | End: 2023-03-07 | Stop reason: SURG

## 2023-03-07 RX ORDER — OXYCODONE AND ACETAMINOPHEN 7.5; 325 MG/1; MG/1
2 TABLET ORAL EVERY 4 HOURS PRN
Status: DISCONTINUED | OUTPATIENT
Start: 2023-03-07 | End: 2023-03-07 | Stop reason: HOSPADM

## 2023-03-07 RX ORDER — SODIUM CHLORIDE 0.9 % (FLUSH) 0.9 %
3 SYRINGE (ML) INJECTION AS NEEDED
Status: DISCONTINUED | OUTPATIENT
Start: 2023-03-07 | End: 2023-03-07 | Stop reason: HOSPADM

## 2023-03-07 RX ORDER — FLUMAZENIL 0.1 MG/ML
0.2 INJECTION INTRAVENOUS AS NEEDED
Status: DISCONTINUED | OUTPATIENT
Start: 2023-03-07 | End: 2023-03-07 | Stop reason: HOSPADM

## 2023-03-07 RX ORDER — IBUPROFEN 600 MG/1
600 TABLET ORAL ONCE AS NEEDED
Status: DISCONTINUED | OUTPATIENT
Start: 2023-03-07 | End: 2023-03-07 | Stop reason: HOSPADM

## 2023-03-07 RX ADMIN — FENTANYL CITRATE 100 MCG: 50 INJECTION, SOLUTION INTRAMUSCULAR; INTRAVENOUS at 06:59

## 2023-03-07 RX ADMIN — SODIUM CHLORIDE, POTASSIUM CHLORIDE, SODIUM LACTATE AND CALCIUM CHLORIDE 1000 ML: 600; 310; 30; 20 INJECTION, SOLUTION INTRAVENOUS at 06:00

## 2023-03-07 RX ADMIN — Medication 3 G: at 07:07

## 2023-03-07 RX ADMIN — PROPOFOL 200 MG: 10 INJECTION, EMULSION INTRAVENOUS at 07:01

## 2023-03-07 RX ADMIN — SUGAMMADEX 200 MG: 100 INJECTION, SOLUTION INTRAVENOUS at 07:51

## 2023-03-07 RX ADMIN — MIDAZOLAM HYDROCHLORIDE 1 MG: 2 INJECTION, SOLUTION INTRAMUSCULAR; INTRAVENOUS at 06:51

## 2023-03-07 RX ADMIN — ROCURONIUM BROMIDE 40 MG: 10 INJECTION, SOLUTION INTRAVENOUS at 07:01

## 2023-03-07 RX ADMIN — LIDOCAINE HYDROCHLORIDE 100 MG: 20 INJECTION, SOLUTION EPIDURAL; INFILTRATION; INTRACAUDAL; PERINEURAL at 06:59

## 2023-03-07 RX ADMIN — ACETAMINOPHEN 1000 MG: 500 TABLET, FILM COATED ORAL at 06:51

## 2023-03-07 RX ADMIN — ONDANSETRON 4 MG: 2 INJECTION INTRAMUSCULAR; INTRAVENOUS at 07:41

## 2023-03-07 NOTE — ANESTHESIA POSTPROCEDURE EVALUATION
Patient: Donna Swain    Procedure Summary     Date: 03/07/23 Room / Location: [facility] OR [address] [place] [address] PAD OR    Anesthesia Start: 0658 Anesthesia Stop: 0809    Procedure: PARTIAL REMOVAL OF BONE, FIFTH METATARSAL, LEFT FOOT (Left: Toes) Diagnosis:       Osteomyelitis of left foot (HCC)      Ulcer of left foot (HCC)      Peripheral neuropathy      (Osteomyelitis left foot fifth metatarsal, chronic wound left foot, peripheral neuropathy)    Surgeons: Tee Dacosta DPM Provider: Jamie Cho CRNA    Anesthesia Type: general ASA Status: 3          Anesthesia Type: general    Vitals  Vitals Value Taken Time   /75 03/07/23 1045   Temp 97.2 °F (36.2 °C) 03/07/23 1045   Pulse 87 03/07/23 1049   Resp 16 03/07/23 1045   SpO2 94 % 03/07/23 1049   Vitals shown include unvalidated device data.        Post Anesthesia Care and Evaluation    Patient location during evaluation: PACU  Patient participation: complete - patient participated  Level of consciousness: awake and alert  Pain management: adequate    Airway patency: patent  Anesthetic complications: No anesthetic complications    Cardiovascular status: acceptable  Respiratory status: acceptable  Hydration status: acceptable    Comments: Blood pressure 127/75, pulse 90, temperature 97.2 °F (36.2 °C), temperature source Temporal, resp. rate 16, SpO2 96 %, not currently breastfeeding.    Pt discharged from PACU based on gisele score >8

## 2023-03-07 NOTE — ANESTHESIA PROCEDURE NOTES
Airway  Urgency: elective    Date/Time: 3/7/2023 7:03 AM  Airway not difficult    General Information and Staff    Patient location during procedure: OR  CRNA/CAA: Jamie Cho CRNA    Indications and Patient Condition  Indications for airway management: airway protection    Preoxygenated: yes  Mask difficulty assessment: 0 - not attempted    Final Airway Details  Final airway type: endotracheal airway      Successful airway: ETT  Cuffed: yes   Successful intubation technique: direct laryngoscopy  Blade: Whitaker  Blade size: 2  ETT size (mm): 7.0  Cormack-Lehane Classification: grade I - full view of glottis  Placement verified by: chest auscultation and capnometry   Cuff volume (mL): 6  Measured from: gums  ETT/EBT to gums (cm): 22  Number of attempts at approach: 1  Assessment: lips, teeth, and gum same as pre-op and atraumatic intubation

## 2023-03-07 NOTE — ANESTHESIA PREPROCEDURE EVALUATION
Anesthesia Evaluation     Patient summary reviewed and Nursing notes reviewed   NPO Solid Status: > 8 hours  NPO Liquid Status: > 8 hours           Airway   Mallampati: II  No difficulty expected  Dental    (+) edentulous    Pulmonary    (+) a smoker Current, sleep apnea,     ROS comment: Possible COPD. Appt. With Dr. Foster in [place] scheduled for april  Cardiovascular   Exercise tolerance: good (4-7 METS)    ECG reviewed    (+) hypertension,   (-) valvular problems/murmurs, past MI, dysrhythmias      Neuro/Psych  (+) seizures (2 years ago, only one seizure),    (-) TIA, CVA  GI/Hepatic/Renal/Endo    (+) obesity, morbid obesity,  diabetes mellitus, thyroid problem hypothyroidism and thyroid nodules    Musculoskeletal (-) negative ROS    Abdominal    Substance History - negative use     OB/GYN negative ob/gyn ROS         Other                        Anesthesia Plan    ASA 3     general     intravenous induction     Anesthetic plan, risks, benefits, and alternatives have been provided, discussed and informed consent has been obtained with: patient.        CODE STATUS:

## 2023-03-07 NOTE — OP NOTE
TOE OSTEOTOMY  Procedure Note    Donna Swain  3/7/2023    Pre-op Diagnosis:   Osteomyelitis left foot fifth metatarsal, chronic wound left foot, peripheral neuropathy    Post-op Diagnosis:     Post-Op Diagnosis Codes:     * Osteomyelitis of left foot ([facility]) [M86.9]     * Ulcer of left foot ([facility]) [L97.529]     * Peripheral neuropathy [G62.9]     Procedure/CPT Codes:       Procedure(s):  1) excision of ulceration left foot   2) PARTIAL REMOVAL OF BONE, FIFTH METATARSAL, LEFT FOOT    Surgeon(s):  Tee Dacosta DPM    Anesthesia: General    Staff:   Circulator: Saadia Velazco RN; Manuel Zuniga RN; Alea Chow RN  Scrub Person: Cy Beard; Franki Urias     was responsible for performing the following activities: Retraction and their skilled assistance was necessary for the success of this case.    Indications for procedure:  Chronic ulceration with osteomyelitis left foot    Procedure details:  Patient brought in the operating room placed under general anesthesia.  Left leg prepped and draped in usual sterile fashion.  Following procedures were performed.    Procedure #1 excision of ulceration left foot.    Attention was then directed patient's left foot where an ulceration the plantar aspect approximately 2 cm in diameter was identified.  It was circumscribed and excised deeply.  It was sent to pathology for gross histologic examination.  Hemostasis achieved.  Wound irrigated.    Procedure #2 partial medial bone fifth metatarsal left.  Incision was created distal to that.  The fifth metatarsal was identified and the periosteum was elevated.  Sagittal saw was used to resect a portion of the fifth metatarsal.  That was removed the operative site in toto.  Bone was sent from the proximal portion of that for culture.  The remainder was sent for gross dislodge examination.  Proximally there was a loose fragment of bone that was dissected free of soft tissue attachments and sent for culture.  Wound was  then irrigated 1000 cc lactated Ringer's.  The deep fascia and subcutaneous tissues were repaired with 0 Vicryl.  The skin edges were able to be reapproximated with 3-0 and 2-0 nylon.  A well-padded compression bandage with a posterior splint was applied.    Estimated Blood Loss: minimal    Specimens:                Specimens     ID Source Type Tests Collected By Collected At Frozen?    1 Foot, Left Bone · TISSUE / BONE CULTURE   Tee Dacosta DPM 3/7/23 0729     Description: left foot bone culture    2 Foot, Left Wound · WOUND CULTURE (Canceled)   Tee Dacosta DPM 3/7/23 0731     Description: left foot ulcer    B Foot, Left Tissue · TISSUE PATHOLOGY EXAM   Tee Dacosta DPM 3/7/23 3630 No    Description: left 5th metatarsal             Drains: * No LDAs found *    Implants: Nothing was implanted during the procedure     Complications: none         Follow up:   Nonweightbearing.  3 days for follow-up.    Tee Dacosta DPM     Date: 3/7/2023  Time: 08:01 CST

## 2023-03-08 LAB
CYTO UR: NORMAL
LAB AP CASE REPORT: NORMAL
Lab: NORMAL
PATH REPORT.FINAL DX SPEC: NORMAL
PATH REPORT.GROSS SPEC: NORMAL

## 2023-03-10 LAB
BACTERIA SPEC AEROBE CULT: ABNORMAL
BACTERIA SPEC AEROBE CULT: ABNORMAL
GRAM STN SPEC: ABNORMAL

## 2023-04-17 ENCOUNTER — HOSPITAL ENCOUNTER (OUTPATIENT)
Dept: WOUND CARE | Age: 48
Discharge: HOME | End: 2023-04-17
Payer: MEDICAID

## 2023-04-17 VITALS
RESPIRATION RATE: 20 BRPM | HEART RATE: 92 BPM | WEIGHT: 290 LBS | BODY MASS INDEX: 41.52 KG/M2 | DIASTOLIC BLOOD PRESSURE: 86 MMHG | SYSTOLIC BLOOD PRESSURE: 129 MMHG | TEMPERATURE: 96.9 F | HEIGHT: 70 IN

## 2023-04-17 DIAGNOSIS — E11.621 DIABETIC ULCER OF LEFT MIDFOOT ASSOCIATED WITH TYPE 2 DIABETES MELLITUS, WITH FAT LAYER EXPOSED (HCC): Primary | ICD-10-CM

## 2023-04-17 DIAGNOSIS — L97.422 DIABETIC ULCER OF LEFT MIDFOOT ASSOCIATED WITH TYPE 2 DIABETES MELLITUS, WITH FAT LAYER EXPOSED (HCC): Primary | ICD-10-CM

## 2023-04-17 DIAGNOSIS — Z87.39 HX OF OSTEOMYELITIS: ICD-10-CM

## 2023-04-17 DIAGNOSIS — Z72.0 TOBACCO ABUSE: ICD-10-CM

## 2023-04-17 DIAGNOSIS — E66.01 MORBID OBESITY DUE TO EXCESS CALORIES (HCC): ICD-10-CM

## 2023-04-17 DIAGNOSIS — E11.610 CHARCOT FOOT DUE TO DIABETES MELLITUS (HCC): ICD-10-CM

## 2023-04-17 PROCEDURE — 99213 OFFICE O/P EST LOW 20 MIN: CPT

## 2023-04-17 PROCEDURE — 99215 OFFICE O/P EST HI 40 MIN: CPT | Performed by: NURSE PRACTITIONER

## 2023-04-17 PROCEDURE — 11042 DBRDMT SUBQ TIS 1ST 20SQCM/<: CPT

## 2023-04-17 PROCEDURE — 11042 DBRDMT SUBQ TIS 1ST 20SQCM/<: CPT | Performed by: NURSE PRACTITIONER

## 2023-04-17 RX ORDER — CLOBETASOL PROPIONATE 0.5 MG/G
OINTMENT TOPICAL ONCE
OUTPATIENT
Start: 2023-04-17 | End: 2023-04-17

## 2023-04-17 RX ORDER — LIDOCAINE 50 MG/G
OINTMENT TOPICAL ONCE
OUTPATIENT
Start: 2023-04-17 | End: 2023-04-17

## 2023-04-17 RX ORDER — GENTAMICIN SULFATE 1 MG/G
OINTMENT TOPICAL ONCE
OUTPATIENT
Start: 2023-04-17 | End: 2023-04-17

## 2023-04-17 RX ORDER — LIDOCAINE 40 MG/G
CREAM TOPICAL ONCE
OUTPATIENT
Start: 2023-04-17 | End: 2023-04-17

## 2023-04-17 RX ORDER — LIDOCAINE HYDROCHLORIDE 20 MG/ML
JELLY TOPICAL ONCE
OUTPATIENT
Start: 2023-04-17 | End: 2023-04-17

## 2023-04-17 RX ORDER — BETAMETHASONE DIPROPIONATE 0.05 %
OINTMENT (GRAM) TOPICAL ONCE
OUTPATIENT
Start: 2023-04-17 | End: 2023-04-17

## 2023-04-17 RX ORDER — BACITRACIN, NEOMYCIN, POLYMYXIN B 400; 3.5; 5 [USP'U]/G; MG/G; [USP'U]/G
OINTMENT TOPICAL ONCE
OUTPATIENT
Start: 2023-04-17 | End: 2023-04-17

## 2023-04-17 RX ORDER — GINSENG 100 MG
CAPSULE ORAL ONCE
OUTPATIENT
Start: 2023-04-17 | End: 2023-04-17

## 2023-04-17 RX ORDER — BACITRACIN ZINC AND POLYMYXIN B SULFATE 500; 1000 [USP'U]/G; [USP'U]/G
OINTMENT TOPICAL ONCE
OUTPATIENT
Start: 2023-04-17 | End: 2023-04-17

## 2023-04-17 RX ORDER — LIDOCAINE HYDROCHLORIDE 40 MG/ML
SOLUTION TOPICAL ONCE
OUTPATIENT
Start: 2023-04-17 | End: 2023-04-17

## 2023-04-17 RX ORDER — LIDOCAINE HYDROCHLORIDE 20 MG/ML
JELLY TOPICAL PRN
Status: DISCONTINUED | OUTPATIENT
Start: 2023-04-17 | End: 2023-04-19 | Stop reason: HOSPADM

## 2023-04-17 RX ORDER — SEMAGLUTIDE 1.34 MG/ML
0.5 INJECTION, SOLUTION SUBCUTANEOUS WEEKLY
COMMUNITY

## 2023-04-17 ASSESSMENT — VISUAL ACUITY: OU: 1

## 2023-04-17 NOTE — PROGRESS NOTES
understanding and questions answered. I spent a total of  60 minutes face to face with the patient. Over 75% of that time was spent on counseling and care coordination. Patient was told that if symptoms worsen or new symptoms develop they are to go to the emergency department immediately. Patient was educated on diagnosis and treatment plan. All of patient's questions were answered, and the patient understands the discharge plan. Discussed appropriate home care of this wound. Wound redressed. Patient instructions were given. Recommend no smoking  Offloading instructions given. [address]   Physician Orders and Discharge Instructions  [address]  Telephone: [phone]    NAME:  Salome Pepper  YOB: 1975  MEDICAL RECORD NUMBER:  [id number]  DATE:  4/17/2023    Discharge condition: Stable    Discharge to: Home    Left via:Private automobile    Accompanied by:  child    ECF/HHA: [facility]   1. Patient will get the following labs drawn prior to next visit: CBC, CMP, Pre-albumin, Sed-Rate, CRP, A1C    Go to Room 405 prior to next visit for bloodwork. Home Health / SNF please fax results to [phone]    2. Patient will have XRAY of left foot prior to next visit. Please go to Room 103 downstairs to register. Procedure will be completed in Room 103. No appointment is required. 3. Please go downstairs in this building to Room 103 to register for blood flow test- LOWER EXTREMITY ARTERIAL STUDY. The procedure will be completed in Room 401. Please be aware of appointment time for this procedure. Please arrive to room 103 at April 26 (Wednesday) on 9:00am for procedure.  Please do not smoke prior to test.    Dressing Orders:  Left foot wound: Soap and water wash, apply promogran (COLLAGEN) over the wound bed, secure with Aqua lizbet AG-place saline over to make it

## 2023-04-17 NOTE — WOUND CARE
osteomyelitis    Relevant Orders    C-Reactive Protein    Sedimentation Rate       WOUNDS REQUIRING DRESSING SUPPLIES:     Wound 04/17/23 Foot Anterior; Left wound 1-left foot lateral wag 1 (Active)   Wound Image   04/17/23 0925   Wound Etiology Diabetic [place] 1 04/17/23 0925   Dressing Status Old drainage noted 04/17/23 0925   Wound Cleansed Soap and water 04/17/23 0925   Dressing/Treatment Alginate with Ag;Dry dressing 04/17/23 0944   Wound Length (cm) 0.6 cm 04/17/23 0925   Wound Width (cm) 2 cm 04/17/23 0925   Wound Depth (cm) 1 cm 04/17/23 0925   Wound Surface Area (cm^2) 1.2 cm^2 04/17/23 0925   Wound Volume (cm^3) 1.2 cm^3 04/17/23 0925   Post-Procedure Length (cm) 0.6 cm 04/17/23 0944   Post-Procedure Width (cm) 2 cm 04/17/23 0944   Post-Procedure Depth (cm) 1 cm 04/17/23 0944   Post-Procedure Surface Area (cm^2) 1.2 cm^2 04/17/23 0944   Post-Procedure Volume (cm^3) 1.2 cm^3 04/17/23 0944   Wound Assessment Exposed structure bone;Slough 04/17/23 0925   Drainage Amount Moderate 04/17/23 0925   Drainage Description Serosanguinous 04/17/23 0925   Odor None 04/17/23 0925   Gu[place]upe-wound Assessment Intact 04/17/23 0925   Margins Defined edges 04/17/23 0925   Wound Thickness Description not for Pressure Injury Full thickness 04/17/23 0925   Number of days: 0          Supplies Requested :      WOUND #: 1   PRIMARY DRESSING:  Other: aqua lizbet AG   Cover and Secure with: 4X4 non woven gauze pad  Bulky roll gauze     FREQUENCY OF DRESSING CHANGES:  Daily         ADDITIONAL ITEMS:  [] Gloves Small  [] Gloves Medium [x] Gloves Large [] Gloves XLarge  [] Tape 1\" [x] Tape 2\" [] Tape 3\"  [x] Medipore Tape  [x] Saline  [] Vashe  [] Skin Prep   [] Adhesive Remover   [] Cotton Tip Applicators   [] Other:    Patient Wound(s) Debrided: [x] Yes if yes please add date 4/17/23 [] No    Debribement Type: Excisional/Sharp and Mechanical     Is the patient currently on an antibiotic for their Wound(s): [] Yes if yes please add name and

## 2023-04-17 NOTE — DISCHARGE INSTRUCTIONS
[address]   Physician Orders and Discharge Instructions  [address]  Telephone: [phone]    NAME:  Kishore Luo  YOB: 1975  MEDICAL RECORD NUMBER:  [id number]  DATE:  4/17/2023    Discharge condition: Stable    Discharge to: Home    Left via:Private automobile    Accompanied by:  child    ECF/[facility]: [facility]   1. Patient will get the following labs drawn prior to next visit: CBC, CMP, Pre-albumin, Sed-Rate, CRP, A1C    Go to Room 405 prior to next visit for bloodwork. Home Health / SNF please fax results to [phone]    2. Patient will have XRAY of left foot prior to next visit. Please go to Room 103 downstairs to register. Procedure will be completed in Room 103. No appointment is required. 3. Please go downstairs in this building to Room 103 to register for blood flow test- LOWER EXTREMITY ARTERIAL STUDY. The procedure will be completed in Room 401. Please be aware of appointment time for this procedure. Please arrive to room 103 at April 26 (Wednesday) on 9:00am for procedure. Please do not smoke prior to test.    Dressing Orders:  Left foot wound: Soap and water wash, apply promogran (COLLAGEN) over the wound bed, secure with Aqua lizbet AG-place saline over to make it wet, secure with 4x4 gauze folded up, rolled gauze daily    Treatment Orders:  Avoid pressure to the wound  Protein rich diet  Multivitamin  Cut back on smoking    [facility] follow up visit ____1 week with Dr. Armendariz_________________________  (Please note your next appointment above and if you are unable to keep, kindly give a 24 hour notice.  Thank you.)          If you experience any of the following, please call the [address] [facility] [address] during business hours:    * Increase in Pain  * Temperature over 101  * Increase in drainage from your wound  * Drainage with a foul odor  * Bleeding  * Increase in swelling  *

## 2023-04-18 NOTE — PLAN OF CARE
Problem: Chronic Conditions and Co-morbidities  Goal: Patient's chronic conditions and co-morbidity symptoms are monitored and maintained or improved  Outcome: Progressing     Problem: Wound:  Goal: Will show signs of wound healing; wound closure and no evidence of infection  Description: Will show signs of wound healing; wound closure and no evidence of infection  Outcome: Progressing     Wound on lateral aspect of foot, patient was advised to offload from pressure, avoid pressure to the area.     Problem: Smoking cessation:  Goal: Ability to formulate a plan to maintain a tobacco-free life will be supported  Description: Ability to formulate a plan to maintain a tobacco-free life will be supported  Outcome: Progressing     Problem: Weight control:  Goal: Ability to maintain an optimal weight for height and age will be supported  Description: Ability to maintain an optimal weight for height and age will be supported  Outcome: Progressing     Problem: Blood Glucose:  Goal: Ability to maintain appropriate glucose levels will improve  Description: Ability to maintain appropriate glucose levels will improve  Outcome: Progressing

## 2023-04-21 ENCOUNTER — TELEPHONE (OUTPATIENT)
Dept: WOUND CARE | Age: 48
End: 2023-04-21

## 2023-04-21 NOTE — PROGRESS NOTES
Returned patient call re:noted an odor when changing dressing yesterday. Follow up call with patient, stated her when her mother changed the dressing they had noted a slight odor, stated she is not running a temperature, no redness, no streaking from wound, no tenderness, no increase in drainage, discussed with Krissy Smith, stated blood work was normal, reviewed with patient yesterday, does not need to come in early unless she is having s&s of infection. Reviewed with patient, stated she would wait until Wednesday to come scheduled, patient has LEAS scheduled before appointment. Discussed with patient if she runs a temp, notices increased drainage, odor, tenderness/pain, redness, streaking to follow up at the ER this weekend, patient verbalized understanding.

## 2023-04-21 NOTE — TELEPHONE ENCOUNTER
Returned patient call re:noted an odor when changing dressing yesterday. Follow up call with patient, stated her when her mother changed the dressing they had noted a slight odor, stated she is not running a temperature, no redness, no streaking from wound, no tenderness, no increase in drainage, discussed with Hernandez Krause, stated blood work was normal, reviewed with patient yesterday, does not need to come in early unless she is having s&s of infection. Reviewed with patient, stated she would wait until Wednesday to come scheduled, patient has LEAS scheduled before appointment. Discussed with patient if she runs a temp, notices increased drainage, odor, tenderness/pain, redness, streaking to follow up at the ER this weekend, patient verbalized understanding.

## 2023-04-26 ENCOUNTER — HOSPITAL ENCOUNTER (OUTPATIENT)
Dept: NON INVASIVE DIAGNOSTICS | Age: 48
Discharge: HOME | End: 2023-04-26
Payer: MEDICAID

## 2023-04-26 ENCOUNTER — HOSPITAL ENCOUNTER (OUTPATIENT)
Dept: GENERAL RADIOLOGY | Age: 48
Discharge: HOME | End: 2023-04-26
Payer: MEDICAID

## 2023-04-26 ENCOUNTER — HOSPITAL ENCOUNTER (OUTPATIENT)
Dept: WOUND CARE | Age: 48
Discharge: HOME | End: 2023-04-26
Payer: MEDICAID

## 2023-04-26 VITALS
TEMPERATURE: 97.4 F | BODY MASS INDEX: 41.52 KG/M2 | DIASTOLIC BLOOD PRESSURE: 76 MMHG | RESPIRATION RATE: 18 BRPM | WEIGHT: 290 LBS | HEART RATE: 97 BPM | SYSTOLIC BLOOD PRESSURE: 107 MMHG | HEIGHT: 70 IN

## 2023-04-26 DIAGNOSIS — E11.621 DIABETIC ULCER OF LEFT MIDFOOT ASSOCIATED WITH TYPE 2 DIABETES MELLITUS, WITH FAT LAYER EXPOSED (HCC): ICD-10-CM

## 2023-04-26 DIAGNOSIS — Z87.39 HX OF OSTEOMYELITIS: ICD-10-CM

## 2023-04-26 DIAGNOSIS — E11.621 DIABETIC ULCER OF LEFT MIDFOOT ASSOCIATED WITH TYPE 2 DIABETES MELLITUS, WITH FAT LAYER EXPOSED (HCC): Primary | ICD-10-CM

## 2023-04-26 DIAGNOSIS — L97.422 DIABETIC ULCER OF LEFT MIDFOOT ASSOCIATED WITH TYPE 2 DIABETES MELLITUS, WITH FAT LAYER EXPOSED (HCC): Primary | ICD-10-CM

## 2023-04-26 DIAGNOSIS — E11.610 CHARCOT FOOT DUE TO DIABETES MELLITUS (HCC): ICD-10-CM

## 2023-04-26 DIAGNOSIS — L97.422 DIABETIC ULCER OF LEFT MIDFOOT ASSOCIATED WITH TYPE 2 DIABETES MELLITUS, WITH FAT LAYER EXPOSED (HCC): ICD-10-CM

## 2023-04-26 DIAGNOSIS — Z72.0 TOBACCO ABUSE: Chronic | ICD-10-CM

## 2023-04-26 PROCEDURE — 97597 DBRDMT OPN WND 1ST 20 CM/<: CPT

## 2023-04-26 PROCEDURE — 97597 DBRDMT OPN WND 1ST 20 CM/<: CPT | Performed by: SURGERY

## 2023-04-26 PROCEDURE — 93923 UPR/LXTR ART STDY 3+ LVLS: CPT

## 2023-04-26 PROCEDURE — 6370000000 HC RX 637 (ALT 250 FOR IP): Performed by: NURSE PRACTITIONER

## 2023-04-26 PROCEDURE — 73620 X-RAY EXAM OF FOOT: CPT | Performed by: RADIOLOGY

## 2023-04-26 PROCEDURE — 73620 X-RAY EXAM OF FOOT: CPT

## 2023-04-26 RX ORDER — CLOBETASOL PROPIONATE 0.5 MG/G
OINTMENT TOPICAL ONCE
OUTPATIENT
Start: 2023-04-26 | End: 2023-04-26

## 2023-04-26 RX ORDER — LIDOCAINE 50 MG/G
OINTMENT TOPICAL ONCE
OUTPATIENT
Start: 2023-04-26 | End: 2023-04-26

## 2023-04-26 RX ORDER — BACITRACIN, NEOMYCIN, POLYMYXIN B 400; 3.5; 5 [USP'U]/G; MG/G; [USP'U]/G
OINTMENT TOPICAL ONCE
OUTPATIENT
Start: 2023-04-26 | End: 2023-04-26

## 2023-04-26 RX ORDER — BACITRACIN ZINC AND POLYMYXIN B SULFATE 500; 1000 [USP'U]/G; [USP'U]/G
OINTMENT TOPICAL ONCE
OUTPATIENT
Start: 2023-04-26 | End: 2023-04-26

## 2023-04-26 RX ORDER — LIDOCAINE HYDROCHLORIDE 20 MG/ML
JELLY TOPICAL ONCE
OUTPATIENT
Start: 2023-04-26 | End: 2023-04-26

## 2023-04-26 RX ORDER — GENTAMICIN SULFATE 1 MG/G
OINTMENT TOPICAL ONCE
OUTPATIENT
Start: 2023-04-26 | End: 2023-04-26

## 2023-04-26 RX ORDER — LIDOCAINE HYDROCHLORIDE 20 MG/ML
JELLY TOPICAL ONCE
Status: COMPLETED | OUTPATIENT
Start: 2023-04-26 | End: 2023-04-26

## 2023-04-26 RX ORDER — GINSENG 100 MG
CAPSULE ORAL ONCE
OUTPATIENT
Start: 2023-04-26 | End: 2023-04-26

## 2023-04-26 RX ORDER — BETAMETHASONE DIPROPIONATE 0.05 %
OINTMENT (GRAM) TOPICAL ONCE
OUTPATIENT
Start: 2023-04-26 | End: 2023-04-26

## 2023-04-26 RX ORDER — LIDOCAINE 40 MG/G
CREAM TOPICAL ONCE
OUTPATIENT
Start: 2023-04-26 | End: 2023-04-26

## 2023-04-26 RX ORDER — LIDOCAINE HYDROCHLORIDE 40 MG/ML
SOLUTION TOPICAL ONCE
OUTPATIENT
Start: 2023-04-26 | End: 2023-04-26

## 2023-04-26 RX ADMIN — LIDOCAINE HYDROCHLORIDE: 20 JELLY TOPICAL at 10:11

## 2023-04-26 NOTE — PROGRESS NOTES
[address]   Progress Note and Procedure Note      [address] RECORD NUMBER:  [id number]  AGE: 57 y.o. GENDER: female  : 1975  EPISODE DATE:  2023    Subjective:     Chief Complaint   Patient presents with    Wound Check         HISTORY of PRESENT ILLNESS HPI     Serafin Kerns is a 57 y.o. female who presents today for wound/ulcer evaluation. Wound Context: Pt with L plantar foot wound here for eval/treat  Wound/Ulcer Pain Timing/Severity: none  Quality of pain: N/A  Severity:  0 / 10   Modifying Factors: None  Associated Signs/Symptoms: none    Ulcer Identification:  Ulcer Type: diabetic  Contributing Factors: diabetes, chronic pressure, and shear force    Wound:  DM        PAST MEDICAL HISTORY        Diagnosis Date    Diabetic ulcer of left midfoot associated with type 2 diabetes mellitus, with fat layer exposed ([address] 2023    Hypothyroidism     Morbid obesity due to excess calories ([address]     Neuropathy     ARIEL on CPAP     Thyroid nodule     Tobacco abuse     Vitamin D deficiency        PAST SURGICAL HISTORY    Past Surgical History:   Procedure Laterality Date     SECTION      CHOLECYSTECTOMY, LAPAROSCOPIC      FOOT SURGERY Left 1996    screw placed after car [address]      patient does not remember which side.     TUBAL LIGATION Bilateral        FAMILY HISTORY    Family History   Problem Relation Age of Onset    Cancer Mother         THYROID    Colon Cancer Father     No Known Problems Sister     No Known Problems Sister     No Known Problems Brother     No Known Problems Brother     Colon Cancer Maternal Grandmother     High Blood Pressure Maternal Grandmother     Diabetes Maternal Grandfather     Lung Disease Paternal Grandmother         never smoker    Other Paternal Grandfather         back problems    No Known Problems Son     No Known Problems Daughter     No Known Problems Daughter        SOCIAL

## 2023-04-26 NOTE — DISCHARGE INSTRUCTIONS
[address]  [facility]   Physician Orders and Discharge Instructions  [address]  Telephone: [phone]    NAME:  Tam Conrad  YOB: 1975  MEDICAL RECORD NUMBER:  [id number]  DATE:  4/26/2023    Discharge condition: Stable    Discharge to: Home    Left via:Private automobile    Accompanied by: Self    Dressing Orders: Left foot wound:   Soap and water wash, apply promogran (COLLAGEN) over the wound bed, secure with Aqua lizbet AG-place saline over to make it wet, secure with 4x4 gauze folded up, rolled gauze daily     Treatment Orders:  Avoid pressure to the wound, Use knee scooter   Protein rich diet  Multivitamin  Cut back on smoking    [facility] follow up visit _____________1 week________________  (Please note your next appointment above and if you are unable to keep, kindly give a 24 hour notice. Thank you.)    If you experience any of the following, please call the [facility] during business hours:    * Increase in Pain  * Temperature over 101  * Increase in drainage from your wound  * Drainage with a foul odor  * Bleeding  * Increase in swelling  * Need for compression bandage changes due to slippage, breakthrough drainage. If you need medical attention outside of the business hours of the [facility] please contact your PCP or go to the nearest emergency room.

## 2023-05-03 ENCOUNTER — HOSPITAL ENCOUNTER (OUTPATIENT)
Dept: WOUND CARE | Age: 48
Discharge: HOME | End: 2023-05-03
Payer: MEDICAID

## 2023-05-03 VITALS
SYSTOLIC BLOOD PRESSURE: 123 MMHG | HEART RATE: 96 BPM | TEMPERATURE: 97.4 F | RESPIRATION RATE: 18 BRPM | DIASTOLIC BLOOD PRESSURE: 75 MMHG

## 2023-05-03 DIAGNOSIS — L97.422 DIABETIC ULCER OF LEFT MIDFOOT ASSOCIATED WITH TYPE 2 DIABETES MELLITUS, WITH FAT LAYER EXPOSED (HCC): Primary | Chronic | ICD-10-CM

## 2023-05-03 DIAGNOSIS — Z72.0 TOBACCO ABUSE: Chronic | ICD-10-CM

## 2023-05-03 DIAGNOSIS — E11.610 CHARCOT FOOT DUE TO DIABETES MELLITUS (HCC): Chronic | ICD-10-CM

## 2023-05-03 DIAGNOSIS — Z87.39 HX OF OSTEOMYELITIS: ICD-10-CM

## 2023-05-03 DIAGNOSIS — E11.621 DIABETIC ULCER OF LEFT MIDFOOT ASSOCIATED WITH TYPE 2 DIABETES MELLITUS, WITH FAT LAYER EXPOSED (HCC): Primary | Chronic | ICD-10-CM

## 2023-05-03 PROCEDURE — 6370000000 HC RX 637 (ALT 250 FOR IP): Performed by: NURSE PRACTITIONER

## 2023-05-03 PROCEDURE — 97597 DBRDMT OPN WND 1ST 20 CM/<: CPT

## 2023-05-03 RX ORDER — BETAMETHASONE DIPROPIONATE 0.05 %
OINTMENT (GRAM) TOPICAL ONCE
OUTPATIENT
Start: 2023-05-03 | End: 2023-05-03

## 2023-05-03 RX ORDER — BACITRACIN, NEOMYCIN, POLYMYXIN B 400; 3.5; 5 [USP'U]/G; MG/G; [USP'U]/G
OINTMENT TOPICAL ONCE
OUTPATIENT
Start: 2023-05-03 | End: 2023-05-03

## 2023-05-03 RX ORDER — LIDOCAINE 40 MG/G
CREAM TOPICAL ONCE
OUTPATIENT
Start: 2023-05-03 | End: 2023-05-03

## 2023-05-03 RX ORDER — LIDOCAINE HYDROCHLORIDE 40 MG/ML
SOLUTION TOPICAL ONCE
OUTPATIENT
Start: 2023-05-03 | End: 2023-05-03

## 2023-05-03 RX ORDER — CLOBETASOL PROPIONATE 0.5 MG/G
OINTMENT TOPICAL ONCE
OUTPATIENT
Start: 2023-05-03 | End: 2023-05-03

## 2023-05-03 RX ORDER — LIDOCAINE HYDROCHLORIDE 20 MG/ML
JELLY TOPICAL ONCE
OUTPATIENT
Start: 2023-05-03 | End: 2023-05-03

## 2023-05-03 RX ORDER — GINSENG 100 MG
CAPSULE ORAL ONCE
OUTPATIENT
Start: 2023-05-03 | End: 2023-05-03

## 2023-05-03 RX ORDER — GENTAMICIN SULFATE 1 MG/G
OINTMENT TOPICAL ONCE
OUTPATIENT
Start: 2023-05-03 | End: 2023-05-03

## 2023-05-03 RX ORDER — LIDOCAINE HYDROCHLORIDE 20 MG/ML
JELLY TOPICAL ONCE
Status: COMPLETED | OUTPATIENT
Start: 2023-05-03 | End: 2023-05-03

## 2023-05-03 RX ORDER — BACITRACIN ZINC AND POLYMYXIN B SULFATE 500; 1000 [USP'U]/G; [USP'U]/G
OINTMENT TOPICAL ONCE
OUTPATIENT
Start: 2023-05-03 | End: 2023-05-03

## 2023-05-03 RX ORDER — LIDOCAINE 50 MG/G
OINTMENT TOPICAL ONCE
OUTPATIENT
Start: 2023-05-03 | End: 2023-05-03

## 2023-05-03 RX ADMIN — LIDOCAINE HYDROCHLORIDE: 20 JELLY TOPICAL at 08:42

## 2023-05-03 NOTE — PROGRESS NOTES
of Wound(s)/Ulcer(s) Debrided: 100%    Total Surface Area Debrided:  1.2 sq cm       Diabetic/Pressure/Non Pressure Ulcers only:  Ulcer: Diabetic ulcer, fat layer exposed             Post Debridement Measurements:    Wound/Ulcer Descriptions are Pre Debridement --EXCEPT MEASUREMENTS    Wound 04/17/23 Foot Anterior; Left wound 1-left foot lateral wag 1 (Active)   Wound Image   05/03/23 0833   Wound Etiology Diabetic Bot[place] 1 05/03/23 0833   Dressing Status Old drainage noted 05/03/23 0833   Wound Cleansed Soap and water 05/03/23 0833   Dressing/Treatment Alginate with Ag;Gauze dressing/dressing sponge;Roll gauze;Tape/Soft cloth adhesive tape 04/26/23 1057   Offloading for Diabetic Foot Ulcers Offloading ordered;Knee scooter 05/03/23 0833   Wound Length (cm) 0.7 cm 05/03/23 0833   Wound Width (cm) 1.8 cm 05/03/23 0833   Wound Depth (cm) 0.4 cm 05/03/23 0833   Wound Surface Area (cm^2) 1.26 cm^2 05/03/23 0833   Change in Wound Size % (l*w) -5 05/03/23 0833   Wound Volume (cm^3) 0.504 cm^3 05/03/23 0833   Wound Healing % 58 05/03/23 0833   Post-Procedure Length (cm) 0.7 cm 05/03/23 0855   Post-Procedure Width (cm) 1.8 cm 05/03/23 0855   Post-Procedure Depth (cm) 0.2 cm 05/03/23 0855   Post-Procedure Surface Area (cm^2) 1.26 cm^2 05/03/23 0855   Post-Procedure Volume (cm^3) 0.252 cm^3 05/03/23 0855   Wound Assessment Louisa/red;Slough 05/03/23 0833   Drainage Amount Moderate 05/03/23 0833   Drainage Description Serosanguinous 05/03/23 0833   Odor None 05/03/23 0833   Guadalupe-wound Assessment Intact; Hyperkeratosis (callous) 05/03/23 0833   Margins Defined edges 05/03/23 0833   Wound Thickness Description not for Pressure Injury Full thickness 05/03/23 0833   Number of days: 15             Estimated Blood Loss:  Minimal    Hemostasis Achieved:  by pressure    Procedural Pain:  0  / 10     Post Procedural Pain:  0 / 10     Response to treatment:  Well tolerated by patient.          Plan:     Problem List Items Addressed This Visit

## 2023-05-03 NOTE — PLAN OF CARE
Problem: Chronic Conditions and Co-morbidities  Goal: Patient's chronic conditions and co-morbidity symptoms are monitored and maintained or improved  Outcome: Progressing     Problem: Discharge Planning  Goal: Discharge to home or other facility with appropriate resources  Outcome: Progressing     Problem: Wound:  Goal: Will show signs of wound healing; wound closure and no evidence of infection  Description: Will show signs of wound healing; wound closure and no evidence of infection  Outcome: Progressing     Problem: Blood Glucose:  Goal: Ability to maintain appropriate glucose levels will improve  Description: Ability to maintain appropriate glucose levels will improve  Outcome: Progressing     Problem: Smoking cessation:  Goal: Ability to formulate a plan to maintain a tobacco-free life will be supported  Description: Ability to formulate a plan to maintain a tobacco-free life will be supported  Outcome: Progressing

## 2023-05-03 NOTE — DISCHARGE INSTRUCTIONS
[address]  [facility]   Physician Orders and Discharge Instructions  [address]  Telephone: [phone]    NAME:  Heath Andrews  YOB: 1975  MEDICAL RECORD NUMBER:  [id number]  DATE:  5/3/2023    Discharge condition: Stable    Discharge to: Home    Left via:Private automobile    Accompanied by: Self     Dressing Orders: Left foot wound:   Soap and water wash, apply promogran (COLLAGEN) over the wound bed, secure with Aqua lizbet AG-place saline over to make it wet, secure with 4x4 gauze folded up, rolled gauze daily  Use knee scooter     Treatment Orders:  Avoid pressure to the wound, Use knee scooter   Protein rich diet  Multivitamin  Cut back on smoking    [facility] follow up visit _____________1 week________________  (Please note your next appointment above and if you are unable to keep, kindly give a 24 hour notice. Thank you.)    If you experience any of the following, please call the [facility] during business hours:    * Increase in Pain  * Temperature over 101  * Increase in drainage from your wound  * Drainage with a foul odor  * Bleeding  * Increase in swelling  * Need for compression bandage changes due to slippage, breakthrough drainage. If you need medical attention outside of the business hours of the [facility] please contact your PCP or go to the nearest emergency room.

## 2023-05-09 ENCOUNTER — HOSPITAL ENCOUNTER (OUTPATIENT)
Dept: WOUND CARE | Age: 48
Discharge: HOME | End: 2023-05-09
Payer: MEDICAID

## 2023-05-09 VITALS
HEART RATE: 94 BPM | RESPIRATION RATE: 18 BRPM | TEMPERATURE: 98 F | SYSTOLIC BLOOD PRESSURE: 121 MMHG | DIASTOLIC BLOOD PRESSURE: 78 MMHG

## 2023-05-09 DIAGNOSIS — E11.621 DIABETIC ULCER OF LEFT MIDFOOT ASSOCIATED WITH TYPE 2 DIABETES MELLITUS, WITH FAT LAYER EXPOSED (HCC): Primary | Chronic | ICD-10-CM

## 2023-05-09 DIAGNOSIS — Z72.0 TOBACCO ABUSE: Chronic | ICD-10-CM

## 2023-05-09 DIAGNOSIS — E11.610 CHARCOT FOOT DUE TO DIABETES MELLITUS (HCC): Chronic | ICD-10-CM

## 2023-05-09 DIAGNOSIS — L97.422 DIABETIC ULCER OF LEFT MIDFOOT ASSOCIATED WITH TYPE 2 DIABETES MELLITUS, WITH FAT LAYER EXPOSED (HCC): Primary | Chronic | ICD-10-CM

## 2023-05-09 DIAGNOSIS — Z87.39 HX OF OSTEOMYELITIS: ICD-10-CM

## 2023-05-09 PROCEDURE — 97597 DBRDMT OPN WND 1ST 20 CM/<: CPT

## 2023-05-09 PROCEDURE — 97597 DBRDMT OPN WND 1ST 20 CM/<: CPT | Performed by: SURGERY

## 2023-05-09 RX ORDER — LIDOCAINE HYDROCHLORIDE 40 MG/ML
SOLUTION TOPICAL ONCE
OUTPATIENT
Start: 2023-05-09 | End: 2023-05-09

## 2023-05-09 RX ORDER — BACITRACIN, NEOMYCIN, POLYMYXIN B 400; 3.5; 5 [USP'U]/G; MG/G; [USP'U]/G
OINTMENT TOPICAL ONCE
OUTPATIENT
Start: 2023-05-09 | End: 2023-05-09

## 2023-05-09 RX ORDER — LIDOCAINE 50 MG/G
OINTMENT TOPICAL ONCE
OUTPATIENT
Start: 2023-05-09 | End: 2023-05-09

## 2023-05-09 RX ORDER — BETAMETHASONE DIPROPIONATE 0.05 %
OINTMENT (GRAM) TOPICAL ONCE
OUTPATIENT
Start: 2023-05-09 | End: 2023-05-09

## 2023-05-09 RX ORDER — LIDOCAINE HYDROCHLORIDE 20 MG/ML
JELLY TOPICAL ONCE
OUTPATIENT
Start: 2023-05-09 | End: 2023-05-09

## 2023-05-09 RX ORDER — GENTAMICIN SULFATE 1 MG/G
OINTMENT TOPICAL ONCE
OUTPATIENT
Start: 2023-05-09 | End: 2023-05-09

## 2023-05-09 RX ORDER — CLOBETASOL PROPIONATE 0.5 MG/G
OINTMENT TOPICAL ONCE
OUTPATIENT
Start: 2023-05-09 | End: 2023-05-09

## 2023-05-09 RX ORDER — GINSENG 100 MG
CAPSULE ORAL ONCE
OUTPATIENT
Start: 2023-05-09 | End: 2023-05-09

## 2023-05-09 RX ORDER — BACITRACIN ZINC AND POLYMYXIN B SULFATE 500; 1000 [USP'U]/G; [USP'U]/G
OINTMENT TOPICAL ONCE
OUTPATIENT
Start: 2023-05-09 | End: 2023-05-09

## 2023-05-09 RX ORDER — LIDOCAINE 40 MG/G
CREAM TOPICAL ONCE
OUTPATIENT
Start: 2023-05-09 | End: 2023-05-09

## 2023-05-09 NOTE — PROGRESS NOTES
Daughter     No Known Problems Daughter        SOCIAL HISTORY    Social History     Tobacco Use    Smoking status: Every Day     Packs/day: 1.00     Years: 32.00     Pack years: 32.00     Types: Cigarettes     Start date: 4/8/1990    Smokeless tobacco: Never    Tobacco comments:     Trying to quit   Vaping Use    Vaping Use: Never used   Substance Use Topics    Alcohol use: No    Drug use: Not Currently     Types: Marijuana Dony Adalberto)     Comment: as a teen ager       ALLERGIES    Allergies   Allergen Reactions    Vancomycin Hives, Itching and Rash       MEDICATIONS    Current Outpatient Medications on File Prior to Encounter   Medication Sig Dispense Refill    Semaglutide, 1 MG/DOSE, (OZEMPIC, 1 MG/DOSE,) 2 MG/1.5ML SOPN Inject 0.5 mg into the skin once a week      lisinopril (PRINIVIL;ZESTRIL) 10 MG tablet Take 1 tablet by mouth 2 times daily      gabapentin (NEURONTIN) 300 MG capsule TAKE TWO CAPSULES THREE TIMES A DAY (Patient taking differently: 3 capsules 3 times daily. ) 180 capsule 5    furosemide (LASIX) 20 MG tablet Take 1 tablet by mouth 2 times daily as needed  5     No current facility-administered medications on file prior to encounter. REVIEW OF SYSTEMS    A comprehensive review of systems was negative.     Objective:      /78   Pulse 94   Temp 98 °F (36.7 °C) (Temporal)   Resp 18   LMP 01/01/2019     Wt Readings from Last 3 Encounters:   04/26/23 290 lb (131.5 kg)   04/17/23 290 lb (131.5 kg)   12/27/22 (!) 302 lb (137 kg)       PHYSICAL EXAM    General Appearance: alert and oriented to person, place and time, well developed and well- nourished, in no acute distress  Skin: warm and dry, no rash or erythema  Head: normocephalic and atraumatic  Eyes: pupils equal, round, and reactive to light, extraocular eye movements intact, conjunctivae normal  ENT: tympanic membrane, external ear and ear canal normal bilaterally, nose without deformity, nasal mucosa and turbinates normal without polyps,

## 2023-05-09 NOTE — DISCHARGE INSTRUCTIONS
[address]  [facility]   Physician Orders and Discharge Instructions  [address]  Telephone: [phone]    NAME:  Cecy Villafuerte  YOB: 1975  MEDICAL RECORD NUMBER:  [id number]  DATE:  5/9/2023    Discharge condition: Stable    Discharge to: Home    Left via:Private automobile    Accompanied by: Family      Dressing Orders: Left foot wound:   Soap and water wash, apply promogran (COLLAGEN) over the wound bed, secure with Aqua lizbet AG-place saline over to make it wet, secure with 4x4 gauze folded up, rolled gauze daily     Treatment Orders:  Avoid pressure to the wound, Use knee scooter and met pad  Protein rich diet  Multivitamin      [address] [place] [address],[address] follow up visit _____________1 week________________  (Please note your next appointment above and if you are unable to keep, kindly give a 24 hour notice. Thank you.)    If you experience any of the following, please call the [facility] during business hours:    * Increase in Pain  * Temperature over 101  * Increase in drainage from your wound  * Drainage with a foul odor  * Bleeding  * Increase in swelling  * Need for compression bandage changes due to slippage, breakthrough drainage. If you need medical attention outside of the business hours of the [facility] please contact your PCP or go to the nearest emergency room.

## 2023-05-09 NOTE — PLAN OF CARE
Problem: Chronic Conditions and Co-morbidities  Goal: Patient's chronic conditions and co-morbidity symptoms are monitored and maintained or improved  Outcome: Progressing     Problem: Discharge Planning  Goal: Discharge to home or other facility with appropriate resources  Outcome: Progressing     Problem: Smoking cessation:  Goal: Ability to formulate a plan to maintain a tobacco-free life will be supported  Description: Ability to formulate a plan to maintain a tobacco-free life will be supported  Outcome: Progressing     Problem: Wound:  Goal: Will show signs of wound healing; wound closure and no evidence of infection  Description: Will show signs of wound healing; wound closure and no evidence of infection  Outcome: Progressing     Problem: Blood Glucose:  Goal: Ability to maintain appropriate glucose levels will improve  Description: Ability to maintain appropriate glucose levels will improve  Outcome: Progressing

## 2023-05-16 ENCOUNTER — HOSPITAL ENCOUNTER (OUTPATIENT)
Dept: WOUND CARE | Age: 48
Discharge: HOME | End: 2023-05-16
Payer: MEDICAID

## 2023-05-16 VITALS
DIASTOLIC BLOOD PRESSURE: 87 MMHG | SYSTOLIC BLOOD PRESSURE: 124 MMHG | HEART RATE: 93 BPM | RESPIRATION RATE: 20 BRPM | TEMPERATURE: 98 F

## 2023-05-16 DIAGNOSIS — L97.422 DIABETIC ULCER OF LEFT MIDFOOT ASSOCIATED WITH TYPE 2 DIABETES MELLITUS, WITH FAT LAYER EXPOSED (HCC): Primary | Chronic | ICD-10-CM

## 2023-05-16 DIAGNOSIS — Z87.39 HX OF OSTEOMYELITIS: ICD-10-CM

## 2023-05-16 DIAGNOSIS — Z72.0 TOBACCO ABUSE: Chronic | ICD-10-CM

## 2023-05-16 DIAGNOSIS — E11.610 CHARCOT FOOT DUE TO DIABETES MELLITUS (HCC): Chronic | ICD-10-CM

## 2023-05-16 DIAGNOSIS — E11.621 DIABETIC ULCER OF LEFT MIDFOOT ASSOCIATED WITH TYPE 2 DIABETES MELLITUS, WITH FAT LAYER EXPOSED (HCC): Primary | Chronic | ICD-10-CM

## 2023-05-16 PROCEDURE — 97597 DBRDMT OPN WND 1ST 20 CM/<: CPT

## 2023-05-16 PROCEDURE — 6370000000 HC RX 637 (ALT 250 FOR IP): Performed by: NURSE PRACTITIONER

## 2023-05-16 RX ORDER — LIDOCAINE HYDROCHLORIDE 20 MG/ML
JELLY TOPICAL ONCE
Status: COMPLETED | OUTPATIENT
Start: 2023-05-16 | End: 2023-05-16

## 2023-05-16 RX ORDER — LIDOCAINE HYDROCHLORIDE 40 MG/ML
SOLUTION TOPICAL ONCE
OUTPATIENT
Start: 2023-05-16 | End: 2023-05-16

## 2023-05-16 RX ORDER — CLOBETASOL PROPIONATE 0.5 MG/G
OINTMENT TOPICAL ONCE
OUTPATIENT
Start: 2023-05-16 | End: 2023-05-16

## 2023-05-16 RX ORDER — GINSENG 100 MG
CAPSULE ORAL ONCE
OUTPATIENT
Start: 2023-05-16 | End: 2023-05-16

## 2023-05-16 RX ORDER — GENTAMICIN SULFATE 1 MG/G
OINTMENT TOPICAL ONCE
OUTPATIENT
Start: 2023-05-16 | End: 2023-05-16

## 2023-05-16 RX ORDER — LIDOCAINE HYDROCHLORIDE 20 MG/ML
JELLY TOPICAL ONCE
OUTPATIENT
Start: 2023-05-16 | End: 2023-05-16

## 2023-05-16 RX ORDER — BACITRACIN ZINC AND POLYMYXIN B SULFATE 500; 1000 [USP'U]/G; [USP'U]/G
OINTMENT TOPICAL ONCE
OUTPATIENT
Start: 2023-05-16 | End: 2023-05-16

## 2023-05-16 RX ORDER — LIDOCAINE 40 MG/G
CREAM TOPICAL ONCE
OUTPATIENT
Start: 2023-05-16 | End: 2023-05-16

## 2023-05-16 RX ORDER — BACITRACIN, NEOMYCIN, POLYMYXIN B 400; 3.5; 5 [USP'U]/G; MG/G; [USP'U]/G
OINTMENT TOPICAL ONCE
OUTPATIENT
Start: 2023-05-16 | End: 2023-05-16

## 2023-05-16 RX ORDER — LIDOCAINE 50 MG/G
OINTMENT TOPICAL ONCE
OUTPATIENT
Start: 2023-05-16 | End: 2023-05-16

## 2023-05-16 RX ORDER — BETAMETHASONE DIPROPIONATE 0.05 %
OINTMENT (GRAM) TOPICAL ONCE
OUTPATIENT
Start: 2023-05-16 | End: 2023-05-16

## 2023-05-16 RX ADMIN — LIDOCAINE HYDROCHLORIDE: 20 JELLY TOPICAL at 10:09

## 2023-05-16 NOTE — PROGRESS NOTES
[address]   Progress Note and Procedure Note      [address] RECORD NUMBER:  [id number]  AGE: 57 y.o. GENDER: female  : 1975  EPISODE DATE:  2023    Subjective:     Chief Complaint   Patient presents with    Wound Check         HISTORY of PRESENT ILLNESS HPI     Nannette Mom is a 57 y.o. female who presents today for wound/ulcer evaluation. Wound Context: Pt with L foot Charcot foot wound here for eval/treat  Wound/Ulcer Pain Timing/Severity: none  Quality of pain: N/A  Severity:  0 / 10   Modifying Factors: None  Associated Signs/Symptoms: none    Ulcer Identification:  Ulcer Type: diabetic  Contributing Factors: diabetes, chronic pressure, and shear force    Wound:  DM        PAST MEDICAL HISTORY        Diagnosis Date    Diabetic ulcer of left midfoot associated with type 2 diabetes mellitus, with fat layer exposed ([address] 2023    Hypothyroidism     Morbid obesity due to excess calories ([address]     Neuropathy     ARIEL on CPAP     Thyroid nodule     Tobacco abuse     Vitamin D deficiency        PAST SURGICAL HISTORY    Past Surgical History:   Procedure Laterality Date     SECTION      CHOLECYSTECTOMY, LAPAROSCOPIC      FOOT SURGERY Left     screw placed after car [address]      patient does not remember which side.     TUBAL LIGATION Bilateral        FAMILY HISTORY    Family History   Problem Relation Age of Onset    Cancer Mother         THYROID    Colon Cancer Father     No Known Problems Sister     No Known Problems Sister     No Known Problems Brother     No Known Problems Brother     Colon Cancer Maternal Grandmother     High Blood Pressure Maternal Grandmother     Diabetes Maternal Grandfather     Lung Disease Paternal Grandmother         never smoker    Other Paternal Grandfather         back problems    No Known Problems Son     No Known Problems Daughter     No Known Problems Daughter        SOCIAL

## 2023-05-16 NOTE — DISCHARGE INSTRUCTIONS
[address]  [facility]   Physician Orders and Discharge Instructions  [address]  Telephone: [phone]    NAME:  Tom Knott  YOB: 1975  MEDICAL RECORD NUMBER:  [id number]  DATE:  5/16/2023    Discharge condition: Stable    Discharge to: Home    Left via:Private automobile    Accompanied by: Self     Dressing Orders: Left foot wound:   Soap and water wash, apply promogran (COLLAGEN) over the wound bed, secure with Aqua lizbet AG-place saline over to make it wet, secure with 4x4 gauze folded up, rolled gauze daily     Treatment Orders:  Avoid pressure to the wound, Use knee scooter and met pad  Protein rich diet  Multivitamin    [address] [place] [address],[address] follow up visit ____________1 week_________________  (Please note your next appointment above and if you are unable to keep, kindly give a 24 hour notice. Thank you.)    If you experience any of the following, [facility] hours:    * Increase in Pain  * Temperature over 101  * Increase in drainage from your wound  * Drainage with a foul odor  * Bleeding  * Increase in swelling  * Need for compression bandage changes due to slippage, breakthrough drainage. If you need medical attention outside of the business hours of the [facility] please contact your PCP or go to the nearest emergency room.

## 2023-05-16 NOTE — PLAN OF CARE
Problem: Discharge Planning  Goal: Discharge to home or other facility with appropriate resources  Outcome: Progressing     Problem: Smoking cessation:  Goal: Ability to formulate a plan to maintain a tobacco-free life will be supported  Description: Ability to formulate a plan to maintain a tobacco-free life will be supported  Outcome: Progressing     Problem: Wound:  Goal: Will show signs of wound healing; wound closure and no evidence of infection  Description: Will show signs of wound healing; wound closure and no evidence of infection  Outcome: Progressing     Problem: Blood Glucose:  Goal: Ability to maintain appropriate glucose levels will improve  Description: Ability to maintain appropriate glucose levels will improve  Outcome: Progressing

## 2023-05-22 NOTE — DISCHARGE INSTRUCTIONS
[address]  [facility]   Physician Orders and Discharge Instructions  [address]  Telephone: [phone]    NAME:  Luis Angel Hutton  YOB: 1975  MEDICAL RECORD NUMBER:  [id number]  DATE:  5/22/2023    Discharge condition: Stable    Discharge to: Home    Left via:Private automobile    Accompanied by:  child    ECF/HHA: none    Dressing Orders: Left foot wound:   Soap and water wash, apply promogran (COLLAGEN) over the wound bed, secure with Aqua lizbet AG-place saline over to make it wet, secure with 4x4 gauze folded up, rolled gauze daily     Treatment Orders:  Avoid pressure to the wound, Use knee scooter and met pad  Protein rich diet  Multivitamin    [facility] follow up visit ____1 week with Dr. Armendariz_________________________  (Please note your next appointment above and if you are unable to keep, kindly give a 24 hour notice. Thank you.)          If you experience any of the following, please call the [facility] during business hours:    * Increase in Pain  * Temperature over 101  * Increase in drainage from your wound  * Drainage with a foul odor  * Bleeding  * Increase in swelling  * Need for compression bandage changes due to slippage, breakthrough drainage. If you need medical attention outside of the business hours of the [facility] please contact your PCP or go to the nearest emergency room.

## 2023-05-23 ENCOUNTER — HOSPITAL ENCOUNTER (OUTPATIENT)
Dept: WOUND CARE | Age: 48
Discharge: HOME | End: 2023-05-23
Payer: MEDICAID

## 2023-05-23 VITALS
TEMPERATURE: 97.3 F | WEIGHT: 290 LBS | RESPIRATION RATE: 20 BRPM | DIASTOLIC BLOOD PRESSURE: 82 MMHG | HEIGHT: 70 IN | BODY MASS INDEX: 41.52 KG/M2 | SYSTOLIC BLOOD PRESSURE: 125 MMHG | HEART RATE: 105 BPM

## 2023-05-23 DIAGNOSIS — E11.610 CHARCOT FOOT DUE TO DIABETES MELLITUS (HCC): Chronic | ICD-10-CM

## 2023-05-23 DIAGNOSIS — Z87.39 HX OF OSTEOMYELITIS: ICD-10-CM

## 2023-05-23 DIAGNOSIS — E11.621 DIABETIC ULCER OF LEFT MIDFOOT ASSOCIATED WITH TYPE 2 DIABETES MELLITUS, WITH FAT LAYER EXPOSED (HCC): Primary | Chronic | ICD-10-CM

## 2023-05-23 DIAGNOSIS — L97.422 DIABETIC ULCER OF LEFT MIDFOOT ASSOCIATED WITH TYPE 2 DIABETES MELLITUS, WITH FAT LAYER EXPOSED (HCC): Primary | Chronic | ICD-10-CM

## 2023-05-23 PROCEDURE — 97597 DBRDMT OPN WND 1ST 20 CM/<: CPT | Performed by: NURSE PRACTITIONER

## 2023-05-23 PROCEDURE — 97597 DBRDMT OPN WND 1ST 20 CM/<: CPT

## 2023-05-23 NOTE — PROGRESS NOTES
[address]   Progress Note and Procedure Note      [address] RECORD NUMBER:  [id number]  AGE: 57 y.o. GENDER: female  : 1975  EPISODE DATE:  2023    Subjective:     Chief Complaint   Patient presents with    Wound Check     Left foot wound      HISTORY of PRESENT ILLNESS KITA Narvaez is a 57 y.o. female who presents today for wound/ulcer evaluation. History of Wound Context: left foot wound follow up/eval and treat    Ulcer Identification:  Ulcer Type: diabetic  Contributing Factors: edema and diabetes    Wound: Surgical incision        PAST MEDICAL HISTORY        Diagnosis Date    Diabetic ulcer of left midfoot associated with type 2 diabetes mellitus, with fat layer exposed ([address] 2023    Hypothyroidism     Morbid obesity due to excess calories ([address]     Neuropathy     ARIEL on CPAP     Thyroid nodule     Tobacco abuse     Vitamin D deficiency        PAST SURGICAL HISTORY    Past Surgical History:   Procedure Laterality Date     SECTION      CHOLECYSTECTOMY, LAPAROSCOPIC      FOOT SURGERY Left 1996    screw placed after car [address]      patient does not remember which side.     TUBAL LIGATION Bilateral        FAMILY HISTORY    Family History   Problem Relation Age of Onset    Cancer Mother         THYROID    Colon Cancer Father     No Known Problems Sister     No Known Problems Sister     No Known Problems Brother     No Known Problems Brother     Colon Cancer Maternal Grandmother     High Blood Pressure Maternal Grandmother     Diabetes Maternal Grandfather     Lung Disease Paternal Grandmother         never smoker    Other Paternal Grandfather         back problems    No Known Problems Son     No Known Problems Daughter     No Known Problems Daughter        SOCIAL HISTORY    Social History     Tobacco Use    Smoking status: Former     Packs/day: 1.00     Years: 32.00     Pack years:

## 2023-05-31 ENCOUNTER — HOSPITAL ENCOUNTER (OUTPATIENT)
Dept: WOUND CARE | Age: 48
Discharge: HOME | End: 2023-05-31
Payer: MEDICAID

## 2023-05-31 VITALS
DIASTOLIC BLOOD PRESSURE: 75 MMHG | RESPIRATION RATE: 18 BRPM | SYSTOLIC BLOOD PRESSURE: 122 MMHG | TEMPERATURE: 96.8 F | HEART RATE: 96 BPM

## 2023-05-31 DIAGNOSIS — E11.610 CHARCOT FOOT DUE TO DIABETES MELLITUS (HCC): Chronic | ICD-10-CM

## 2023-05-31 DIAGNOSIS — E11.621 DIABETIC ULCER OF LEFT MIDFOOT ASSOCIATED WITH TYPE 2 DIABETES MELLITUS, WITH FAT LAYER EXPOSED (HCC): Primary | Chronic | ICD-10-CM

## 2023-05-31 DIAGNOSIS — L97.422 DIABETIC ULCER OF LEFT MIDFOOT ASSOCIATED WITH TYPE 2 DIABETES MELLITUS, WITH FAT LAYER EXPOSED (HCC): Primary | Chronic | ICD-10-CM

## 2023-05-31 DIAGNOSIS — Z87.39 HX OF OSTEOMYELITIS: ICD-10-CM

## 2023-05-31 PROCEDURE — 99213 OFFICE O/P EST LOW 20 MIN: CPT

## 2023-05-31 PROCEDURE — 6370000000 HC RX 637 (ALT 250 FOR IP): Performed by: NURSE PRACTITIONER

## 2023-05-31 PROCEDURE — 99212 OFFICE O/P EST SF 10 MIN: CPT | Performed by: SURGERY

## 2023-05-31 RX ORDER — LIDOCAINE HYDROCHLORIDE 40 MG/ML
SOLUTION TOPICAL ONCE
OUTPATIENT
Start: 2023-05-31 | End: 2023-05-31

## 2023-05-31 RX ORDER — BETAMETHASONE DIPROPIONATE 0.05 %
OINTMENT (GRAM) TOPICAL ONCE
OUTPATIENT
Start: 2023-05-31 | End: 2023-05-31

## 2023-05-31 RX ORDER — LIDOCAINE HYDROCHLORIDE 20 MG/ML
JELLY TOPICAL ONCE
Status: COMPLETED | OUTPATIENT
Start: 2023-05-31 | End: 2023-05-31

## 2023-05-31 RX ORDER — GINSENG 100 MG
CAPSULE ORAL ONCE
OUTPATIENT
Start: 2023-05-31 | End: 2023-05-31

## 2023-05-31 RX ORDER — BACITRACIN, NEOMYCIN, POLYMYXIN B 400; 3.5; 5 [USP'U]/G; MG/G; [USP'U]/G
OINTMENT TOPICAL ONCE
OUTPATIENT
Start: 2023-05-31 | End: 2023-05-31

## 2023-05-31 RX ORDER — GENTAMICIN SULFATE 1 MG/G
OINTMENT TOPICAL ONCE
OUTPATIENT
Start: 2023-05-31 | End: 2023-05-31

## 2023-05-31 RX ORDER — BACITRACIN ZINC AND POLYMYXIN B SULFATE 500; 1000 [USP'U]/G; [USP'U]/G
OINTMENT TOPICAL ONCE
OUTPATIENT
Start: 2023-05-31 | End: 2023-05-31

## 2023-05-31 RX ORDER — LIDOCAINE HYDROCHLORIDE 20 MG/ML
JELLY TOPICAL ONCE
OUTPATIENT
Start: 2023-05-31 | End: 2023-05-31

## 2023-05-31 RX ORDER — CLOBETASOL PROPIONATE 0.5 MG/G
OINTMENT TOPICAL ONCE
OUTPATIENT
Start: 2023-05-31 | End: 2023-05-31

## 2023-05-31 RX ORDER — LIDOCAINE 40 MG/G
CREAM TOPICAL ONCE
OUTPATIENT
Start: 2023-05-31 | End: 2023-05-31

## 2023-05-31 RX ORDER — LIDOCAINE 50 MG/G
OINTMENT TOPICAL ONCE
OUTPATIENT
Start: 2023-05-31 | End: 2023-05-31

## 2023-05-31 RX ADMIN — LIDOCAINE HYDROCHLORIDE: 20 JELLY TOPICAL at 08:53

## 2023-05-31 NOTE — DISCHARGE INSTRUCTIONS
[address]  [facility]   Physician Orders and Discharge Instructions  [address]  Telephone: [phone]    NAME:  Anitra Andrews  YOB: 1975  MEDICAL RECORD NUMBER:  [id number]  DATE:  5/31/2023    Discharge condition: Stable    Discharge to: Home    Left via:Private automobile    Accompanied by: Self    ECF/HHA: None     Dressing Orders: Left foot wound:   Soap and water wash, apply promogran (COLLAGEN) over the wound bed, secure with Aqua lizbet AG-place saline over to make it wet, secure with 4x4 gauze folded up, rolled gauze daily     Treatment Orders:  Avoid pressure to the wound, Use knee scooter and met pad  Protein rich diet  Multivitamin    [facility] follow up visit ___________1 week__________________  (Please note your next appointment above and if you are unable to keep, kindly give a 24 hour notice. Thank you.)    If you experience any of the following, please call the [facility] during business hours:    * Increase in Pain  * Temperature over 101  * Increase in drainage from your wound  * Drainage with a foul odor  * Bleeding  * Increase in swelling  * Need for compression bandage changes due to slippage, breakthrough drainage. If you need medical attention outside of the business hours of the [facility] please contact your PCP or go to the nearest emergency room.

## 2023-05-31 NOTE — PLAN OF CARE
Problem: Chronic Conditions and Co-morbidities  Goal: Patient's chronic conditions and co-morbidity symptoms are monitored and maintained or improved  Outcome: Progressing     Problem: Discharge Planning  Goal: Discharge to home or other facility with appropriate resources  Outcome: Progressing     Problem: Wound:  Goal: Will show signs of wound healing; wound closure and no evidence of infection  Description: Will show signs of wound healing; wound closure and no evidence of infection  Outcome: Progressing     Problem: Smoking cessation:  Goal: Ability to formulate a plan to maintain a tobacco-free life will be supported  Description: Ability to formulate a plan to maintain a tobacco-free life will be supported  Outcome: Progressing     Problem: Blood Glucose:  Goal: Ability to maintain appropriate glucose levels will improve  Description: Ability to maintain appropriate glucose levels will improve  Outcome: Progressing

## 2023-05-31 NOTE — PROGRESS NOTES
[address]   Progress Note and Procedure Note      [address] RECORD NUMBER:  [id number]  AGE: 57 y.o. GENDER: female  : 1975  EPISODE DATE:  2023    Subjective:     Chief Complaint   Patient presents with    Wound Check     Left foot wound         HISTORY of PRESENT ILLNESS HPI     Cecy Villafuerte is a 57 y.o. female who presents today for wound/ulcer evaluation. History of Wound Context: Pt with L foot wound here for eval/treat  Wound/Ulcer Pain Timing/Severity: none  Quality of pain: N/A  Severity:  0 / 10   Modifying Factors: None  Associated Signs/Symptoms: none    Ulcer Identification:  Ulcer Type: diabetic  Contributing Factors:  Charcot foot    Wound:  DM        PAST MEDICAL HISTORY        Diagnosis Date    Diabetic ulcer of left midfoot associated with type 2 diabetes mellitus, with fat layer exposed ([address] 2023    Hypothyroidism     Morbid obesity due to excess calories ([address]     Neuropathy     ARIEL on CPAP     Thyroid nodule     Tobacco abuse     Vitamin D deficiency        PAST SURGICAL HISTORY    Past Surgical History:   Procedure Laterality Date     SECTION      CHOLECYSTECTOMY, LAPAROSCOPIC      FOOT SURGERY Left 1996    screw placed after car [address]      patient does not remember which side.     TUBAL LIGATION Bilateral        FAMILY HISTORY    Family History   Problem Relation Age of Onset    Cancer Mother         THYROID    Colon Cancer Father     No Known Problems Sister     No Known Problems Sister     No Known Problems Brother     No Known Problems Brother     Colon Cancer Maternal Grandmother     High Blood Pressure Maternal Grandmother     Diabetes Maternal Grandfather     Lung Disease Paternal Grandmother         never smoker    Other Paternal Grandfather         back problems    No Known Problems Son     No Known Problems Daughter     No Known Problems Daughter        SOCIAL

## 2023-06-07 ENCOUNTER — HOSPITAL ENCOUNTER (OUTPATIENT)
Dept: WOUND CARE | Age: 48
Discharge: HOME | End: 2023-06-07
Payer: MEDICAID

## 2023-06-07 VITALS
WEIGHT: 290 LBS | HEIGHT: 70 IN | BODY MASS INDEX: 41.52 KG/M2 | DIASTOLIC BLOOD PRESSURE: 66 MMHG | TEMPERATURE: 96.4 F | RESPIRATION RATE: 18 BRPM | SYSTOLIC BLOOD PRESSURE: 99 MMHG | HEART RATE: 95 BPM

## 2023-06-07 DIAGNOSIS — E11.621 DIABETIC ULCER OF LEFT MIDFOOT ASSOCIATED WITH TYPE 2 DIABETES MELLITUS, WITH FAT LAYER EXPOSED (HCC): Primary | ICD-10-CM

## 2023-06-07 DIAGNOSIS — E11.610 CHARCOT FOOT DUE TO DIABETES MELLITUS (HCC): ICD-10-CM

## 2023-06-07 DIAGNOSIS — Z87.39 HX OF OSTEOMYELITIS: ICD-10-CM

## 2023-06-07 DIAGNOSIS — L97.422 DIABETIC ULCER OF LEFT MIDFOOT ASSOCIATED WITH TYPE 2 DIABETES MELLITUS, WITH FAT LAYER EXPOSED (HCC): Primary | ICD-10-CM

## 2023-06-07 PROCEDURE — 97597 DBRDMT OPN WND 1ST 20 CM/<: CPT | Performed by: SURGERY

## 2023-06-07 PROCEDURE — 6370000000 HC RX 637 (ALT 250 FOR IP): Performed by: NURSE PRACTITIONER

## 2023-06-07 PROCEDURE — 97597 DBRDMT OPN WND 1ST 20 CM/<: CPT

## 2023-06-07 RX ORDER — BETAMETHASONE DIPROPIONATE 0.05 %
OINTMENT (GRAM) TOPICAL ONCE
OUTPATIENT
Start: 2023-06-07 | End: 2023-06-07

## 2023-06-07 RX ORDER — BACITRACIN ZINC AND POLYMYXIN B SULFATE 500; 1000 [USP'U]/G; [USP'U]/G
OINTMENT TOPICAL ONCE
OUTPATIENT
Start: 2023-06-07 | End: 2023-06-07

## 2023-06-07 RX ORDER — GENTAMICIN SULFATE 1 MG/G
OINTMENT TOPICAL ONCE
OUTPATIENT
Start: 2023-06-07 | End: 2023-06-07

## 2023-06-07 RX ORDER — LIDOCAINE HYDROCHLORIDE 20 MG/ML
JELLY TOPICAL ONCE
Status: COMPLETED | OUTPATIENT
Start: 2023-06-07 | End: 2023-06-07

## 2023-06-07 RX ORDER — LIDOCAINE HYDROCHLORIDE 20 MG/ML
JELLY TOPICAL ONCE
OUTPATIENT
Start: 2023-06-07 | End: 2023-06-07

## 2023-06-07 RX ORDER — CLOBETASOL PROPIONATE 0.5 MG/G
OINTMENT TOPICAL ONCE
OUTPATIENT
Start: 2023-06-07 | End: 2023-06-07

## 2023-06-07 RX ORDER — GINSENG 100 MG
CAPSULE ORAL ONCE
OUTPATIENT
Start: 2023-06-07 | End: 2023-06-07

## 2023-06-07 RX ORDER — IBUPROFEN 200 MG
TABLET ORAL ONCE
OUTPATIENT
Start: 2023-06-07 | End: 2023-06-07

## 2023-06-07 RX ORDER — LIDOCAINE 40 MG/G
CREAM TOPICAL ONCE
OUTPATIENT
Start: 2023-06-07 | End: 2023-06-07

## 2023-06-07 RX ORDER — LIDOCAINE HYDROCHLORIDE 40 MG/ML
SOLUTION TOPICAL ONCE
OUTPATIENT
Start: 2023-06-07 | End: 2023-06-07

## 2023-06-07 RX ORDER — LIDOCAINE 50 MG/G
OINTMENT TOPICAL ONCE
OUTPATIENT
Start: 2023-06-07 | End: 2023-06-07

## 2023-06-07 RX ADMIN — LIDOCAINE HYDROCHLORIDE: 20 JELLY TOPICAL at 09:10

## 2023-06-07 NOTE — PROGRESS NOTES
Items Addressed This Visit       * (Principal) Diabetic ulcer of left midfoot associated with type 2 diabetes mellitus, with fat layer exposed ([address] - Primary (Chronic)    Relevant Orders    Initiate Outpatient Wound Care Protocol    Charcot foot due to diabetes mellitus ([address] (Chronic)    Relevant Orders    Initiate Outpatient Wound Care Protocol    Hx of osteomyelitis    Relevant Orders    Initiate Outpatient Wound Care Protocol       Cont current care RTO 1 week    Treatment Note please see attached Discharge Instructions    In my professional opinion this patient would benefit from HBO Therapy: No    Written patient dismissal instructions given to patient and signed by patient or POA.              Electronically signed by Sammie Gotti MD on 6/7/2023 at 9:32 AM

## 2023-06-07 NOTE — DISCHARGE INSTRUCTIONS
[address]  [facility]   Physician Orders and Discharge Instructions  [address]  Telephone: [phone]    NAME:  Alex Luevano  YOB: 1975  MEDICAL RECORD NUMBER:  [id number]  DATE:  6/7/2023    Discharge condition: Stable    Discharge to: Home    Left via:Private automobile    Accompanied by: Family     ECF/HHA: None     Dressing Orders: Left foot wound:   Soap and water wash, apply promogran (COLLAGEN) over the wound bed, secure with Aqua lizbet AG-place saline over to make it wet, secure with 4x4 gauze folded up, rolled gauze daily     Treatment Orders:  Avoid pressure to the wound, Use knee scooter and met pad  Protein rich diet  Multivitamin    [facility] follow up visit _____________1 week________________  (Please note your next appointment above and if you are unable to keep, kindly give a 24 hour notice. Thank you.)    If you experience any of the following, please call the [facility] during business hours:    * Increase in Pain  * Temperature over 101  * Increase in drainage from your wound  * Drainage with a foul odor  * Bleeding  * Increase in swelling  * Need for compression bandage changes due to slippage, breakthrough drainage. If you need medical attention outside of the business hours of the [facility] please contact your PCP or go to the nearest emergency room.

## 2023-06-07 NOTE — PLAN OF CARE
Problem: Chronic Conditions and Co-morbidities  Goal: Patient's chronic conditions and co-morbidity symptoms are monitored and maintained or improved  Outcome: Progressing     Problem: Discharge Planning  Goal: Discharge to home or other facility with appropriate resources  Outcome: Progressing     Problem: Wound:  Goal: Will show signs of wound healing; wound closure and no evidence of infection  Description: Will show signs of wound healing; wound closure and no evidence of infection  Outcome: Progressing     Problem: Falls - Risk of:  Goal: Will remain free from falls  Description: Will remain free from falls  Outcome: Progressing   Uses knee scooter  Problem: Blood Glucose:  Goal: Ability to maintain appropriate glucose levels will improve  Description: Ability to maintain appropriate glucose levels will improve  Outcome: Progressing

## 2023-06-21 ENCOUNTER — HOSPITAL ENCOUNTER (OUTPATIENT)
Dept: WOUND CARE | Age: 48
Discharge: HOME | End: 2023-06-21
Payer: MEDICAID

## 2023-06-21 VITALS
HEART RATE: 89 BPM | TEMPERATURE: 97.4 F | HEIGHT: 70 IN | WEIGHT: 290 LBS | RESPIRATION RATE: 18 BRPM | SYSTOLIC BLOOD PRESSURE: 121 MMHG | BODY MASS INDEX: 41.52 KG/M2 | DIASTOLIC BLOOD PRESSURE: 75 MMHG

## 2023-06-21 DIAGNOSIS — E11.621 DIABETIC ULCER OF LEFT MIDFOOT ASSOCIATED WITH TYPE 2 DIABETES MELLITUS, WITH FAT LAYER EXPOSED (HCC): Primary | Chronic | ICD-10-CM

## 2023-06-21 DIAGNOSIS — E11.610 CHARCOT FOOT DUE TO DIABETES MELLITUS (HCC): Chronic | ICD-10-CM

## 2023-06-21 DIAGNOSIS — L97.422 DIABETIC ULCER OF LEFT MIDFOOT ASSOCIATED WITH TYPE 2 DIABETES MELLITUS, WITH FAT LAYER EXPOSED (HCC): Primary | Chronic | ICD-10-CM

## 2023-06-21 DIAGNOSIS — Z87.39 HX OF OSTEOMYELITIS: ICD-10-CM

## 2023-06-21 PROCEDURE — 6370000000 HC RX 637 (ALT 250 FOR IP): Performed by: NURSE PRACTITIONER

## 2023-06-21 PROCEDURE — 97597 DBRDMT OPN WND 1ST 20 CM/<: CPT

## 2023-06-21 PROCEDURE — 97597 DBRDMT OPN WND 1ST 20 CM/<: CPT | Performed by: SURGERY

## 2023-06-21 RX ORDER — LIDOCAINE 40 MG/G
CREAM TOPICAL ONCE
OUTPATIENT
Start: 2023-06-21 | End: 2023-06-21

## 2023-06-21 RX ORDER — BETAMETHASONE DIPROPIONATE 0.05 %
OINTMENT (GRAM) TOPICAL ONCE
OUTPATIENT
Start: 2023-06-21 | End: 2023-06-21

## 2023-06-21 RX ORDER — IBUPROFEN 200 MG
TABLET ORAL ONCE
OUTPATIENT
Start: 2023-06-21 | End: 2023-06-21

## 2023-06-21 RX ORDER — LIDOCAINE HYDROCHLORIDE 20 MG/ML
JELLY TOPICAL ONCE
OUTPATIENT
Start: 2023-06-21 | End: 2023-06-21

## 2023-06-21 RX ORDER — GINSENG 100 MG
CAPSULE ORAL ONCE
OUTPATIENT
Start: 2023-06-21 | End: 2023-06-21

## 2023-06-21 RX ORDER — LIDOCAINE HYDROCHLORIDE 40 MG/ML
SOLUTION TOPICAL ONCE
OUTPATIENT
Start: 2023-06-21 | End: 2023-06-21

## 2023-06-21 RX ORDER — LIDOCAINE 50 MG/G
OINTMENT TOPICAL ONCE
OUTPATIENT
Start: 2023-06-21 | End: 2023-06-21

## 2023-06-21 RX ORDER — CLOBETASOL PROPIONATE 0.5 MG/G
OINTMENT TOPICAL ONCE
OUTPATIENT
Start: 2023-06-21 | End: 2023-06-21

## 2023-06-21 RX ORDER — BACITRACIN ZINC AND POLYMYXIN B SULFATE 500; 1000 [USP'U]/G; [USP'U]/G
OINTMENT TOPICAL ONCE
OUTPATIENT
Start: 2023-06-21 | End: 2023-06-21

## 2023-06-21 RX ORDER — LIDOCAINE HYDROCHLORIDE 20 MG/ML
JELLY TOPICAL ONCE
Status: COMPLETED | OUTPATIENT
Start: 2023-06-21 | End: 2023-06-21

## 2023-06-21 RX ORDER — GENTAMICIN SULFATE 1 MG/G
OINTMENT TOPICAL ONCE
OUTPATIENT
Start: 2023-06-21 | End: 2023-06-21

## 2023-06-21 RX ADMIN — LIDOCAINE HYDROCHLORIDE: 20 JELLY TOPICAL at 09:35

## 2023-06-21 NOTE — PROGRESS NOTES
[address]   Progress Note and Procedure Note      [address] RECORD NUMBER:  [id number]  AGE: 57 y.o. GENDER: female  : 1975  EPISODE DATE:  2023    Subjective:     Chief Complaint   Patient presents with    Wound Check         HISTORY of PRESENT ILLNESS HPI     Myrle Bernheim is a 57 y.o. female who presents today for wound/ulcer evaluation. Wound Context: Pt with L charcot foot wound here for eval/wilmer  Wound/Ulcer Pain Timing/Severity: none  Quality of pain: N/A  Severity:  0 / 10   Modifying Factors: None  Associated Signs/Symptoms: none    Ulcer Identification:  Ulcer Type: diabetic  Contributing Factors: diabetes and shear force    Wound:  DM        PAST MEDICAL HISTORY        Diagnosis Date    Diabetic ulcer of left midfoot associated with type 2 diabetes mellitus, with fat layer exposed ([address] 2023    Hypothyroidism     Morbid obesity due to excess calories ([address]     Neuropathy     ARIEL on CPAP     Thyroid nodule     Tobacco abuse     Vitamin D deficiency        PAST SURGICAL HISTORY    Past Surgical History:   Procedure Laterality Date     SECTION      CHOLECYSTECTOMY, LAPAROSCOPIC      FOOT SURGERY Left 1996    screw placed after car [address]      patient does not remember which side.     TUBAL LIGATION Bilateral        FAMILY HISTORY    Family History   Problem Relation Age of Onset    Cancer Mother         THYROID    Colon Cancer Father     No Known Problems Sister     No Known Problems Sister     No Known Problems Brother     No Known Problems Brother     Colon Cancer Maternal Grandmother     High Blood Pressure Maternal Grandmother     Diabetes Maternal Grandfather     Lung Disease Paternal Grandmother         never smoker    Other Paternal Grandfather         back problems    No Known Problems Son     No Known Problems Daughter     No Known Problems Daughter        SOCIAL HISTORY    Social

## 2023-06-21 NOTE — DISCHARGE INSTRUCTIONS
[address]  [facility]   Physician Orders and Discharge Instructions  [address]  Telephone: [phone]    NAME:  Haydee Johnson  YOB: 1975  MEDICAL RECORD NUMBER:  [id number]  DATE:  6/21/2023    Discharge condition: Stable    Discharge to: Home    Left via:Private automobile    Accompanied by: Self     ECF/HHA: None     Dressing Orders: Left foot wound:  Soap and water wash, apply promogran (COLLAGEN) over the wound bed, secure with Aqua lizbet AG-place saline  over to make it wet, secure with 4x4 gauze folded up, rolled gauze daily  Follow up with a Dermatologist     Treatment Orders:  Avoid pressure to the wound, Use knee scooter and met pad  Protein rich diet  Multivitamin    [facility] follow up visit _____________1 week________________  (Please note your next appointment above and if you are unable to keep, kindly give a 24 hour notice. Thank you.)    If you experience any of the following, please call the [facility] during business hours:    * Increase in Pain  * Temperature over 101  * Increase in drainage from your wound  * Drainage with a foul odor  * Bleeding  * Increase in swelling  * Need for compression bandage changes due to slippage, breakthrough drainage. If you need medical attention outside of the business hours of the [facility] please contact your PCP or go to the nearest emergency room.

## 2023-06-28 ENCOUNTER — HOSPITAL ENCOUNTER (OUTPATIENT)
Dept: WOUND CARE | Age: 48
Discharge: HOME | End: 2023-06-28
Payer: MEDICAID

## 2023-06-28 VITALS
BODY MASS INDEX: 41.52 KG/M2 | HEART RATE: 101 BPM | RESPIRATION RATE: 18 BRPM | WEIGHT: 290 LBS | TEMPERATURE: 96.7 F | DIASTOLIC BLOOD PRESSURE: 69 MMHG | SYSTOLIC BLOOD PRESSURE: 106 MMHG | HEIGHT: 70 IN

## 2023-06-28 DIAGNOSIS — Z72.0 TOBACCO ABUSE: Chronic | ICD-10-CM

## 2023-06-28 DIAGNOSIS — L97.422 DIABETIC ULCER OF LEFT MIDFOOT ASSOCIATED WITH TYPE 2 DIABETES MELLITUS, WITH FAT LAYER EXPOSED (HCC): Primary | ICD-10-CM

## 2023-06-28 DIAGNOSIS — E11.621 DIABETIC ULCER OF LEFT MIDFOOT ASSOCIATED WITH TYPE 2 DIABETES MELLITUS, WITH FAT LAYER EXPOSED (HCC): Primary | ICD-10-CM

## 2023-06-28 DIAGNOSIS — E11.610 CHARCOT FOOT DUE TO DIABETES MELLITUS (HCC): ICD-10-CM

## 2023-06-28 DIAGNOSIS — Z87.39 HX OF OSTEOMYELITIS: ICD-10-CM

## 2023-06-28 PROCEDURE — 6370000000 HC RX 637 (ALT 250 FOR IP): Performed by: NURSE PRACTITIONER

## 2023-06-28 PROCEDURE — 97597 DBRDMT OPN WND 1ST 20 CM/<: CPT | Performed by: SURGERY

## 2023-06-28 PROCEDURE — 97597 DBRDMT OPN WND 1ST 20 CM/<: CPT

## 2023-06-28 RX ORDER — LIDOCAINE HYDROCHLORIDE 20 MG/ML
JELLY TOPICAL ONCE
OUTPATIENT
Start: 2023-06-28 | End: 2023-06-28

## 2023-06-28 RX ORDER — IBUPROFEN 200 MG
TABLET ORAL ONCE
OUTPATIENT
Start: 2023-06-28 | End: 2023-06-28

## 2023-06-28 RX ORDER — DOXYCYCLINE HYCLATE 100 MG/1
100 CAPSULE ORAL 2 TIMES DAILY
COMMUNITY

## 2023-06-28 RX ORDER — LIDOCAINE HYDROCHLORIDE 40 MG/ML
SOLUTION TOPICAL ONCE
OUTPATIENT
Start: 2023-06-28 | End: 2023-06-28

## 2023-06-28 RX ORDER — LIDOCAINE 40 MG/G
CREAM TOPICAL ONCE
OUTPATIENT
Start: 2023-06-28 | End: 2023-06-28

## 2023-06-28 RX ORDER — CLOBETASOL PROPIONATE 0.5 MG/G
OINTMENT TOPICAL ONCE
OUTPATIENT
Start: 2023-06-28 | End: 2023-06-28

## 2023-06-28 RX ORDER — BETAMETHASONE DIPROPIONATE 0.05 %
OINTMENT (GRAM) TOPICAL ONCE
OUTPATIENT
Start: 2023-06-28 | End: 2023-06-28

## 2023-06-28 RX ORDER — BACITRACIN ZINC AND POLYMYXIN B SULFATE 500; 1000 [USP'U]/G; [USP'U]/G
OINTMENT TOPICAL ONCE
OUTPATIENT
Start: 2023-06-28 | End: 2023-06-28

## 2023-06-28 RX ORDER — GENTAMICIN SULFATE 1 MG/G
OINTMENT TOPICAL ONCE
OUTPATIENT
Start: 2023-06-28 | End: 2023-06-28

## 2023-06-28 RX ORDER — LIDOCAINE 50 MG/G
OINTMENT TOPICAL ONCE
OUTPATIENT
Start: 2023-06-28 | End: 2023-06-28

## 2023-06-28 RX ORDER — LIDOCAINE HYDROCHLORIDE 20 MG/ML
JELLY TOPICAL ONCE
Status: COMPLETED | OUTPATIENT
Start: 2023-06-28 | End: 2023-06-28

## 2023-06-28 RX ORDER — GINSENG 100 MG
CAPSULE ORAL ONCE
OUTPATIENT
Start: 2023-06-28 | End: 2023-06-28

## 2023-06-28 RX ADMIN — LIDOCAINE HYDROCHLORIDE: 20 JELLY TOPICAL at 09:31

## 2023-06-28 ASSESSMENT — PAIN DESCRIPTION - PAIN TYPE: TYPE: NEUROPATHIC PAIN

## 2023-06-28 ASSESSMENT — PAIN SCALES - GENERAL: PAINLEVEL_OUTOF10: 7

## 2023-06-28 ASSESSMENT — PAIN DESCRIPTION - FREQUENCY: FREQUENCY: INTERMITTENT

## 2023-06-28 ASSESSMENT — PAIN DESCRIPTION - ORIENTATION: ORIENTATION: RIGHT

## 2023-06-28 ASSESSMENT — PAIN DESCRIPTION - LOCATION: LOCATION: LEG;FOOT

## 2023-06-28 ASSESSMENT — PAIN DESCRIPTION - ONSET: ONSET: ON-GOING

## 2023-06-28 ASSESSMENT — PAIN DESCRIPTION - DESCRIPTORS: DESCRIPTORS: TINGLING;BURNING

## 2023-06-28 ASSESSMENT — PAIN - FUNCTIONAL ASSESSMENT: PAIN_FUNCTIONAL_ASSESSMENT: ACTIVITIES ARE NOT PREVENTED

## 2023-07-05 ENCOUNTER — HOSPITAL ENCOUNTER (OUTPATIENT)
Dept: WOUND CARE | Age: 48
Discharge: HOME | End: 2023-07-05
Payer: MEDICAID

## 2023-07-05 VITALS
BODY MASS INDEX: 41.52 KG/M2 | DIASTOLIC BLOOD PRESSURE: 95 MMHG | HEART RATE: 91 BPM | SYSTOLIC BLOOD PRESSURE: 145 MMHG | RESPIRATION RATE: 18 BRPM | TEMPERATURE: 96.7 F | WEIGHT: 290 LBS | HEIGHT: 70 IN

## 2023-07-05 DIAGNOSIS — Z87.39 HX OF OSTEOMYELITIS: ICD-10-CM

## 2023-07-05 DIAGNOSIS — L97.422 DIABETIC ULCER OF LEFT MIDFOOT ASSOCIATED WITH TYPE 2 DIABETES MELLITUS, WITH FAT LAYER EXPOSED (HCC): Primary | Chronic | ICD-10-CM

## 2023-07-05 DIAGNOSIS — E11.610 CHARCOT FOOT DUE TO DIABETES MELLITUS (HCC): Chronic | ICD-10-CM

## 2023-07-05 DIAGNOSIS — E11.621 DIABETIC ULCER OF LEFT MIDFOOT ASSOCIATED WITH TYPE 2 DIABETES MELLITUS, WITH FAT LAYER EXPOSED (HCC): Primary | Chronic | ICD-10-CM

## 2023-07-05 DIAGNOSIS — Z72.0 TOBACCO ABUSE: Chronic | ICD-10-CM

## 2023-07-05 PROCEDURE — 6370000000 HC RX 637 (ALT 250 FOR IP): Performed by: NURSE PRACTITIONER

## 2023-07-05 PROCEDURE — 99212 OFFICE O/P EST SF 10 MIN: CPT

## 2023-07-05 PROCEDURE — 99212 OFFICE O/P EST SF 10 MIN: CPT | Performed by: SURGERY

## 2023-07-05 RX ORDER — BETAMETHASONE DIPROPIONATE 0.05 %
OINTMENT (GRAM) TOPICAL ONCE
OUTPATIENT
Start: 2023-07-05 | End: 2023-07-05

## 2023-07-05 RX ORDER — BACITRACIN ZINC AND POLYMYXIN B SULFATE 500; 1000 [USP'U]/G; [USP'U]/G
OINTMENT TOPICAL ONCE
OUTPATIENT
Start: 2023-07-05 | End: 2023-07-05

## 2023-07-05 RX ORDER — LIDOCAINE HYDROCHLORIDE 20 MG/ML
JELLY TOPICAL ONCE
Status: COMPLETED | OUTPATIENT
Start: 2023-07-05 | End: 2023-07-05

## 2023-07-05 RX ORDER — CLOBETASOL PROPIONATE 0.5 MG/G
OINTMENT TOPICAL ONCE
OUTPATIENT
Start: 2023-07-05 | End: 2023-07-05

## 2023-07-05 RX ORDER — IBUPROFEN 200 MG
TABLET ORAL ONCE
OUTPATIENT
Start: 2023-07-05 | End: 2023-07-05

## 2023-07-05 RX ORDER — LIDOCAINE HYDROCHLORIDE 20 MG/ML
JELLY TOPICAL ONCE
OUTPATIENT
Start: 2023-07-05 | End: 2023-07-05

## 2023-07-05 RX ORDER — GINSENG 100 MG
CAPSULE ORAL ONCE
OUTPATIENT
Start: 2023-07-05 | End: 2023-07-05

## 2023-07-05 RX ORDER — GENTAMICIN SULFATE 1 MG/G
OINTMENT TOPICAL ONCE
OUTPATIENT
Start: 2023-07-05 | End: 2023-07-05

## 2023-07-05 RX ORDER — LIDOCAINE HYDROCHLORIDE 40 MG/ML
SOLUTION TOPICAL ONCE
OUTPATIENT
Start: 2023-07-05 | End: 2023-07-05

## 2023-07-05 RX ORDER — LIDOCAINE 40 MG/G
CREAM TOPICAL ONCE
OUTPATIENT
Start: 2023-07-05 | End: 2023-07-05

## 2023-07-05 RX ORDER — LIDOCAINE 50 MG/G
OINTMENT TOPICAL ONCE
OUTPATIENT
Start: 2023-07-05 | End: 2023-07-05

## 2023-07-05 RX ADMIN — LIDOCAINE HYDROCHLORIDE: 20 JELLY TOPICAL at 09:26

## 2023-07-05 NOTE — DISCHARGE INSTRUCTIONS
[address] and Hyper[facility] O[facility] Therapy   Physician Orders and Discharge Instructions  [address] [facility],[address], [address]  Telephone: [phone]    NAME:  Karan Chang  YOB: 1975  MEDICAL RECORD NUMBER:  [id number]  DATE:  7/5/2023    Discharge condition: Stable    Discharge to: Home    Left via:Private automobile    Accompanied by: Self    Dressing Orders: Left foot wound:  Soap and water wash,   Dry dressing daily   MRI on 7/20 at 10:00 behind  [facility]   Avoid pressure to the wound, Use knee scooter and met pad  Protein rich diet  Multivitamin    [facility] follow up visit _____________2 weeks________________  (Please note your next appointment above and if you are unable to keep, kindly give a 24 hour notice. Thank you.)    If you experience any of the following, please call the [facility] during business hours:    * Increase in Pain  * Temperature over 101  * Increase in drainage from your wound  * Drainage with a foul odor  * Bleeding  * Increase in swelling  * Need for compression bandage changes due to slippage, breakthrough drainage. If you need medical attention outside of the business hours of the [facility] please contact your PCP or go to the nearest emergency room.

## 2023-07-05 NOTE — PROGRESS NOTES
[address]   Progress Note and Procedure Note      [address] [facility] RECORD NUMBER:  [id number]  AGE: 57 y.o. GENDER: female  : 1975  EPISODE DATE:  2023    Subjective:     Chief Complaint   Patient presents with    Wound Check         HISTORY of PRESENT ILLNESS KITA Foster is a 57 y.o. female who presents today for wound/ulcer evaluation. History of Wound Context: Pt with L foot wound here for eval/treat  Wound/Ulcer Pain Timing/Severity: none  Quality of pain: N/A  Severity:  0 / 10   Modifying Factors: None  Associated Signs/Symptoms: none    Ulcer Identification:  Ulcer Type: non-healing surgical  Contributing Factors: smoking    Wound:  surgical        PAST MEDICAL HISTORY        Diagnosis Date    Diabetic ulcer of left midfoot associated with type 2 diabetes mellitus, with fat layer exposed ([address]) 2023    Hypothyroidism     Morbid obesity due to excess calories ([address])     Neuropathy     ARIEL on CPAP     Thyroid nodule     Tobacco abuse     Vitamin D deficiency        PAST SURGICAL HISTORY    Past Surgical History:   Procedure Laterality Date     SECTION      CHOLECYSTECTOMY, LAPAROSCOPIC      FOOT SURGERY Left     screw placed after car [address]      patient does not remember which side.     TUBAL LIGATION Bilateral        FAMILY HISTORY    Family History   Problem Relation Age of Onset    Cancer Mother         THYROID    Colon Cancer Father     No Known Problems Sister     No Known Problems Sister     No Known Problems Brother     No Known Problems Brother     Colon Cancer Maternal Grandmother     High Blood Pressure Maternal Grandmother     Diabetes Maternal Grandfather     Lung Disease Paternal Grandmother         never smoker    Other Paternal Grandfather         back problems    No Known Problems Son     No Known Problems Daughter     No Known Problems Daughter        SOCIAL HISTORY    Social

## 2023-07-20 ENCOUNTER — HOSPITAL ENCOUNTER (OUTPATIENT)
Dept: MRI IMAGING | Age: 48
Discharge: HOME | End: 2023-07-20
Attending: SURGERY
Payer: MEDICAID

## 2023-07-20 ENCOUNTER — HOSPITAL ENCOUNTER (OUTPATIENT)
Dept: WOUND CARE | Age: 48
Discharge: HOME | End: 2023-07-20

## 2023-07-20 DIAGNOSIS — E11.621 DIABETIC ULCER OF LEFT MIDFOOT ASSOCIATED WITH TYPE 2 DIABETES MELLITUS, WITH FAT LAYER EXPOSED (HCC): Chronic | ICD-10-CM

## 2023-07-20 DIAGNOSIS — L97.422 DIABETIC ULCER OF LEFT MIDFOOT ASSOCIATED WITH TYPE 2 DIABETES MELLITUS, WITH FAT LAYER EXPOSED (HCC): Chronic | ICD-10-CM

## 2023-07-20 PROCEDURE — A9577 INJ MULTIHANCE: HCPCS | Performed by: SURGERY

## 2023-07-20 PROCEDURE — 6360000004 HC RX CONTRAST MEDICATION: Performed by: SURGERY

## 2023-07-20 PROCEDURE — 73720 MRI LWR EXTREMITY W/O&W/DYE: CPT

## 2023-07-20 RX ADMIN — GADOBENATE DIMEGLUMINE 20 ML: 529 INJECTION, SOLUTION INTRAVENOUS at 12:41

## 2023-07-26 ENCOUNTER — HOSPITAL ENCOUNTER (OUTPATIENT)
Dept: WOUND CARE | Age: 48
Discharge: HOME | End: 2023-07-26
Payer: MEDICAID

## 2023-07-26 VITALS
HEART RATE: 92 BPM | DIASTOLIC BLOOD PRESSURE: 92 MMHG | TEMPERATURE: 97.1 F | SYSTOLIC BLOOD PRESSURE: 151 MMHG | WEIGHT: 290 LBS | RESPIRATION RATE: 18 BRPM | HEIGHT: 70 IN | BODY MASS INDEX: 41.52 KG/M2

## 2023-07-26 DIAGNOSIS — E11.610 CHARCOT FOOT DUE TO DIABETES MELLITUS (HCC): Chronic | ICD-10-CM

## 2023-07-26 DIAGNOSIS — E11.621 DIABETIC ULCER OF LEFT MIDFOOT ASSOCIATED WITH TYPE 2 DIABETES MELLITUS, WITH FAT LAYER EXPOSED (HCC): Primary | Chronic | ICD-10-CM

## 2023-07-26 DIAGNOSIS — Z87.39 HX OF OSTEOMYELITIS: ICD-10-CM

## 2023-07-26 DIAGNOSIS — L97.422 DIABETIC ULCER OF LEFT MIDFOOT ASSOCIATED WITH TYPE 2 DIABETES MELLITUS, WITH FAT LAYER EXPOSED (HCC): Primary | Chronic | ICD-10-CM

## 2023-07-26 PROCEDURE — 99213 OFFICE O/P EST LOW 20 MIN: CPT

## 2023-07-26 PROCEDURE — 99212 OFFICE O/P EST SF 10 MIN: CPT | Performed by: SURGERY

## 2023-07-26 RX ORDER — BETAMETHASONE DIPROPIONATE 0.05 %
OINTMENT (GRAM) TOPICAL ONCE
OUTPATIENT
Start: 2023-07-26 | End: 2023-07-26

## 2023-07-26 RX ORDER — IBUPROFEN 200 MG
TABLET ORAL ONCE
OUTPATIENT
Start: 2023-07-26 | End: 2023-07-26

## 2023-07-26 RX ORDER — LIDOCAINE HYDROCHLORIDE 20 MG/ML
JELLY TOPICAL ONCE
OUTPATIENT
Start: 2023-07-26 | End: 2023-07-26

## 2023-07-26 RX ORDER — BACITRACIN ZINC AND POLYMYXIN B SULFATE 500; 1000 [USP'U]/G; [USP'U]/G
OINTMENT TOPICAL ONCE
OUTPATIENT
Start: 2023-07-26 | End: 2023-07-26

## 2023-07-26 RX ORDER — CLOBETASOL PROPIONATE 0.5 MG/G
OINTMENT TOPICAL ONCE
OUTPATIENT
Start: 2023-07-26 | End: 2023-07-26

## 2023-07-26 RX ORDER — LIDOCAINE HYDROCHLORIDE 40 MG/ML
SOLUTION TOPICAL ONCE
OUTPATIENT
Start: 2023-07-26 | End: 2023-07-26

## 2023-07-26 RX ORDER — LIDOCAINE 50 MG/G
OINTMENT TOPICAL ONCE
OUTPATIENT
Start: 2023-07-26 | End: 2023-07-26

## 2023-07-26 RX ORDER — GENTAMICIN SULFATE 1 MG/G
OINTMENT TOPICAL ONCE
OUTPATIENT
Start: 2023-07-26 | End: 2023-07-26

## 2023-07-26 RX ORDER — LIDOCAINE 40 MG/G
CREAM TOPICAL ONCE
OUTPATIENT
Start: 2023-07-26 | End: 2023-07-26

## 2023-07-26 RX ORDER — GINSENG 100 MG
CAPSULE ORAL ONCE
OUTPATIENT
Start: 2023-07-26 | End: 2023-07-26

## 2023-07-26 NOTE — DISCHARGE INSTRUCTIONS
[address] and Hyper[facility] Oxygen Therapy   Physician Orders and Discharge Instructions  [address] [facility],[address] [place] [address], [address]  Telephone: [phone]    NAME:  Irena Liriano  YOB: 1975  MEDICAL RECORD NUMBER:  [id number]  DATE:  7/26/2023    Discharge condition: Stable    Discharge to: Home    Left via:Private automobile    Accompanied by: Self     Dressing Orders: Left foot wound:  Healed, Moisturize and Protect     Diabetic Foot Care    Diabetes can lead to many different types of foot complications, including athlete's foot (a fungal infection), calluses, bunions and other foot deformities, or ulcers that can range from a surface wound to a deep infection. You will need to check your feet twice daily and give them special care and attention. 1) Wash with lukewarm water and a mild soap. Dry well between your toes. Do not soak your feet unless instructed to do so by a physician. Moisturize your feet with a good thick cream like Eucerin Cream, Aquaphor or Cocoa Butter (in a jar) at least twice daily. Do not apply moisturizer between toes. 2)  Protect your feet and never go barefoot. Wear slippers around the house. Treat even minor wounds and skin cracks as an urgent matter when you have diabetes. Remember early treatment is essential to avoid more advanced problems. 3) Check your feet daily or have someone else check them if your eyesight is limited. If you live alone try using a handheld mirror. Watch for a red spot that persists or callus formation. 4)  Look and feel inside your shoes before putting them on. Inspect the soles for nails or screws. 5)  If you form callus or thickened skin on your feet use Flexitol Heel Balm once daily alternating with your regular moisturizer. Madonna Randy is available at [facility]12) and other pharmacies.     6) If you form callus this is a sign that this area is

## 2023-07-26 NOTE — PLAN OF CARE
Problem: Chronic Conditions and Co-morbidities  Goal: Patient's chronic conditions and co-morbidity symptoms are monitored and maintained or improved  7/26/2023 1454 by Lashaun Martin RN  Outcome: Completed  7/26/2023 1447 by Jered Moore RN  Outcome: Progressing     Problem: Discharge Planning  Goal: Discharge to home or other facility with appropriate resources  7/26/2023 1454 by Lashaun Martin RN  Outcome: Completed  7/26/2023 1447 by Jered Moore RN  Outcome: Progressing     Problem: Wound:  Goal: Will show signs of wound healing; wound closure and no evidence of infection  Description: Will show signs of wound healing; wound closure and no evidence of infection  7/26/2023 1454 by Lashaun Martin RN  Outcome: Completed  7/26/2023 1447 by Jered Moore RN  Outcome: Progressing     Problem: Weight control:  Goal: Ability to maintain an optimal weight for height and age will be supported  Description: Ability to maintain an optimal weight for height and age will be supported  7/26/2023 1454 by Lashaun Martin RN  Outcome: Completed  7/26/2023 1447 by Jered Moore RN  Outcome: Progressing     Problem: Falls - Risk of:  Goal: Will remain free from falls  Description: Will remain free from falls  7/26/2023 1454 by Lashaun Martin RN  Outcome: Completed  7/26/2023 1447 by Jered Moore RN  Outcome: Progressing     Problem: Blood Glucose:  Goal: Ability to maintain appropriate glucose levels will improve  Description: Ability to maintain appropriate glucose levels will improve  7/26/2023 1454 by Lashaun Martin RN  Outcome: Completed  7/26/2023 1447 by Jered Moore RN  Outcome: Progressing

## 2023-07-26 NOTE — PROGRESS NOTES
[address]   Progress Note and Procedure Note      [address] [facility] RECORD NUMBER:  [id number]  AGE: 57 y.o. GENDER: female  : 1975  EPISODE DATE:  2023    Subjective:     Chief Complaint   Patient presents with    Wound Check     Patient presents today for recheck left foot wound. HISTORY of PRESENT ILLNESS KITA Jones is a 57 y.o. female who presents today for wound/ulcer evaluation. History of Wound Context: Pt with wound here for eval/treat  Wound/Ulcer Pain Timing/Severity: none  Quality of pain: N/A  Severity:  0 / 10   Modifying Factors: None  Associated Signs/Symptoms: none    Ulcer Identification:  Ulcer Type: pressure  Contributing Factors: chronic pressure    Wound:  pressure        PAST MEDICAL HISTORY        Diagnosis Date    Diabetic ulcer of left midfoot associated with type 2 diabetes mellitus, with fat layer exposed ([address]) 2023    Hypothyroidism     Morbid obesity due to excess calories ([address])     Neuropathy     ARIEL on CPAP     Thyroid nodule     Tobacco abuse     Vitamin D deficiency        PAST SURGICAL HISTORY    Past Surgical History:   Procedure Laterality Date     SECTION      CHOLECYSTECTOMY, LAPAROSCOPIC      FOOT SURGERY Left     screw placed after car [address]      patient does not remember which side.     TUBAL LIGATION Bilateral        FAMILY HISTORY    Family History   Problem Relation Age of Onset    Cancer Mother         THYROID    Colon Cancer Father     No Known Problems Sister     No Known Problems Sister     No Known Problems Brother     No Known Problems Brother     Colon Cancer Maternal Grandmother     High Blood Pressure Maternal Grandmother     Diabetes Maternal Grandfather     Lung Disease Paternal Grandmother         never smoker    Other Paternal Grandfather         back problems    No Known Problems Son     No Known Problems Daughter     No Known

## 2023-08-07 ENCOUNTER — HOSPITAL ENCOUNTER (OUTPATIENT)
Dept: WOUND CARE | Age: 48
Discharge: HOME | End: 2023-08-07
Payer: MEDICAID

## 2023-08-07 VITALS
HEART RATE: 92 BPM | SYSTOLIC BLOOD PRESSURE: 138 MMHG | RESPIRATION RATE: 18 BRPM | WEIGHT: 290 LBS | DIASTOLIC BLOOD PRESSURE: 85 MMHG | BODY MASS INDEX: 41.52 KG/M2 | HEIGHT: 70 IN | TEMPERATURE: 97.1 F

## 2023-08-07 DIAGNOSIS — E11.621 DIABETIC ULCER OF LEFT MIDFOOT ASSOCIATED WITH TYPE 2 DIABETES MELLITUS, WITH FAT LAYER EXPOSED (HCC): Primary | Chronic | ICD-10-CM

## 2023-08-07 DIAGNOSIS — E11.610 CHARCOT FOOT DUE TO DIABETES MELLITUS (HCC): Chronic | ICD-10-CM

## 2023-08-07 DIAGNOSIS — Z87.39 HX OF OSTEOMYELITIS: ICD-10-CM

## 2023-08-07 DIAGNOSIS — L97.422 DIABETIC ULCER OF LEFT MIDFOOT ASSOCIATED WITH TYPE 2 DIABETES MELLITUS, WITH FAT LAYER EXPOSED (HCC): Primary | Chronic | ICD-10-CM

## 2023-08-07 PROCEDURE — 99212 OFFICE O/P EST SF 10 MIN: CPT | Performed by: SURGERY

## 2023-08-07 PROCEDURE — 99213 OFFICE O/P EST LOW 20 MIN: CPT

## 2023-08-07 NOTE — PROGRESS NOTES
canal normal bilaterally, nose without deformity, nasal mucosa and turbinates normal without polyps, lips teeth and gums normal  Neck: supple and non-tender without mass, no thyromegaly or thyroid nodules, no cervical lymphadenopathy  Pulmonary/Chest: clear to auscultation bilaterally- no wheezes, rales or rhonchi, normal air movement, no respiratory distress  Cardiovascular: normal rate, regular rhythm, normal S1 and S2, no murmurs, rubs, clicks, or gallops, distal pulses intact, no carotid bruits  Abdomen: soft, non-tender, non-distended, normal bowel sounds, no masses or organomegaly  Extremities: no cyanosis, clubbing or edema  Musculoskeletal: normal range of motion, no joint swelling, deformity or tenderness  Neurologic: reflexes normal and symmetric, no cranial nerve deficit, gait, coordination and speech normal, skin sensation normal      Assessment:      Patient Active Problem List   Diagnosis Code    Acute respiratory failure with hypoxemia ([facility]) J96.01    Tobacco abuse Z72.0    Acute bronchitis J20.9    Respiratory failure ([facility]) J96.90    Vitamin D deficiency E55.9    Hypothyroidism E03.9    Hyperglycemia R73.9    Pneumonia due to organism J18.9    Hypoxia R09.02    Chest pain R07.9    Osteomyelitis ([facility]) M86.9    Therapy failure due to antibiotic resistance Z16.20    Thyroid nodule E04.1    ARIEL on CPAP G47.33, Z99.89    Neuropathy G62.9    Morbid obesity due to excess calories ([facility]) E66.01    Noncompliance with CPAP treatment Z91.199    Diabetic ulcer of left midfoot associated with type 2 diabetes mellitus, with fat layer exposed ([address]) E11.621, L97.422    Charcot foot due to diabetes mellitus ([facility]) E11.610    Hx of osteomyelitis Z87.39                        Plan:     Problem List Items Addressed This Visit       * (Principal) Diabetic ulcer of left midfoot associated with type 2 diabetes mellitus, with fat layer exposed ([address]) - Primary (Chronic)    Charcot foot due to diabetes mellitus ([address]) (Chronic)

## 2023-08-07 NOTE — DISCHARGE INSTRUCTIONS
[address] and Hyper[facility] Oxygen Therapy   Physician Orders and Discharge Instructions  [address] [facility],[address] [place] [address], [address]  Telephone: [phone]    NAME:  Isaac Strong  YOB: 1975  MEDICAL RECORD NUMBER:  [id number]  DATE:  8/7/2023    Discharge condition: Stable    Discharge to: Home    Left via:Private automobile    Accompanied by: Self    Dressing Orders: Left foot wound:  Soap and water wash,   Dry dressing daily   Avoid pressure to the wound, Use knee scooter and met pad  Protein rich diet  Multivitamin    Diabetic Foot Care    Diabetes can lead to many different types of foot complications, including athlete's foot (a fungal infection), calluses, bunions and other foot deformities, or ulcers that can range from a surface wound to a deep infection. You will need to check your feet twice daily and give them special care and attention. 1) Wash with lukewarm water and a mild soap. Dry well between your toes. Do not soak your feet unless instructed to do so by a physician. Moisturize your feet with a good thick cream like Eucerin Cream, Aquaphor or Cocoa Butter (in a jar) at least twice daily. Do not apply moisturizer between toes. 2)  Protect your feet and never go barefoot. Wear slippers around the house. Treat even minor wounds and skin cracks as an urgent matter when you have diabetes. Remember early treatment is essential to avoid more advanced problems. 3) Check your feet daily or have someone else check them if your eyesight is limited. If you live alone try using a handheld mirror. Watch for a red spot that persists or callus formation. 4)  Look and feel inside your shoes before putting them on. Inspect the soles for nails or screws. 5)  If you form callus or thickened skin on your feet use Flexitol Heel Balm once daily alternating with your regular moisturizer.   Joselyn Livers is available

## 2023-11-14 ENCOUNTER — HOSPITAL ENCOUNTER (OUTPATIENT)
Dept: WOUND CARE | Age: 48
Discharge: HOME | End: 2023-11-14
Payer: MEDICAID

## 2023-11-14 VITALS
TEMPERATURE: 97.6 F | HEART RATE: 91 BPM | RESPIRATION RATE: 18 BRPM | SYSTOLIC BLOOD PRESSURE: 157 MMHG | DIASTOLIC BLOOD PRESSURE: 96 MMHG

## 2023-11-14 DIAGNOSIS — E11.621 DIABETIC ULCER OF LEFT MIDFOOT ASSOCIATED WITH TYPE 2 DIABETES MELLITUS, WITH FAT LAYER EXPOSED (HCC): Primary | Chronic | ICD-10-CM

## 2023-11-14 DIAGNOSIS — E11.610 CHARCOT FOOT DUE TO DIABETES MELLITUS (HCC): Chronic | ICD-10-CM

## 2023-11-14 DIAGNOSIS — Z87.39 HX OF OSTEOMYELITIS: ICD-10-CM

## 2023-11-14 DIAGNOSIS — L97.422 DIABETIC ULCER OF LEFT MIDFOOT ASSOCIATED WITH TYPE 2 DIABETES MELLITUS, WITH FAT LAYER EXPOSED (HCC): Primary | Chronic | ICD-10-CM

## 2023-11-14 PROCEDURE — 99212 OFFICE O/P EST SF 10 MIN: CPT | Performed by: SURGERY

## 2023-11-14 PROCEDURE — 99213 OFFICE O/P EST LOW 20 MIN: CPT

## 2023-11-14 RX ORDER — LIDOCAINE HYDROCHLORIDE 20 MG/ML
JELLY TOPICAL ONCE
Status: CANCELLED | OUTPATIENT
Start: 2023-11-14 | End: 2023-11-14

## 2023-11-14 RX ORDER — IBUPROFEN 200 MG
TABLET ORAL ONCE
Status: CANCELLED | OUTPATIENT
Start: 2023-11-14 | End: 2023-11-14

## 2023-11-14 RX ORDER — BACITRACIN ZINC AND POLYMYXIN B SULFATE 500; 1000 [USP'U]/G; [USP'U]/G
OINTMENT TOPICAL ONCE
Status: CANCELLED | OUTPATIENT
Start: 2023-11-14 | End: 2023-11-14

## 2023-11-14 RX ORDER — GINSENG 100 MG
CAPSULE ORAL ONCE
Status: CANCELLED | OUTPATIENT
Start: 2023-11-14 | End: 2023-11-14

## 2023-11-14 RX ORDER — GENTAMICIN SULFATE 1 MG/G
OINTMENT TOPICAL ONCE
Status: CANCELLED | OUTPATIENT
Start: 2023-11-14 | End: 2023-11-14

## 2023-11-14 RX ORDER — LIDOCAINE 50 MG/G
OINTMENT TOPICAL ONCE
Status: CANCELLED | OUTPATIENT
Start: 2023-11-14 | End: 2023-11-14

## 2023-11-14 RX ORDER — LIDOCAINE HYDROCHLORIDE 40 MG/ML
SOLUTION TOPICAL ONCE
Status: CANCELLED | OUTPATIENT
Start: 2023-11-14 | End: 2023-11-14

## 2023-11-14 RX ORDER — BETAMETHASONE DIPROPIONATE 0.05 %
OINTMENT (GRAM) TOPICAL ONCE
Status: CANCELLED | OUTPATIENT
Start: 2023-11-14 | End: 2023-11-14

## 2023-11-14 RX ORDER — CLOBETASOL PROPIONATE 0.5 MG/G
OINTMENT TOPICAL ONCE
Status: CANCELLED | OUTPATIENT
Start: 2023-11-14 | End: 2023-11-14

## 2023-11-14 RX ORDER — TRIAMCINOLONE ACETONIDE 1 MG/G
OINTMENT TOPICAL ONCE
Status: CANCELLED | OUTPATIENT
Start: 2023-11-14 | End: 2023-11-14

## 2023-11-14 RX ORDER — LIDOCAINE 40 MG/G
CREAM TOPICAL ONCE
Status: CANCELLED | OUTPATIENT
Start: 2023-11-14 | End: 2023-11-14

## 2023-11-14 ASSESSMENT — PAIN DESCRIPTION - ORIENTATION: ORIENTATION: RIGHT;LEFT

## 2023-11-14 ASSESSMENT — PAIN DESCRIPTION - DESCRIPTORS: DESCRIPTORS: TINGLING

## 2023-11-14 ASSESSMENT — PAIN DESCRIPTION - PAIN TYPE: TYPE: NEUROPATHIC PAIN

## 2023-11-14 ASSESSMENT — PAIN - FUNCTIONAL ASSESSMENT: PAIN_FUNCTIONAL_ASSESSMENT: PREVENTS OR INTERFERES SOME ACTIVE ACTIVITIES AND ADLS

## 2023-11-14 ASSESSMENT — PAIN DESCRIPTION - FREQUENCY: FREQUENCY: INTERMITTENT

## 2023-11-14 ASSESSMENT — PAIN SCALES - GENERAL: PAINLEVEL_OUTOF10: 7

## 2023-11-14 ASSESSMENT — PAIN DESCRIPTION - LOCATION: LOCATION: FOOT

## 2023-11-14 ASSESSMENT — PAIN DESCRIPTION - ONSET: ONSET: ON-GOING

## 2023-11-14 NOTE — PROGRESS NOTES
Wound Depth (cm) 0 cm 11/14/23 1032   Wound Surface Area (cm^2) 0 cm^2 11/14/23 1032   Wound Volume (cm^3) 0 cm^3 11/14/23 1032   Wound Assessment Dry 11/14/23 1032   Drainage Amount Scant (moist but unmeasurable) 11/14/23 1032   Drainage Description Serosanguinous 11/14/23 1032   Odor None 11/14/23 1032   [place]-wound Assessment Hyperkeratosis (callous); Blanchable erythema 11/14/23 1032   Number of days: 0             Plan:     Problem List Items Addressed This Visit       * (Principal) Diabetic ulcer of left midfoot associated with type 2 diabetes mellitus, with fat layer exposed ([address]) - Primary (Chronic)    Relevant Orders    Initiate Outpatient Wound Care Protocol    Charcot foot due to diabetes mellitus ([address]) (Chronic)    Relevant Orders    Initiate Outpatient Wound Care Protocol    Hx of osteomyelitis    Relevant Orders    Initiate Outpatient Wound Care Protocol     Pt remains healed Cont to soften callus area and wear appropriate shoewear. RTO PRN      Treatment Note please see attached Discharge Instructions    In my professional opinion this patient would benefit from HBO Therapy: No    Written patient dismissal instructions given to patient and signed by patient or POA.              Electronically signed by Ck Sutton MD on 11/14/2023 at 10:47 AM

## 2023-11-14 NOTE — PATIENT INSTRUCTIONS
[address] and Hyperbaric Oxygen Therapy   Physician Orders and Discharge Instructions  [address] [facility],[address] 11[address] [facility], [address] [facility]  Telephone: [phone]    NAME:  Yusuf Avilez  YOB: 1975  MEDICAL RECORD NUMBER:  [id number]  DATE:  11/14/2023    Discharge condition: Stable    Discharge to: Home    Left via:Private automobile    Accompanied by:  child    ECF/HHA:     Diabetic Foot Care    Diabetes can lead to many different types of foot complications, including athlete's foot (a fungal infection), calluses, bunions and other foot deformities, or ulcers that can range from a surface wound to a deep infection. You will need to check your feet twice daily and give them special care and attention. 1) Wash with lukewarm water and a mild soap. Dry well between your toes. Do not soak your feet unless instructed to do so by a physician. Moisturize your feet with a good thick cream like Eucerin Cream, Aquaphor or Cocoa Butter (in a jar) at least twice daily. Do not apply moisturizer between toes. 2)  Protect your feet and never go barefoot. Wear slippers around the house. Treat even minor wounds and skin cracks as an urgent matter when you have diabetes. Remember early treatment is essential to avoid more advanced problems. 3) Check your feet daily or have someone else check them if your eyesight is limited. If you live alone try using a handheld mirror. Watch for a red spot that persists or callus formation. 4)  Look and feel inside your shoes before putting them on. Inspect the soles for nails or screws. 5)  If you form callus or thickened skin on your feet use Flexitol Heel Balm once daily alternating with your regular moisturizer. Brittani Highman is available at [facility] ($12) and other pharmacies. 6) If you form callus this is a sign that this area is getting too much pressure or rubbing in your shoe.  You may

## 2024-02-07 ENCOUNTER — HOSPITAL ENCOUNTER (OUTPATIENT)
Dept: WOUND CARE | Age: 49
Discharge: HOME | End: 2024-02-07
Payer: MEDICAID

## 2024-02-07 VITALS
BODY MASS INDEX: 24.77 KG/M2 | HEART RATE: 87 BPM | RESPIRATION RATE: 20 BRPM | WEIGHT: 173 LBS | DIASTOLIC BLOOD PRESSURE: 86 MMHG | TEMPERATURE: 96.3 F | HEIGHT: 70 IN | SYSTOLIC BLOOD PRESSURE: 125 MMHG

## 2024-02-07 DIAGNOSIS — L97.422 DIABETIC ULCER OF LEFT MIDFOOT ASSOCIATED WITH TYPE 2 DIABETES MELLITUS, WITH FAT LAYER EXPOSED (HCC): Primary | Chronic | ICD-10-CM

## 2024-02-07 DIAGNOSIS — E11.610 CHARCOT FOOT DUE TO DIABETES MELLITUS (HCC): Chronic | ICD-10-CM

## 2024-02-07 DIAGNOSIS — E11.621 DIABETIC ULCER OF LEFT MIDFOOT ASSOCIATED WITH TYPE 2 DIABETES MELLITUS, WITH FAT LAYER EXPOSED (HCC): Primary | Chronic | ICD-10-CM

## 2024-02-07 DIAGNOSIS — Z72.0 TOBACCO ABUSE: Chronic | ICD-10-CM

## 2024-02-07 PROCEDURE — 99215 OFFICE O/P EST HI 40 MIN: CPT | Performed by: NURSE PRACTITIONER

## 2024-02-07 PROCEDURE — 99214 OFFICE O/P EST MOD 30 MIN: CPT

## 2024-02-07 RX ORDER — GINSENG 100 MG
CAPSULE ORAL ONCE
OUTPATIENT
Start: 2024-02-07 | End: 2024-02-07

## 2024-02-07 RX ORDER — BACITRACIN ZINC AND POLYMYXIN B SULFATE 500; 1000 [USP'U]/G; [USP'U]/G
OINTMENT TOPICAL ONCE
OUTPATIENT
Start: 2024-02-07 | End: 2024-02-07

## 2024-02-07 RX ORDER — LIDOCAINE HYDROCHLORIDE 40 MG/ML
SOLUTION TOPICAL ONCE
OUTPATIENT
Start: 2024-02-07 | End: 2024-02-07

## 2024-02-07 RX ORDER — BETAMETHASONE DIPROPIONATE 0.5 MG/G
CREAM TOPICAL ONCE
OUTPATIENT
Start: 2024-02-07 | End: 2024-02-07

## 2024-02-07 RX ORDER — LIDOCAINE 40 MG/G
CREAM TOPICAL ONCE
OUTPATIENT
Start: 2024-02-07 | End: 2024-02-07

## 2024-02-07 RX ORDER — LIDOCAINE HYDROCHLORIDE 20 MG/ML
JELLY TOPICAL ONCE
OUTPATIENT
Start: 2024-02-07 | End: 2024-02-07

## 2024-02-07 RX ORDER — SODIUM CHLOR/HYPOCHLOROUS ACID 0.033 %
SOLUTION, IRRIGATION IRRIGATION ONCE
OUTPATIENT
Start: 2024-02-07 | End: 2024-02-07

## 2024-02-07 RX ORDER — IBUPROFEN 200 MG
TABLET ORAL ONCE
OUTPATIENT
Start: 2024-02-07 | End: 2024-02-07

## 2024-02-07 RX ORDER — LIDOCAINE 50 MG/G
OINTMENT TOPICAL ONCE
OUTPATIENT
Start: 2024-02-07 | End: 2024-02-07

## 2024-02-07 RX ORDER — CLOBETASOL PROPIONATE 0.5 MG/G
OINTMENT TOPICAL ONCE
OUTPATIENT
Start: 2024-02-07 | End: 2024-02-07

## 2024-02-07 RX ORDER — GENTAMICIN SULFATE 1 MG/G
OINTMENT TOPICAL ONCE
OUTPATIENT
Start: 2024-02-07 | End: 2024-02-07

## 2024-02-07 RX ORDER — TRIAMCINOLONE ACETONIDE 1 MG/G
OINTMENT TOPICAL ONCE
OUTPATIENT
Start: 2024-02-07 | End: 2024-02-07

## 2024-02-07 ASSESSMENT — VISUAL ACUITY: OU: 1

## 2024-02-07 NOTE — DISCHARGE INSTRUCTIONS
[facility] Wound Care and Hyperbaric Oxygen Therapy   Physician Orders and Discharge Instructions  [address] [place] [address]  [address]  [place] [address]  Telephone: [phone]      FAX [phone]    NAME:  Bárbara Ambrocio  YOB: 1975  MEDICAL RECORD NUMBER:  [id number]  DATE:  2/7/2024    Discharge condition: Stable    Discharge to: Home    Left via:Private automobile    Accompanied by:  self    ECF/HHA: [facility]    Dressing Orders:  Left foot wound: tuck collagen (PROMOGRAN) into the wound bed, secure with saline moistened gauze and rolled gauze daily.    Treatment Orders:  Protein rich diet  Multivitamin  Avoid pressure to the wound with felt/foam-avoid getting wet  Reapply glue if needed    [facility] follow up visit ___1 week with Dr. Armendariz__________________________  (Please note your next appointment above and if you are unable to keep, kindly give a 24 hour notice. Thank you.)      Diabetic Foot Care    Diabetes can lead to many different types of foot complications, including athlete's foot (a fungal infection), calluses, bunions and other foot deformities, or ulcers that can range from a surface wound to a deep infection.  You will need to check your feet twice daily and give them special care and attention.    1) Wash with lukewarm water and a mild soap.  Dry well between your toes. Do not soak your feet unless instructed to do so by a physician.        Moisturize your feet with a good thick cream like Eucerin Cream, Aquaphor or Cocoa Butter (in a jar) at least twice daily. Do not apply moisturizer between toes.    2)  Protect your feet and never go barefoot.  Wear slippers around the house. Treat even minor wounds and skin cracks as an urgent matter when you have diabetes.  Remember early treatment is essential to avoid more advanced problems.    3) Check your feet daily or have someone else check them if your eyesight is limited.  If you live alone try using a handheld mirror.  Watch for

## 2024-02-07 NOTE — PLAN OF CARE
Problem: Chronic Conditions and Co-morbidities  Goal: Patient's chronic conditions and co-morbidity symptoms are monitored and maintained or improved  Outcome: Progressing     Problem: Wound:  Goal: Will show signs of wound healing; wound closure and no evidence of infection  Description: Will show signs of wound healing; wound closure and no evidence of infection  Outcome: Progressing     Problem: Smoking cessation:  Goal: Ability to formulate a plan to maintain a tobacco-free life will be supported  Description: Ability to formulate a plan to maintain a tobacco-free life will be supported  Outcome: Progressing

## 2024-02-07 NOTE — HOME CARE
Wound Care Supplies      Supply Company:     [employer] [id number] [place] [address] p: [phone] f: [phone]     Ordering Center:     [facility]  [address] [place] [address] [address]  [facility] [id number] [phone]  WOUND CARE Dept: [phone]   FAX NUMBER [phone]    Patient Information:      Bárbara Mahoney  [address] [facility] [address]   [phone]   : 1975  AGE: 48 y.o.     GENDER: female   EPISODE DATE: 2024    Insurance:      PRIMARY INSURANCE:  Plan: [employer] [facility]  Coverage: [employer] MEDICAID [place]  Effective Date: 2017  Group Number: [unfilled]  Subscriber Number: [id number] - (Medicaid Managed)    Payer/Plan Subscr  Sex Relation Sub. Ins. ID Effective Group Num   1. [employer] MEDICAI* BÁRBARA MAHONEY 1975 Female Self [id number]                                    [address]       Patient Wound Information:      Problem List Items Addressed This Visit          Endocrine    * (Principal) Diabetic ulcer of left midfoot associated with type 2 diabetes mellitus, with fat layer exposed (HCC) - Primary (Chronic)    Relevant Orders    Initiate Outpatient Wound Care Protocol    Charcot foot due to diabetes mellitus (HCC) (Chronic)    Relevant Orders    Initiate Outpatient Wound Care Protocol       Other    Tobacco abuse (Chronic)       WOUNDS REQUIRING DRESSING SUPPLIES:     Wound 24 Foot Left;Dorsal wound 1-left foot wag 1 (Active)   Wound Image   24 1425   Wound Etiology Diabetic Botello 1 24 1425   Dressing Status Old drainage noted 24 1425   Wound Cleansed Soap and water 24 1425   Dressing/Treatment Collagen;Moist to dry 24 1434   Offloading for Diabetic Foot Ulcers Offloading ordered;Felt or foam 24 1434   Wound Length (cm) 0.3 cm 24 1425   Wound Width (cm) 0.3 cm 24 1425   Wound Depth (cm) 0.2 cm 24 1425   Wound Surface Area (cm^2) 0.09 cm^2

## 2024-02-07 NOTE — PROGRESS NOTES
02/07/24 1425   Wound Surface Area (cm^2) 0.09 cm^2 02/07/24 1425   Wound Volume (cm^3) 0.018 cm^3 02/07/24 1425   Wound Assessment Pink/red 02/07/24 1425   Drainage Amount Moderate (25-50%) 02/07/24 1425   Drainage Description Serosanguinous 02/07/24 1425   Odor None 02/07/24 1425   Guadalupe-wound Assessment Intact 02/07/24 1425   Margins Defined edges 02/07/24 1425   Wound Thickness Description not for Pressure Injury Full thickness 02/07/24 1425   Number of days: 0        Assessment    1. Diabetic ulcer of left midfoot associated with type 2 diabetes mellitus, with fat layer exposed ([facility], located [facility])    2. Charcot foot due to diabetes mellitus ([facility])    3. Tobacco abuse      Plan for wound - Dress per physician order  Treatment:     Compression : No   Offloading : Yes   Dressing : promogran/saline moistened gauze    Discussed importance of offloading, smoking cessation, wound care, and plan of care.  Patient understanding and questions answered.     I spent a total of  60 minutes face to face with the patient. Over 100% of that time was spent on counseling and care coordination.      Patient was told that if symptoms worsen or new symptoms develop they are to go to the emergency department immediately. Patient was educated on diagnosis and treatment plan.  All of patient's questions were answered, and the patient understands the discharge plan.                Discussed appropriate home care of this wound. Wound redressed.  Patient instructions were given.  Recommend no smoking  Offloading instructions given

## 2024-02-13 ENCOUNTER — HOSPITAL ENCOUNTER (OUTPATIENT)
Dept: WOUND CARE | Age: 49
Discharge: HOME | End: 2024-02-13
Payer: MEDICAID

## 2024-02-13 VITALS
SYSTOLIC BLOOD PRESSURE: 149 MMHG | HEART RATE: 87 BPM | TEMPERATURE: 97.4 F | RESPIRATION RATE: 20 BRPM | DIASTOLIC BLOOD PRESSURE: 95 MMHG

## 2024-02-13 DIAGNOSIS — E11.610 CHARCOT FOOT DUE TO DIABETES MELLITUS (HCC): Primary | ICD-10-CM

## 2024-02-13 DIAGNOSIS — E11.621 DIABETIC ULCER OF LEFT MIDFOOT ASSOCIATED WITH TYPE 2 DIABETES MELLITUS, WITH FAT LAYER EXPOSED (HCC): ICD-10-CM

## 2024-02-13 DIAGNOSIS — L97.422 DIABETIC ULCER OF LEFT MIDFOOT ASSOCIATED WITH TYPE 2 DIABETES MELLITUS, WITH FAT LAYER EXPOSED (HCC): ICD-10-CM

## 2024-02-13 PROCEDURE — 97597 DBRDMT OPN WND 1ST 20 CM/<: CPT

## 2024-02-13 PROCEDURE — 97597 DBRDMT OPN WND 1ST 20 CM/<: CPT | Performed by: SURGERY

## 2024-02-13 RX ORDER — LIDOCAINE HYDROCHLORIDE 40 MG/ML
SOLUTION TOPICAL ONCE
OUTPATIENT
Start: 2024-02-13 | End: 2024-02-13

## 2024-02-13 RX ORDER — GINSENG 100 MG
CAPSULE ORAL ONCE
OUTPATIENT
Start: 2024-02-13 | End: 2024-02-13

## 2024-02-13 RX ORDER — BACITRACIN ZINC AND POLYMYXIN B SULFATE 500; 1000 [USP'U]/G; [USP'U]/G
OINTMENT TOPICAL ONCE
OUTPATIENT
Start: 2024-02-13 | End: 2024-02-13

## 2024-02-13 RX ORDER — SODIUM CHLOR/HYPOCHLOROUS ACID 0.033 %
SOLUTION, IRRIGATION IRRIGATION ONCE
OUTPATIENT
Start: 2024-02-13 | End: 2024-02-13

## 2024-02-13 RX ORDER — IBUPROFEN 200 MG
TABLET ORAL ONCE
OUTPATIENT
Start: 2024-02-13 | End: 2024-02-13

## 2024-02-13 RX ORDER — LIDOCAINE HYDROCHLORIDE 20 MG/ML
JELLY TOPICAL ONCE
OUTPATIENT
Start: 2024-02-13 | End: 2024-02-13

## 2024-02-13 RX ORDER — GENTAMICIN SULFATE 1 MG/G
OINTMENT TOPICAL ONCE
OUTPATIENT
Start: 2024-02-13 | End: 2024-02-13

## 2024-02-13 RX ORDER — CLOBETASOL PROPIONATE 0.5 MG/G
OINTMENT TOPICAL ONCE
OUTPATIENT
Start: 2024-02-13 | End: 2024-02-13

## 2024-02-13 RX ORDER — LIDOCAINE HYDROCHLORIDE 20 MG/ML
JELLY TOPICAL ONCE
Status: COMPLETED | OUTPATIENT
Start: 2024-02-13 | End: 2024-02-13

## 2024-02-13 RX ORDER — LIDOCAINE 40 MG/G
CREAM TOPICAL ONCE
OUTPATIENT
Start: 2024-02-13 | End: 2024-02-13

## 2024-02-13 RX ORDER — LIDOCAINE 50 MG/G
OINTMENT TOPICAL ONCE
OUTPATIENT
Start: 2024-02-13 | End: 2024-02-13

## 2024-02-13 RX ORDER — TRIAMCINOLONE ACETONIDE 1 MG/G
OINTMENT TOPICAL ONCE
OUTPATIENT
Start: 2024-02-13 | End: 2024-02-13

## 2024-02-13 RX ORDER — BETAMETHASONE DIPROPIONATE 0.5 MG/G
CREAM TOPICAL ONCE
OUTPATIENT
Start: 2024-02-13 | End: 2024-02-13

## 2024-02-13 RX ADMIN — LIDOCAINE HYDROCHLORIDE: 20 JELLY TOPICAL at 15:08

## 2024-02-13 NOTE — PATIENT INSTRUCTIONS
[facility] Wound Care and Hyperbaric Oxygen Therapy   Physician Orders and Discharge Instructions  [address] [place] [address]  [address]  [place] [address]  Telephone: [phone]      FAX [phone]    NAME:  Bárbara Ambrocio  YOB: 1975  MEDICAL RECORD NUMBER:  [id number]  DATE:  2/13/2024    Discharge condition: Stable    Discharge to: Home    Left via:Private automobile    Accompanied by: Self     ECF/HHA: [facility]     Dressing Orders: Left foot wound:   Tuck collagen (PROMOGRAN) into the wound bed, secure with saline moistened gauze and rolled gauze daily.  Treatment Orders:  Protein rich diet  Multivitamin  Avoid pressure to the wound with felt/foam-avoid getting wet  Reapply glue if needed    C follow up visit ____________1 week_________________  (Please note your next appointment above and if you are unable to keep, kindly give a 24 hour notice. Thank you.)    If you experience any of the following, please call the Wound Care Center during business hours:    * Increase in Pain  * Temperature over 101  * Increase in drainage from your wound  * Drainage with a foul odor  * Bleeding  * Increase in swelling  * Need for compression bandage changes due to slippage, breakthrough drainage.    If you need medical attention outside of the business hours of the Wound Care Centers please contact your PCP or go to the nearest emergency room.
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
## 2024-02-13 NOTE — PLAN OF CARE
Problem: Chronic Conditions and Co-morbidities  Goal: Patient's chronic conditions and co-morbidity symptoms are monitored and maintained or improved  Outcome: Progressing     Problem: Chronic Conditions and Co-morbidities  Goal: Patient's chronic conditions and co-morbidity symptoms are monitored and maintained or improved  Outcome: Progressing     Problem: Wound:  Goal: Will show signs of wound healing; wound closure and no evidence of infection  Description: Will show signs of wound healing; wound closure and no evidence of infection  Outcome: Progressing     Problem: Smoking cessation:  Goal: Ability to formulate a plan to maintain a tobacco-free life will be supported  Description: Ability to formulate a plan to maintain a tobacco-free life will be supported  Outcome: Progressing

## 2024-02-22 ENCOUNTER — HOSPITAL ENCOUNTER (OUTPATIENT)
Dept: WOUND CARE | Age: 49
Discharge: HOME | End: 2024-02-22
Payer: MEDICAID

## 2024-02-22 VITALS
DIASTOLIC BLOOD PRESSURE: 86 MMHG | SYSTOLIC BLOOD PRESSURE: 131 MMHG | HEART RATE: 86 BPM | RESPIRATION RATE: 18 BRPM | TEMPERATURE: 98 F

## 2024-02-22 DIAGNOSIS — E11.621 DIABETIC ULCER OF LEFT MIDFOOT ASSOCIATED WITH TYPE 2 DIABETES MELLITUS, WITH FAT LAYER EXPOSED (HCC): Primary | Chronic | ICD-10-CM

## 2024-02-22 DIAGNOSIS — Z72.0 TOBACCO ABUSE: Chronic | ICD-10-CM

## 2024-02-22 DIAGNOSIS — E11.610 CHARCOT FOOT DUE TO DIABETES MELLITUS (HCC): Chronic | ICD-10-CM

## 2024-02-22 DIAGNOSIS — L97.422 DIABETIC ULCER OF LEFT MIDFOOT ASSOCIATED WITH TYPE 2 DIABETES MELLITUS, WITH FAT LAYER EXPOSED (HCC): Primary | Chronic | ICD-10-CM

## 2024-02-22 PROCEDURE — 99213 OFFICE O/P EST LOW 20 MIN: CPT

## 2024-02-22 PROCEDURE — 99212 OFFICE O/P EST SF 10 MIN: CPT | Performed by: SURGERY

## 2024-02-22 RX ORDER — GENTAMICIN SULFATE 1 MG/G
OINTMENT TOPICAL ONCE
OUTPATIENT
Start: 2024-02-22 | End: 2024-02-22

## 2024-02-22 RX ORDER — BACITRACIN ZINC AND POLYMYXIN B SULFATE 500; 1000 [USP'U]/G; [USP'U]/G
OINTMENT TOPICAL ONCE
OUTPATIENT
Start: 2024-02-22 | End: 2024-02-22

## 2024-02-22 RX ORDER — GINSENG 100 MG
CAPSULE ORAL ONCE
OUTPATIENT
Start: 2024-02-22 | End: 2024-02-22

## 2024-02-22 RX ORDER — BETAMETHASONE DIPROPIONATE 0.5 MG/G
CREAM TOPICAL ONCE
OUTPATIENT
Start: 2024-02-22 | End: 2024-02-22

## 2024-02-22 RX ORDER — IBUPROFEN 200 MG
TABLET ORAL ONCE
OUTPATIENT
Start: 2024-02-22 | End: 2024-02-22

## 2024-02-22 RX ORDER — LIDOCAINE HYDROCHLORIDE 20 MG/ML
JELLY TOPICAL ONCE
Status: COMPLETED | OUTPATIENT
Start: 2024-02-22 | End: 2024-02-22

## 2024-02-22 RX ORDER — LIDOCAINE HYDROCHLORIDE 40 MG/ML
SOLUTION TOPICAL ONCE
OUTPATIENT
Start: 2024-02-22 | End: 2024-02-22

## 2024-02-22 RX ORDER — LIDOCAINE HYDROCHLORIDE 20 MG/ML
JELLY TOPICAL ONCE
OUTPATIENT
Start: 2024-02-22 | End: 2024-02-22

## 2024-02-22 RX ORDER — TRIAMCINOLONE ACETONIDE 1 MG/G
OINTMENT TOPICAL ONCE
OUTPATIENT
Start: 2024-02-22 | End: 2024-02-22

## 2024-02-22 RX ORDER — LIDOCAINE 50 MG/G
OINTMENT TOPICAL ONCE
OUTPATIENT
Start: 2024-02-22 | End: 2024-02-22

## 2024-02-22 RX ORDER — LIDOCAINE 40 MG/G
CREAM TOPICAL ONCE
OUTPATIENT
Start: 2024-02-22 | End: 2024-02-22

## 2024-02-22 RX ORDER — SODIUM CHLOR/HYPOCHLOROUS ACID 0.033 %
SOLUTION, IRRIGATION IRRIGATION ONCE
OUTPATIENT
Start: 2024-02-22 | End: 2024-02-22

## 2024-02-22 RX ORDER — CLOBETASOL PROPIONATE 0.5 MG/G
OINTMENT TOPICAL ONCE
OUTPATIENT
Start: 2024-02-22 | End: 2024-02-22

## 2024-02-22 RX ADMIN — LIDOCAINE HYDROCHLORIDE: 20 JELLY TOPICAL at 14:14

## 2024-02-22 NOTE — PLAN OF CARE
Problem: Wound:  Goal: Will show signs of wound healing; wound closure and no evidence of infection  Description: Will show signs of wound healing; wound closure and no evidence of infection  Outcome: Progressing     Problem: Smoking cessation:  Goal: Ability to formulate a plan to maintain a tobacco-free life will be supported  Description: Ability to formulate a plan to maintain a tobacco-free life will be supported  Outcome: Progressing

## 2024-02-22 NOTE — PATIENT INSTRUCTIONS
[facility] Wound Care and Hyperbaric Oxygen Therapy   Physician Orders and Discharge Instructions  [address] [place] [address]  [address]  [place] [address]  Telephone: [phone]      FAX [phone]    NAME:  Bárbara Ambrocio  YOB: 1975  MEDICAL RECORD NUMBER:  [id number]  DATE:  2/22/2024    Discharge condition: Stable    Discharge to: Home    Left via:Private automobile    Accompanied by: Family    ECF/HHA: [facility]    Dressing Orders: Left foot wound:  Healed, Moisturize and Protect  Protein rich diet  Multivitamin  Avoid pressure to the area, use knee scooter   No smoking     Diabetic Foot Care    Diabetes can lead to many different types of foot complications, including athlete's foot (a fungal infection), calluses, bunions and other foot deformities, or ulcers that can range from a surface wound to a deep infection.  You will need to check your feet twice daily and give them special care and attention.    1) Wash with lukewarm water and a mild soap.  Dry well between your toes. Do not soak your feet unless instructed to do so by a physician.        Moisturize your feet with a good thick cream like Eucerin Cream, Aquaphor or Cocoa Butter (in a jar) at least twice daily. Do not apply moisturizer between toes.    2)  Protect your feet and never go barefoot.  Wear slippers around the house. Treat even minor wounds and skin cracks as an urgent matter when you have diabetes.  Remember early treatment is essential to avoid more advanced problems.    3) Check your feet daily or have someone else check them if your eyesight is limited.  If you live alone try using a handheld mirror.  Watch for a red spot that persists or callus formation.     4)  Look and feel inside your shoes before putting them on.  Inspect the soles for nails or screws.       5)  If you form callus or thickened skin on your feet use Flexitol Heel Balm once daily alternating with your regular moisturizer.  Flexitol Heel Balm is
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
## 2024-03-13 ENCOUNTER — HOSPITAL ENCOUNTER (OUTPATIENT)
Dept: WOUND CARE | Age: 49
Discharge: HOME | End: 2024-03-13
Payer: MEDICAID

## 2024-03-13 VITALS
WEIGHT: 173 LBS | SYSTOLIC BLOOD PRESSURE: 148 MMHG | HEIGHT: 70 IN | HEART RATE: 95 BPM | BODY MASS INDEX: 24.77 KG/M2 | DIASTOLIC BLOOD PRESSURE: 83 MMHG | TEMPERATURE: 97.8 F

## 2024-03-13 PROCEDURE — 99212 OFFICE O/P EST SF 10 MIN: CPT | Performed by: SURGERY

## 2024-03-13 PROCEDURE — 99213 OFFICE O/P EST LOW 20 MIN: CPT

## 2024-03-13 NOTE — PROGRESS NOTES
Dressing/Treatment Open to air 02/22/24 1417   Offloading for Diabetic Foot Ulcers Offloading ordered;Knee scooter 03/13/24 1412   Wound Length (cm) 0 cm 03/13/24 1412   Wound Width (cm) 0 cm 03/13/24 1412   Wound Depth (cm) 0 cm 03/13/24 1412   Wound Surface Area (cm^2) 0 cm^2 03/13/24 1412   Change in Wound Size % (l*w) 100 03/13/24 1412   Wound Volume (cm^3) 0 cm^3 03/13/24 1412   Wound Healing % 100 03/13/24 1412   Post-Procedure Length (cm) 0.1 cm 02/13/24 1506   Post-Procedure Width (cm) 0.1 cm 02/13/24 1506   Post-Procedure Depth (cm) 0.1 cm 02/13/24 1506   Post-Procedure Surface Area (cm^2) 0.01 cm^2 02/13/24 1506   Post-Procedure Volume (cm^3) 0.001 cm^3 02/13/24 1506   Wound Assessment Epithelialization;Dry 03/13/24 1412   Drainage Amount None (dry) 03/13/24 1412   Drainage Description Serous 02/22/24 1417   Odor None 03/13/24 1412   Guadalupe-wound Assessment Hyperkeratosis (callous) 03/13/24 1412   Margins Defined edges 02/22/24 1417   Wound Thickness Description not for Pressure Injury Full thickness 02/22/24 1417   Number of days: 34             Plan:     Problem List Items Addressed This Visit    None    See Dr. Cotter for Charcot foot therapy.  RTO 2 weeks      Treatment Note please see attached Discharge Instructions    In my professional opinion this patient would benefit from HBO Therapy: No    Written patient dismissal instructions given to patient and signed by patient or POA.             Electronically signed by Guero Armendariz MD on 3/13/2024 at 2:21 PM

## 2024-03-13 NOTE — PLAN OF CARE
Problem: Chronic Conditions and Co-morbidities  Goal: Patient's chronic conditions and co-morbidity symptoms are monitored and maintained or improved  Outcome: Progressing     Problem: Wound:  Goal: Will show signs of wound healing; wound closure and no evidence of infection  Description: Will show signs of wound healing; wound closure and no evidence of infection  Outcome: Progressing     Problem: Smoking cessation:  Goal: Ability to formulate a plan to maintain a tobacco-free life will be supported  Description: Ability to formulate a plan to maintain a tobacco-free life will be supported  Outcome: Progressing     Problem: Weight control:  Goal: Ability to maintain an optimal weight for height and age will be supported  Description: Ability to maintain an optimal weight for height and age will be supported  Outcome: Progressing     Problem: Falls - Risk of:  Goal: Will remain free from falls  Description: Will remain free from falls  Outcome: Progressing     Problem: Blood Glucose:  Goal: Ability to maintain appropriate glucose levels will improve  Description: Ability to maintain appropriate glucose levels will improve  Outcome: Progressing

## 2024-03-13 NOTE — PATIENT INSTRUCTIONS
[facility] Wound Care and Hyperbaric Oxygen Therapy   Physician Orders and Discharge Instructions  [address] [place] [address]  [address]  [place] [address]  Telephone: [phone]      FAX [phone]    NAME:  Bárbara Ambrocio  YOB: 1975  MEDICAL RECORD NUMBER:  [id number]  DATE:  3/13/2024    Discharge condition: Stable    Discharge to: Home    Left via:Private automobile    Accompanied by: Self    ECF/HHA: [facility]    Dressing Orders: Left foot wound:  Collagen to open area, dry gauze and tape, daily  Protein rich diet  Multivitamin  Avoid pressure to the area, use knee scooter  No smoking  Diabetic Foot Care    [facility] follow up visit _______1 week with Dr Cotter 2 weeks with Dr Armendariz______________________  (Please note your next appointment above and if you are unable to keep, kindly give a 24 hour notice. Thank you.)    If you experience any of the following, please call the Wound Care Center during business hours:    * Increase in Pain  * Temperature over 101  * Increase in drainage from your wound  * Drainage with a foul odor  * Bleeding  * Increase in swelling  * Need for compression bandage changes due to slippage, breakthrough drainage.    If you need medical attention outside of the business hours of the Wound Care Centers please contact your PCP or go to the nearest emergency room.

## 2024-03-27 ENCOUNTER — HOSPITAL ENCOUNTER (OUTPATIENT)
Dept: WOUND CARE | Age: 49
Discharge: HOME | End: 2024-03-27
Payer: MEDICAID

## 2024-03-27 VITALS
SYSTOLIC BLOOD PRESSURE: 130 MMHG | TEMPERATURE: 97.5 F | DIASTOLIC BLOOD PRESSURE: 80 MMHG | HEIGHT: 70 IN | RESPIRATION RATE: 18 BRPM | BODY MASS INDEX: 24.77 KG/M2 | HEART RATE: 97 BPM | WEIGHT: 173 LBS

## 2024-03-27 DIAGNOSIS — L97.422 DIABETIC ULCER OF LEFT MIDFOOT ASSOCIATED WITH TYPE 2 DIABETES MELLITUS, WITH FAT LAYER EXPOSED (HCC): ICD-10-CM

## 2024-03-27 DIAGNOSIS — E11.610 CHARCOT FOOT DUE TO DIABETES MELLITUS (HCC): Primary | ICD-10-CM

## 2024-03-27 DIAGNOSIS — E11.621 DIABETIC ULCER OF LEFT MIDFOOT ASSOCIATED WITH TYPE 2 DIABETES MELLITUS, WITH FAT LAYER EXPOSED (HCC): ICD-10-CM

## 2024-03-27 PROCEDURE — 99212 OFFICE O/P EST SF 10 MIN: CPT | Performed by: SURGERY

## 2024-03-27 PROCEDURE — 99213 OFFICE O/P EST LOW 20 MIN: CPT

## 2024-03-27 RX ORDER — LIDOCAINE HYDROCHLORIDE 40 MG/ML
SOLUTION TOPICAL ONCE
OUTPATIENT
Start: 2024-03-27 | End: 2024-03-27

## 2024-03-27 RX ORDER — BETAMETHASONE DIPROPIONATE 0.5 MG/G
CREAM TOPICAL ONCE
OUTPATIENT
Start: 2024-03-27 | End: 2024-03-27

## 2024-03-27 RX ORDER — GINSENG 100 MG
CAPSULE ORAL ONCE
OUTPATIENT
Start: 2024-03-27 | End: 2024-03-27

## 2024-03-27 RX ORDER — LIDOCAINE 40 MG/G
CREAM TOPICAL ONCE
OUTPATIENT
Start: 2024-03-27 | End: 2024-03-27

## 2024-03-27 RX ORDER — LIDOCAINE 50 MG/G
OINTMENT TOPICAL ONCE
OUTPATIENT
Start: 2024-03-27 | End: 2024-03-27

## 2024-03-27 RX ORDER — LIDOCAINE HYDROCHLORIDE 20 MG/ML
JELLY TOPICAL ONCE
OUTPATIENT
Start: 2024-03-27 | End: 2024-03-27

## 2024-03-27 RX ORDER — GENTAMICIN SULFATE 1 MG/G
OINTMENT TOPICAL ONCE
OUTPATIENT
Start: 2024-03-27 | End: 2024-03-27

## 2024-03-27 RX ORDER — BACITRACIN ZINC AND POLYMYXIN B SULFATE 500; 1000 [USP'U]/G; [USP'U]/G
OINTMENT TOPICAL ONCE
OUTPATIENT
Start: 2024-03-27 | End: 2024-03-27

## 2024-03-27 RX ORDER — CLOBETASOL PROPIONATE 0.5 MG/G
OINTMENT TOPICAL ONCE
OUTPATIENT
Start: 2024-03-27 | End: 2024-03-27

## 2024-03-27 RX ORDER — TRIAMCINOLONE ACETONIDE 1 MG/G
OINTMENT TOPICAL ONCE
OUTPATIENT
Start: 2024-03-27 | End: 2024-03-27

## 2024-03-27 RX ORDER — SODIUM CHLOR/HYPOCHLOROUS ACID 0.033 %
SOLUTION, IRRIGATION IRRIGATION ONCE
OUTPATIENT
Start: 2024-03-27 | End: 2024-03-27

## 2024-03-27 RX ORDER — IBUPROFEN 200 MG
TABLET ORAL ONCE
OUTPATIENT
Start: 2024-03-27 | End: 2024-03-27

## 2024-03-27 NOTE — PLAN OF CARE
Problem: Chronic Conditions and Co-morbidities  Goal: Patient's chronic conditions and co-morbidity symptoms are monitored and maintained or improved  Outcome: Progressing     Problem: Chronic Conditions and Co-morbidities  Goal: Patient's chronic conditions and co-morbidity symptoms are monitored and maintained or improved  Outcome: Progressing     Problem: Wound:  Goal: Will show signs of wound healing; wound closure and no evidence of infection  Description: Will show signs of wound healing; wound closure and no evidence of infection  Outcome: Progressing     Problem: Smoking cessation:  Goal: Ability to formulate a plan to maintain a tobacco-free life will be supported  Description: Ability to formulate a plan to maintain a tobacco-free life will be supported  Outcome: Progressing     Problem: Weight control:  Goal: Ability to maintain an optimal weight for height and age will be supported  Description: Ability to maintain an optimal weight for height and age will be supported  Outcome: Progressing     Problem: Falls - Risk of:  Goal: Will remain free from falls  Description: Will remain free from falls  Outcome: Progressing     Problem: Blood Glucose:  Goal: Ability to maintain appropriate glucose levels will improve  Description: Ability to maintain appropriate glucose levels will improve  Outcome: Progressing
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
## 2024-03-27 NOTE — PATIENT INSTRUCTIONS
across.      12)  Keeping your blood sugar in control will help prevent future ulcerations.  If you smoke, STOP.  Diabetes and smoking are a bad combination that increases your risk of complications from Diabetes.     13)  Avoid activities that can injure the feet -- Some activities increase the risk of foot injury and are not recommended, including walking barefoot, using a heating pad or hot water bottle on the feet, and stepping into the bathtub before testing the temperature.    14)  Ask for foot exams -- Screening for foot complications should be a routine part of most medical visits but is sometimes overlooked. Don't hesitate to ask the health care provider for a foot check at least once a year and more frequently if there are foot changes.    [facility] follow up visit ____________PRN_________________  (Please note your next appointment above and if you are unable to keep, kindly give a 24 hour notice. Thank you.)    If you experience any of the following, please call the Wound Care Center during business hours:    * Increase in Pain  * Temperature over 101  * Increase in drainage from your wound  * Drainage with a foul odor  * Bleeding  * Increase in swelling  * Need for compression bandage changes due to slippage, breakthrough drainage.    If you need medical attention outside of the business hours of the Wound Care Centers please contact your PCP or go to the nearest emergency room.

## 2024-03-27 NOTE — PLAN OF CARE
Problem: Wound:  Goal: Will show signs of wound healing; wound closure and no evidence of infection  Description: Will show signs of wound healing; wound closure and no evidence of infection  3/27/2024 1331 by Van Jean RN  Outcome: Completed  3/27/2024 1324 by Angie Mccoy RN  Outcome: Progressing     Problem: Smoking cessation:  Goal: Ability to formulate a plan to maintain a tobacco-free life will be supported  Description: Ability to formulate a plan to maintain a tobacco-free life will be supported  3/27/2024 1331 by Van Jean RN  Outcome: Completed  3/27/2024 1324 by Angie Mccoy RN  Outcome: Progressing     Problem: Weight control:  Goal: Ability to maintain an optimal weight for height and age will be supported  Description: Ability to maintain an optimal weight for height and age will be supported  3/27/2024 1331 by Van Jean RN  Outcome: Completed  3/27/2024 1324 by Angie Mccoy RN  Outcome: Progressing     Problem: Falls - Risk of:  Goal: Will remain free from falls  Description: Will remain free from falls  3/27/2024 1331 by Van Jean RN  Outcome: Completed  3/27/2024 1324 by Angie Mccoy RN  Outcome: Progressing     Problem: Blood Glucose:  Goal: Ability to maintain appropriate glucose levels will improve  Description: Ability to maintain appropriate glucose levels will improve  3/27/2024 1331 by Van Jean RN  Outcome: Completed  3/27/2024 1324 by Angie Mccoy RN  Outcome: Progressing

## 2024-04-30 NOTE — TELEPHONE ENCOUNTER
Requested Prescriptions     Pending Prescriptions Disp Refills    gabapentin (NEURONTIN) 300 MG capsule [Pharmacy Med Name: GABAPENTIN 300 MG CAPS 300 CAP] 210 capsule 5     Sig: TAKE 2 CAPSULES BY MOUTH IN THE MORNING, TAKE 2 CAPSULES BY MOUTH AT NOON AND TAKE 3 CAPSULES BY MOUTH IN THE EVENING.        Last Office Visit:  4/8/2019  Next Office Visit:  10/15/2019  Last Medication Refill:  4/8/2019 with 5 refills   Eva Michelle up to date: 09/2019     *RX updated to reflect   10/5/2019  fill date*
13:15

## 2024-05-21 ENCOUNTER — HOSPITAL ENCOUNTER (OUTPATIENT)
Dept: WOUND CARE | Age: 49
Discharge: HOME | End: 2024-05-21
Payer: MEDICARE

## 2024-05-21 VITALS
TEMPERATURE: 96.7 F | HEART RATE: 86 BPM | SYSTOLIC BLOOD PRESSURE: 136 MMHG | HEIGHT: 70 IN | DIASTOLIC BLOOD PRESSURE: 73 MMHG | BODY MASS INDEX: 25.05 KG/M2 | RESPIRATION RATE: 18 BRPM | WEIGHT: 175 LBS

## 2024-05-21 DIAGNOSIS — R73.9 HYPERGLYCEMIA: ICD-10-CM

## 2024-05-21 DIAGNOSIS — E11.621 DIABETIC ULCER OF LEFT MIDFOOT ASSOCIATED WITH TYPE 2 DIABETES MELLITUS, WITH FAT LAYER EXPOSED (HCC): Primary | ICD-10-CM

## 2024-05-21 DIAGNOSIS — E11.610 CHARCOT FOOT DUE TO DIABETES MELLITUS (HCC): ICD-10-CM

## 2024-05-21 DIAGNOSIS — L97.422 DIABETIC ULCER OF LEFT MIDFOOT ASSOCIATED WITH TYPE 2 DIABETES MELLITUS, WITH FAT LAYER EXPOSED (HCC): Primary | ICD-10-CM

## 2024-05-21 PROCEDURE — 97597 DBRDMT OPN WND 1ST 20 CM/<: CPT

## 2024-05-21 PROCEDURE — 99213 OFFICE O/P EST LOW 20 MIN: CPT

## 2024-05-21 PROCEDURE — 97597 DBRDMT OPN WND 1ST 20 CM/<: CPT | Performed by: NURSE PRACTITIONER

## 2024-05-21 PROCEDURE — 99215 OFFICE O/P EST HI 40 MIN: CPT | Performed by: NURSE PRACTITIONER

## 2024-05-21 RX ORDER — LIDOCAINE HYDROCHLORIDE 20 MG/ML
JELLY TOPICAL ONCE
Status: DISCONTINUED | OUTPATIENT
Start: 2024-05-21 | End: 2024-05-23 | Stop reason: HOSPADM

## 2024-05-21 RX ORDER — LIDOCAINE HYDROCHLORIDE 40 MG/ML
SOLUTION TOPICAL ONCE
OUTPATIENT
Start: 2024-05-21 | End: 2024-05-21

## 2024-05-21 RX ORDER — IBUPROFEN 200 MG
TABLET ORAL ONCE
OUTPATIENT
Start: 2024-05-21 | End: 2024-05-21

## 2024-05-21 RX ORDER — CLINDAMYCIN HYDROCHLORIDE 300 MG/1
300 CAPSULE ORAL 4 TIMES DAILY
COMMUNITY

## 2024-05-21 RX ORDER — GINSENG 100 MG
CAPSULE ORAL ONCE
OUTPATIENT
Start: 2024-05-21 | End: 2024-05-21

## 2024-05-21 RX ORDER — LIDOCAINE HYDROCHLORIDE 20 MG/ML
JELLY TOPICAL ONCE
OUTPATIENT
Start: 2024-05-21 | End: 2024-05-21

## 2024-05-21 RX ORDER — BACITRACIN ZINC AND POLYMYXIN B SULFATE 500; 1000 [USP'U]/G; [USP'U]/G
OINTMENT TOPICAL ONCE
OUTPATIENT
Start: 2024-05-21 | End: 2024-05-21

## 2024-05-21 RX ORDER — LIDOCAINE 50 MG/G
OINTMENT TOPICAL ONCE
OUTPATIENT
Start: 2024-05-21 | End: 2024-05-21

## 2024-05-21 RX ORDER — GENTAMICIN SULFATE 1 MG/G
OINTMENT TOPICAL ONCE
OUTPATIENT
Start: 2024-05-21 | End: 2024-05-21

## 2024-05-21 RX ORDER — CLOBETASOL PROPIONATE 0.5 MG/G
OINTMENT TOPICAL ONCE
OUTPATIENT
Start: 2024-05-21 | End: 2024-05-21

## 2024-05-21 RX ORDER — PREGABALIN 100 MG/1
200 CAPSULE ORAL 3 TIMES DAILY
COMMUNITY

## 2024-05-21 RX ORDER — LIDOCAINE 40 MG/G
CREAM TOPICAL ONCE
OUTPATIENT
Start: 2024-05-21 | End: 2024-05-21

## 2024-05-21 RX ORDER — BETAMETHASONE DIPROPIONATE 0.5 MG/G
CREAM TOPICAL ONCE
OUTPATIENT
Start: 2024-05-21 | End: 2024-05-21

## 2024-05-21 RX ORDER — SODIUM CHLOR/HYPOCHLOROUS ACID 0.033 %
SOLUTION, IRRIGATION IRRIGATION ONCE
OUTPATIENT
Start: 2024-05-21 | End: 2024-05-21

## 2024-05-21 RX ORDER — TRIAMCINOLONE ACETONIDE 1 MG/G
OINTMENT TOPICAL ONCE
OUTPATIENT
Start: 2024-05-21 | End: 2024-05-21

## 2024-05-21 NOTE — PLAN OF CARE
Problem: Chronic Conditions and Co-morbidities  Goal: Patient's chronic conditions and co-morbidity symptoms are monitored and maintained or improved  Outcome: Progressing     Problem: Wound:  Goal: Will show signs of wound healing; wound closure and no evidence of infection  Description: Will show signs of wound healing; wound closure and no evidence of infection  Outcome: Progressing     Problem: Blood Glucose:  Goal: Ability to maintain appropriate glucose levels will improve  Description: Ability to maintain appropriate glucose levels will improve  Outcome: Progressing

## 2024-05-21 NOTE — PATIENT INSTRUCTIONS
[facility] Wound Care and Hyperbaric Oxygen Therapy   Physician Orders and Discharge Instructions  [address] [place] [address] [address]  [address]  [place] [address]  Telephone: [phone]      FAX [phone]    NAME:  Bárbara Ambrocio  YOB: 1975  MEDICAL RECORD NUMBER:  [id number]  DATE:  5/21/2024    Discharge condition: Stable    Discharge to: Home    Left via:Private automobile    Accompanied by: Self    ECF/HHA: [facility]     Dressing Orders: Left Foot  Soap and water wash  Silver Alginate to open area, cover with dry gauze, roll gauze and medipore tape, change daily  Wear off load boot   Try to stop smoking   FREDY on 6/11 [place] in [address] Test will be done in [address] at 1:00  Wound Care Appointment to follow test at 1:30  X-ray today in [address], Labs today in [address]    [facility] follow up visit _____________1 week________________  (Please note your next appointment above and if you are unable to keep, kindly give a 24 hour notice. Thank you.)    If you experience any of the following, please call the Wound Care Center during business hours:    * Increase in Pain  * Temperature over 101  * Increase in drainage from your wound  * Drainage with a foul odor  * Bleeding  * Increase in swelling  * Need for compression bandage changes due to slippage, breakthrough drainage.    If you need medical attention outside of the business hours of the Wound Care Centers please contact your PCP or go to the nearest emergency room.

## 2024-05-21 NOTE — PROGRESS NOTES
care.  Patient understanding and questions answered.     I spent a total of  60 minutes face to face with the patient. Over 95% of that time was spent on counseling and care coordination.      Patient was told that if symptoms worsen or new symptoms develop they are to go to the emergency department immediately. Patient was educated on diagnosis and treatment plan.  All of patient's questions were answered, and the patient understands the discharge plan.                Discussed appropriate home care of this wound. Wound redressed.  Patient instructions were given.  Recommend no smoking  Offloading instructions given

## 2024-05-21 NOTE — WOUND CARE
Wound Care Supplies      Supply Company:     [facility] [employer] [address] [place] [address] f: [phone] f: [phone] p: [phone] [email or web address]      Ordering Center:     [facility]  [address] [place] [address] [address]  [facility] [id number] [phone]  WOUND CARE Dept: [phone]   FAX NUMBER [phone]    Patient Information:      Bárbara Mahoney  [address] [facility] [address]   [phone]   : 1975  AGE: 49 y.o.     GENDER: female   EPISODE DATE: 2024    Insurance:      PRIMARY INSURANCE:  Plan: MEDICARE PART A AND B  Coverage: MEDICARE  Effective Date: 2024  Group Number: [unfilled]  Subscriber Number: [id number] - (Medicare)    Payer/Plan Subscr  Sex Relation Sub. Ins. ID Effective Group Num   1. MEDICARE - ME* BÁRBARA MAHONEY 1975 Female Self [id number]                                    [address] [address]       Patient Wound Information:      Problem List Items Addressed This Visit          Endocrine    * (Principal) Diabetic ulcer of left midfoot associated with type 2 diabetes mellitus, with fat layer exposed (HCC) - Primary (Chronic)    Relevant Medications    lidocaine (XYLOCAINE) 2 % uro-jet (Start on 2024  4:00 PM)    Other Relevant Orders    Initiate Outpatient Wound Care Protocol    XR FOOT LEFT (2 VIEWS)    CBC with Auto Differential    Comprehensive Metabolic Panel    C-Reactive Protein    Hemoglobin A1C    Prealbumin    Sedimentation Rate    Initiate Outpatient Wound Care Protocol    Charcot foot due to diabetes mellitus (HCC) (Chronic)    Relevant Medications    pregabalin (LYRICA) 100 MG capsule    lidocaine (XYLOCAINE) 2 % uro-jet (Start on 2024  4:00 PM)    Other Relevant Orders    Initiate Outpatient Wound Care Protocol    Initiate Outpatient Wound Care Protocol       Other    Hyperglycemia    Relevant Orders    Hemoglobin A1C       WOUNDS REQUIRING DRESSING SUPPLIES:     Wound 24 Foot Left;Dorsal

## 2024-05-27 NOTE — INTERVAL H&P NOTE
H&P reviewed. The patient was examined and there are no changes to the H&P.  
Vaccine status unknown

## 2024-05-29 ENCOUNTER — OFFICE VISIT (OUTPATIENT)
Dept: WOUND CARE | Facility: HOSPITAL | Age: 49
End: 2024-05-29
Payer: MEDICARE

## 2024-05-29 ENCOUNTER — HOSPITAL ENCOUNTER (OUTPATIENT)
Dept: GENERAL RADIOLOGY | Facility: HOSPITAL | Age: 49
Discharge: HOME | End: 2024-05-29
Payer: MEDICARE

## 2024-05-29 ENCOUNTER — LAB (OUTPATIENT)
Dept: LAB | Facility: HOSPITAL | Age: 49
End: 2024-05-29
Payer: MEDICARE

## 2024-05-29 ENCOUNTER — TRANSCRIBE ORDERS (OUTPATIENT)
Dept: ADMINISTRATIVE | Facility: HOSPITAL | Age: 49
End: 2024-05-29
Payer: MEDICARE

## 2024-05-29 DIAGNOSIS — M86.9 INFLAMMATION OF BONE: ICD-10-CM

## 2024-05-29 DIAGNOSIS — L97.509 DIABETIC FOOT ULCER WITH OSTEOMYELITIS: Primary | ICD-10-CM

## 2024-05-29 DIAGNOSIS — E11.621 DIABETIC FOOT ULCER WITH OSTEOMYELITIS: ICD-10-CM

## 2024-05-29 DIAGNOSIS — L97.521 NON-PRESSURE CHRONIC ULCER OF OTHER PART OF LEFT FOOT LIMITED TO BREAKDOWN OF SKIN: ICD-10-CM

## 2024-05-29 DIAGNOSIS — M86.9 DIABETIC FOOT ULCER WITH OSTEOMYELITIS: Primary | ICD-10-CM

## 2024-05-29 DIAGNOSIS — M14.672 CHARCOT'S JOINT, LEFT ANKLE AND FOOT: ICD-10-CM

## 2024-05-29 DIAGNOSIS — L97.509 TYPE 2 DIABETES MELLITUS WITH FOOT ULCER, UNSPECIFIED WHETHER LONG TERM INSULIN USE: Primary | ICD-10-CM

## 2024-05-29 DIAGNOSIS — E11.69 DIABETIC FOOT ULCER WITH OSTEOMYELITIS: Primary | ICD-10-CM

## 2024-05-29 DIAGNOSIS — E11.621 DIABETIC FOOT ULCER WITH OSTEOMYELITIS: Primary | ICD-10-CM

## 2024-05-29 DIAGNOSIS — E11.40 TYPE 2 DIABETES MELLITUS WITH DIABETIC NEUROPATHY, UNSPECIFIED WHETHER LONG TERM INSULIN USE: ICD-10-CM

## 2024-05-29 DIAGNOSIS — E11.621 TYPE 2 DIABETES MELLITUS WITH FOOT ULCER, UNSPECIFIED WHETHER LONG TERM INSULIN USE: Primary | ICD-10-CM

## 2024-05-29 DIAGNOSIS — L97.509 DIABETIC FOOT ULCER WITH OSTEOMYELITIS: ICD-10-CM

## 2024-05-29 DIAGNOSIS — E11.69 DIABETIC FOOT ULCER WITH OSTEOMYELITIS: ICD-10-CM

## 2024-05-29 DIAGNOSIS — M86.9 DIABETIC FOOT ULCER WITH OSTEOMYELITIS: ICD-10-CM

## 2024-05-29 LAB
ALBUMIN SERPL-MCNC: 4 G/DL (ref 3.5–5.2)
ALBUMIN/GLOB SERPL: 1.2 G/DL
ALP SERPL-CCNC: 80 U/L (ref 39–117)
ALT SERPL W P-5'-P-CCNC: 11 U/L (ref 1–33)
ANION GAP SERPL CALCULATED.3IONS-SCNC: 9 MMOL/L (ref 5–15)
AST SERPL-CCNC: 10 U/L (ref 1–32)
BASOPHILS # BLD AUTO: 0.03 10*3/MM3 (ref 0–0.2)
BASOPHILS NFR BLD AUTO: 0.3 % (ref 0–1.5)
BILIRUB SERPL-MCNC: 0.5 MG/DL (ref 0–1.2)
BUN SERPL-MCNC: 7 MG/DL (ref 6–20)
BUN/CREAT SERPL: 12.3 (ref 7–25)
CALCIUM SPEC-SCNC: 9.2 MG/DL (ref 8.6–10.5)
CHLORIDE SERPL-SCNC: 101 MMOL/L (ref 98–107)
CO2 SERPL-SCNC: 30 MMOL/L (ref 22–29)
CREAT SERPL-MCNC: 0.57 MG/DL (ref 0.57–1)
CRP SERPL-MCNC: 1.5 MG/DL (ref 0–0.5)
DEPRECATED RDW RBC AUTO: 40.3 FL (ref 37–54)
EGFRCR SERPLBLD CKD-EPI 2021: 113 ML/MIN/1.73
EOSINOPHIL # BLD AUTO: 0.28 10*3/MM3 (ref 0–0.4)
EOSINOPHIL NFR BLD AUTO: 2.8 % (ref 0.3–6.2)
ERYTHROCYTE [DISTWIDTH] IN BLOOD BY AUTOMATED COUNT: 13 % (ref 12.3–15.4)
ERYTHROCYTE [SEDIMENTATION RATE] IN BLOOD: 20 MM/HR (ref 0–20)
GLOBULIN UR ELPH-MCNC: 3.4 GM/DL
GLUCOSE SERPL-MCNC: 79 MG/DL (ref 65–99)
HBA1C MFR BLD: 5.3 % (ref 4.8–5.6)
HCT VFR BLD AUTO: 41.2 % (ref 34–46.6)
HGB BLD-MCNC: 13.9 G/DL (ref 12–15.9)
IMM GRANULOCYTES # BLD AUTO: 0.03 10*3/MM3 (ref 0–0.05)
IMM GRANULOCYTES NFR BLD AUTO: 0.3 % (ref 0–0.5)
LYMPHOCYTES # BLD AUTO: 3.06 10*3/MM3 (ref 0.7–3.1)
LYMPHOCYTES NFR BLD AUTO: 31.1 % (ref 19.6–45.3)
MCH RBC QN AUTO: 29.2 PG (ref 26.6–33)
MCHC RBC AUTO-ENTMCNC: 33.7 G/DL (ref 31.5–35.7)
MCV RBC AUTO: 86.6 FL (ref 79–97)
MONOCYTES # BLD AUTO: 0.48 10*3/MM3 (ref 0.1–0.9)
MONOCYTES NFR BLD AUTO: 4.9 % (ref 5–12)
NEUTROPHILS NFR BLD AUTO: 5.97 10*3/MM3 (ref 1.7–7)
NEUTROPHILS NFR BLD AUTO: 60.6 % (ref 42.7–76)
NRBC BLD AUTO-RTO: 0 /100 WBC (ref 0–0.2)
PLATELET # BLD AUTO: 270 10*3/MM3 (ref 140–450)
PMV BLD AUTO: 9.4 FL (ref 6–12)
POTASSIUM SERPL-SCNC: 3.9 MMOL/L (ref 3.5–5.2)
PREALB SERPL-MCNC: 16.5 MG/DL (ref 20–40)
PROT SERPL-MCNC: 7.4 G/DL (ref 6–8.5)
RBC # BLD AUTO: 4.76 10*6/MM3 (ref 3.77–5.28)
SODIUM SERPL-SCNC: 140 MMOL/L (ref 136–145)
WBC NRBC COR # BLD AUTO: 9.85 10*3/MM3 (ref 3.4–10.8)

## 2024-05-29 PROCEDURE — G0463 HOSPITAL OUTPT CLINIC VISIT: HCPCS

## 2024-05-29 PROCEDURE — 83036 HEMOGLOBIN GLYCOSYLATED A1C: CPT

## 2024-05-29 PROCEDURE — 73620 X-RAY EXAM OF FOOT: CPT

## 2024-05-29 PROCEDURE — 36415 COLL VENOUS BLD VENIPUNCTURE: CPT

## 2024-05-29 PROCEDURE — 85652 RBC SED RATE AUTOMATED: CPT

## 2024-05-29 PROCEDURE — 80053 COMPREHEN METABOLIC PANEL: CPT

## 2024-05-29 PROCEDURE — 84134 ASSAY OF PREALBUMIN: CPT

## 2024-05-29 PROCEDURE — 86140 C-REACTIVE PROTEIN: CPT

## 2024-05-29 PROCEDURE — 85025 COMPLETE CBC W/AUTO DIFF WBC: CPT

## 2024-05-30 ENCOUNTER — TELEPHONE (OUTPATIENT)
Dept: PODIATRY | Facility: CLINIC | Age: 49
End: 2024-05-30
Payer: MEDICARE

## 2024-06-06 ENCOUNTER — TRANSCRIBE ORDERS (OUTPATIENT)
Dept: ADMINISTRATIVE | Facility: HOSPITAL | Age: 49
End: 2024-06-06
Payer: MEDICARE

## 2024-06-06 ENCOUNTER — HOSPITAL ENCOUNTER (OUTPATIENT)
Dept: GENERAL RADIOLOGY | Facility: HOSPITAL | Age: 49
Discharge: HOME | End: 2024-06-06
Payer: MEDICARE

## 2024-06-06 ENCOUNTER — OFFICE VISIT (OUTPATIENT)
Dept: WOUND CARE | Facility: HOSPITAL | Age: 49
End: 2024-06-06
Payer: MEDICARE

## 2024-06-06 DIAGNOSIS — M14.671 CHARCOT'S JOINT OF ANKLE, RIGHT: Primary | ICD-10-CM

## 2024-06-06 DIAGNOSIS — M14.671 CHARCOT'S JOINT OF ANKLE, RIGHT: ICD-10-CM

## 2024-06-06 PROCEDURE — 73630 X-RAY EXAM OF FOOT: CPT

## 2024-06-10 NOTE — PROGRESS NOTES
"    [facility] - PODIATRY    Today's Date: 06/17/2024     Patient Name: Donna Swain  MRN: [id number]  [facility]: [id number]  PCP: Benjamin Kelsey MD  Referring Provider: No ref. provider found    SUBJECTIVE     Chief Complaint   Patient presents with    Establish Care     Benjamin Kelsey MD-04/12/2024-DIABETIC FOOT CARE CLINIC- pt states here for diabetic foot/nail care. Is seeing dr helton in wound care for feet and charcots and was told to make a follow up in this clinic with dr helton for next visit- pt denies pain due to neuropathy    Diabetes     106mg/dl BG this am after breakfast      HPI: Donna Swain, a 49 y.o.female, comes to clinic as a(n) new patient complaining of foot pain, complaining of ankle pain, and complaining of toenail/callus issues. Patient has h/o Charcot joint of left ankle, type II DM, hypertension, hypothyroidism, neuropathy, sleep apnea, tobacco abuse . Patient is NIDDM with last stated BG level of 106mg/dl. Last A1C was around 5.2%.  Today patient presents with complaints of elongated, thickened, irregular toenails.  Patient reports she does have difficulty reaching down to her feet to provide care for herself.  Patient has recently been seen in outpatient wound care center but is no longer receiving treatment there as her wound to her left foot has healed.  Does report she is going back to dermatologist in the next week or 2 for further evaluation of psoriasis to the palms of her hands and the soles of her feet.  She does wear a \"Crow\" boot to her left foot at all times.  Denies any open wounds or sores today.  Denies pain except for neuropathy pain. Relates previous treatment(s) including surgery with Dr. Dacosta, outpatient [facility] . Denies any constitutional symptoms. No other pedal complaints at this time.    Past Medical History:   Diagnosis Date    Ankle swelling     Back pain     Charcot's joint of ankle, left     Diabetes     Hemorrhoids     History of stomach " ulcers     Hypertension     Hyperthyroidism     Neuropathy     Sleep apnea     doesn't use c pap. feels like she is suffocating    Thyroid goiter      Past Surgical History:   Procedure Laterality Date    CHOLECYSTECTOMY      lap    COLONOSCOPY      ENDOSCOPY      FOOT HARDWARE REMOVAL Left     MULTIPLE TOOTH EXTRACTIONS      OVARY SURGERY      TOE OSTEOTOMY Left 3/7/2023    Procedure: PARTIAL REMOVAL OF BONE, FIFTH METATARSAL, LEFT FOOT;  Surgeon: Tee Dacosta DPM;  Location:  [facility] OR;  Service: Podiatry;  Laterality: Left;    TUBAL ABDOMINAL LIGATION       Family History   Problem Relation Age of Onset    Cancer Mother     Cancer Father     COPD Father     Diabetes Maternal Grandfather      Social History     Socioeconomic History    Marital status: Single   Tobacco Use    Smoking status: Every Day     Current packs/day: 0.50     Average packs/day: 0.5 packs/day for 0.5 years (0.2 ttl pk-yrs)     Types: Cigarettes     Start date: 2024    Smokeless tobacco: Never   Vaping Use    Vaping status: Never Used   Substance and Sexual Activity    Alcohol use: Not Currently    Drug use: Not Currently    Sexual activity: Defer     Allergies   Allergen Reactions    Vancomycin Hives     Current Outpatient Medications   Medication Sig Dispense Refill    furosemide (LASIX) 20 MG tablet Take 1 tablet by mouth 2 (Two) Times a Day.      ibuprofen (ADVIL,MOTRIN) 800 MG tablet Take 1 tablet by mouth Every 6 (Six) Hours As Needed for Mild Pain.      pregabalin (LYRICA) 100 MG capsule Take 2 capsules by mouth 2 (Two) Times a Day.      Semaglutide (OZEMPIC, 0.25 OR 0.5 MG/DOSE, SC) Inject  under the skin into the appropriate area as directed 1 (One) Time Per Week.       No current facility-administered medications for this visit.     Review of Systems    OBJECTIVE     Vitals:    06/17/24 1444   BP: 126/74       PHYSICAL EXAM  GEN:   Accompanied by none.     Foot/Ankle Exam    RADIOLOGY/NUCLEAR:  XR Foot 3+ View Right    Result  Date: 6/6/2024  Narrative: XR FOOT 3+ VW RIGHT- 6/6/2024 10:37 AM  HISTORY: m14.67; M14.671-Charcot's joint, right ankle and foot  COMPARISON: None available  FINDINGS: Frontal, lateral and oblique radiographs of the foot were provided for review.  Mineralization is normal. There is severe chronic change at the midfoot and hindfoot consistent with Charcot joint. There is sclerosis and cystic change as well as exuberant osteophyte formation dorsally. No acute fractures seen. Soft tissue density noted posterior to the ankle joint of uncertain etiology. This may represent a large ankle joint effusion however cystic or solid mass is not excluded. MRI could be performed for further evaluation. Chronic appearing deformity likely chronic fracture noted of the proximal phalanx of the great toe.    Impression:  1. Severe chronic changes at the midfoot and hindfoot as above consistent with Charcot joint. 2. Round soft tissue mass posterior to the ankle joint as above measuring at least 4.5 cm. MRI could be performed for further evaluation.  This report was signed and finalized on 6/6/2024 2:35 PM by rTay Cunningham.      XR Foot 2 View Left    Result Date: 5/29/2024  Narrative: XR FOOT 2 VW LEFT- 5/29/2024 8:34 AM  HISTORY: M86.9; M86.9-Osteomyelitis, unspecified  COMPARISON: None available  FINDINGS: Frontal, lateral and oblique radiographs of the foot were provided for review.  Severe chronic deformity noted at the midfoot. There is lateral dislocation of the second and fifth metatarsal bones with mixed areas of lucency and sclerosis. No acute fracture is seen. Well-corticated ossific densities are noted in the soft tissues likely due to fragmentation. There is also pes planus deformity on the lateral view. Questionable soft tissue defect is noted laterally. Clinical correlation is recommended. No gas is seen in the soft tissues. There is a moderate hallux valgus deformity with degenerative change at the first MTP joint.       Impression:  1. Severe chronic appearing changes at the midfoot and pes planus deformity. FINDINGS likely related to Charcot joint. 2. Question soft tissue defect laterally without gas in the soft tissues. Clinical correlation is recommended. 3. Moderate hallux valgus deformity and degenerative change at the first MTP joint.  This report was signed and finalized on 5/29/2024 9:45 AM by Tray Cunningham.       LABORATORY/CULTURE RESULTS:      PATHOLOGY RESULTS:       ASSESSMENT/PLAN     Diagnoses and all orders for this visit:    1. Thickened nails (Primary)    2. Right ankle pain, unspecified chronicity    3. Right foot pain    4. Charcot ankle, right  -     MRI Foot Right Without Contrast; Future    5. Type 2 diabetes mellitus with diabetic polyneuropathy, without long-term current use of insulin      Comprehensive lower extremity examination and evaluation was performed.  Discussed findings and treatment plan including risks, benefits, and treatment options with patient in detail. Patient agreed with treatment plan.  After verbal consent obtained, nail(s) x10 debrided of length and thickness with nail nipper without incidence  Patient may maintain nails and calluses at home utilizing emery board or pumice stone between visits as needed  Reviewed at home diabetic foot care including daily foot checks   Management and control of type II DM to be continued per PCP.  CAM boot dispensed at today's visit to wear per right lower extremity for protection and further stabilization of Charcot joint.  MRI ordered today for further evaluation.  Patient interested in routine diabetic foot and nail care services-'s appointment scheduled for 3 months.  Patient to follow-up with Dr. Mckee in 4 weeks for further evaluation of Charcot foot.    An After Visit Summary was printed and given to the patient at discharge, including (if requested) any available informative/educational handouts regarding diagnosis, treatment, or  medications. All questions were answered to patient/family satisfaction. Should symptoms fail to improve or worsen they agree to call or return to clinic or to go to the Emergency Department. Discussed the importance of following up with any needed screening tests/labs/specialist appointments and any requested follow-up recommended by me today. Importance of maintaining follow-up discussed and patient accepts that missed appointments can delay diagnosis and potentially lead to worsening of conditions.  Return in about 4 weeks (around 7/15/2024) for Follow-up with Dr. Mckee, Follow-up in Foot Care Clinic., or sooner if acute issues arise.      This document has been electronically signed by LORENA Paris on June 17, 2024 16:43 CDT

## 2024-06-14 ENCOUNTER — TELEPHONE (OUTPATIENT)
Dept: PODIATRY | Facility: CLINIC | Age: 49
End: 2024-06-14
Payer: MEDICARE

## 2024-06-14 ENCOUNTER — OFFICE VISIT (OUTPATIENT)
Dept: WOUND CARE | Facility: HOSPITAL | Age: 49
End: 2024-06-14
Payer: MEDICARE

## 2024-06-14 PROCEDURE — G0463 HOSPITAL OUTPT CLINIC VISIT: HCPCS

## 2024-06-17 ENCOUNTER — OFFICE VISIT (OUTPATIENT)
Dept: PODIATRY | Facility: CLINIC | Age: 49
End: 2024-06-17
Payer: MEDICARE

## 2024-06-17 VITALS
WEIGHT: 285 LBS | DIASTOLIC BLOOD PRESSURE: 74 MMHG | HEIGHT: 68 IN | SYSTOLIC BLOOD PRESSURE: 126 MMHG | BODY MASS INDEX: 43.19 KG/M2

## 2024-06-17 DIAGNOSIS — M14.671 CHARCOT ANKLE, RIGHT: ICD-10-CM

## 2024-06-17 DIAGNOSIS — L60.2 THICKENED NAILS: Primary | ICD-10-CM

## 2024-06-17 DIAGNOSIS — M79.671 RIGHT FOOT PAIN: ICD-10-CM

## 2024-06-17 DIAGNOSIS — E11.42 TYPE 2 DIABETES MELLITUS WITH DIABETIC POLYNEUROPATHY, WITHOUT LONG-TERM CURRENT USE OF INSULIN: ICD-10-CM

## 2024-06-17 DIAGNOSIS — M25.571 RIGHT ANKLE PAIN, UNSPECIFIED CHRONICITY: ICD-10-CM

## 2024-06-17 PROBLEM — B35.1 ONYCHOMYCOSIS: Status: ACTIVE | Noted: 2017-07-20

## 2024-06-17 PROBLEM — I89.0 LYMPHEDEMA: Status: ACTIVE | Noted: 2020-08-04

## 2024-06-17 PROBLEM — Z87.39 HX OF OSTEOMYELITIS: Status: ACTIVE | Noted: 2023-04-17

## 2024-06-17 PROBLEM — Z72.0 TOBACCO ABUSE: Chronic | Status: ACTIVE | Noted: 2017-04-08

## 2024-06-17 PROCEDURE — 1160F RVW MEDS BY RX/DR IN RCRD: CPT

## 2024-06-17 PROCEDURE — 1159F MED LIST DOCD IN RCRD: CPT

## 2024-06-17 PROCEDURE — 11721 DEBRIDE NAIL 6 OR MORE: CPT

## 2024-06-17 PROCEDURE — 3074F SYST BP LT 130 MM HG: CPT

## 2024-06-17 PROCEDURE — 99214 OFFICE O/P EST MOD 30 MIN: CPT

## 2024-06-17 PROCEDURE — 3078F DIAST BP <80 MM HG: CPT

## 2024-06-17 RX ORDER — PREGABALIN 100 MG/1
200 CAPSULE ORAL 2 TIMES DAILY
COMMUNITY

## 2024-06-20 ENCOUNTER — TELEPHONE (OUTPATIENT)
Dept: PODIATRY | Facility: CLINIC | Age: 49
End: 2024-06-20
Payer: MEDICARE

## 2024-06-20 NOTE — TELEPHONE ENCOUNTER
Caller: MERLY    Relationship: SELF    Best call back number: [phone]    What is the best time to reach you: ANY    Who are you requesting to speak with (clinical staff, provider,  specific staff member): CLINICAL    What was the call regarding: PT WAS SEEN 6/17/24 PUT IN A BOOT- NOTICED TODAY THAT NOW THE TOP PART OF HER FOOT IS SWOLLEN- NOT JUST THE ANKLE- TOES ARE NOT SWOLLEN- IS THIS NORMAL- PLEASE CALL    Is it okay if the provider responds through MyChart: CALL

## 2024-06-21 NOTE — TELEPHONE ENCOUNTER
I would say the swelling is more likely related to her Charcot foot rather than the boot itself. Please encourage her to continue with the boot as much as she can tolerate as this is providing structure, stability, and protection to her foot. Please ask her if she has got her MRI schedule?

## 2024-06-21 NOTE — TELEPHONE ENCOUNTER
Called Patient and went over what Sumi had said patient stated she will keep the boot on and keep elevated as needed.

## 2024-07-08 ENCOUNTER — HOSPITAL ENCOUNTER (OUTPATIENT)
Dept: MRI IMAGING | Facility: HOSPITAL | Age: 49
Discharge: HOME | End: 2024-07-08
Payer: MEDICARE

## 2024-07-08 DIAGNOSIS — M14.671 CHARCOT ANKLE, RIGHT: ICD-10-CM

## 2024-07-08 PROCEDURE — 73718 MRI LOWER EXTREMITY W/O DYE: CPT

## 2024-07-09 ENCOUNTER — OFFICE VISIT (OUTPATIENT)
Dept: WOUND CARE | Facility: HOSPITAL | Age: 49
End: 2024-07-09
Payer: MEDICARE

## 2024-07-09 ENCOUNTER — TRANSCRIBE ORDERS (OUTPATIENT)
Dept: ADMINISTRATIVE | Facility: HOSPITAL | Age: 49
End: 2024-07-09
Payer: MEDICARE

## 2024-07-09 ENCOUNTER — HOSPITAL ENCOUNTER (OUTPATIENT)
Dept: GENERAL RADIOLOGY | Facility: HOSPITAL | Age: 49
Discharge: HOME | End: 2024-07-09
Payer: MEDICARE

## 2024-07-09 ENCOUNTER — LAB REQUISITION (OUTPATIENT)
Dept: LAB | Facility: HOSPITAL | Age: 49
End: 2024-07-09
Payer: MEDICARE

## 2024-07-09 DIAGNOSIS — E11.621 TYPE 2 DIABETES MELLITUS WITH FOOT ULCER (CODE): ICD-10-CM

## 2024-07-09 DIAGNOSIS — M86.9 INFLAMMATION OF BONE: Primary | ICD-10-CM

## 2024-07-09 DIAGNOSIS — M86.9 INFLAMMATION OF BONE: ICD-10-CM

## 2024-07-09 PROCEDURE — 87075 CULTR BACTERIA EXCEPT BLOOD: CPT

## 2024-07-09 PROCEDURE — 73630 X-RAY EXAM OF FOOT: CPT

## 2024-07-09 PROCEDURE — 87205 SMEAR GRAM STAIN: CPT

## 2024-07-09 PROCEDURE — 87186 SC STD MICRODIL/AGAR DIL: CPT

## 2024-07-09 PROCEDURE — 87070 CULTURE OTHR SPECIMN AEROBIC: CPT

## 2024-07-09 PROCEDURE — 87176 TISSUE HOMOGENIZATION CULTR: CPT

## 2024-07-12 LAB
BACTERIA SPEC AEROBE CULT: ABNORMAL
GRAM STN SPEC: ABNORMAL

## 2024-07-15 LAB — BACTERIA SPEC ANAEROBE CULT: ABNORMAL

## 2024-07-19 ENCOUNTER — OFFICE VISIT (OUTPATIENT)
Dept: WOUND CARE | Facility: HOSPITAL | Age: 49
End: 2024-07-19
Payer: MEDICARE

## 2024-07-19 DIAGNOSIS — L97.522 NON-PRESSURE CHRONIC ULCER OF OTHER PART OF LEFT FOOT WITH FAT LAYER EXPOSED: ICD-10-CM

## 2024-07-19 DIAGNOSIS — E11.621 TYPE 2 DIABETES MELLITUS WITH FOOT ULCER, UNSPECIFIED WHETHER LONG TERM INSULIN USE: Primary | ICD-10-CM

## 2024-07-19 DIAGNOSIS — M14.672 CHARCOT'S JOINT, LEFT ANKLE AND FOOT: ICD-10-CM

## 2024-07-19 DIAGNOSIS — L97.509 TYPE 2 DIABETES MELLITUS WITH FOOT ULCER, UNSPECIFIED WHETHER LONG TERM INSULIN USE: Primary | ICD-10-CM

## 2024-07-19 DIAGNOSIS — E11.40 TYPE 2 DIABETES MELLITUS WITH DIABETIC NEUROPATHY, UNSPECIFIED WHETHER LONG TERM INSULIN USE: ICD-10-CM

## 2024-07-22 ENCOUNTER — TELEPHONE (OUTPATIENT)
Age: 49
End: 2024-07-22
Payer: MEDICARE

## 2024-07-22 NOTE — PROGRESS NOTES
[facility] - PODIATRY    Today's Date: 07/23/2024     Patient Name: Donna Swain  MRN: [id number]  CSN: [id number]  PCP: Benjamin Kelsey MD  Referring Provider: No ref. provider found    SUBJECTIVE     Chief Complaint   Patient presents with    Follow-up     Benjamin Kelsey MD-04/12/2024-Charcot ankle right MRI in chart-pt states she is here today for left ankle presents in CAM boot no changes/ Mri in chart-pt denies unusual pain    Diabetes     HPI: Donna Swain, a 49 y.o.female, comes to clinic as a(n) established patient complaining of foot pain and complaining of ankle pain. Patient has h/o Charcot joint of left ankle, type II DM, hypertension, hypothyroidism, neuropathy, sleep apnea, tobacco abuse . Patient is NIDDM and unsure of last BG level. Last A1C was around 5.3%.   Patient has recently been seen in outpatient wound care center for a left foot ulceration.  Recently completed a course of abx.  She does wear a CROW boot to her left foot at all times.  Notes pain to her right foot from Charcot.  Recently had MRI performed. Has been wearing a CAM. 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 Denies pain except for neuropathy pain. Relates previous treatment(s) including surgery with Dr. Dacosta, outpatient [facility] . Denies any constitutional symptoms. No other pedal complaints at this time.    Past Medical History:   Diagnosis Date    Ankle swelling     Back pain     Charcot's joint of ankle, left     Diabetes     Hemorrhoids     History of stomach ulcers     Hypertension     Hyperthyroidism     Neuropathy     Sleep apnea     doesn't use c pap. feels like she is suffocating    Thyroid goiter      Past Surgical History:   Procedure Laterality Date    CHOLECYSTECTOMY      lap    COLONOSCOPY      ENDOSCOPY      FOOT HARDWARE REMOVAL Left     MULTIPLE TOOTH EXTRACTIONS      OVARY SURGERY      TOE OSTEOTOMY Left 3/7/2023    Procedure: PARTIAL REMOVAL OF BONE, FIFTH METATARSAL, LEFT FOOT;   Surgeon: Tee Dacosta DPM;  Location:  [facility] OR;  Service: Podiatry;  Laterality: Left;    TUBAL ABDOMINAL LIGATION       Family History   Problem Relation Age of Onset    Cancer Mother     Cancer Father     COPD Father     Diabetes Maternal Grandfather      Social History     Socioeconomic History    Marital status: Single   Tobacco Use    Smoking status: Every Day     Current packs/day: 0.50     Average packs/day: 0.5 packs/day for 0.6 years (0.3 ttl pk-yrs)     Types: Cigarettes     Start date: 2024     Passive exposure: Current    Smokeless tobacco: Never   Vaping Use    Vaping status: Never Used   Substance and Sexual Activity    Alcohol use: Not Currently    Drug use: Not Currently    Sexual activity: Defer     Allergies   Allergen Reactions    Vancomycin Hives     Current Outpatient Medications   Medication Sig Dispense Refill    furosemide (LASIX) 20 MG tablet Take 1 tablet by mouth 2 (Two) Times a Day.      ibuprofen (ADVIL,MOTRIN) 800 MG tablet Take 1 tablet by mouth Every 6 (Six) Hours As Needed for Mild Pain.      pregabalin (LYRICA) 100 MG capsule Take 2 capsules by mouth 2 (Two) Times a Day.      Semaglutide (OZEMPIC, 0.25 OR 0.5 MG/DOSE, SC) Inject  under the skin into the appropriate area as directed 1 (One) Time Per Week.       No current facility-administered medications for this visit.     Review of Systems   Constitutional:  Negative for chills and fever.   HENT:  Negative for congestion.    Respiratory:  Negative for shortness of breath.    Cardiovascular:  Positive for leg swelling. Negative for chest pain.   Gastrointestinal:  Negative for constipation, diarrhea, nausea and vomiting.   Musculoskeletal:  Positive for arthralgias and gait problem.        Foot pain   Skin:  Positive for wound.   Neurological:  Positive for numbness.       OBJECTIVE     Vitals:    07/23/24 0755   BP: 124/90   Pulse: 91   SpO2: 94%         PHYSICAL EXAM  GEN:   Accompanied by none.     Foot/Ankle  Exam    GENERAL  Appearance:  appears stated age and obese  Orientation:  AAOx3  Affect:  appropriate  Assistance:  independent  Right shoe gear: CAM boot    VASCULAR     Right Foot Vascularity   Dorsalis pedis:  2+  Posterior tibial:  2+  Skin temperature:  warm  Edema gradin+ and pitting  CFT:  3  Pedal hair growth:  Present  Varicosities:  none     Left Foot Vascularity   Dorsalis pedis:  2+  Posterior tibial:  2+  Skin temperature:  warm  Edema gradin+ and pitting  CFT:  3  Pedal hair growth:  Present  Varicosities:  none     NEUROLOGIC     Right Foot Neurologic   Light touch sensation: diminished  Vibratory sensation: diminished  Hot/Cold sensation: diminished     Left Foot Neurologic   Light touch sensation: diminished  Vibratory sensation: diminished  Hot/Cold sensation:  diminished    MUSCULOSKELETAL     Right Foot Musculoskeletal   Ecchymosis:  none  Tenderness:  plantar arch tenderness and midtarsal joints tenderness   (posterior lower leg)  Arch:  Charcot  Hallux valgus: Yes       Left Foot Musculoskeletal   Ecchymosis:  none  Tenderness:  plantar arch tenderness and midtarsal joints tenderness  Arch:  Charcot  Hallux valgus: Yes      MUSCLE STRENGTH     Right Foot Muscle Strength   Foot dorsiflexion:  4+  Foot plantar flexion:  4+  Foot inversion:  4+  Foot eversion:  4+     Left Foot Muscle Strength   Foot dorsiflexion:  4+  Foot plantar flexion:  4+  Foot inversion:  4+  Foot eversion:  4+    RANGE OF MOTION     Right Foot Range of Motion   Foot and ankle ROM within normal limits       Left Foot Range of Motion   Foot and ankle ROM within normal limits      DERMATOLOGIC      Right Foot Dermatologic   Skin  Positive for skin changes (psoriasis).      Left Foot Dermatologic   Skin  Positive for skin changes (psoriasis) and ulcer (bandaged).       RADIOLOGY/NUCLEAR:  XR Foot 3+ View Left    Result Date: 2024  Narrative: XR FOOT 3+ VW LEFT- 2024 8:27 AM  HISTORY: m86.9;  M86.9-Osteomyelitis, unspecified  COMPARISON: 5/29/2024  FINDINGS: Frontal, lateral and oblique radiographs of the foot were provided for review.  Again there are chronic changes at the midfoot with areas of mixed sclerosis and lucency and fragmentation with lateral subluxation of the metatarsal bones and widening of the Lisfranc joint. FINDINGS again are likely related to a Charcot joint. There is a moderate hallux valgus deformity and osteopenia. No acute fracture is seen. Soft tissue defect laterally again noted with increasing well-corticated ossification in the soft tissues. No gas in the soft tissues is seen. No definite erosive changes are identified.      Impression:  1. Chronic changes with osteopenia and Charcot joint formation at the midfoot, moderate hallux valgus deformity, and pes planus deformity. 2. Soft tissue defect likely ulceration laterally at the midfoot may've increased in size with increasing ossification of the soft tissues. 3. No definite erosive changes identified to suggest osteomyelitis. MRI could be performed for further evaluation if there is a continued clinical concern for osteomyelitis.  This report was signed and finalized on 7/9/2024 9:41 AM by Tray Cunningham.      MRI Foot Right Without Contrast    Result Date: 7/9/2024  Narrative: EXAM: MRI FOOT RIGHT WO CONTRAST-  INDICATION: Charcot; M14.671-Charcot's joint, right ankle and foot  COMPARISON: 6/6/2024 radiograph  TECHNIQUE: Multiplanar T1 and T2 weighted MR sequences were obtained of the right foot without contrast.  FINDINGS:  Osseous structures/articulations:  There are extensive degenerative changes involving the subtalar, Charcot, and midfoot articulations with associated subchondral cystic change, reactive marrow edema, and synovitis. There is also associated bony remodeling, fragmentation, and disorganization. There is a dominant fragment dorsal to the talonavicular articulation measuring up to 1.7 cm.  No definite  evidence of osteomyelitis. No obvious acute fracture.  There is also moderate degenerative changes about the tibiotalar joint with subchondral cystic/reactive changes about the medial talar dome.  Partially visualized T2 hyperintense signal with a peripheral T1 hypointense rim within the mid fifth metatarsal shaft (series 901 image 8).  There are multiple ganglion cyst about the posterior subtalar joint, including a 3.5 x 3.6 cm ganglion cyst along the dorsal aspect of the subtalar joint corresponding with the previously demonstrated mass on the prior radiograph (series 501 image 16).   Tendons: Mild tendinosis of the peroneus brevis in the retromalleolar groove. Mildly increased fluid within the peroneus longus and brevis synovial sheath within the retromalleolar groove (series 901 image 28). There is also mildly increased fluid within the flexor hallucis longus tendon sheath just proximal to the knot of Wade (series 702 image 20).  Musculature: There is diffuse muscle atrophy and mild generalized edema.  Soft tissues: No visible soft tissue ulcer. There is diffuse circumferential subcutaneous fat edema most notable about the dorsal aspect of the forefoot.      Impression:  Previously demonstrated mass on prior radiograph corresponds with a 3.6 cm ganglion cyst along the dorsal aspect of the subtalar joint. There are additional smaller ganglion cyst along the posterior subtalar joint with associated synovitis.  Changes as detailed above about the midfoot and hindfoot articulations, compatible with Charcot joint. No definite evidence of osteomyelitis or underlying soft tissue ulcer/sinus tract.  Diffuse muscle atrophy and edema which may be partially related to denervation changes.  Circumferential soft tissue edema most notable about the dorsal forefoot is likely reactive. No visible fluid collection to suggest abscess.  Mild peroneus and flexor hallucis longus tenosynovitis.  Partially visualized signal changes  within the mid to distal aspect of the fifth metatarsal shaft is felt to most likely represent a bone infarct given presence of normal central marrow signal.  This report was signed and finalized on 7/9/2024 8:59 AM by Nik Roberts.       LABORATORY/CULTURE RESULTS:      PATHOLOGY RESULTS:       ASSESSMENT/PLAN     Diagnoses and all orders for this visit:    1. Charcot's joint, right ankle and foot (Primary)    2. Right ankle pain, unspecified chronicity    3. Right foot pain    4. Type 2 diabetes mellitus with diabetic polyneuropathy, without long-term current use of insulin    5. Tobacco abuse    6. Non-pressure chronic ulcer of other part of left foot with bone involvement without evidence of necrosis    7. Lymphedema    8. BMI 40.0-44.9, adult        Comprehensive lower extremity examination and evaluation was performed.  Discussed findings and treatment plan including risks, benefits, and treatment options with patient in detail. Patient agreed with treatment plan.  Patient may maintain nails and calluses at home utilizing emery board or pumice stone between visits as needed  Reviewed at home diabetic foot care including daily foot checks   Management and control of type II DM to be continued per PCP.  Reviewed xrays and MRI with patient.  Patient has bilateral Charoct arthropathy and a large posterior STJ ganglion on the right.  Initially will need to continue treatment at [facility] for ulceration.   I do feel that the patient would ultimately benefit from surgical stabilization of her feet given the Charcot but she will need to be nicotine free and all wounds healed. Ideally her pustular psoriasis would also improve. May need TCC and midfoot fusion right with DENZEL and removal of cyst.  Continue use of CAM and CROW.   Limit WB when possible.  An After Visit Summary was printed and given to the patient at discharge, including (if requested) any available informative/educational handouts regarding diagnosis,  treatment, or medications. All questions were answered to patient/family satisfaction. Should symptoms fail to improve or worsen they agree to call or return to clinic or to go to the Emergency Department. Discussed the importance of following up with any needed screening tests/labs/specialist appointments and any requested follow-up recommended by me today. Importance of maintaining follow-up discussed and patient accepts that missed appointments can delay diagnosis and potentially lead to worsening of conditions.  Return in about 2 months (around 9/23/2024) for Recheck, Follow-up with Dr. Mckee., or sooner if acute issues arise.      This document has been electronically signed by Ciro Mckee DPM on July 23, 2024 09:01 CDT

## 2024-07-23 ENCOUNTER — OFFICE VISIT (OUTPATIENT)
Age: 49
End: 2024-07-23
Payer: MEDICARE

## 2024-07-23 VITALS
SYSTOLIC BLOOD PRESSURE: 124 MMHG | WEIGHT: 280 LBS | HEART RATE: 91 BPM | DIASTOLIC BLOOD PRESSURE: 90 MMHG | BODY MASS INDEX: 42.44 KG/M2 | OXYGEN SATURATION: 94 % | HEIGHT: 68 IN

## 2024-07-23 DIAGNOSIS — M25.571 RIGHT ANKLE PAIN, UNSPECIFIED CHRONICITY: ICD-10-CM

## 2024-07-23 DIAGNOSIS — M14.671 CHARCOT'S JOINT, RIGHT ANKLE AND FOOT: Primary | ICD-10-CM

## 2024-07-23 DIAGNOSIS — E11.42 TYPE 2 DIABETES MELLITUS WITH DIABETIC POLYNEUROPATHY, WITHOUT LONG-TERM CURRENT USE OF INSULIN: ICD-10-CM

## 2024-07-23 DIAGNOSIS — Z72.0 TOBACCO ABUSE: Chronic | ICD-10-CM

## 2024-07-23 DIAGNOSIS — M79.671 RIGHT FOOT PAIN: ICD-10-CM

## 2024-07-23 DIAGNOSIS — I89.0 LYMPHEDEMA: ICD-10-CM

## 2024-07-23 DIAGNOSIS — L97.526 NON-PRESSURE CHRONIC ULCER OF OTHER PART OF LEFT FOOT WITH BONE INVOLVEMENT WITHOUT EVIDENCE OF NECROSIS: ICD-10-CM

## 2024-07-23 PROCEDURE — 3080F DIAST BP >= 90 MM HG: CPT | Performed by: PODIATRIST

## 2024-07-23 PROCEDURE — 1159F MED LIST DOCD IN RCRD: CPT | Performed by: PODIATRIST

## 2024-07-23 PROCEDURE — 99213 OFFICE O/P EST LOW 20 MIN: CPT | Performed by: PODIATRIST

## 2024-07-23 PROCEDURE — 3074F SYST BP LT 130 MM HG: CPT | Performed by: PODIATRIST

## 2024-07-23 PROCEDURE — 1160F RVW MEDS BY RX/DR IN RCRD: CPT | Performed by: PODIATRIST

## 2024-08-01 ENCOUNTER — OFFICE VISIT (OUTPATIENT)
Dept: WOUND CARE | Facility: HOSPITAL | Age: 49
End: 2024-08-01
Payer: MEDICARE

## 2024-08-01 DIAGNOSIS — L97.509 TYPE 2 DIABETES MELLITUS WITH FOOT ULCER, UNSPECIFIED WHETHER LONG TERM INSULIN USE: Primary | ICD-10-CM

## 2024-08-01 DIAGNOSIS — L97.526 NON-PRESSURE CHRONIC ULCER OF OTHER PART OF LEFT FOOT WITH BONE INVOLVEMENT WITHOUT EVIDENCE OF NECROSIS: ICD-10-CM

## 2024-08-01 DIAGNOSIS — E11.621 TYPE 2 DIABETES MELLITUS WITH FOOT ULCER, UNSPECIFIED WHETHER LONG TERM INSULIN USE: Primary | ICD-10-CM

## 2024-08-01 DIAGNOSIS — M14.672 CHARCOT'S JOINT, LEFT ANKLE AND FOOT: ICD-10-CM

## 2024-08-02 ENCOUNTER — TRANSCRIBE ORDERS (OUTPATIENT)
Dept: ADMINISTRATIVE | Facility: HOSPITAL | Age: 49
End: 2024-08-02
Payer: MEDICARE

## 2024-08-02 DIAGNOSIS — M86.9 OSTEOMYELITIS OF LEFT FOOT, UNSPECIFIED TYPE: Primary | ICD-10-CM

## 2024-08-07 ENCOUNTER — HOSPITAL ENCOUNTER (OUTPATIENT)
Dept: MRI IMAGING | Facility: HOSPITAL | Age: 49
Discharge: HOME | End: 2024-08-07
Admitting: PODIATRIST
Payer: MEDICARE

## 2024-08-07 PROCEDURE — 73718 MRI LOWER EXTREMITY W/O DYE: CPT

## 2024-08-15 ENCOUNTER — OFFICE VISIT (OUTPATIENT)
Dept: WOUND CARE | Facility: HOSPITAL | Age: 49
End: 2024-08-15
Payer: MEDICARE

## 2024-08-15 DIAGNOSIS — E11.621 TYPE 2 DIABETES MELLITUS WITH FOOT ULCER, UNSPECIFIED WHETHER LONG TERM INSULIN USE: Primary | ICD-10-CM

## 2024-08-15 DIAGNOSIS — L97.509 TYPE 2 DIABETES MELLITUS WITH FOOT ULCER, UNSPECIFIED WHETHER LONG TERM INSULIN USE: Primary | ICD-10-CM

## 2024-08-15 DIAGNOSIS — L97.526 NON-PRESSURE CHRONIC ULCER OF OTHER PART OF LEFT FOOT WITH BONE INVOLVEMENT WITHOUT EVIDENCE OF NECROSIS: ICD-10-CM

## 2024-08-15 DIAGNOSIS — M14.672 CHARCOT'S JOINT, LEFT ANKLE AND FOOT: ICD-10-CM

## 2024-08-15 DIAGNOSIS — E11.40 TYPE 2 DIABETES MELLITUS WITH DIABETIC NEUROPATHY, UNSPECIFIED WHETHER LONG TERM INSULIN USE: ICD-10-CM

## 2024-08-30 ENCOUNTER — OFFICE VISIT (OUTPATIENT)
Dept: WOUND CARE | Facility: HOSPITAL | Age: 49
End: 2024-08-30
Payer: MEDICARE

## 2024-08-30 DIAGNOSIS — E11.40 TYPE 2 DIABETES MELLITUS WITH DIABETIC NEUROPATHY, UNSPECIFIED WHETHER LONG TERM INSULIN USE: ICD-10-CM

## 2024-08-30 DIAGNOSIS — L97.509 TYPE 2 DIABETES MELLITUS WITH FOOT ULCER, UNSPECIFIED WHETHER LONG TERM INSULIN USE: Primary | ICD-10-CM

## 2024-08-30 DIAGNOSIS — E11.621 TYPE 2 DIABETES MELLITUS WITH FOOT ULCER, UNSPECIFIED WHETHER LONG TERM INSULIN USE: Primary | ICD-10-CM

## 2024-08-30 DIAGNOSIS — L97.526 NON-PRESSURE CHRONIC ULCER OF OTHER PART OF LEFT FOOT WITH BONE INVOLVEMENT WITHOUT EVIDENCE OF NECROSIS: ICD-10-CM

## 2024-08-30 DIAGNOSIS — M14.672 CHARCOT'S JOINT, LEFT ANKLE AND FOOT: ICD-10-CM

## 2024-09-03 ENCOUNTER — OFFICE VISIT (OUTPATIENT)
Dept: WOUND CARE | Facility: HOSPITAL | Age: 49
End: 2024-09-03
Payer: MEDICARE

## 2024-09-06 ENCOUNTER — OFFICE VISIT (OUTPATIENT)
Dept: WOUND CARE | Facility: HOSPITAL | Age: 49
End: 2024-09-06
Payer: MEDICARE

## 2024-09-09 ENCOUNTER — HOSPITAL ENCOUNTER (OUTPATIENT)
Dept: GENERAL RADIOLOGY | Facility: HOSPITAL | Age: 49
Discharge: HOME | End: 2024-09-09
Payer: MEDICARE

## 2024-09-09 ENCOUNTER — OFFICE VISIT (OUTPATIENT)
Dept: WOUND CARE | Facility: HOSPITAL | Age: 49
End: 2024-09-09
Payer: MEDICARE

## 2024-09-09 ENCOUNTER — TRANSCRIBE ORDERS (OUTPATIENT)
Dept: ADMINISTRATIVE | Facility: HOSPITAL | Age: 49
End: 2024-09-09
Payer: MEDICARE

## 2024-09-09 ENCOUNTER — LAB REQUISITION (OUTPATIENT)
Dept: LAB | Facility: HOSPITAL | Age: 49
End: 2024-09-09
Payer: MEDICARE

## 2024-09-09 DIAGNOSIS — R52 PAIN ASSOCIATED WITH WOUND: Primary | ICD-10-CM

## 2024-09-09 DIAGNOSIS — E11.40 TYPE 2 DIABETES MELLITUS WITH DIABETIC NEUROPATHY, UNSPECIFIED WHETHER LONG TERM INSULIN USE: ICD-10-CM

## 2024-09-09 DIAGNOSIS — M14.672 CHARCOT'S JOINT, LEFT ANKLE AND FOOT: ICD-10-CM

## 2024-09-09 DIAGNOSIS — M86.9 INFLAMMATION OF BONE: ICD-10-CM

## 2024-09-09 DIAGNOSIS — L97.526 NON-PRESSURE CHRONIC ULCER OF OTHER PART OF LEFT FOOT WITH BONE INVOLVEMENT WITHOUT EVIDENCE OF NECROSIS: ICD-10-CM

## 2024-09-09 DIAGNOSIS — L97.509 TYPE 2 DIABETES MELLITUS WITH FOOT ULCER, UNSPECIFIED WHETHER LONG TERM INSULIN USE: Primary | ICD-10-CM

## 2024-09-09 DIAGNOSIS — M86.9 INFLAMMATION OF BONE: Primary | ICD-10-CM

## 2024-09-09 DIAGNOSIS — A49.9 BACTERIAL INFECTION, UNSPECIFIED: ICD-10-CM

## 2024-09-09 DIAGNOSIS — E11.621 TYPE 2 DIABETES MELLITUS WITH FOOT ULCER, UNSPECIFIED WHETHER LONG TERM INSULIN USE: Primary | ICD-10-CM

## 2024-09-09 DIAGNOSIS — E11.621 TYPE 2 DIABETES MELLITUS WITH FOOT ULCER (CODE): ICD-10-CM

## 2024-09-09 DIAGNOSIS — T14.8XXA PAIN ASSOCIATED WITH WOUND: Primary | ICD-10-CM

## 2024-09-09 DIAGNOSIS — Z72.0 TOBACCO USE: ICD-10-CM

## 2024-09-09 PROCEDURE — 73630 X-RAY EXAM OF FOOT: CPT

## 2024-09-09 PROCEDURE — 88307 TISSUE EXAM BY PATHOLOGIST: CPT | Performed by: PODIATRIST

## 2024-09-09 PROCEDURE — 99214 OFFICE O/P EST MOD 30 MIN: CPT | Performed by: PODIATRIST

## 2024-09-09 PROCEDURE — 87176 TISSUE HOMOGENIZATION CULTR: CPT | Performed by: PODIATRIST

## 2024-09-09 PROCEDURE — 11044 DBRDMT BONE 1ST 20 SQ CM/<: CPT | Performed by: PODIATRIST

## 2024-09-09 PROCEDURE — 87070 CULTURE OTHR SPECIMN AEROBIC: CPT | Performed by: PODIATRIST

## 2024-09-09 PROCEDURE — 87077 CULTURE AEROBIC IDENTIFY: CPT | Performed by: PODIATRIST

## 2024-09-09 PROCEDURE — 87075 CULTR BACTERIA EXCEPT BLOOD: CPT | Performed by: PODIATRIST

## 2024-09-09 PROCEDURE — 88311 DECALCIFY TISSUE: CPT | Performed by: PODIATRIST

## 2024-09-09 PROCEDURE — 87205 SMEAR GRAM STAIN: CPT | Performed by: PODIATRIST

## 2024-09-09 PROCEDURE — 87186 SC STD MICRODIL/AGAR DIL: CPT | Performed by: PODIATRIST

## 2024-09-09 RX ORDER — HYDROCODONE BITARTRATE AND ACETAMINOPHEN 7.5; 325 MG/1; MG/1
1 TABLET ORAL EVERY 6 HOURS PRN
Qty: 12 TABLET | Refills: 0 | Status: SHIPPED | OUTPATIENT
Start: 2024-09-09

## 2024-09-10 ENCOUNTER — LAB REQUISITION (OUTPATIENT)
Dept: LAB | Facility: HOSPITAL | Age: 49
End: 2024-09-10
Payer: MEDICARE

## 2024-09-10 DIAGNOSIS — E11.621 TYPE 2 DIABETES MELLITUS WITH FOOT ULCER (CODE): ICD-10-CM

## 2024-09-11 LAB
CYTO UR: NORMAL
LAB AP CASE REPORT: NORMAL
LAB AP CLINICAL INFORMATION: NORMAL
Lab: NORMAL
PATH REPORT.FINAL DX SPEC: NORMAL
PATH REPORT.GROSS SPEC: NORMAL

## 2024-09-13 LAB
BACTERIA SPEC AEROBE CULT: ABNORMAL
BACTERIA SPEC AEROBE CULT: ABNORMAL
GRAM STN SPEC: ABNORMAL

## 2024-09-15 LAB — BACTERIA SPEC ANAEROBE CULT: NORMAL

## 2024-09-16 ENCOUNTER — OFFICE VISIT (OUTPATIENT)
Dept: WOUND CARE | Facility: HOSPITAL | Age: 49
End: 2024-09-16
Payer: MEDICARE

## 2024-09-16 ENCOUNTER — TELEPHONE (OUTPATIENT)
Age: 49
End: 2024-09-16
Payer: MEDICARE

## 2024-09-16 ENCOUNTER — PREP FOR SURGERY (OUTPATIENT)
Dept: OTHER | Facility: HOSPITAL | Age: 49
End: 2024-09-16
Payer: MEDICARE

## 2024-09-16 DIAGNOSIS — E11.621 TYPE 2 DIABETES MELLITUS WITH FOOT ULCER, UNSPECIFIED WHETHER LONG TERM INSULIN USE: Primary | ICD-10-CM

## 2024-09-16 DIAGNOSIS — M86.172 OTHER ACUTE OSTEOMYELITIS, LEFT ANKLE AND FOOT: ICD-10-CM

## 2024-09-16 DIAGNOSIS — L97.509 TYPE 2 DIABETES MELLITUS WITH FOOT ULCER, UNSPECIFIED WHETHER LONG TERM INSULIN USE: Primary | ICD-10-CM

## 2024-09-16 DIAGNOSIS — M89.8X7 EXOSTOSIS OF LEFT FOOT: ICD-10-CM

## 2024-09-16 DIAGNOSIS — M86.172 ACUTE OSTEOMYELITIS OF LEFT FOOT: Primary | ICD-10-CM

## 2024-09-16 DIAGNOSIS — M14.672 CHARCOT'S JOINT, LEFT ANKLE AND FOOT: ICD-10-CM

## 2024-09-16 DIAGNOSIS — L97.524 NON-PRESSURE CHRONIC ULCER OF OTHER PART OF LEFT FOOT WITH NECROSIS OF BONE: ICD-10-CM

## 2024-09-16 NOTE — H&P (VIEW-ONLY)
[facility] - PODIATRY    Today's Date: 09/16/2024     Patient Name: Donna Swain  MRN: [id number]  CSN: [id number]  PCP: Benjamin Kelsey MD  Referring Provider: No ref. provider found    SUBJECTIVE     No chief complaint on file.    HPI: Donna Swain, a 49 y.o.female, comes to clinic as a(n) established patient complaining of nonhealing left foot ulceration . Patient has h/o Charcot joint of left ankle, type II DM, hypertension, hypothyroidism, neuropathy, sleep apnea, tobacco abuse . Patient is NIDDM and unsure of last BG level. Last A1C was around 5.3%.   Patient has been undergoing treatment at [facility] outpatient wound care center for a left foot ulceration.  Recent pathology results have shown acute osteomyelitis.  She has been placed on 6 weeks of antibiotic therapy.  Her imaging shows plantar prominence resulting in chronic ulceration. Denies pain except for neuropathy pain. Relates previous treatment(s) including surgery with Dr. Dacosta, outpatient [facility] . Denies any constitutional symptoms. No other pedal complaints at this time.    Past Medical History:   Diagnosis Date    Ankle swelling     Back pain     Charcot's joint of ankle, left     Diabetes     Hemorrhoids     History of stomach ulcers     Hypertension     Hyperthyroidism     Neuropathy     Sleep apnea     doesn't use c pap. feels like she is suffocating    Thyroid goiter      Past Surgical History:   Procedure Laterality Date    CHOLECYSTECTOMY      lap    COLONOSCOPY      ENDOSCOPY      FOOT HARDWARE REMOVAL Left     MULTIPLE TOOTH EXTRACTIONS      OVARY SURGERY      TOE OSTEOTOMY Left 3/7/2023    Procedure: PARTIAL REMOVAL OF BONE, FIFTH METATARSAL, LEFT FOOT;  Surgeon: Tee Dacosta DPM;  Location: [facility] OR;  Service: Podiatry;  Laterality: Left;    TUBAL ABDOMINAL LIGATION       Family History   Problem Relation Age of Onset    Cancer Mother     Cancer Father     COPD Father     Diabetes Maternal Grandfather       Social History     Socioeconomic History    Marital status: Single   Tobacco Use    Smoking status: Every Day     Current packs/day: 0.50     Average packs/day: 0.5 packs/day for 0.7 years (0.4 ttl pk-yrs)     Types: Cigarettes     Start date: 2024     Passive exposure: Current    Smokeless tobacco: Never   Vaping Use    Vaping status: Never Used   Substance and Sexual Activity    Alcohol use: Not Currently    Drug use: Not Currently    Sexual activity: Defer     Allergies   Allergen Reactions    Vancomycin Hives     Current Outpatient Medications   Medication Sig Dispense Refill    furosemide (LASIX) 20 MG tablet Take 1 tablet by mouth 2 (Two) Times a Day.      HYDROcodone-acetaminophen (NORCO) 7.5-325 MG per tablet Take 1 tablet by mouth Every 6 (Six) Hours As Needed for Moderate Pain. 12 tablet 0    ibuprofen (ADVIL,MOTRIN) 800 MG tablet Take 1 tablet by mouth Every 6 (Six) Hours As Needed for Mild Pain.      pregabalin (LYRICA) 100 MG capsule Take 2 capsules by mouth 2 (Two) Times a Day.      Semaglutide (OZEMPIC, 0.25 OR 0.5 MG/DOSE, SC) Inject  under the skin into the appropriate area as directed 1 (One) Time Per Week.       No current facility-administered medications for this visit.     Review of Systems   Constitutional:  Negative for chills and fever.   HENT:  Negative for congestion.    Respiratory:  Negative for shortness of breath.    Cardiovascular:  Positive for leg swelling. Negative for chest pain.   Gastrointestinal:  Negative for constipation, diarrhea, nausea and vomiting.   Musculoskeletal:  Positive for arthralgias and gait problem.        Foot pain   Skin:  Positive for wound.   Neurological:  Positive for numbness.       OBJECTIVE     There were no vitals filed for this visit.        PHYSICAL EXAM  GEN:   Accompanied by none.     Physical Exam  Vitals reviewed.   Constitutional:       Appearance: Normal appearance. She is well-developed. She is obese.   HENT:      Head: Normocephalic and  atraumatic.      Right Ear: Tympanic membrane normal.      Left Ear: Tympanic membrane normal.      Nose: Nose normal.      Mouth/Throat:      Pharynx: Oropharynx is clear.   Eyes:      Extraocular Movements: Extraocular movements intact.      Pupils: Pupils are equal, round, and reactive to light.   Cardiovascular:      Rate and Rhythm: Normal rate and regular rhythm.      Pulses: Normal pulses.           Dorsalis pedis pulses are 2+ on the right side and 2+ on the left side.        Posterior tibial pulses are 2+ on the right side and 2+ on the left side.      Heart sounds: Normal heart sounds.   Pulmonary:      Effort: Pulmonary effort is normal.      Breath sounds: Normal breath sounds.   Abdominal:      General: Bowel sounds are normal.      Palpations: Abdomen is soft.   Musculoskeletal:      Cervical back: Normal range of motion and neck supple.      Right foot: Bunion present.      Left foot: Bunion present.   Feet:      Right foot:      Skin integrity: Warmth present.      Left foot:      Skin integrity: Ulcer (bandaged) and warmth present.   Neurological:      General: No focal deficit present.      Mental Status: She is alert and oriented to person, place, and time. Mental status is at baseline.   Psychiatric:         Mood and Affect: Mood normal.         Behavior: Behavior normal.         Thought Content: Thought content normal.         Judgment: Judgment normal.          Foot/Ankle Exam    GENERAL  Appearance:  appears stated age and obese  Orientation:  AAOx3  Affect:  appropriate  Assistance:  independent  Right shoe gear: CAM boot    VASCULAR     Right Foot Vascularity   Dorsalis pedis:  2+  Posterior tibial:  2+  Skin temperature:  warm  Edema gradin+ and pitting  CFT:  3  Pedal hair growth:  Present  Varicosities:  none     Left Foot Vascularity   Dorsalis pedis:  2+  Posterior tibial:  2+  Skin temperature:  warm  Edema gradin+ and pitting  CFT:  3  Pedal hair growth:   Present  Varicosities:  none     NEUROLOGIC     Right Foot Neurologic   Light touch sensation: diminished  Vibratory sensation: diminished  Hot/Cold sensation: diminished     Left Foot Neurologic   Light touch sensation: diminished  Vibratory sensation: diminished  Hot/Cold sensation:  diminished    MUSCULOSKELETAL     Right Foot Musculoskeletal   Ecchymosis:  none  Tenderness:  plantar arch tenderness and midtarsal joints tenderness   (posterior lower leg)  Arch:  Charcot  Hallux valgus: Yes       Left Foot Musculoskeletal   Ecchymosis:  none  Tenderness:  plantar arch tenderness and midtarsal joints tenderness  Arch:  Charcot  Hallux valgus: Yes      MUSCLE STRENGTH     Right Foot Muscle Strength   Foot dorsiflexion:  4+  Foot plantar flexion:  4+  Foot inversion:  4+  Foot eversion:  4+     Left Foot Muscle Strength   Foot dorsiflexion:  4+  Foot plantar flexion:  4+  Foot inversion:  4+  Foot eversion:  4+    RANGE OF MOTION     Right Foot Range of Motion   Foot and ankle ROM within normal limits       Left Foot Range of Motion   Foot and ankle ROM within normal limits      DERMATOLOGIC      Right Foot Dermatologic   Skin  Positive for skin changes (psoriasis).      Left Foot Dermatologic   Skin  Positive for skin changes (psoriasis) and ulcer (bandaged).      Left foot additional comments: Full-thickness ulceration to the plantar lateral midfoot.  Moderate periwound maceration.  Mild overlying slough.  Wound extends into bone with mildly soft and fragmentation at the edges.      RADIOLOGY/NUCLEAR:  XR Foot 3+ View Left    Result Date: 9/9/2024  Narrative: XR FOOT 3+ VW LEFT- 9/9/2024 11:20 AM  HISTORY: M86.9; M86.9-Osteomyelitis, unspecified  COMPARISON: 7/9/2024  FINDINGS: Frontal, lateral and oblique radiographs of the foot were provided for review.  There is normal mineralization. Chronic appearing changes at the midfoot are again noted likely Charcot joint. There is dorsal soft tissue swelling and edema  which has increased. No acute fracture or erosive changes are seen. There is a stable moderate hallux valgus deformity. Hyperdense material is noted in the soft tissues laterally at the base of the fifth metatarsal bone.      Impression:  1. Increased dorsal soft tissue swelling and edema otherwise stable chronic appearing changes of the left foot.  This report was signed and finalized on 9/9/2024 3:30 PM by Tray Cunningham.       LABORATORY/CULTURE RESULTS:      PATHOLOGY RESULTS:       ASSESSMENT/PLAN     Diagnoses and all orders for this visit:    1. Acute osteomyelitis of left foot (Primary)  -     ethyl alcohol 62 % 2 each  -     Case Request; Standing  -     Instructions on coughing, deep breathing, and incentive spirometry.; Future  -     CBC & Differential; Future  -     Basic Metabolic Panel; Future  -     ECG 12 Lead; Future  -     XR chest 2 vw; Future  -     ceFAZolin 2000 mg IVPB in 100 mL NS (MBP)  -     Case Request    2. Exostosis of left foot  -     ethyl alcohol 62 % 2 each  -     Case Request; Standing  -     Instructions on coughing, deep breathing, and incentive spirometry.; Future  -     CBC & Differential; Future  -     Basic Metabolic Panel; Future  -     ECG 12 Lead; Future  -     XR chest 2 vw; Future  -     ceFAZolin 2000 mg IVPB in 100 mL NS (MBP)  -     Case Request    Other orders  -     Follow Anesthesia Guidelines / Protocol; Future  -     Follow Anesthesia Guidelines / Protocol; Standing  -     Obtain Informed Consent; Future  -     Provide Instructions to Patient Regarding NPO Status; Future  -     Chlorhexidine Skin Prep - Educate and Review With Patient; Future  -     Verify NPO Status; Standing  -     Obtain Informed Consent (If Not Done Inpatient or PAT); Standing  -     Instructions on coughing, deep breathing, and incentive spirometry.; Standing  -     Notify Provider - Standard; Standing  -     Provide NPO Instructions to Patient        Comprehensive lower extremity  examination and evaluation was performed.  Discussed findings and treatment plan including risks, benefits, and treatment options with patient in detail. Patient agreed with treatment plan.  Reviewed at home diabetic foot care including daily foot checks   Management and control of type II DM to be continued per PCP.  Reviewed imaging, culture, and pathology results with patient.  Continue antibiotics as prescribed.  Continue treatment at outpatient wound care center for chronic ulceration.  I believe best course of action would be to proceed with a Debridement of Bone/Plantar Exostectomy - Left Foot.  Patient is in agreement.  All options, benefits, and risks associate with surgery were discussed with the patient including but not limited to: Standard risk of anesthesia, pain, bleeding, flexion, nonhealing/dehiscence, loss of limb or life.  Pre and postoperative course were discussed in detail.  No guarantees were improved.  All questions were answered to patient/family satisfaction. Should symptoms fail to improve or worsen they agree to call or return to clinic or to go to the Emergency Department. Discussed the importance of following up with any needed screening tests/labs/specialist appointments and any requested follow-up recommended by me today. Importance of maintaining follow-up discussed and patient accepts that missed appointments can delay diagnosis and potentially lead to worsening of conditions.  No follow-ups on file., or sooner if acute issues arise.      This document has been electronically signed by Ciro Mckee DPM on September 16, 2024 10:35 CDT

## 2024-09-18 ENCOUNTER — TELEPHONE (OUTPATIENT)
Age: 49
End: 2024-09-18

## 2024-09-18 ENCOUNTER — TELEPHONE (OUTPATIENT)
Age: 49
End: 2024-09-18
Payer: MEDICARE

## 2024-09-18 PROBLEM — M89.8X7 EXOSTOSIS OF LEFT FOOT: Status: ACTIVE | Noted: 2024-09-16

## 2024-09-18 PROBLEM — M86.172 ACUTE OSTEOMYELITIS OF LEFT FOOT: Status: ACTIVE | Noted: 2024-09-16

## 2024-09-18 NOTE — TELEPHONE ENCOUNTER
HUB AGENT ATTEMPTED CLINICAL WARM TRANSFER - NO ANSWER     PLEASE CALL / LEAVE VMAIL PATIENT CELL [phone] TO DISCUSS PATIENT's QUESTIONS re KEEPING APPT NEXT TUES 09-24-24 OR NOT     AND ALSO re RESCHEDULING HER PAT APPT TO BE ON TUES 9/24 AS WELL     THANKS

## 2024-09-23 ENCOUNTER — TELEPHONE (OUTPATIENT)
Age: 49
End: 2024-09-23
Payer: MEDICARE

## 2024-09-27 ENCOUNTER — PRE-ADMISSION TESTING (OUTPATIENT)
Dept: PREADMISSION TESTING | Facility: HOSPITAL | Age: 49
End: 2024-09-27
Payer: MEDICARE

## 2024-09-27 ENCOUNTER — HOSPITAL ENCOUNTER (OUTPATIENT)
Dept: GENERAL RADIOLOGY | Facility: HOSPITAL | Age: 49
Discharge: HOME | End: 2024-09-27
Payer: MEDICARE

## 2024-09-27 VITALS
BODY MASS INDEX: 45.99 KG/M2 | WEIGHT: 293 LBS | RESPIRATION RATE: 18 BRPM | HEART RATE: 86 BPM | HEIGHT: 67 IN | OXYGEN SATURATION: 94 % | DIASTOLIC BLOOD PRESSURE: 83 MMHG | SYSTOLIC BLOOD PRESSURE: 178 MMHG

## 2024-09-27 DIAGNOSIS — M86.172 ACUTE OSTEOMYELITIS OF LEFT FOOT: ICD-10-CM

## 2024-09-27 DIAGNOSIS — M89.8X7 EXOSTOSIS OF LEFT FOOT: ICD-10-CM

## 2024-09-27 LAB
ANION GAP SERPL CALCULATED.3IONS-SCNC: 9 MMOL/L (ref 5–15)
BASOPHILS # BLD AUTO: 0.04 10*3/MM3 (ref 0–0.2)
BASOPHILS NFR BLD AUTO: 0.5 % (ref 0–1.5)
BUN SERPL-MCNC: 10 MG/DL (ref 6–20)
BUN/CREAT SERPL: 14.7 (ref 7–25)
CALCIUM SPEC-SCNC: 8.6 MG/DL (ref 8.6–10.5)
CHLORIDE SERPL-SCNC: 102 MMOL/L (ref 98–107)
CO2 SERPL-SCNC: 29 MMOL/L (ref 22–29)
CREAT SERPL-MCNC: 0.68 MG/DL (ref 0.57–1)
DEPRECATED RDW RBC AUTO: 43.7 FL (ref 37–54)
EGFRCR SERPLBLD CKD-EPI 2021: 108.3 ML/MIN/1.73
EOSINOPHIL # BLD AUTO: 0.31 10*3/MM3 (ref 0–0.4)
EOSINOPHIL NFR BLD AUTO: 3.6 % (ref 0.3–6.2)
ERYTHROCYTE [DISTWIDTH] IN BLOOD BY AUTOMATED COUNT: 13.6 % (ref 12.3–15.4)
GLUCOSE SERPL-MCNC: 131 MG/DL (ref 65–99)
HCT VFR BLD AUTO: 39.6 % (ref 34–46.6)
HGB BLD-MCNC: 12.6 G/DL (ref 12–15.9)
IMM GRANULOCYTES # BLD AUTO: 0.02 10*3/MM3 (ref 0–0.05)
IMM GRANULOCYTES NFR BLD AUTO: 0.2 % (ref 0–0.5)
LYMPHOCYTES # BLD AUTO: 2.79 10*3/MM3 (ref 0.7–3.1)
LYMPHOCYTES NFR BLD AUTO: 32.3 % (ref 19.6–45.3)
MCH RBC QN AUTO: 28.1 PG (ref 26.6–33)
MCHC RBC AUTO-ENTMCNC: 31.8 G/DL (ref 31.5–35.7)
MCV RBC AUTO: 88.4 FL (ref 79–97)
MONOCYTES # BLD AUTO: 0.45 10*3/MM3 (ref 0.1–0.9)
MONOCYTES NFR BLD AUTO: 5.2 % (ref 5–12)
NEUTROPHILS NFR BLD AUTO: 5.02 10*3/MM3 (ref 1.7–7)
NEUTROPHILS NFR BLD AUTO: 58.2 % (ref 42.7–76)
NRBC BLD AUTO-RTO: 0 /100 WBC (ref 0–0.2)
PLATELET # BLD AUTO: 300 10*3/MM3 (ref 140–450)
PMV BLD AUTO: 9.5 FL (ref 6–12)
POTASSIUM SERPL-SCNC: 3.9 MMOL/L (ref 3.5–5.2)
RBC # BLD AUTO: 4.48 10*6/MM3 (ref 3.77–5.28)
SODIUM SERPL-SCNC: 140 MMOL/L (ref 136–145)
WBC NRBC COR # BLD AUTO: 8.63 10*3/MM3 (ref 3.4–10.8)

## 2024-09-27 PROCEDURE — 85025 COMPLETE CBC W/AUTO DIFF WBC: CPT

## 2024-09-27 PROCEDURE — 93010 ELECTROCARDIOGRAM REPORT: CPT | Performed by: INTERNAL MEDICINE

## 2024-09-27 PROCEDURE — 36415 COLL VENOUS BLD VENIPUNCTURE: CPT

## 2024-09-27 PROCEDURE — 71046 X-RAY EXAM CHEST 2 VIEWS: CPT

## 2024-09-27 PROCEDURE — 93005 ELECTROCARDIOGRAM TRACING: CPT

## 2024-09-27 PROCEDURE — 80048 BASIC METABOLIC PNL TOTAL CA: CPT

## 2024-09-27 RX ORDER — MULTIPLE VITAMINS W/ MINERALS TAB 9MG-400MCG
1 TAB ORAL DAILY
COMMUNITY

## 2024-09-27 NOTE — DISCHARGE INSTRUCTIONS
Preparing for Surgery  Follow these instructions before the procedure:  Several days or weeks before your procedure  Medication(s) you need to stop   ___1 week prior to surgery: OZEMPIC      Ask your health care provider about:  Changing or stopping your regular medicines. This is especially important if you are taking diabetes medicines or blood thinners.  Taking medicines such as aspirin and ibuprofen. These medicines can thin your blood. Do not take these medicines unless your health care provider tells you to take them.  Taking over-the-counter medicines, vitamins, herbs, and supplements.    Contact your surgeon if you:  Develop a fever of more than 100.4°F (38°C) or other feelings of illness during the 48 hours before your surgery.  Have symptoms that get worse.  Have questions or concerns about your surgery.  If you are going home the same day of your surgery you will need to arrange for a responsible adult, age 18 years old or older, to drive you home from the hospital and stay with you for 24 hours. Verification of the  will be made prior to any procedure requiring sedation. You may not go home in a taxi or any form of public transportation by yourself.     Day before your procedure    24 hours before your procedure DO NOT drink alcoholic beverages or smoke.  24 hours before your procedure STOP taking Erectile Dysfunction medication (i.e.,Cialis, Viagra)   You may be asked to shower with a germ-killing soap.  Day of your procedure   You may take the following medication(s) the morning of surgery with a sip of water: PREGABLIN      8 hours before your scheduled arrival time, STOP all food, any dairy products, and full liquids. This includes hard candy, chewing gum or mints. This is extremely important to prevent serious complications.     Up to 2 hours before your scheduled arrival time, you may have clear liquids no cream, powder, or pulp of any kind. Safe options are water, black coffee, plain tea,  soda, Gatorade/Powerade, clear broth, apple juice.    2 hours before your scheduled arrival time, STOP drinking clear liquids.    You may need to take another shower with a germ-killing soap before you leave home in the morning. Do not use perfumes, colognes, or body lotions.  Wear comfortable loose-fitting clothing.  Remove all jewelry including body piercing and rings, dark colored nail polish, and make up prior to arrival at the hospital. Leave all valuables at home.   Bring your hearing aids if you rely on them.  Do not wear contact lenses. If you wear eyeglasses remember to bring a case to store them in while you are in surgery.  Do not use denture adhesives since you will be asked to remove them during your surgery.    You do not need to bring your home medications into the hospital.   Bring your sleep apnea device with you on the day of your surgery (if this applies to you).  If you wear portable oxygen, bring it with you.   If you are staying overnight, you may bring a bag of items you may need such as slippers, robe and a change of clothes for your discharge. You may want to leave these items in the car until you are ready for them since your family will take your belongings when you leave the pre-operative area.  Arrive at the hospital as scheduled by the office. You will be asked to arrive 2 hours prior to your surgery time in order to prepare for your procedure.  When you arrive at the hospital  Go to the registration desk located at the main entrance of the hospital.  After registration is completed, you will be given a beeper and a sticker sheet. Take the stickers to Outpatient Surgery and place in the tray at the end of the desk to notify the staff that you have arrived and registered.   Return to the lobby to wait. You are not always called back according to the time of arrival but rather the time your doctor will be ready.  When your beeper lights up and vibrates proceed through the double doors,  under the stairs, and a member of the Outpatient Surgery staff will escort you to your preoperative room.

## 2024-09-30 LAB
QT INTERVAL: 368 MS
QTC INTERVAL: 440 MS

## 2024-10-02 ENCOUNTER — ANESTHESIA EVENT (OUTPATIENT)
Dept: PERIOP | Facility: HOSPITAL | Age: 49
End: 2024-10-02
Payer: MEDICARE

## 2024-10-02 ENCOUNTER — ANESTHESIA (OUTPATIENT)
Dept: PERIOP | Facility: HOSPITAL | Age: 49
End: 2024-10-02
Payer: MEDICARE

## 2024-10-02 ENCOUNTER — HOSPITAL ENCOUNTER (OUTPATIENT)
Facility: HOSPITAL | Age: 49
Setting detail: HOSPITAL OUTPATIENT SURGERY
Discharge: HOME | End: 2024-10-02
Attending: PODIATRIST | Admitting: PODIATRIST
Payer: MEDICARE

## 2024-10-02 VITALS
RESPIRATION RATE: 16 BRPM | OXYGEN SATURATION: 93 % | HEART RATE: 73 BPM | SYSTOLIC BLOOD PRESSURE: 106 MMHG | DIASTOLIC BLOOD PRESSURE: 60 MMHG | TEMPERATURE: 97.8 F

## 2024-10-02 DIAGNOSIS — M89.8X7 EXOSTOSIS OF LEFT FOOT: ICD-10-CM

## 2024-10-02 DIAGNOSIS — M86.172 ACUTE OSTEOMYELITIS OF LEFT FOOT: ICD-10-CM

## 2024-10-02 LAB
GLUCOSE BLDC GLUCOMTR-MCNC: 108 MG/DL (ref 70–130)
GLUCOSE BLDC GLUCOMTR-MCNC: 108 MG/DL (ref 70–130)

## 2024-10-02 PROCEDURE — 25010000002 LIDOCAINE PF 2% 2 % SOLUTION: Performed by: NURSE ANESTHETIST, CERTIFIED REGISTERED

## 2024-10-02 PROCEDURE — 25010000002 CEFAZOLIN PER 500 MG: Performed by: PODIATRIST

## 2024-10-02 PROCEDURE — 25810000003 SODIUM CHLORIDE PER 500 ML: Performed by: PODIATRIST

## 2024-10-02 PROCEDURE — 87102 FUNGUS ISOLATION CULTURE: CPT | Performed by: PODIATRIST

## 2024-10-02 PROCEDURE — 82948 REAGENT STRIP/BLOOD GLUCOSE: CPT | Performed by: FAMILY MEDICINE

## 2024-10-02 PROCEDURE — 25010000002 BUPIVACAINE 0.5 % SOLUTION: Performed by: PODIATRIST

## 2024-10-02 PROCEDURE — 87116 MYCOBACTERIA CULTURE: CPT | Performed by: PODIATRIST

## 2024-10-02 PROCEDURE — 87206 SMEAR FLUORESCENT/ACID STAI: CPT | Performed by: PODIATRIST

## 2024-10-02 PROCEDURE — 87205 SMEAR GRAM STAIN: CPT | Performed by: PODIATRIST

## 2024-10-02 PROCEDURE — 82948 REAGENT STRIP/BLOOD GLUCOSE: CPT

## 2024-10-02 PROCEDURE — 28122 PARTIAL REMOVAL OF FOOT BONE: CPT | Performed by: PODIATRIST

## 2024-10-02 PROCEDURE — 25810000003 LACTATED RINGERS PER 1000 ML: Performed by: PODIATRIST

## 2024-10-02 PROCEDURE — 87147 CULTURE TYPE IMMUNOLOGIC: CPT | Performed by: PODIATRIST

## 2024-10-02 PROCEDURE — 88304 TISSUE EXAM BY PATHOLOGIST: CPT | Performed by: PODIATRIST

## 2024-10-02 PROCEDURE — 88311 DECALCIFY TISSUE: CPT | Performed by: PODIATRIST

## 2024-10-02 PROCEDURE — 87176 TISSUE HOMOGENIZATION CULTR: CPT | Performed by: PODIATRIST

## 2024-10-02 PROCEDURE — 25010000002 VASOPRESSIN 20 UNIT/ML SOLUTION: Performed by: NURSE ANESTHETIST, CERTIFIED REGISTERED

## 2024-10-02 PROCEDURE — 25010000002 FENTANYL CITRATE (PF) 100 MCG/2ML SOLUTION: Performed by: NURSE ANESTHETIST, CERTIFIED REGISTERED

## 2024-10-02 PROCEDURE — 25010000002 PROPOFOL 10 MG/ML EMULSION: Performed by: NURSE ANESTHETIST, CERTIFIED REGISTERED

## 2024-10-02 PROCEDURE — 87070 CULTURE OTHR SPECIMN AEROBIC: CPT | Performed by: PODIATRIST

## 2024-10-02 PROCEDURE — 87186 SC STD MICRODIL/AGAR DIL: CPT | Performed by: PODIATRIST

## 2024-10-02 PROCEDURE — 87075 CULTR BACTERIA EXCEPT BLOOD: CPT | Performed by: PODIATRIST

## 2024-10-02 RX ORDER — SODIUM CHLORIDE 0.9 % (FLUSH) 0.9 %
3 SYRINGE (ML) INJECTION AS NEEDED
Status: DISCONTINUED | OUTPATIENT
Start: 2024-10-02 | End: 2024-10-02 | Stop reason: HOSPADM

## 2024-10-02 RX ORDER — BUPIVACAINE HYDROCHLORIDE 5 MG/ML
INJECTION, SOLUTION PERINEURAL AS NEEDED
Status: DISCONTINUED | OUTPATIENT
Start: 2024-10-02 | End: 2024-10-02 | Stop reason: HOSPADM

## 2024-10-02 RX ORDER — MIDAZOLAM HYDROCHLORIDE 2 MG/2ML
1 INJECTION, SOLUTION INTRAMUSCULAR; INTRAVENOUS
Status: DISCONTINUED | OUTPATIENT
Start: 2024-10-02 | End: 2024-10-02 | Stop reason: HOSPADM

## 2024-10-02 RX ORDER — FENTANYL CITRATE 50 UG/ML
25 INJECTION, SOLUTION INTRAMUSCULAR; INTRAVENOUS
Status: DISCONTINUED | OUTPATIENT
Start: 2024-10-02 | End: 2024-10-02 | Stop reason: HOSPADM

## 2024-10-02 RX ORDER — SODIUM CHLORIDE 0.9 % (FLUSH) 0.9 %
10 SYRINGE (ML) INJECTION AS NEEDED
Status: DISCONTINUED | OUTPATIENT
Start: 2024-10-02 | End: 2024-10-02 | Stop reason: HOSPADM

## 2024-10-02 RX ORDER — FENTANYL CITRATE 50 UG/ML
50 INJECTION, SOLUTION INTRAMUSCULAR; INTRAVENOUS
Status: DISCONTINUED | OUTPATIENT
Start: 2024-10-02 | End: 2024-10-02 | Stop reason: HOSPADM

## 2024-10-02 RX ORDER — HYDROMORPHONE HYDROCHLORIDE 1 MG/ML
0.5 INJECTION, SOLUTION INTRAMUSCULAR; INTRAVENOUS; SUBCUTANEOUS
Status: DISCONTINUED | OUTPATIENT
Start: 2024-10-02 | End: 2024-10-02 | Stop reason: HOSPADM

## 2024-10-02 RX ORDER — IBUPROFEN 600 MG/1
600 TABLET, FILM COATED ORAL EVERY 6 HOURS PRN
Status: DISCONTINUED | OUTPATIENT
Start: 2024-10-02 | End: 2024-10-02 | Stop reason: HOSPADM

## 2024-10-02 RX ORDER — SODIUM CHLORIDE, SODIUM LACTATE, POTASSIUM CHLORIDE, CALCIUM CHLORIDE 600; 310; 30; 20 MG/100ML; MG/100ML; MG/100ML; MG/100ML
9 INJECTION, SOLUTION INTRAVENOUS CONTINUOUS
Status: DISCONTINUED | OUTPATIENT
Start: 2024-10-02 | End: 2024-10-02 | Stop reason: HOSPADM

## 2024-10-02 RX ORDER — DROPERIDOL 2.5 MG/ML
0.62 INJECTION, SOLUTION INTRAMUSCULAR; INTRAVENOUS ONCE AS NEEDED
Status: DISCONTINUED | OUTPATIENT
Start: 2024-10-02 | End: 2024-10-02 | Stop reason: HOSPADM

## 2024-10-02 RX ORDER — SODIUM CHLORIDE 9 MG/ML
INJECTION, SOLUTION INTRAVENOUS AS NEEDED
Status: DISCONTINUED | OUTPATIENT
Start: 2024-10-02 | End: 2024-10-02 | Stop reason: HOSPADM

## 2024-10-02 RX ORDER — SODIUM CHLORIDE 0.9 % (FLUSH) 0.9 %
10 SYRINGE (ML) INJECTION EVERY 12 HOURS SCHEDULED
Status: DISCONTINUED | OUTPATIENT
Start: 2024-10-02 | End: 2024-10-02 | Stop reason: HOSPADM

## 2024-10-02 RX ORDER — PROPOFOL 10 MG/ML
VIAL (ML) INTRAVENOUS AS NEEDED
Status: DISCONTINUED | OUTPATIENT
Start: 2024-10-02 | End: 2024-10-02 | Stop reason: SURG

## 2024-10-02 RX ORDER — SODIUM CHLORIDE, SODIUM LACTATE, POTASSIUM CHLORIDE, CALCIUM CHLORIDE 600; 310; 30; 20 MG/100ML; MG/100ML; MG/100ML; MG/100ML
1000 INJECTION, SOLUTION INTRAVENOUS CONTINUOUS
Status: DISCONTINUED | OUTPATIENT
Start: 2024-10-02 | End: 2024-10-02 | Stop reason: HOSPADM

## 2024-10-02 RX ORDER — DEXTROSE MONOHYDRATE 25 G/50ML
12.5 INJECTION, SOLUTION INTRAVENOUS AS NEEDED
Status: DISCONTINUED | OUTPATIENT
Start: 2024-10-02 | End: 2024-10-02 | Stop reason: HOSPADM

## 2024-10-02 RX ORDER — OXYCODONE AND ACETAMINOPHEN 5; 325 MG/1; MG/1
1 TABLET ORAL EVERY 8 HOURS PRN
Qty: 12 TABLET | Refills: 0 | Status: SHIPPED | OUTPATIENT
Start: 2024-10-02

## 2024-10-02 RX ORDER — NALOXONE HCL 0.4 MG/ML
0.04 VIAL (ML) INJECTION AS NEEDED
Status: DISCONTINUED | OUTPATIENT
Start: 2024-10-02 | End: 2024-10-02 | Stop reason: HOSPADM

## 2024-10-02 RX ORDER — LABETALOL HYDROCHLORIDE 5 MG/ML
5 INJECTION, SOLUTION INTRAVENOUS
Status: DISCONTINUED | OUTPATIENT
Start: 2024-10-02 | End: 2024-10-02 | Stop reason: HOSPADM

## 2024-10-02 RX ORDER — FENTANYL CITRATE 50 UG/ML
INJECTION, SOLUTION INTRAMUSCULAR; INTRAVENOUS AS NEEDED
Status: DISCONTINUED | OUTPATIENT
Start: 2024-10-02 | End: 2024-10-02 | Stop reason: SURG

## 2024-10-02 RX ORDER — ACETAMINOPHEN 650 MG
TABLET, EXTENDED RELEASE ORAL AS NEEDED
Status: DISCONTINUED | OUTPATIENT
Start: 2024-10-02 | End: 2024-10-02 | Stop reason: HOSPADM

## 2024-10-02 RX ORDER — ROCURONIUM BROMIDE 10 MG/ML
INJECTION, SOLUTION INTRAVENOUS AS NEEDED
Status: DISCONTINUED | OUTPATIENT
Start: 2024-10-02 | End: 2024-10-02 | Stop reason: SURG

## 2024-10-02 RX ORDER — FLUMAZENIL 0.1 MG/ML
0.2 INJECTION INTRAVENOUS AS NEEDED
Status: DISCONTINUED | OUTPATIENT
Start: 2024-10-02 | End: 2024-10-02 | Stop reason: HOSPADM

## 2024-10-02 RX ORDER — OXYCODONE AND ACETAMINOPHEN 10; 325 MG/1; MG/1
1 TABLET ORAL EVERY 4 HOURS PRN
Status: DISCONTINUED | OUTPATIENT
Start: 2024-10-02 | End: 2024-10-02 | Stop reason: HOSPADM

## 2024-10-02 RX ORDER — LIDOCAINE HYDROCHLORIDE 20 MG/ML
INJECTION, SOLUTION EPIDURAL; INFILTRATION; INTRACAUDAL; PERINEURAL AS NEEDED
Status: DISCONTINUED | OUTPATIENT
Start: 2024-10-02 | End: 2024-10-02 | Stop reason: SURG

## 2024-10-02 RX ORDER — ONDANSETRON 4 MG/1
4 TABLET, FILM COATED ORAL EVERY 8 HOURS PRN
Qty: 21 TABLET | Refills: 0 | Status: SHIPPED | OUTPATIENT
Start: 2024-10-02

## 2024-10-02 RX ORDER — ONDANSETRON 2 MG/ML
4 INJECTION INTRAMUSCULAR; INTRAVENOUS
Status: DISCONTINUED | OUTPATIENT
Start: 2024-10-02 | End: 2024-10-02 | Stop reason: HOSPADM

## 2024-10-02 RX ADMIN — ROCURONIUM BROMIDE 30 MG: 10 INJECTION, SOLUTION INTRAVENOUS at 14:02

## 2024-10-02 RX ADMIN — LIDOCAINE HYDROCHLORIDE 100 MG: 20 INJECTION, SOLUTION EPIDURAL; INFILTRATION; INTRACAUDAL; PERINEURAL at 14:02

## 2024-10-02 RX ADMIN — FENTANYL CITRATE 100 MCG: 50 INJECTION, SOLUTION INTRAMUSCULAR; INTRAVENOUS at 14:10

## 2024-10-02 RX ADMIN — SODIUM CHLORIDE, POTASSIUM CHLORIDE, SODIUM LACTATE AND CALCIUM CHLORIDE 1000 ML: 600; 310; 30; 20 INJECTION, SOLUTION INTRAVENOUS at 09:05

## 2024-10-02 RX ADMIN — PROPOFOL 200 MG: 10 INJECTION, EMULSION INTRAVENOUS at 14:02

## 2024-10-02 RX ADMIN — CEFAZOLIN 2000 MG: 2 INJECTION, POWDER, FOR SOLUTION INTRAMUSCULAR; INTRAVENOUS at 14:10

## 2024-10-02 NOTE — ANESTHESIA POSTPROCEDURE EVALUATION
Patient: Donna Swain    Procedure Summary       Date: 10/02/24 Room / Location:  [facility] OR 09 /  [facility] OR    Anesthesia Start: 1400 Anesthesia Stop: 1457    Procedures:       Debridement of Bone/Plantar Exostectomy - Left Foot (Left)      Plantar Exostectomy (Left) Diagnosis:       Acute osteomyelitis of left foot      Exostosis of left foot      (Acute osteomyelitis of left foot [M86.172])      (Exostosis of left foot [M89.8X7])    Surgeons: Ciro Mckee DPM Provider: Cy Clifford CRNA    Anesthesia Type: general ASA Status: 3            Anesthesia Type: general    Vitals  Vitals Value Taken Time   /73 10/02/24 1627   Temp 97.8 °F (36.6 °C) 10/02/24 1450   Pulse 69 10/02/24 1627   Resp 16 10/02/24 1627   SpO2 93 % 10/02/24 1627           Post Anesthesia Care and Evaluation    Patient location during evaluation: PACU  Patient participation: complete - patient participated  Level of consciousness: awake and alert  Pain management: adequate    Airway patency: patent  Anesthetic complications: No anesthetic complications  PONV Status: none  Cardiovascular status: acceptable and hemodynamically stable  Respiratory status: acceptable  Hydration status: acceptable    Comments: Blood pressure 106/60, pulse 73, temperature 97.8 °F (36.6 °C), temperature source Temporal, resp. rate 16, SpO2 93%, not currently breastfeeding.    Patient discharged from PACU based upon Madan score. Please see RN notes for further details

## 2024-10-02 NOTE — ANESTHESIA PROCEDURE NOTES
Airway  Urgency: elective    Date/Time: 10/2/2024 2:03 PM  Airway not difficult    General Information and Staff    Patient location during procedure: OR  CRNA/CAA: Cy Clifford CRNA    Indications and Patient Condition  Indications for airway management: airway protection    Preoxygenated: yes  Mask difficulty assessment: 1 - vent by mask    Final Airway Details  Final airway type: endotracheal airway      Successful airway: ETT     Successful intubation technique: direct laryngoscopy  Endotracheal tube insertion site: oral  Blade: Angel  Blade size: 3.5 (3.5)  ETT size (mm): 7.5  Cormack-Lehane Classification: grade I - full view of glottis  Placement verified by: chest auscultation and capnometry   Measured from: gums  Number of attempts at approach: 1  Assessment: lips, teeth, and gum same as pre-op and atraumatic intubation

## 2024-10-02 NOTE — BRIEF OP NOTE
LOWER EXTREMITY DEBRIDEMENT, BONE SPUR LOWER EXTREMITY  Progress Note    Donna Swain  10/2/2024    Pre-op Diagnosis:   Acute osteomyelitis of left foot [M86.172]  Exostosis of left foot [M89.8X7]       Post-Op Diagnosis Codes:     * Acute osteomyelitis of left foot [M86.172]     * Exostosis of left foot [M89.8X7]    Procedure/CPT® Codes:        Procedure(s):  Debridement of Bone/Plantar Exostectomy - Left Foot          Surgeon(s):  Ciro Mckee DPM    Anesthesia: General    Staff:   Circulator: Cindy Sorto RN  Scrub Person: Edilma Bello; Manuel Seth         Estimated Blood Loss: minimal    Urine Voided: * No values recorded between 10/2/2024  1:58 PM and 10/2/2024  2:52 PM *    Specimens:                Specimens       ID Source Type Tests Collected By Collected At Frozen?    A Foot, Left Bone TISSUE PATHOLOGY EXAM   Ciro Mckee DPM 10/2/24 1427     Description: Left Foot Bone and Tissue    B Foot, Left Tissue ANAEROBIC CULTURE  FUNGAL CULTURE  TISSUE / BONE CULTURE  TISSUE PATHOLOGY EXAM  AFB CULTURE   Ciro Mckee DPM 10/2/24 1428     Description: Lefy Foot Tissue Culture                  Drains: * No LDAs found *    Findings: Consistent with pre-operative diagnoses.         Complications: None          Ciro Mckee DPM     Date: 10/2/2024  Time: 14:52 CDT

## 2024-10-02 NOTE — ANESTHESIA PREPROCEDURE EVALUATION
Anesthesia Evaluation     Patient summary reviewed   no history of anesthetic complications:   NPO Solid Status: > 8 hours             Airway   Mallampati: II  TM distance: >3 FB  Dental    (+) upper dentures and lower dentures    Pulmonary    (+) a smoker,sleep apnea  (-) COPD, asthma  Cardiovascular   Exercise tolerance: good (4-7 METS)    (-) pacemaker, past MI, angina, cardiac stents      Neuro/Psych  (-) seizures, TIA, CVA  GI/Hepatic/Renal/Endo    (+) morbid obesity, diabetes mellitus  (-) GERD, liver disease, no renal disease    Musculoskeletal     Abdominal    Substance History      OB/GYN          Other                    Anesthesia Plan    ASA 3     general     intravenous induction     Anesthetic plan, risks, benefits, and alternatives have been provided, discussed and informed consent has been obtained with: patient.    CODE STATUS:

## 2024-10-02 NOTE — OP NOTE
POSTOPERATIVE NOTE  Patient: Donna Swain  MRN: [id number]    YOB: 1975  Age: 49 y.o.  Sex: female  Unit:  [facility] OR Room/Bed: [facility] OR/MAIN OR Location: [facility]    Date of Operation: 10/2/2024     Preoperative Diagnosis:  Acute osteomyelitis of left foot [M86.172]  Exostosis of left foot [M89.8X7]     Postoperative Diagnosis:  1.   Same as pre-operative    Operative Procedures:  Debridement of Bone/Plantar Exostectomy - Left Foot    Surgeon: Surgeons and Role:     * Ciro Mckee DPM - Primary    Anesthesia: General     Hemostasis: Anatomic Dissection, Thigh Tourniquet, Electric cauterization    Estimated Blood Loss: minimal    Pathology:   Specimens       ID Source Type Tests Collected By Collected At Frozen?    A Foot, Left Bone TISSUE PATHOLOGY EXAM   Ciro Mckee DPM 10/2/24 1427     Description: Left Foot Bone and Tissue    B Foot, Left Tissue ANAEROBIC CULTURE  FUNGAL CULTURE  TISSUE / BONE CULTURE  TISSUE PATHOLOGY EXAM  AFB CULTURE   Ciro Mckee DPM 10/2/24 1428     Description: Lefy Foot Tissue Culture            Materials: Nothing was implanted during the procedure    Injectables: 20mL 0.5% Marcaine Plain    Fluids: See anesthesia log.    Drains: None    Complications: None    Postoperative Condition: Stable. Patient tolerated procedure and anesthesia well. Patient left the operating room with vital signs stable and vascular status intact.     Operative Findings: Consistent with preoperative diagnosis.    Indications for Procedure: This 49 y.o. patient presents with chronic left foot ulceration with underlying bone infection.  Patient states that they have failed conservative therapy and opts for surgical intervention at this time. The patient has been NPO for greater than 8 hours. The patient is ready for surgical intervention.    DESCRIPTION OF PROCEDURE  Under mild sedation, patient was brought into the operating room and placed on the operating room table in supine  position. Preoperative antibiotic was given. A pneumatic tourniquet was placed about the patient's left thigh. The patient was placed under General anesthesia, then local block was performed at the surgical site using the above mentioned local anesthesia. The foot and ankle were then scrubbed, prepped and draped in the usual aseptic manner. Using an Esmarch, the foot and ankle were exsanguinated and tourniquet was inflated.    Attention was then directed to the left lateral foot where the ulceration was identified.  2 horizontal semielliptical incisions were made around the ulceration and made full-thickness down to level of bone.  The ellipse of tissue was fully resected and passed from the operative field.  A section of deep tissue will be sent to microbiology for culture.  No significant purulence was appreciated.  The wound did extend down to bone that had small erosions and discoloration consistent with osteomyelitis.  Soft tissue was freed from its bony attachments.  Next utilizing a bone saw, resection of the exposed bone was made and will be sent to pathology for analysis.  The bone was noted to be mildly soft but not fragmented.  All nonviable tissue was debrided/removed.  All rough edges were smoothed.  The wound was then flushed with copious marge of sterile saline.  Deep and subcutaneous tissues were reapproximated lysing 3-0 Vicryl.  Skin was reapproximated utilizing 3-0 nylon in vertical mattress and simple suturing technique.    The tourniquet was deflated.  Hemostasis had been obtained.  Cap refill was noted to all toes.    All counts were correct.    A bandage consisting of Adaptic, Betadine soaked gauze, Kerlix, and Coban was applied.    The patient tolerated the procedures and anesthesia well.  They were transferred to the recovery room with vital signs stable and vascular status intact to the Left lower extremity.  After a period of postoperative monitoring, the patient will be discharged to  home with oral and written postoperative instructions.  They will follow-up in office within 1 week.  They are to be nonweightbearing to the surgical foot.

## 2024-10-03 ENCOUNTER — OFFICE VISIT (OUTPATIENT)
Dept: WOUND CARE | Facility: HOSPITAL | Age: 49
End: 2024-10-03
Payer: MEDICARE

## 2024-10-03 DIAGNOSIS — L97.509 TYPE 2 DIABETES MELLITUS WITH FOOT ULCER, UNSPECIFIED WHETHER LONG TERM INSULIN USE: Primary | ICD-10-CM

## 2024-10-03 DIAGNOSIS — Z72.0 TOBACCO USE: ICD-10-CM

## 2024-10-03 DIAGNOSIS — E11.621 TYPE 2 DIABETES MELLITUS WITH FOOT ULCER, UNSPECIFIED WHETHER LONG TERM INSULIN USE: Primary | ICD-10-CM

## 2024-10-03 DIAGNOSIS — M86.172 OTHER ACUTE OSTEOMYELITIS, LEFT ANKLE AND FOOT: ICD-10-CM

## 2024-10-03 DIAGNOSIS — L97.524 NON-PRESSURE CHRONIC ULCER OF OTHER PART OF LEFT FOOT WITH NECROSIS OF BONE: ICD-10-CM

## 2024-10-03 PROCEDURE — G0463 HOSPITAL OUTPT CLINIC VISIT: HCPCS

## 2024-10-05 LAB
BACTERIA SPEC AEROBE CULT: ABNORMAL
GRAM STN SPEC: ABNORMAL
GRAM STN SPEC: ABNORMAL

## 2024-10-07 ENCOUNTER — OFFICE VISIT (OUTPATIENT)
Dept: WOUND CARE | Facility: HOSPITAL | Age: 49
End: 2024-10-07
Payer: MEDICARE

## 2024-10-07 DIAGNOSIS — L97.524 NON-PRESSURE CHRONIC ULCER OF OTHER PART OF LEFT FOOT WITH NECROSIS OF BONE: ICD-10-CM

## 2024-10-07 DIAGNOSIS — E11.40 TYPE 2 DIABETES MELLITUS WITH DIABETIC NEUROPATHY, UNSPECIFIED WHETHER LONG TERM INSULIN USE: ICD-10-CM

## 2024-10-07 DIAGNOSIS — Z72.0 TOBACCO USE: ICD-10-CM

## 2024-10-07 DIAGNOSIS — G89.18 POST-OP PAIN: Primary | ICD-10-CM

## 2024-10-07 DIAGNOSIS — A49.9 BACTERIAL INFECTION, UNSPECIFIED: ICD-10-CM

## 2024-10-07 DIAGNOSIS — M14.672 CHARCOT'S JOINT, LEFT ANKLE AND FOOT: ICD-10-CM

## 2024-10-07 DIAGNOSIS — E11.621 TYPE 2 DIABETES MELLITUS WITH FOOT ULCER, UNSPECIFIED WHETHER LONG TERM INSULIN USE: Primary | ICD-10-CM

## 2024-10-07 DIAGNOSIS — L97.509 TYPE 2 DIABETES MELLITUS WITH FOOT ULCER, UNSPECIFIED WHETHER LONG TERM INSULIN USE: Primary | ICD-10-CM

## 2024-10-07 LAB — BACTERIA SPEC ANAEROBE CULT: NORMAL

## 2024-10-07 PROCEDURE — 97597 DBRDMT OPN WND 1ST 20 CM/<: CPT | Performed by: PODIATRIST

## 2024-10-07 PROCEDURE — 99024 POSTOP FOLLOW-UP VISIT: CPT | Performed by: PODIATRIST

## 2024-10-07 RX ORDER — HYDROCODONE BITARTRATE AND ACETAMINOPHEN 7.5; 325 MG/1; MG/1
1 TABLET ORAL EVERY 6 HOURS PRN
Qty: 28 TABLET | Refills: 0 | Status: SHIPPED | OUTPATIENT
Start: 2024-10-07

## 2024-10-09 LAB
FUNGUS WND CULT: NORMAL
MYCOBACTERIUM SPEC CULT: NORMAL
NIGHT BLUE STAIN TISS: NORMAL
NIGHT BLUE STAIN TISS: NORMAL

## 2024-10-21 ENCOUNTER — OFFICE VISIT (OUTPATIENT)
Dept: WOUND CARE | Facility: HOSPITAL | Age: 49
End: 2024-10-21
Payer: MEDICARE

## 2024-11-04 ENCOUNTER — OFFICE VISIT (OUTPATIENT)
Dept: WOUND CARE | Facility: HOSPITAL | Age: 49
End: 2024-11-04
Payer: MEDICARE

## 2024-11-06 LAB — FUNGUS WND CULT: NORMAL

## 2024-11-11 LAB
MYCOBACTERIUM SPEC CULT: NORMAL
NIGHT BLUE STAIN TISS: NORMAL
NIGHT BLUE STAIN TISS: NORMAL

## 2024-11-12 LAB — FUNGUS WND CULT: NORMAL

## 2024-11-14 ENCOUNTER — OFFICE VISIT (OUTPATIENT)
Dept: WOUND CARE | Facility: HOSPITAL | Age: 49
End: 2024-11-14
Payer: MEDICARE

## 2024-11-20 ENCOUNTER — TRANSCRIBE ORDERS (OUTPATIENT)
Dept: ADMINISTRATIVE | Facility: HOSPITAL | Age: 49
End: 2024-11-20
Payer: MEDICARE

## 2024-11-20 DIAGNOSIS — I73.9 PERIPHERAL VASCULAR DISEASE, UNSPECIFIED: Primary | ICD-10-CM

## 2024-11-25 ENCOUNTER — OFFICE VISIT (OUTPATIENT)
Dept: WOUND CARE | Facility: HOSPITAL | Age: 49
End: 2024-11-25
Payer: MEDICARE

## 2024-11-25 ENCOUNTER — HOSPITAL ENCOUNTER (OUTPATIENT)
Dept: ULTRASOUND IMAGING | Facility: HOSPITAL | Age: 49
Discharge: HOME | End: 2024-11-25
Payer: MEDICARE

## 2024-11-25 DIAGNOSIS — I73.9 PERIPHERAL VASCULAR DISEASE, UNSPECIFIED: ICD-10-CM

## 2024-11-25 PROCEDURE — 93923 UPR/LXTR ART STDY 3+ LVLS: CPT

## 2024-12-05 ENCOUNTER — OFFICE VISIT (OUTPATIENT)
Dept: WOUND CARE | Facility: HOSPITAL | Age: 49
End: 2024-12-05
Payer: MEDICARE

## 2024-12-12 ENCOUNTER — OFFICE VISIT (OUTPATIENT)
Dept: WOUND CARE | Facility: HOSPITAL | Age: 49
End: 2024-12-12
Payer: MEDICARE

## 2024-12-19 ENCOUNTER — OFFICE VISIT (OUTPATIENT)
Dept: WOUND CARE | Facility: HOSPITAL | Age: 49
End: 2024-12-19
Payer: MEDICARE

## 2025-01-02 ENCOUNTER — OFFICE VISIT (OUTPATIENT)
Dept: WOUND CARE | Facility: HOSPITAL | Age: 50
End: 2025-01-02
Payer: MEDICARE

## 2025-01-09 ENCOUNTER — TRANSCRIBE ORDERS (OUTPATIENT)
Dept: ADMINISTRATIVE | Facility: HOSPITAL | Age: 50
End: 2025-01-09
Payer: MEDICARE

## 2025-01-09 ENCOUNTER — OFFICE VISIT (OUTPATIENT)
Dept: WOUND CARE | Facility: HOSPITAL | Age: 50
End: 2025-01-09
Payer: MEDICARE

## 2025-01-09 ENCOUNTER — HOSPITAL ENCOUNTER (OUTPATIENT)
Dept: GENERAL RADIOLOGY | Facility: HOSPITAL | Age: 50
Discharge: HOME | End: 2025-01-09
Payer: MEDICARE

## 2025-01-09 DIAGNOSIS — M86.172 OTHER ACUTE OSTEOMYELITIS OF LEFT FOOT: Primary | ICD-10-CM

## 2025-01-09 PROCEDURE — 73630 X-RAY EXAM OF FOOT: CPT

## 2025-01-13 ENCOUNTER — LAB REQUISITION (OUTPATIENT)
Dept: LAB | Facility: HOSPITAL | Age: 50
End: 2025-01-13
Payer: MEDICARE

## 2025-01-13 ENCOUNTER — OFFICE VISIT (OUTPATIENT)
Dept: WOUND CARE | Facility: HOSPITAL | Age: 50
End: 2025-01-13
Payer: MEDICARE

## 2025-01-13 DIAGNOSIS — E11.621 TYPE 2 DIABETES MELLITUS WITH FOOT ULCER (CODE): ICD-10-CM

## 2025-01-13 PROCEDURE — 87075 CULTR BACTERIA EXCEPT BLOOD: CPT | Performed by: PODIATRIST

## 2025-01-13 PROCEDURE — 87176 TISSUE HOMOGENIZATION CULTR: CPT | Performed by: PODIATRIST

## 2025-01-13 PROCEDURE — 87147 CULTURE TYPE IMMUNOLOGIC: CPT | Performed by: PODIATRIST

## 2025-01-13 PROCEDURE — 87205 SMEAR GRAM STAIN: CPT | Performed by: PODIATRIST

## 2025-01-13 PROCEDURE — 87186 SC STD MICRODIL/AGAR DIL: CPT | Performed by: PODIATRIST

## 2025-01-13 PROCEDURE — 87070 CULTURE OTHR SPECIMN AEROBIC: CPT | Performed by: PODIATRIST

## 2025-01-16 ENCOUNTER — OFFICE VISIT (OUTPATIENT)
Dept: WOUND CARE | Facility: HOSPITAL | Age: 50
End: 2025-01-16
Payer: MEDICARE

## 2025-01-16 LAB
BACTERIA SPEC AEROBE CULT: ABNORMAL
GRAM STN SPEC: ABNORMAL
GRAM STN SPEC: ABNORMAL

## 2025-01-19 LAB — BACTERIA SPEC ANAEROBE CULT: NORMAL

## 2025-01-22 ENCOUNTER — PREP FOR SURGERY (OUTPATIENT)
Dept: OTHER | Facility: HOSPITAL | Age: 50
End: 2025-01-22
Payer: MEDICARE

## 2025-01-22 ENCOUNTER — OFFICE VISIT (OUTPATIENT)
Dept: WOUND CARE | Facility: HOSPITAL | Age: 50
End: 2025-01-22
Payer: MEDICARE

## 2025-01-22 DIAGNOSIS — E11.628 DIABETIC FOOT INFECTION: Primary | ICD-10-CM

## 2025-01-22 DIAGNOSIS — L08.9 DIABETIC FOOT INFECTION: Primary | ICD-10-CM

## 2025-01-22 DIAGNOSIS — Z01.818 PREOPERATIVE TESTING: ICD-10-CM

## 2025-01-22 DIAGNOSIS — Z79.02 ENCOUNTER FOR MONITORING ANTIPLATELET THERAPY: ICD-10-CM

## 2025-01-22 DIAGNOSIS — Z51.81 ENCOUNTER FOR MONITORING ANTIPLATELET THERAPY: ICD-10-CM

## 2025-01-22 PROBLEM — E11.610 CHARCOT FOOT DUE TO DIABETES MELLITUS: Status: ACTIVE | Noted: 2025-01-22

## 2025-01-22 NOTE — H&P (VIEW-ONLY)
1/26/2025        Benjamin Kelsey MD  [address] [place] [address] / [place] [address]      Donna Swain  1975    L foot wound      Dear Benjamin Kelsey MD        HPI  I had the pleasure of seeing your patient Donna Swain in the office today.  Thank you kindly for this consultation.  As you recall, Donna Swain is a 49 y.o.  female who you are currently following for routine health maintenance.  The patient has been seeing Dr. Mckee for her left diabetic foot wound and right foot malformations.  The patient's had a longstanding history of at least 4 years of dealing with the left foot wound for which she has become frustrated with the ongoing treatment for the wound.  After a discussion with Dr. Mckee last week she was inquisitive to left below-knee amputation.  I had an extensive discussion with the patient over the risk and benefits of the procedure, and she would like to proceed with left below-knee amputation.  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    Past Medical History:   Diagnosis Date    Ankle swelling     Back pain     Charcot's joint of ankle, left     Diabetes     Hemorrhoids     History of stomach ulcers     Hypertension     Hyperthyroidism     Neuropathy     Sleep apnea     doesn't use c pap. feels like she is suffocating    Thyroid goiter        Past Surgical History:   Procedure Laterality Date    BONE SPUR LEG Left 10/2/2024    Procedure: Plantar Exostectomy;  Surgeon: Ciro Mckee DPM;  Location:  [facility] OR;  Service: Podiatry;  Laterality: Left;    CHOLECYSTECTOMY      lap    COLONOSCOPY      ENDOSCOPY      FOOT HARDWARE REMOVAL Left     HYSTERECTOMY      LEG DEBRIDEMENT Left 10/2/2024    Procedure: Debridement of Bone/Plantar Exostectomy - Left Foot;  Surgeon: Ciro Mckee DPM;  Location:  [facility] OR;  Service: Podiatry;  Laterality: Left;    MULTIPLE TOOTH EXTRACTIONS      OVARY SURGERY      TOE OSTEOTOMY Left 03/07/2023    Procedure: PARTIAL REMOVAL OF BONE, FIFTH  METATARSAL, LEFT FOOT;  Surgeon: Tee Dacosta DPM;  Location:  [facility] OR;  Service: Podiatry;  Laterality: Left;    TUBAL ABDOMINAL LIGATION         Family History   Problem Relation Age of Onset    Cancer Mother     Cancer Father     COPD Father     Diabetes Maternal Grandfather        Social History     Socioeconomic History    Marital status: Single   Tobacco Use    Smoking status: Every Day     Current packs/day: 0.50     Average packs/day: 0.5 packs/day for 1.1 years (0.5 ttl pk-yrs)     Types: Cigarettes     Start date: 2024     Passive exposure: Current    Smokeless tobacco: Never   Vaping Use    Vaping status: Never Used   Substance and Sexual Activity    Alcohol use: Not Currently    Drug use: Not Currently    Sexual activity: Defer       Allergies   Allergen Reactions    Vancomycin Hives       Current Outpatient Medications   Medication Instructions    amitriptyline (ELAVIL) 10 mg, Oral, Nightly    buPROPion HCl (WELLBUTRIN PO) 150 mg, Oral, Daily    furosemide (LASIX) 40 mg, Oral, Daily    ibuprofen (ADVIL,MOTRIN) 800 mg, Oral, Every 6 Hours PRN    pregabalin (LYRICA) 200 mg, Oral, 3 times daily    Semaglutide (OZEMPIC, 0.25 OR 0.5 MG/DOSE, SC) 1 mg, Subcutaneous, Weekly           Review of Systems   Constitutional: Negative.  Negative for diaphoresis and fever.   HENT: Negative.     Eyes: Negative.    Respiratory: Negative.  Negative for shortness of breath and wheezing.    Cardiovascular: Negative.  Negative for leg swelling.   Gastrointestinal: Negative.  Negative for abdominal pain.   Endocrine: Negative.    Genitourinary: Negative.    Musculoskeletal: Negative.  Negative for gait problem.   Skin: Negative.    Allergic/Immunologic: Negative.    Neurological: Negative.  Negative for dizziness and numbness.   Hematological: Negative.    Psychiatric/Behavioral: Negative.         There were no vitals taken for this visit.  Physical Exam  Vitals and nursing note reviewed.   Constitutional:        General: She is not in acute distress.     Appearance: Normal appearance. She is not diaphoretic.   HENT:      Head: Normocephalic. No right periorbital erythema or left periorbital erythema.      Nose: Nose normal.   Eyes:      General: No scleral icterus.     Pupils: Pupils are equal.   Cardiovascular:      Rate and Rhythm: Normal rate and regular rhythm.      Pulses:           Dorsalis pedis pulses are 2+ on the left side.        Posterior tibial pulses are 2+ on the left side.   Pulmonary:      Effort: Pulmonary effort is normal. No respiratory distress.   Abdominal:      General: There is no distension.      Palpations: Abdomen is soft.      Tenderness: There is no abdominal tenderness. There is no guarding.   Musculoskeletal:         General: No swelling or tenderness. Normal range of motion.      Cervical back: Normal range of motion and neck supple.      Right lower leg: No edema.      Left lower leg: No edema.   Feet:      Right foot:      Skin integrity: Skin integrity normal.      Left foot:      Skin integrity: Skin integrity normal.   Skin:     General: Skin is warm and dry.      Findings: No rash.      Comments: Left foot wound with mild surrounding erythema no fluctuance or induration.  There is healthy granulation tissue at the base no overt evidence of infection.  No significant drainage.   Neurological:      General: No focal deficit present.      Mental Status: She is alert and oriented to person, place, and time. Mental status is at baseline.      Cranial Nerves: No cranial nerve deficit.      Gait: Gait normal.   Psychiatric:         Attention and Perception: Attention normal.         Mood and Affect: Mood normal.         Behavior: Behavior normal.         Thought Content: Thought content normal.         Judgment: Judgment normal.         XR Foot 3+ View Left    Result Date: 1/9/2025  Narrative: EXAMINATION: XR FOOT 3+ VW LEFT-  1/9/2025 10:49 AM  HISTORY: ATTENTION TO LEFT FOOT LATERAL TO  EVALUATE FOR OSTEOMYELITIS OR OTHER ACUTE PROCESSES; M86.172-Other acute osteomyelitis, left ankle and foot  COMPARISON: 9/9/2024 and 8/7/2024  TECHNIQUE: Frontal, lateral and oblique radiographs of the LEFT foot were provided for review.  FINDINGS: Soft tissue wound along the lateral aspect of the midfoot redemonstrated. Since the previous examination, the patient has had a exostectomy in the region of the wound. The distal portion of the cuboid along its plantar lateral aspect is now changed in shape. It is unclear whether this is all postoperative or there is any underlying osteomyelitis. This is immediately adjacent to the wound.. Soft tissue wound along the plantar lateral aspect of the midfoot. Elsewhere, no interval change.       Impression:  1.  Interval change in the appearance of the plantar lateral surface of the cuboid. Some of this may be postoperative in nature; however, the margin is somewhat irregular and adjacent underlying osteomyelitis is not entirely excluded. No immediate postoperative radiographs are available for review to determine whether this has changed since the surgery in October.  This report was signed and finalized on 1/9/2025 10:55 AM by Dr. Jose Guadalupe Garcia MD.       Patient Active Problem List   Diagnosis    Sleep apnea    Hypertension    Class 3 severe obesity due to excess calories without serious comorbidity with body mass index (BMI) of 50.0 to 59.9 in adult    Hx of osteomyelitis    Lymphedema    Neuropathy    Onychomycosis    Polyneuropathy due to type 2 diabetes mellitus    Tobacco abuse    Acute osteomyelitis of left foot    Exostosis of left foot    Diabetic foot infection    Preoperative testing    Charcot foot due to diabetes mellitus         ICD-10-CM ICD-9-CM   1. Diabetic foot infection  E11.628 250.80    L08.9 686.9   2. Preoperative testing  Z01.818 V72.84   3. Encounter for monitoring antiplatelet therapy  Z51.81 V58.83    Z79.02 V58.63       Lab Frequency Next  Occurrence   Instructions on coughing, deep breathing, and incentive spirometry. Once 09/21/2024   Follow Anesthesia Guidelines / Protocol Once 01/27/2025   Provide NPO Instructions to Patient Once 01/27/2025   Chlorhexidine Skin Prep Once 01/27/2025   CBC and Differential Once 01/27/2025   Basic metabolic panel Once 01/27/2025   APTT Once 01/27/2025   Protime-INR Once 01/27/2025   ECG 12 Lead Once 01/27/2025       Plan: After thoroughly evaluating Donna Swain, I believe the best course of action is to proceed with a left below-knee amputation.  Extensively discussed risk and benefits of amputation.  The patient states that she is tired of dealing with a left foot wound and would rather proceed with left below-knee amputation.  She has palpable pulses and I see no significant risk factors for nonhealing of the below-knee amputation.  Risks/benefits were explained at great length to the patient which include but are not limited to bleeding, infection, vessel damage, nerve damage, failure of wound to heal, MI, stroke, and death.  The patient understands all the risks and wishes for me to proceed.   Patient will need to hold her Ozempic for 1 week prior to surgery.      Class 3 Severe Obesity (BMI >=40). Obesity-related health conditions include the following: hypertension. Obesity is newly identified. BMI is is above average; no BMI management plan is appropriate. We discussed portion control and increasing exercise.    This was all discussed in full with complete understanding.    Thank you for allowing me to participate in the care of your patient.  Please do not hesitate with any questions or concerns.  I will keep you aware of any further encounters with Donna Swain.        Sincerely yours,         Giancarlo Demarco MD

## 2025-01-22 NOTE — H&P
1/26/2025        Benjamin Kelsey MD  [address] [place] [address] / [place] [address]      Donna Swain  1975    L foot wound      Dear Benjamin Kelsey MD        HPI  I had the pleasure of seeing your patient Donna Swain in the office today.  Thank you kindly for this consultation.  As you recall, Donna Swain is a 49 y.o.  female who you are currently following for routine health maintenance.  The patient has been seeing Dr. Mckee for her left diabetic foot wound and right foot malformations.  The patient's had a longstanding history of at least 4 years of dealing with the left foot wound for which she has become frustrated with the ongoing treatment for the wound.  After a discussion with Dr. Mckee last week she was inquisitive to left below-knee amputation.  I had an extensive discussion with the patient over the risk and benefits of the procedure, and she would like to proceed with left below-knee amputation.  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    Past Medical History:   Diagnosis Date    Ankle swelling     Back pain     Charcot's joint of ankle, left     Diabetes     Hemorrhoids     History of stomach ulcers     Hypertension     Hyperthyroidism     Neuropathy     Sleep apnea     doesn't use c pap. feels like she is suffocating    Thyroid goiter        Past Surgical History:   Procedure Laterality Date    BONE SPUR LEG Left 10/2/2024    Procedure: Plantar Exostectomy;  Surgeon: Ciro Mckee DPM;  Location:  [facility];  Service: Podiatry;  Laterality: Left;    CHOLECYSTECTOMY      lap    COLONOSCOPY      ENDOSCOPY      FOOT HARDWARE REMOVAL Left     HYSTERECTOMY      LEG DEBRIDEMENT Left 10/2/2024    Procedure: Debridement of Bone/Plantar Exostectomy - Left Foot;  Surgeon: Ciro Mckee DPM;  Location:  [facility];  Service: Podiatry;  Laterality: Left;    MULTIPLE TOOTH EXTRACTIONS      OVARY SURGERY      TOE OSTEOTOMY Left 03/07/2023    Procedure: PARTIAL REMOVAL OF BONE, FIFTH  METATARSAL, LEFT FOOT;  Surgeon: Tee Dacosta DPM;  Location:  [facility];  Service: Podiatry;  Laterality: Left;    TUBAL ABDOMINAL LIGATION         Family History   Problem Relation Age of Onset    Cancer Mother     Cancer Father     COPD Father     Diabetes Maternal Grandfather        Social History     Socioeconomic History    Marital status: Single   Tobacco Use    Smoking status: Every Day     Current packs/day: 0.50     Average packs/day: 0.5 packs/day for 1.1 years (0.5 ttl pk-yrs)     Types: Cigarettes     Start date: 2024     Passive exposure: Current    Smokeless tobacco: Never   Vaping Use    Vaping status: Never Used   Substance and Sexual Activity    Alcohol use: Not Currently    Drug use: Not Currently    Sexual activity: Defer       Allergies   Allergen Reactions    Vancomycin Hives       Current Outpatient Medications   Medication Instructions    amitriptyline (ELAVIL) 10 mg, Oral, Nightly    buPROPion HCl (WELLBUTRIN PO) 150 mg, Oral, Daily    furosemide (LASIX) 40 mg, Oral, Daily    ibuprofen (ADVIL,MOTRIN) 800 mg, Oral, Every 6 Hours PRN    pregabalin (LYRICA) 200 mg, Oral, 3 times daily    Semaglutide (OZEMPIC, 0.25 OR 0.5 MG/DOSE, SC) 1 mg, Subcutaneous, Weekly           Review of Systems   Constitutional: Negative.  Negative for diaphoresis and fever.   HENT: Negative.     Eyes: Negative.    Respiratory: Negative.  Negative for shortness of breath and wheezing.    Cardiovascular: Negative.  Negative for leg swelling.   Gastrointestinal: Negative.  Negative for abdominal pain.   Endocrine: Negative.    Genitourinary: Negative.    Musculoskeletal: Negative.  Negative for gait problem.   Skin: Negative.    Allergic/Immunologic: Negative.    Neurological: Negative.  Negative for dizziness and numbness.   Hematological: Negative.    Psychiatric/Behavioral: Negative.         There were no vitals taken for this visit.  Physical Exam  Vitals and nursing note reviewed.   Constitutional:        General: She is not in acute distress.     Appearance: Normal appearance. She is not diaphoretic.   HENT:      Head: Normocephalic. No right periorbital erythema or left periorbital erythema.      Nose: Nose normal.   Eyes:      General: No scleral icterus.     Pupils: Pupils are equal.   Cardiovascular:      Rate and Rhythm: Normal rate and regular rhythm.      Pulses:           Dorsalis pedis pulses are 2+ on the left side.        Posterior tibial pulses are 2+ on the left side.   Pulmonary:      Effort: Pulmonary effort is normal. No respiratory distress.   Abdominal:      General: There is no distension.      Palpations: Abdomen is soft.      Tenderness: There is no abdominal tenderness. There is no guarding.   Musculoskeletal:         General: No swelling or tenderness. Normal range of motion.      Cervical back: Normal range of motion and neck supple.      Right lower leg: No edema.      Left lower leg: No edema.   Feet:      Right foot:      Skin integrity: Skin integrity normal.      Left foot:      Skin integrity: Skin integrity normal.   Skin:     General: Skin is warm and dry.      Findings: No rash.      Comments: Left foot wound with mild surrounding erythema no fluctuance or induration.  There is healthy granulation tissue at the base no overt evidence of infection.  No significant drainage.   Neurological:      General: No focal deficit present.      Mental Status: She is alert and oriented to person, place, and time. Mental status is at baseline.      Cranial Nerves: No cranial nerve deficit.      Gait: Gait normal.   Psychiatric:         Attention and Perception: Attention normal.         Mood and Affect: Mood normal.         Behavior: Behavior normal.         Thought Content: Thought content normal.         Judgment: Judgment normal.         XR Foot 3+ View Left    Result Date: 1/9/2025  Narrative: EXAMINATION: XR FOOT 3+ VW LEFT-  1/9/2025 10:49 AM  HISTORY: ATTENTION TO LEFT FOOT LATERAL TO  EVALUATE FOR OSTEOMYELITIS OR OTHER ACUTE PROCESSES; M86.172-Other acute osteomyelitis, left ankle and foot  COMPARISON: 9/9/2024 and 8/7/2024  TECHNIQUE: Frontal, lateral and oblique radiographs of the LEFT foot were provided for review.  FINDINGS: Soft tissue wound along the lateral aspect of the midfoot redemonstrated. Since the previous examination, the patient has had a exostectomy in the region of the wound. The distal portion of the cuboid along its plantar lateral aspect is now changed in shape. It is unclear whether this is all postoperative or there is any underlying osteomyelitis. This is immediately adjacent to the wound.. Soft tissue wound along the plantar lateral aspect of the midfoot. Elsewhere, no interval change.       Impression:  1.  Interval change in the appearance of the plantar lateral surface of the cuboid. Some of this may be postoperative in nature; however, the margin is somewhat irregular and adjacent underlying osteomyelitis is not entirely excluded. No immediate postoperative radiographs are available for review to determine whether this has changed since the surgery in October.  This report was signed and finalized on 1/9/2025 10:55 AM by Dr. Jose Guadalupe Garcia MD.       Patient Active Problem List   Diagnosis    Sleep apnea    Hypertension    Class 3 severe obesity due to excess calories without serious comorbidity with body mass index (BMI) of 50.0 to 59.9 in adult    Hx of osteomyelitis    Lymphedema    Neuropathy    Onychomycosis    Polyneuropathy due to type 2 diabetes mellitus    Tobacco abuse    Acute osteomyelitis of left foot    Exostosis of left foot    Diabetic foot infection    Preoperative testing    Charcot foot due to diabetes mellitus         ICD-10-CM ICD-9-CM   1. Diabetic foot infection  E11.628 250.80    L08.9 686.9   2. Preoperative testing  Z01.818 V72.84   3. Encounter for monitoring antiplatelet therapy  Z51.81 V58.83    Z79.02 V58.63       Lab Frequency Next  Occurrence   Instructions on coughing, deep breathing, and incentive spirometry. Once 09/21/2024   Follow Anesthesia Guidelines / Protocol Once 01/27/2025   Provide NPO Instructions to Patient Once 01/27/2025   Chlorhexidine Skin Prep Once 01/27/2025   CBC and Differential Once 01/27/2025   Basic metabolic panel Once 01/27/2025   APTT Once 01/27/2025   Protime-INR Once 01/27/2025   ECG 12 Lead Once 01/27/2025       Plan: After thoroughly evaluating Donna Swain, I believe the best course of action is to proceed with a left below-knee amputation.  Extensively discussed risk and benefits of amputation.  The patient states that she is tired of dealing with a left foot wound and would rather proceed with left below-knee amputation.  She has palpable pulses and I see no significant risk factors for nonhealing of the below-knee amputation.  Risks/benefits were explained at great length to the patient which include but are not limited to bleeding, infection, vessel damage, nerve damage, failure of wound to heal, MI, stroke, and death.  The patient understands all the risks and wishes for me to proceed.   Patient will need to hold her Ozempic for 1 week prior to surgery.      Class 3 Severe Obesity (BMI >=40). Obesity-related health conditions include the following: hypertension. Obesity is newly identified. BMI is is above average; no BMI management plan is appropriate. We discussed portion control and increasing exercise.    This was all discussed in full with complete understanding.    Thank you for allowing me to participate in the care of your patient.  Please do not hesitate with any questions or concerns.  I will keep you aware of any further encounters with Donna Swain.        Sincerely yours,         Giancarlo Demarco MD

## 2025-01-24 ENCOUNTER — TELEPHONE (OUTPATIENT)
Dept: VASCULAR SURGERY | Facility: CLINIC | Age: 50
End: 2025-01-24
Payer: MEDICARE

## 2025-01-24 ENCOUNTER — INFUSION (OUTPATIENT)
Dept: ONCOLOGY | Facility: HOSPITAL | Age: 50
End: 2025-01-24
Payer: MEDICARE

## 2025-01-24 ENCOUNTER — PRE-ADMISSION TESTING (OUTPATIENT)
Dept: PREADMISSION TESTING | Facility: HOSPITAL | Age: 50
DRG: 617 | End: 2025-01-24
Payer: MEDICARE

## 2025-01-24 VITALS
SYSTOLIC BLOOD PRESSURE: 140 MMHG | DIASTOLIC BLOOD PRESSURE: 86 MMHG | OXYGEN SATURATION: 97 % | WEIGHT: 293 LBS | HEIGHT: 69 IN | BODY MASS INDEX: 43.4 KG/M2 | RESPIRATION RATE: 18 BRPM | HEART RATE: 90 BPM

## 2025-01-24 VITALS
HEIGHT: 69 IN | HEART RATE: 75 BPM | RESPIRATION RATE: 18 BRPM | BODY MASS INDEX: 43.4 KG/M2 | WEIGHT: 293 LBS | OXYGEN SATURATION: 97 % | TEMPERATURE: 97.3 F | DIASTOLIC BLOOD PRESSURE: 88 MMHG | SYSTOLIC BLOOD PRESSURE: 142 MMHG

## 2025-01-24 DIAGNOSIS — L08.9 DIABETIC FOOT INFECTION: ICD-10-CM

## 2025-01-24 DIAGNOSIS — E11.628 DIABETIC FOOT INFECTION: ICD-10-CM

## 2025-01-24 DIAGNOSIS — Z79.02 ENCOUNTER FOR MONITORING ANTIPLATELET THERAPY: ICD-10-CM

## 2025-01-24 DIAGNOSIS — Z01.818 PREOPERATIVE TESTING: ICD-10-CM

## 2025-01-24 DIAGNOSIS — M86.172 ACUTE OSTEOMYELITIS OF LEFT FOOT: Primary | ICD-10-CM

## 2025-01-24 DIAGNOSIS — Z51.81 ENCOUNTER FOR MONITORING ANTIPLATELET THERAPY: ICD-10-CM

## 2025-01-24 LAB
ANION GAP SERPL CALCULATED.3IONS-SCNC: 9 MMOL/L (ref 5–15)
APTT PPP: 23.9 SECONDS (ref 24.5–36)
BASOPHILS # BLD AUTO: 0.04 10*3/MM3 (ref 0–0.2)
BASOPHILS NFR BLD AUTO: 0.5 % (ref 0–1.5)
BUN SERPL-MCNC: 10 MG/DL (ref 6–20)
BUN/CREAT SERPL: 16.4 (ref 7–25)
CALCIUM SPEC-SCNC: 9.3 MG/DL (ref 8.6–10.5)
CHLORIDE SERPL-SCNC: 98 MMOL/L (ref 98–107)
CO2 SERPL-SCNC: 31 MMOL/L (ref 22–29)
CREAT SERPL-MCNC: 0.61 MG/DL (ref 0.57–1)
DEPRECATED RDW RBC AUTO: 41.4 FL (ref 37–54)
EGFRCR SERPLBLD CKD-EPI 2021: 111.1 ML/MIN/1.73
EOSINOPHIL # BLD AUTO: 0.3 10*3/MM3 (ref 0–0.4)
EOSINOPHIL NFR BLD AUTO: 3.4 % (ref 0.3–6.2)
ERYTHROCYTE [DISTWIDTH] IN BLOOD BY AUTOMATED COUNT: 13.4 % (ref 12.3–15.4)
GLUCOSE SERPL-MCNC: 89 MG/DL (ref 65–99)
HCT VFR BLD AUTO: 37.8 % (ref 34–46.6)
HGB BLD-MCNC: 12.2 G/DL (ref 12–15.9)
IMM GRANULOCYTES # BLD AUTO: 0.04 10*3/MM3 (ref 0–0.05)
IMM GRANULOCYTES NFR BLD AUTO: 0.5 % (ref 0–0.5)
INR PPP: 1.02 (ref 0.91–1.09)
LYMPHOCYTES # BLD AUTO: 2.17 10*3/MM3 (ref 0.7–3.1)
LYMPHOCYTES NFR BLD AUTO: 24.7 % (ref 19.6–45.3)
MCH RBC QN AUTO: 27.7 PG (ref 26.6–33)
MCHC RBC AUTO-ENTMCNC: 32.3 G/DL (ref 31.5–35.7)
MCV RBC AUTO: 85.7 FL (ref 79–97)
MONOCYTES # BLD AUTO: 0.4 10*3/MM3 (ref 0.1–0.9)
MONOCYTES NFR BLD AUTO: 4.6 % (ref 5–12)
NEUTROPHILS NFR BLD AUTO: 5.83 10*3/MM3 (ref 1.7–7)
NEUTROPHILS NFR BLD AUTO: 66.3 % (ref 42.7–76)
NRBC BLD AUTO-RTO: 0 /100 WBC (ref 0–0.2)
PLATELET # BLD AUTO: 374 10*3/MM3 (ref 140–450)
PMV BLD AUTO: 9 FL (ref 6–12)
POTASSIUM SERPL-SCNC: 4.1 MMOL/L (ref 3.5–5.2)
PROTHROMBIN TIME: 13.9 SECONDS (ref 11.8–14.8)
QT INTERVAL: 370 MS
QTC INTERVAL: 429 MS
RBC # BLD AUTO: 4.41 10*6/MM3 (ref 3.77–5.28)
SODIUM SERPL-SCNC: 138 MMOL/L (ref 136–145)
WBC NRBC COR # BLD AUTO: 8.78 10*3/MM3 (ref 3.4–10.8)

## 2025-01-24 PROCEDURE — 85610 PROTHROMBIN TIME: CPT

## 2025-01-24 PROCEDURE — 80048 BASIC METABOLIC PNL TOTAL CA: CPT

## 2025-01-24 PROCEDURE — 85730 THROMBOPLASTIN TIME PARTIAL: CPT

## 2025-01-24 PROCEDURE — 25010000002 DALBAVANCIN 500 MG RECONSTITUTED SOLUTION 1 EACH VIAL: Performed by: PODIATRIST

## 2025-01-24 PROCEDURE — 25010000003 DEXTROSE 5 % SOLUTION 500 ML FLEX CONT: Performed by: PODIATRIST

## 2025-01-24 PROCEDURE — 93005 ELECTROCARDIOGRAM TRACING: CPT

## 2025-01-24 PROCEDURE — 36415 COLL VENOUS BLD VENIPUNCTURE: CPT

## 2025-01-24 PROCEDURE — 96365 THER/PROPH/DIAG IV INF INIT: CPT

## 2025-01-24 PROCEDURE — 85025 COMPLETE CBC W/AUTO DIFF WBC: CPT

## 2025-01-24 PROCEDURE — 93010 ELECTROCARDIOGRAM REPORT: CPT | Performed by: STUDENT IN AN ORGANIZED HEALTH CARE EDUCATION/TRAINING PROGRAM

## 2025-01-24 RX ORDER — AMITRIPTYLINE HYDROCHLORIDE 10 MG/1
10 TABLET ORAL NIGHTLY
COMMUNITY

## 2025-01-24 RX ADMIN — DALBAVANCIN 1500 MG: 500 INJECTION, POWDER, FOR SOLUTION INTRAVENOUS at 10:21

## 2025-01-24 NOTE — DISCHARGE INSTRUCTIONS
Preparing for Surgery  Follow these instructions before the procedure:  Several days or weeks before your procedure  Medication(s) you need to stop   _______ days/week prior to surgery: ozempic      Ask your health care provider about:  Changing or stopping your regular medicines. This is especially important if you are taking diabetes medicines or blood thinners.  Taking medicines such as aspirin and ibuprofen. These medicines can thin your blood. Do not take these medicines unless your health care provider tells you to take them.  Taking over-the-counter medicines, vitamins, herbs, and supplements.    Contact your surgeon if you:  Develop a fever of more than 100.4°F (38°C) or other feelings of illness during the 48 hours before your surgery.  Have symptoms that get worse.  Have questions or concerns about your surgery.  If you are going home the same day of your surgery you will need to arrange for a responsible adult, age 18 years old or older, to drive you home from the hospital and stay with you for 24 hours. Verification of the  will be made prior to any procedure requiring sedation. You may not go home in a taxi or any form of public transportation by yourself.     Day before your procedure      24 hours before your procedure DO NOT drink alcoholic beverages or smoke.  24 hours before your procedure STOP taking Erectile Dysfunction medication (i.e.,Cialis, Viagra)   You may be asked to shower with a germ-killing soap.  Day of your procedure   You may take the following medication(s) the morning of surgery with a sip of water:  Lyrica      8 hours before your scheduled arrival time, STOP all food, any dairy products, and full liquids. This includes hard candy, chewing gum or mints. This is extremely important to prevent serious complications.     Up to 2 hours before your scheduled arrival time, you may have clear liquids no cream, powder, or pulp of any kind. Safe options are water, black coffee, plain  tea, soda, Gatorade/Powerade, clear broth, apple juice.    2 hours before your scheduled arrival time, STOP drinking clear liquids.    You may need to take another shower with a germ-killing soap before you leave home in the morning. Do not use perfumes, colognes, or body lotions.  Wear comfortable loose-fitting clothing.  Remove all jewelry including body piercing and rings, dark colored nail polish, and make up prior to arrival at the hospital. Leave all valuables at home.   Bring your hearing aids if you rely on them.  Do not wear contact lenses. If you wear eyeglasses remember to bring a case to store them in while you are in surgery.  Do not use denture adhesives since you will be asked to remove them during your surgery.    You do not need to bring your home medications into the hospital.   Bring your sleep apnea device with you on the day of your surgery (if this applies to you).  If you have an Inspire implant for sleep apnea, please bring the remote with you on the day of surgery.  If you wear portable oxygen, bring it with you.   If you are staying overnight, you may bring a bag of items you may need such as slippers, robe and a change of clothes for your discharge. You may want to leave these items in the car until you are ready for them since your family will take your belongings when you leave the pre-operative area.  Arrive at the hospital as scheduled by the office. You will be asked to arrive 2 hours prior to your surgery time in order to prepare for your procedure.  When you arrive at the hospital  Go to the registration desk located at the main entrance of the hospital.  After registration is completed, you will be given a beeper and a sticker sheet. Take the stickers to Outpatient Surgery and place in the tray at the end of the desk to notify the staff that you have arrived and registered.   Return to the lobby to wait. You are not always called back according to the time of arrival but rather the  time your doctor will be ready.  When your beeper lights up and vibrates proceed through the double doors, under the stairs, and a member of the Outpatient Surgery staff will escort you to your preoperative room.

## 2025-01-24 NOTE — TELEPHONE ENCOUNTER
Spoke with patient   Your surgery is scheduled for 01/27/2025, you need to be at the hospital at 5:00 am.  Nothing to eat or drink after midnight the night before your procedure. Patient  HOLD OZEMPIC ONE WEEK PRIOR TO PROCEDURE. Your pre work is scheduled for 01/24/2025, you need to be at the hospital at 8:30 am. Patient states she has not had her ozempic since 01/11/2025 and verbalized understanding of procedure instructions.

## 2025-01-27 ENCOUNTER — ANESTHESIA EVENT (OUTPATIENT)
Dept: PERIOP | Facility: HOSPITAL | Age: 50
End: 2025-01-27
Payer: MEDICARE

## 2025-01-27 ENCOUNTER — ANESTHESIA (OUTPATIENT)
Dept: PERIOP | Facility: HOSPITAL | Age: 50
End: 2025-01-27
Payer: MEDICARE

## 2025-01-27 ENCOUNTER — HOSPITAL ENCOUNTER (INPATIENT)
Facility: HOSPITAL | Age: 50
LOS: 4 days | Discharge: NURSING, REHAB OR LONG-TERM CARE | End: 2025-01-31
Attending: STUDENT IN AN ORGANIZED HEALTH CARE EDUCATION/TRAINING PROGRAM | Admitting: STUDENT IN AN ORGANIZED HEALTH CARE EDUCATION/TRAINING PROGRAM
Payer: MEDICARE

## 2025-01-27 DIAGNOSIS — G89.18 ACUTE POST-OPERATIVE PAIN: ICD-10-CM

## 2025-01-27 DIAGNOSIS — L08.9 DIABETIC FOOT INFECTION: ICD-10-CM

## 2025-01-27 DIAGNOSIS — R26.9 GAIT ABNORMALITY: Primary | ICD-10-CM

## 2025-01-27 DIAGNOSIS — E11.628 DIABETIC FOOT INFECTION: ICD-10-CM

## 2025-01-27 DIAGNOSIS — Z01.818 PREOPERATIVE TESTING: ICD-10-CM

## 2025-01-27 PROBLEM — M86.9 OSTEOMYELITIS: Status: ACTIVE | Noted: 2025-01-27

## 2025-01-27 LAB
ABO GROUP BLD: NORMAL
BLD GP AB SCN SERPL QL: NEGATIVE
GLUCOSE BLDC GLUCOMTR-MCNC: 109 MG/DL (ref 70–130)
GLUCOSE BLDC GLUCOMTR-MCNC: 119 MG/DL (ref 70–130)
GLUCOSE BLDC GLUCOMTR-MCNC: 125 MG/DL (ref 70–130)
GLUCOSE BLDC GLUCOMTR-MCNC: 98 MG/DL (ref 70–130)
RH BLD: POSITIVE
T&S EXPIRATION DATE: NORMAL

## 2025-01-27 PROCEDURE — 25010000002 HYDROMORPHONE PER 4 MG: Performed by: ANESTHESIOLOGY

## 2025-01-27 PROCEDURE — 82948 REAGENT STRIP/BLOOD GLUCOSE: CPT

## 2025-01-27 PROCEDURE — L1830 KO IMMOB CANVAS LONG PRE OTS: HCPCS | Performed by: STUDENT IN AN ORGANIZED HEALTH CARE EDUCATION/TRAINING PROGRAM

## 2025-01-27 PROCEDURE — 25010000002 BUPIVACAINE (PF) 0.25 % SOLUTION: Performed by: STUDENT IN AN ORGANIZED HEALTH CARE EDUCATION/TRAINING PROGRAM

## 2025-01-27 PROCEDURE — 25010000002 FENTANYL CITRATE (PF) 100 MCG/2ML SOLUTION

## 2025-01-27 PROCEDURE — 27880 AMPUTATION OF LOWER LEG: CPT | Performed by: STUDENT IN AN ORGANIZED HEALTH CARE EDUCATION/TRAINING PROGRAM

## 2025-01-27 PROCEDURE — 88307 TISSUE EXAM BY PATHOLOGIST: CPT | Performed by: STUDENT IN AN ORGANIZED HEALTH CARE EDUCATION/TRAINING PROGRAM

## 2025-01-27 PROCEDURE — 25010000002 CEFAZOLIN 3 G RECONSTITUTED SOLUTION 1 EACH VIAL: Performed by: STUDENT IN AN ORGANIZED HEALTH CARE EDUCATION/TRAINING PROGRAM

## 2025-01-27 PROCEDURE — 25010000002 HYDROMORPHONE 1 MG/ML SOLUTION: Performed by: STUDENT IN AN ORGANIZED HEALTH CARE EDUCATION/TRAINING PROGRAM

## 2025-01-27 PROCEDURE — 25010000002 ONDANSETRON PER 1 MG

## 2025-01-27 PROCEDURE — 0Y6J0Z1 DETACHMENT AT LEFT LOWER LEG, HIGH, OPEN APPROACH: ICD-10-PCS | Performed by: STUDENT IN AN ORGANIZED HEALTH CARE EDUCATION/TRAINING PROGRAM

## 2025-01-27 PROCEDURE — 25010000002 SUGAMMADEX 200 MG/2ML SOLUTION

## 2025-01-27 PROCEDURE — 25010000002 FENTANYL CITRATE (PF) 50 MCG/ML SOLUTION: Performed by: ANESTHESIOLOGY

## 2025-01-27 PROCEDURE — 25010000002 LIDOCAINE PF 2% 2 % SOLUTION

## 2025-01-27 PROCEDURE — 86901 BLOOD TYPING SEROLOGIC RH(D): CPT | Performed by: STUDENT IN AN ORGANIZED HEALTH CARE EDUCATION/TRAINING PROGRAM

## 2025-01-27 PROCEDURE — 86850 RBC ANTIBODY SCREEN: CPT | Performed by: STUDENT IN AN ORGANIZED HEALTH CARE EDUCATION/TRAINING PROGRAM

## 2025-01-27 PROCEDURE — 25010000002 PROPOFOL 10 MG/ML EMULSION

## 2025-01-27 PROCEDURE — 25810000003 LACTATED RINGERS PER 1000 ML: Performed by: STUDENT IN AN ORGANIZED HEALTH CARE EDUCATION/TRAINING PROGRAM

## 2025-01-27 PROCEDURE — 86900 BLOOD TYPING SEROLOGIC ABO: CPT | Performed by: STUDENT IN AN ORGANIZED HEALTH CARE EDUCATION/TRAINING PROGRAM

## 2025-01-27 DEVICE — LIGACLIP MCA MULTIPLE CLIP APPLIERS, 20 MEDIUM CLIPS
Type: IMPLANTABLE DEVICE | Site: LEG | Status: FUNCTIONAL
Brand: LIGACLIP

## 2025-01-27 DEVICE — LIGACLIP MCA MULTIPLE CLIP APPLIERS, 20 SMALL CLIPS
Type: IMPLANTABLE DEVICE | Site: LEG | Status: FUNCTIONAL
Brand: LIGACLIP

## 2025-01-27 RX ORDER — ACITRETIN 25 MG/1
25 CAPSULE ORAL
COMMUNITY

## 2025-01-27 RX ORDER — FLUMAZENIL 0.1 MG/ML
0.2 INJECTION INTRAVENOUS AS NEEDED
Status: DISCONTINUED | OUTPATIENT
Start: 2025-01-27 | End: 2025-01-27 | Stop reason: HOSPADM

## 2025-01-27 RX ORDER — ACETAMINOPHEN 325 MG/1
650 TABLET ORAL EVERY 4 HOURS PRN
Status: DISCONTINUED | OUTPATIENT
Start: 2025-01-27 | End: 2025-01-31 | Stop reason: HOSPADM

## 2025-01-27 RX ORDER — HYDROMORPHONE HYDROCHLORIDE 1 MG/ML
0.5 INJECTION, SOLUTION INTRAMUSCULAR; INTRAVENOUS; SUBCUTANEOUS
Status: DISCONTINUED | OUTPATIENT
Start: 2025-01-27 | End: 2025-01-27 | Stop reason: HOSPADM

## 2025-01-27 RX ORDER — AMITRIPTYLINE HYDROCHLORIDE 10 MG/1
10 TABLET ORAL NIGHTLY
Status: DISCONTINUED | OUTPATIENT
Start: 2025-01-27 | End: 2025-01-31 | Stop reason: HOSPADM

## 2025-01-27 RX ORDER — INSULIN LISPRO 100 [IU]/ML
2-9 INJECTION, SOLUTION INTRAVENOUS; SUBCUTANEOUS
Status: DISCONTINUED | OUTPATIENT
Start: 2025-01-27 | End: 2025-01-31 | Stop reason: HOSPADM

## 2025-01-27 RX ORDER — OXYCODONE AND ACETAMINOPHEN 10; 325 MG/1; MG/1
1 TABLET ORAL EVERY 4 HOURS PRN
Status: DISCONTINUED | OUTPATIENT
Start: 2025-01-27 | End: 2025-01-27 | Stop reason: HOSPADM

## 2025-01-27 RX ORDER — FENTANYL CITRATE 50 UG/ML
INJECTION, SOLUTION INTRAMUSCULAR; INTRAVENOUS AS NEEDED
Status: DISCONTINUED | OUTPATIENT
Start: 2025-01-27 | End: 2025-01-27 | Stop reason: SURG

## 2025-01-27 RX ORDER — DEXTROSE MONOHYDRATE 25 G/50ML
25 INJECTION, SOLUTION INTRAVENOUS
Status: DISCONTINUED | OUTPATIENT
Start: 2025-01-27 | End: 2025-01-31 | Stop reason: HOSPADM

## 2025-01-27 RX ORDER — SODIUM CHLORIDE, SODIUM LACTATE, POTASSIUM CHLORIDE, CALCIUM CHLORIDE 600; 310; 30; 20 MG/100ML; MG/100ML; MG/100ML; MG/100ML
100 INJECTION, SOLUTION INTRAVENOUS CONTINUOUS
Status: DISPENSED | OUTPATIENT
Start: 2025-01-27 | End: 2025-01-28

## 2025-01-27 RX ORDER — ONDANSETRON 4 MG/1
4 TABLET, ORALLY DISINTEGRATING ORAL EVERY 6 HOURS PRN
Status: DISCONTINUED | OUTPATIENT
Start: 2025-01-27 | End: 2025-01-31 | Stop reason: HOSPADM

## 2025-01-27 RX ORDER — MAGNESIUM HYDROXIDE 1200 MG/15ML
LIQUID ORAL AS NEEDED
Status: DISCONTINUED | OUTPATIENT
Start: 2025-01-27 | End: 2025-01-27 | Stop reason: HOSPADM

## 2025-01-27 RX ORDER — PREGABALIN 100 MG/1
200 CAPSULE ORAL 3 TIMES DAILY
Status: DISCONTINUED | OUTPATIENT
Start: 2025-01-27 | End: 2025-01-31 | Stop reason: HOSPADM

## 2025-01-27 RX ORDER — FENTANYL CITRATE 50 UG/ML
50 INJECTION, SOLUTION INTRAMUSCULAR; INTRAVENOUS
Status: DISCONTINUED | OUTPATIENT
Start: 2025-01-27 | End: 2025-01-27 | Stop reason: HOSPADM

## 2025-01-27 RX ORDER — HYDROMORPHONE HYDROCHLORIDE 1 MG/ML
0.5 INJECTION, SOLUTION INTRAMUSCULAR; INTRAVENOUS; SUBCUTANEOUS
Status: DISCONTINUED | OUTPATIENT
Start: 2025-01-27 | End: 2025-01-29 | Stop reason: ALTCHOICE

## 2025-01-27 RX ORDER — IBUPROFEN 600 MG/1
600 TABLET, FILM COATED ORAL EVERY 6 HOURS PRN
Status: DISCONTINUED | OUTPATIENT
Start: 2025-01-27 | End: 2025-01-27 | Stop reason: HOSPADM

## 2025-01-27 RX ORDER — ONDANSETRON 2 MG/ML
4 INJECTION INTRAMUSCULAR; INTRAVENOUS
Status: DISCONTINUED | OUTPATIENT
Start: 2025-01-27 | End: 2025-01-27 | Stop reason: HOSPADM

## 2025-01-27 RX ORDER — LIDOCAINE HYDROCHLORIDE 10 MG/ML
0.5 INJECTION, SOLUTION EPIDURAL; INFILTRATION; INTRACAUDAL; PERINEURAL ONCE AS NEEDED
Status: DISCONTINUED | OUTPATIENT
Start: 2025-01-27 | End: 2025-01-27 | Stop reason: HOSPADM

## 2025-01-27 RX ORDER — SEMAGLUTIDE 1.34 MG/ML
1 INJECTION, SOLUTION SUBCUTANEOUS WEEKLY
COMMUNITY

## 2025-01-27 RX ORDER — ROCURONIUM BROMIDE 10 MG/ML
INJECTION, SOLUTION INTRAVENOUS AS NEEDED
Status: DISCONTINUED | OUTPATIENT
Start: 2025-01-27 | End: 2025-01-27 | Stop reason: SURG

## 2025-01-27 RX ORDER — LABETALOL HYDROCHLORIDE 5 MG/ML
5 INJECTION, SOLUTION INTRAVENOUS
Status: DISCONTINUED | OUTPATIENT
Start: 2025-01-27 | End: 2025-01-27 | Stop reason: HOSPADM

## 2025-01-27 RX ORDER — ACETAMINOPHEN 500 MG
1000 TABLET ORAL ONCE
Status: COMPLETED | OUTPATIENT
Start: 2025-01-27 | End: 2025-01-27

## 2025-01-27 RX ORDER — NICOTINE POLACRILEX 4 MG
15 LOZENGE BUCCAL
Status: DISCONTINUED | OUTPATIENT
Start: 2025-01-27 | End: 2025-01-31 | Stop reason: HOSPADM

## 2025-01-27 RX ORDER — SODIUM CHLORIDE 0.9 % (FLUSH) 0.9 %
3-10 SYRINGE (ML) INJECTION AS NEEDED
Status: DISCONTINUED | OUTPATIENT
Start: 2025-01-27 | End: 2025-01-27 | Stop reason: HOSPADM

## 2025-01-27 RX ORDER — NITROGLYCERIN 0.4 MG/1
0.4 TABLET SUBLINGUAL
Status: DISCONTINUED | OUTPATIENT
Start: 2025-01-27 | End: 2025-01-27 | Stop reason: SDUPTHER

## 2025-01-27 RX ORDER — NITROGLYCERIN 0.4 MG/1
0.4 TABLET SUBLINGUAL
Status: DISCONTINUED | OUTPATIENT
Start: 2025-01-27 | End: 2025-01-31 | Stop reason: HOSPADM

## 2025-01-27 RX ORDER — SODIUM CHLORIDE, SODIUM LACTATE, POTASSIUM CHLORIDE, CALCIUM CHLORIDE 600; 310; 30; 20 MG/100ML; MG/100ML; MG/100ML; MG/100ML
1000 INJECTION, SOLUTION INTRAVENOUS CONTINUOUS
Status: DISPENSED | OUTPATIENT
Start: 2025-01-27 | End: 2025-01-28

## 2025-01-27 RX ORDER — FUROSEMIDE 40 MG/1
40 TABLET ORAL DAILY
Status: DISCONTINUED | OUTPATIENT
Start: 2025-01-27 | End: 2025-01-31 | Stop reason: HOSPADM

## 2025-01-27 RX ORDER — ACETAMINOPHEN 160 MG/5ML
650 SOLUTION ORAL EVERY 4 HOURS PRN
Status: DISCONTINUED | OUTPATIENT
Start: 2025-01-27 | End: 2025-01-31 | Stop reason: HOSPADM

## 2025-01-27 RX ORDER — SODIUM CHLORIDE 0.9 % (FLUSH) 0.9 %
3 SYRINGE (ML) INJECTION EVERY 12 HOURS SCHEDULED
Status: DISCONTINUED | OUTPATIENT
Start: 2025-01-27 | End: 2025-01-27 | Stop reason: HOSPADM

## 2025-01-27 RX ORDER — BUPROPION HYDROCHLORIDE 150 MG/1
150 TABLET ORAL DAILY
COMMUNITY

## 2025-01-27 RX ORDER — BUPROPION HYDROCHLORIDE 150 MG/1
150 TABLET ORAL DAILY
Status: DISCONTINUED | OUTPATIENT
Start: 2025-01-28 | End: 2025-01-31 | Stop reason: HOSPADM

## 2025-01-27 RX ORDER — NALOXONE HCL 0.4 MG/ML
0.4 VIAL (ML) INJECTION
Status: DISCONTINUED | OUTPATIENT
Start: 2025-01-27 | End: 2025-01-31

## 2025-01-27 RX ORDER — BUPIVACAINE HYDROCHLORIDE 2.5 MG/ML
INJECTION, SOLUTION EPIDURAL; INFILTRATION; INTRACAUDAL AS NEEDED
Status: DISCONTINUED | OUTPATIENT
Start: 2025-01-27 | End: 2025-01-27 | Stop reason: HOSPADM

## 2025-01-27 RX ORDER — MIDAZOLAM HYDROCHLORIDE 2 MG/2ML
1 INJECTION, SOLUTION INTRAMUSCULAR; INTRAVENOUS
Status: DISCONTINUED | OUTPATIENT
Start: 2025-01-27 | End: 2025-01-27 | Stop reason: HOSPADM

## 2025-01-27 RX ORDER — IBUPROFEN 600 MG/1
1 TABLET ORAL
Status: DISCONTINUED | OUTPATIENT
Start: 2025-01-27 | End: 2025-01-31 | Stop reason: HOSPADM

## 2025-01-27 RX ORDER — SODIUM CHLORIDE 9 MG/ML
40 INJECTION, SOLUTION INTRAVENOUS AS NEEDED
Status: DISCONTINUED | OUTPATIENT
Start: 2025-01-27 | End: 2025-01-31 | Stop reason: HOSPADM

## 2025-01-27 RX ORDER — HYDROCODONE BITARTRATE AND ACETAMINOPHEN 5; 325 MG/1; MG/1
1 TABLET ORAL EVERY 6 HOURS PRN
Status: DISCONTINUED | OUTPATIENT
Start: 2025-01-27 | End: 2025-01-29

## 2025-01-27 RX ORDER — NALOXONE HCL 0.4 MG/ML
0.04 VIAL (ML) INJECTION AS NEEDED
Status: DISCONTINUED | OUTPATIENT
Start: 2025-01-27 | End: 2025-01-27 | Stop reason: HOSPADM

## 2025-01-27 RX ORDER — NALOXONE HCL 0.4 MG/ML
0.4 VIAL (ML) INJECTION
Status: DISCONTINUED | OUTPATIENT
Start: 2025-01-27 | End: 2025-01-29 | Stop reason: ALTCHOICE

## 2025-01-27 RX ORDER — SODIUM CHLORIDE 0.9 % (FLUSH) 0.9 %
3 SYRINGE (ML) INJECTION AS NEEDED
Status: DISCONTINUED | OUTPATIENT
Start: 2025-01-27 | End: 2025-01-27 | Stop reason: HOSPADM

## 2025-01-27 RX ORDER — MUPIROCIN 20 MG/G
1 OINTMENT TOPICAL DAILY
COMMUNITY

## 2025-01-27 RX ORDER — SODIUM CHLORIDE 0.9 % (FLUSH) 0.9 %
10 SYRINGE (ML) INJECTION EVERY 12 HOURS SCHEDULED
Status: DISCONTINUED | OUTPATIENT
Start: 2025-01-27 | End: 2025-01-31 | Stop reason: HOSPADM

## 2025-01-27 RX ORDER — SODIUM CHLORIDE 0.9 % (FLUSH) 0.9 %
10 SYRINGE (ML) INJECTION AS NEEDED
Status: DISCONTINUED | OUTPATIENT
Start: 2025-01-27 | End: 2025-01-31 | Stop reason: HOSPADM

## 2025-01-27 RX ORDER — SODIUM CHLORIDE 9 MG/ML
40 INJECTION, SOLUTION INTRAVENOUS AS NEEDED
Status: DISCONTINUED | OUTPATIENT
Start: 2025-01-27 | End: 2025-01-27 | Stop reason: HOSPADM

## 2025-01-27 RX ORDER — ACETAMINOPHEN 650 MG/1
650 SUPPOSITORY RECTAL EVERY 4 HOURS PRN
Status: DISCONTINUED | OUTPATIENT
Start: 2025-01-27 | End: 2025-01-31 | Stop reason: HOSPADM

## 2025-01-27 RX ORDER — ONDANSETRON 2 MG/ML
INJECTION INTRAMUSCULAR; INTRAVENOUS AS NEEDED
Status: DISCONTINUED | OUTPATIENT
Start: 2025-01-27 | End: 2025-01-27 | Stop reason: SURG

## 2025-01-27 RX ORDER — FENTANYL CITRATE 50 UG/ML
50 INJECTION, SOLUTION INTRAMUSCULAR; INTRAVENOUS ONCE
Status: DISCONTINUED | OUTPATIENT
Start: 2025-01-27 | End: 2025-01-27 | Stop reason: HOSPADM

## 2025-01-27 RX ORDER — PROPOFOL 10 MG/ML
VIAL (ML) INTRAVENOUS AS NEEDED
Status: DISCONTINUED | OUTPATIENT
Start: 2025-01-27 | End: 2025-01-27 | Stop reason: SURG

## 2025-01-27 RX ORDER — LIDOCAINE HYDROCHLORIDE 20 MG/ML
INJECTION, SOLUTION EPIDURAL; INFILTRATION; INTRACAUDAL; PERINEURAL AS NEEDED
Status: DISCONTINUED | OUTPATIENT
Start: 2025-01-27 | End: 2025-01-27 | Stop reason: SURG

## 2025-01-27 RX ORDER — SCOPOLAMINE 1 MG/3D
1 PATCH, EXTENDED RELEASE TRANSDERMAL ONCE
Status: DISCONTINUED | OUTPATIENT
Start: 2025-01-27 | End: 2025-01-27

## 2025-01-27 RX ADMIN — SCOPOLAMINE 1 PATCH: 1.5 PATCH, EXTENDED RELEASE TRANSDERMAL at 06:52

## 2025-01-27 RX ADMIN — PREGABALIN 200 MG: 100 CAPSULE ORAL at 14:14

## 2025-01-27 RX ADMIN — HYDROMORPHONE HYDROCHLORIDE 1 MG: 1 INJECTION, SOLUTION INTRAMUSCULAR; INTRAVENOUS; SUBCUTANEOUS at 14:14

## 2025-01-27 RX ADMIN — Medication 10 ML: at 14:16

## 2025-01-27 RX ADMIN — PROPOFOL 200 MG: 10 INJECTION, EMULSION INTRAVENOUS at 08:10

## 2025-01-27 RX ADMIN — SODIUM CHLORIDE, POTASSIUM CHLORIDE, SODIUM LACTATE AND CALCIUM CHLORIDE 100 ML/HR: 600; 310; 30; 20 INJECTION, SOLUTION INTRAVENOUS at 14:18

## 2025-01-27 RX ADMIN — HYDROCODONE BITARTRATE AND ACETAMINOPHEN 1 TABLET: 5; 325 TABLET ORAL at 23:43

## 2025-01-27 RX ADMIN — HYDROMORPHONE HYDROCHLORIDE 1 MG: 1 INJECTION, SOLUTION INTRAMUSCULAR; INTRAVENOUS; SUBCUTANEOUS at 18:40

## 2025-01-27 RX ADMIN — SUGAMMADEX 200 MG: 100 INJECTION, SOLUTION INTRAVENOUS at 09:35

## 2025-01-27 RX ADMIN — HYDROMORPHONE HYDROCHLORIDE 0.5 MG: 1 INJECTION, SOLUTION INTRAMUSCULAR; INTRAVENOUS; SUBCUTANEOUS at 11:54

## 2025-01-27 RX ADMIN — HYDROMORPHONE HYDROCHLORIDE 1 MG: 1 INJECTION, SOLUTION INTRAMUSCULAR; INTRAVENOUS; SUBCUTANEOUS at 21:39

## 2025-01-27 RX ADMIN — FUROSEMIDE 40 MG: 40 TABLET ORAL at 14:14

## 2025-01-27 RX ADMIN — FENTANYL CITRATE 50 MCG: 50 INJECTION INTRAMUSCULAR; INTRAVENOUS at 11:08

## 2025-01-27 RX ADMIN — FENTANYL CITRATE 50 MCG: 50 INJECTION, SOLUTION INTRAMUSCULAR; INTRAVENOUS at 08:49

## 2025-01-27 RX ADMIN — AMITRIPTYLINE HYDROCHLORIDE 10 MG: 10 TABLET, FILM COATED ORAL at 20:30

## 2025-01-27 RX ADMIN — Medication 10 ML: at 20:31

## 2025-01-27 RX ADMIN — PREGABALIN 200 MG: 100 CAPSULE ORAL at 20:30

## 2025-01-27 RX ADMIN — ONDANSETRON 4 MG: 2 INJECTION INTRAMUSCULAR; INTRAVENOUS at 08:35

## 2025-01-27 RX ADMIN — ROCURONIUM BROMIDE 50 MG: 10 INJECTION, SOLUTION INTRAVENOUS at 08:10

## 2025-01-27 RX ADMIN — ACETAMINOPHEN 1000 MG: 500 TABLET, FILM COATED ORAL at 06:52

## 2025-01-27 RX ADMIN — HYDROMORPHONE HYDROCHLORIDE 1 MG: 1 INJECTION, SOLUTION INTRAMUSCULAR; INTRAVENOUS; SUBCUTANEOUS at 17:09

## 2025-01-27 RX ADMIN — OXYCODONE AND ACETAMINOPHEN 1 TABLET: 325; 10 TABLET ORAL at 11:00

## 2025-01-27 RX ADMIN — HYDROMORPHONE HYDROCHLORIDE 0.5 MG: 1 INJECTION, SOLUTION INTRAMUSCULAR; INTRAVENOUS; SUBCUTANEOUS at 10:48

## 2025-01-27 RX ADMIN — SODIUM CHLORIDE, POTASSIUM CHLORIDE, SODIUM LACTATE AND CALCIUM CHLORIDE 1000 ML: 600; 310; 30; 20 INJECTION, SOLUTION INTRAVENOUS at 06:16

## 2025-01-27 RX ADMIN — FENTANYL CITRATE 100 MCG: 50 INJECTION, SOLUTION INTRAMUSCULAR; INTRAVENOUS at 08:05

## 2025-01-27 RX ADMIN — LIDOCAINE HYDROCHLORIDE 100 MG: 20 INJECTION, SOLUTION EPIDURAL; INFILTRATION; INTRACAUDAL; PERINEURAL at 08:10

## 2025-01-27 RX ADMIN — SODIUM CHLORIDE 3000 MG: 900 INJECTION INTRAVENOUS at 08:14

## 2025-01-27 RX ADMIN — FENTANYL CITRATE 50 MCG: 50 INJECTION, SOLUTION INTRAMUSCULAR; INTRAVENOUS at 09:17

## 2025-01-27 NOTE — PROGRESS NOTES
Pharmacy Dosing Service  Antimicrobial Dosing  Cefazolin    Assessment/Action/Plan:  Ancef 2 gm IV once pre-op has been adjusted to 3 gm IV once for patient weighing >= 120 kg       Subjective:  Donna Swain is a 49 y.o. female   Objective:  Ht:  ; Wt: (!) 140 kg (309 lb 4.9 oz)  Estimated Creatinine Clearance: 172.1 mL/min (by C-G formula based on SCr of 0.61 mg/dL).   Creatinine   Date Value Ref Range Status      Lab Results   Component Value Date      Baseline culture results:  Microbiology Results (last 10 days)       ** No results found for the last 240 hours. **            Lorenzo Valentine, PharmD  01/27/25 05:49 CST

## 2025-01-27 NOTE — ANESTHESIA PREPROCEDURE EVALUATION
Anesthesia Evaluation     Patient summary reviewed   no history of anesthetic complications:   NPO Solid Status: > 8 hours             Airway   Mallampati: II  TM distance: >3 FB  Dental    (+) upper dentures, partials and poor dentition    Pulmonary    (+) a smoker,sleep apnea  (-) COPD, asthma  Cardiovascular   Exercise tolerance: good (4-7 METS)    (-) pacemaker, past MI, angina, cardiac stents      Neuro/Psych  (+) numbness  (-) seizures, TIA, CVA  GI/Hepatic/Renal/Endo    (+) morbid obesity, diabetes mellitus  (-) GERD, liver disease, no renal disease    Musculoskeletal     Abdominal    Substance History      OB/GYN          Other                        Anesthesia Plan    ASA 3     general     intravenous induction     Anesthetic plan, risks, benefits, and alternatives have been provided, discussed and informed consent has been obtained with: patient.      CODE STATUS:

## 2025-01-27 NOTE — ANESTHESIA POSTPROCEDURE EVALUATION
Patient: Donna Swain    Procedure Summary       Date: 01/27/25 Room / Location:  [facility] 09 /  [facility]    Anesthesia Start: 0804 Anesthesia Stop: 1037    Procedure: LEFT BELOW KNEE AMPUTATION (Left: Leg Lower) Diagnosis:       Diabetic foot infection      Preoperative testing      (Diabetic foot infection [E11.628, L08.9])      (Preoperative testing [Z01.818])    Surgeons: Giancarlo Demarco MD Provider: Wiley Mcguire CRNA    Anesthesia Type: general ASA Status: 3            Anesthesia Type: general    Vitals  Vitals Value Taken Time   /93 01/27/25 1345   Temp 98.3 °F (36.8 °C) 01/27/25 1345   Pulse 100 01/27/25 1345   Resp 15 01/27/25 1345   SpO2 96 % 01/27/25 1345           Post Anesthesia Care and Evaluation    Patient location during evaluation: PACU  Patient participation: complete - patient participated  Level of consciousness: awake and awake and alert  Pain score: 0  Pain management: adequate    Airway patency: patent  Anesthetic complications: No anesthetic complications  PONV Status: none  Cardiovascular status: acceptable  Respiratory status: acceptable  Hydration status: acceptable    Comments: Patient discharged according to acceptable Madan score per RN assessment. See nursing records for further information.     Blood pressure 149/93, pulse 100, temperature 98.3 °F (36.8 °C), temperature source Oral, resp. rate 15, weight (!) 140 kg (309 lb 4.9 oz), SpO2 96%, not currently breastfeeding.

## 2025-01-27 NOTE — OP NOTE
Donna Swain  1/27/2025     PREOPERATIVE DIAGNOSIS: Diabetic foot infection [E11.628, L08.9]  Preoperative testing [Z01.818]  Chronic Left foot wound with osteo     POSTOPERATIVE DIAGNOSIS: Post-Op Diagnosis Codes:     * Diabetic foot infection [E11.628, L08.9]     * Preoperative testing [Z01.818]   same   PROCEDURE PERFORMED: Left Below knee amputation      SURGEON: Silvano Demarco MD      ANESTHESIA: General.    PREPARATION: Routine.    STAFF: Circulator: Cindy Sorto RN; Franki Echevarria RN  Scrub Person: Jocelyn Stephenson CST    Estimated Blood Loss: 100ml    SPECIMENS: Left leg/foot    COMPLICATIONS: none    INDICATIONS: Donna Swain is a 49 y.o. female with a chronic DM left foot wound with osteo.  The patient discussed further surgery with podiatry and after their discussion she desired BKA.  Risks/benefits were explained at great length to the patient which include but are not limited to bleeding, infection, vessel damage, nerve damage, failure of wound to heal, MI, stroke, and death.  The patient understands all the risks and wishes for me to proceed. The indications, risks, and possible complications of the procedure were explained to the patient, who voiced understanding and wished to proceed with surgery.     PROCEDURE IN DETAIL:     The patient was taken to the operating room and placed on the operating table in a supine position. After general anesthesia was obtained, the left lower extremity was prepped and draped in a sterile manner.  Prior to prepping a tourniquet was placed along the thigh.  The leg was venous exsanginated with elevation and an esmar. The tourniquet was then insufflated. Approximately 4 finger breaths below the tibial plateau the standard below-knee amputation incision was made.  Careful dissection was made down through the subcutaneous tissues using the Bovie cautery to ensure hemostasis.  Any crossing veins were ligated with 3-0 silk suture.  The anterior compartment was taken down first  encountering the anterior tibial artery and vein.  They were carefully dissected free with the Metzenbaum scissors and then separately ligated with 2-0 silk suture ligatures.  Next, the fibula was identified and freed from its local attachments with the periosteal elevator.  Once it was freed up proximally it was transected with the bone saw.  Next the periosteal elevator was taken to the tibia where was fully cleaned off.  A lap sponge was placed posteriorly.  The pneumatic saw was then used to transect the tibia successfully.  The tibia was lifted up on and the posterior flap was created with the amputation knife.  The popliteal vessels were quickly clamped off.  The lower leg was removed as specimen.  Any loose bleeding was clamped with hemostats.  The tourniquet was then desufflated.   Each of the hemostats were ligated with 2-0 and 3-0 silk suture ligatures.  The anterior part of the tibia was then transected with the bone saw and smoothed out with the bone file.  The wound bed was copiously irrigated with antibiotic saline and hemostasis was observed.  The sciatic nerve was also ligated with 2-0 silk suture.  The fascial layers were then closed with a 2-0 Vicryl in an interrupted fashion.  The skin was then reapproximated using staples.  The wound was then cleaned.  Sterile dressings were applied. The patient tolerated the procedure well. Sponge and needle counts were correct. The patient was then awakened and extubated in the operating room and taken to the recovery room in good condition.    Giancarlo Demarco MD  1/27/2025  10:30 CST

## 2025-01-27 NOTE — ANESTHESIA PROCEDURE NOTES
Airway  Urgency: elective    Date/Time: 1/27/2025 8:11 AM  Airway not difficult    General Information and Staff    Patient location during procedure: OR    Indications and Patient Condition  Indications for airway management: airway protection    Preoxygenated: yes  MILS maintained throughout  Mask difficulty assessment: 2 - vent by mask + OA or adjuvant +/- NMBA    Final Airway Details  Final airway type: endotracheal airway      Successful airway: ETT  Cuffed: yes   Successful intubation technique: direct laryngoscopy  Facilitating devices/methods: intubating stylet  Endotracheal tube insertion site: oral  Blade: Angel  Blade size: 3  ETT size (mm): 7.5  Cormack-Lehane Classification: grade I - full view of glottis  Placement verified by: chest auscultation and capnometry   Cuff volume (mL): 7  Measured from: teeth  ETT/EBT  to teeth (cm): 20  Number of attempts at approach: 1  Assessment: lips, teeth, and gum same as pre-op and atraumatic intubation             This is a recent snapshot of the patient's [facility] medical record.  For current drug dose and complete information and questions, call [phone] or In Basket pool, [facility] ([id number])  CSN Number:  [id number]

## 2025-01-28 LAB
ANION GAP SERPL CALCULATED.3IONS-SCNC: 8 MMOL/L (ref 5–15)
BASOPHILS # BLD AUTO: 0.03 10*3/MM3 (ref 0–0.2)
BASOPHILS NFR BLD AUTO: 0.3 % (ref 0–1.5)
BUN SERPL-MCNC: 11 MG/DL (ref 6–20)
BUN/CREAT SERPL: 15.9 (ref 7–25)
CALCIUM SPEC-SCNC: 8.8 MG/DL (ref 8.6–10.5)
CHLORIDE SERPL-SCNC: 95 MMOL/L (ref 98–107)
CO2 SERPL-SCNC: 32 MMOL/L (ref 22–29)
CREAT SERPL-MCNC: 0.69 MG/DL (ref 0.57–1)
DEPRECATED RDW RBC AUTO: 44.2 FL (ref 37–54)
EGFRCR SERPLBLD CKD-EPI 2021: 107.9 ML/MIN/1.73
EOSINOPHIL # BLD AUTO: 0.32 10*3/MM3 (ref 0–0.4)
EOSINOPHIL NFR BLD AUTO: 3 % (ref 0.3–6.2)
ERYTHROCYTE [DISTWIDTH] IN BLOOD BY AUTOMATED COUNT: 13.7 % (ref 12.3–15.4)
GLUCOSE BLDC GLUCOMTR-MCNC: 142 MG/DL (ref 70–130)
GLUCOSE BLDC GLUCOMTR-MCNC: 150 MG/DL (ref 70–130)
GLUCOSE BLDC GLUCOMTR-MCNC: 159 MG/DL (ref 70–130)
GLUCOSE BLDC GLUCOMTR-MCNC: 192 MG/DL (ref 70–130)
GLUCOSE SERPL-MCNC: 156 MG/DL (ref 65–99)
HCT VFR BLD AUTO: 37.4 % (ref 34–46.6)
HGB BLD-MCNC: 11.6 G/DL (ref 12–15.9)
IMM GRANULOCYTES # BLD AUTO: 0.03 10*3/MM3 (ref 0–0.05)
IMM GRANULOCYTES NFR BLD AUTO: 0.3 % (ref 0–0.5)
LYMPHOCYTES # BLD AUTO: 1.88 10*3/MM3 (ref 0.7–3.1)
LYMPHOCYTES NFR BLD AUTO: 17.8 % (ref 19.6–45.3)
MCH RBC QN AUTO: 27.5 PG (ref 26.6–33)
MCHC RBC AUTO-ENTMCNC: 31 G/DL (ref 31.5–35.7)
MCV RBC AUTO: 88.6 FL (ref 79–97)
MONOCYTES # BLD AUTO: 0.65 10*3/MM3 (ref 0.1–0.9)
MONOCYTES NFR BLD AUTO: 6.1 % (ref 5–12)
NEUTROPHILS NFR BLD AUTO: 7.66 10*3/MM3 (ref 1.7–7)
NEUTROPHILS NFR BLD AUTO: 72.5 % (ref 42.7–76)
NRBC BLD AUTO-RTO: 0 /100 WBC (ref 0–0.2)
PLATELET # BLD AUTO: 302 10*3/MM3 (ref 140–450)
PMV BLD AUTO: 9.1 FL (ref 6–12)
POTASSIUM SERPL-SCNC: 4.2 MMOL/L (ref 3.5–5.2)
QT INTERVAL: 370 MS
QTC INTERVAL: 429 MS
RBC # BLD AUTO: 4.22 10*6/MM3 (ref 3.77–5.28)
SODIUM SERPL-SCNC: 135 MMOL/L (ref 136–145)
WBC NRBC COR # BLD AUTO: 10.57 10*3/MM3 (ref 3.4–10.8)

## 2025-01-28 PROCEDURE — 82948 REAGENT STRIP/BLOOD GLUCOSE: CPT

## 2025-01-28 PROCEDURE — 85025 COMPLETE CBC W/AUTO DIFF WBC: CPT | Performed by: STUDENT IN AN ORGANIZED HEALTH CARE EDUCATION/TRAINING PROGRAM

## 2025-01-28 PROCEDURE — 94799 UNLISTED PULMONARY SVC/PX: CPT

## 2025-01-28 PROCEDURE — 94761 N-INVAS EAR/PLS OXIMETRY MLT: CPT

## 2025-01-28 PROCEDURE — 63710000001 INSULIN LISPRO (HUMAN) PER 5 UNITS: Performed by: STUDENT IN AN ORGANIZED HEALTH CARE EDUCATION/TRAINING PROGRAM

## 2025-01-28 PROCEDURE — 80048 BASIC METABOLIC PNL TOTAL CA: CPT | Performed by: STUDENT IN AN ORGANIZED HEALTH CARE EDUCATION/TRAINING PROGRAM

## 2025-01-28 PROCEDURE — 25010000002 HYDROMORPHONE PER 4 MG: Performed by: STUDENT IN AN ORGANIZED HEALTH CARE EDUCATION/TRAINING PROGRAM

## 2025-01-28 PROCEDURE — 94660 CPAP INITIATION&MGMT: CPT

## 2025-01-28 PROCEDURE — 25810000003 LACTATED RINGERS PER 1000 ML: Performed by: STUDENT IN AN ORGANIZED HEALTH CARE EDUCATION/TRAINING PROGRAM

## 2025-01-28 PROCEDURE — 97116 GAIT TRAINING THERAPY: CPT

## 2025-01-28 PROCEDURE — 97162 PT EVAL MOD COMPLEX 30 MIN: CPT

## 2025-01-28 PROCEDURE — 25010000002 HYDROMORPHONE 1 MG/ML SOLUTION: Performed by: STUDENT IN AN ORGANIZED HEALTH CARE EDUCATION/TRAINING PROGRAM

## 2025-01-28 RX ADMIN — PREGABALIN 200 MG: 100 CAPSULE ORAL at 14:35

## 2025-01-28 RX ADMIN — HYDROCODONE BITARTRATE AND ACETAMINOPHEN 1 TABLET: 5; 325 TABLET ORAL at 13:48

## 2025-01-28 RX ADMIN — Medication 10 ML: at 16:26

## 2025-01-28 RX ADMIN — PREGABALIN 200 MG: 100 CAPSULE ORAL at 08:36

## 2025-01-28 RX ADMIN — HYDROMORPHONE HYDROCHLORIDE 0.5 MG: 1 INJECTION, SOLUTION INTRAMUSCULAR; INTRAVENOUS; SUBCUTANEOUS at 03:06

## 2025-01-28 RX ADMIN — HYDROMORPHONE HYDROCHLORIDE 0.5 MG: 1 INJECTION, SOLUTION INTRAMUSCULAR; INTRAVENOUS; SUBCUTANEOUS at 05:19

## 2025-01-28 RX ADMIN — HYDROMORPHONE HYDROCHLORIDE 1 MG: 1 INJECTION, SOLUTION INTRAMUSCULAR; INTRAVENOUS; SUBCUTANEOUS at 11:24

## 2025-01-28 RX ADMIN — HYDROCODONE BITARTRATE AND ACETAMINOPHEN 1 TABLET: 5; 325 TABLET ORAL at 19:46

## 2025-01-28 RX ADMIN — HYDROMORPHONE HYDROCHLORIDE 1 MG: 1 INJECTION, SOLUTION INTRAMUSCULAR; INTRAVENOUS; SUBCUTANEOUS at 20:27

## 2025-01-28 RX ADMIN — HYDROMORPHONE HYDROCHLORIDE 1 MG: 1 INJECTION, SOLUTION INTRAMUSCULAR; INTRAVENOUS; SUBCUTANEOUS at 16:25

## 2025-01-28 RX ADMIN — AMITRIPTYLINE HYDROCHLORIDE 10 MG: 10 TABLET, FILM COATED ORAL at 20:27

## 2025-01-28 RX ADMIN — BUPROPION HYDROCHLORIDE 150 MG: 150 TABLET, EXTENDED RELEASE ORAL at 08:36

## 2025-01-28 RX ADMIN — HYDROCODONE BITARTRATE AND ACETAMINOPHEN 1 TABLET: 5; 325 TABLET ORAL at 07:28

## 2025-01-28 RX ADMIN — PREGABALIN 200 MG: 100 CAPSULE ORAL at 20:27

## 2025-01-28 RX ADMIN — FUROSEMIDE 40 MG: 40 TABLET ORAL at 08:36

## 2025-01-28 RX ADMIN — SODIUM CHLORIDE, POTASSIUM CHLORIDE, SODIUM LACTATE AND CALCIUM CHLORIDE 100 ML/HR: 600; 310; 30; 20 INJECTION, SOLUTION INTRAVENOUS at 03:08

## 2025-01-28 RX ADMIN — INSULIN LISPRO 2 UNITS: 100 INJECTION, SOLUTION INTRAVENOUS; SUBCUTANEOUS at 20:34

## 2025-01-28 RX ADMIN — HYDROMORPHONE HYDROCHLORIDE 1 MG: 1 INJECTION, SOLUTION INTRAMUSCULAR; INTRAVENOUS; SUBCUTANEOUS at 08:36

## 2025-01-28 RX ADMIN — Medication 10 ML: at 20:27

## 2025-01-28 RX ADMIN — HYDROMORPHONE HYDROCHLORIDE 1 MG: 1 INJECTION, SOLUTION INTRAMUSCULAR; INTRAVENOUS; SUBCUTANEOUS at 00:48

## 2025-01-28 NOTE — PROGRESS NOTES
LOS: 1 day   Patient Care Team:  Benjamin Kelsey MD as PCP - General (Family Medicine)    Chief Complaint: Admitted for left diabetic foot wound    Subjective     Patient Complaints:  Patient reported pain on a.m. rounds.  Patient reports the pain is improved throughout the day.    Objective     Vital Signs  Temp:  [97.8 °F (36.6 °C)-99 °F (37.2 °C)] 98.4 °F (36.9 °C)  Heart Rate:  [83-99] 95  Resp:  [16-23] 18  BP: (117-142)/(58-88) 117/58    Physical Exam  Cardiovascular:      Rate and Rhythm: Normal rate and regular rhythm.   Pulmonary:      Effort: Pulmonary effort is normal. No respiratory distress.   Musculoskeletal:      Comments: Left BKA with dressing clean dry intact.  Knee immobilizer in place.         Laboratory Data:   Results from last 7 days   Lab Units 01/28/25  0349 01/24/25  0859   WBC 10*3/mm3 10.57 8.78   HEMOGLOBIN g/dL 11.6* 12.2   HEMATOCRIT % 37.4 37.8   PLATELETS 10*3/mm3 302 374       Results from last 7 days   Lab Units 01/28/25  0349 01/24/25  0859   SODIUM mmol/L 135* 138   POTASSIUM mmol/L 4.2 4.1   CHLORIDE mmol/L 95* 98   CO2 mmol/L 32.0* 31.0*   BUN mg/dL 11 10   CREATININE mg/dL 0.69 0.61   CALCIUM mg/dL 8.8 9.3   GLUCOSE mg/dL 156* 89     Results from last 7 days   Lab Units 01/24/25  0859   PROTIME Seconds 13.9   INR  1.02   APTT seconds 23.9*            Medication Review:     Assessment & Plan       Charcot foot due to diabetes mellitus    Diabetic foot infection    Preoperative testing    Osteomyelitis      49-year-old female admitted after left BKA on 1.27 for chronic osteo of left foot.    -Afebrile vital signs stable continue to monitor  -Pain control continue current regiment  -PT ordered  -Will hold Lasix as patient reports she does not take every day.  -Glucose controlled continue sliding scale insulin  -Anticipate patient will need rehab at discharge.    Plan for disposition:    Giancarlo Demarco MD  Vascular Surgery  [phone]  01/28/25  16:00 CST

## 2025-01-28 NOTE — THERAPY EVALUATION
Patient Name: Donna Swain  : 1975    MRN: [id number]                              Today's Date: 2025       Admit Date: 2025    Visit Dx:     ICD-10-CM ICD-9-CM   1. Gait abnormality [R26.9]  R26.9 781.2   2. Diabetic foot infection  E11.628 250.80    L08.9 686.9   3. Preoperative testing  Z01.818 V72.84     Patient Active Problem List   Diagnosis    Sleep apnea    Hypertension    Class 3 severe obesity due to excess calories without serious comorbidity with body mass index (BMI) of 50.0 to 59.9 in adult    Hx of osteomyelitis    Lymphedema    Neuropathy    Onychomycosis    Polyneuropathy due to type 2 diabetes mellitus    Tobacco abuse    Acute osteomyelitis of left foot    Exostosis of left foot    Diabetic foot infection    Preoperative testing    Charcot foot due to diabetes mellitus    Osteomyelitis     Past Medical History:   Diagnosis Date    Ankle swelling     Back pain     Charcot's joint of ankle, left     Diabetes     GERD (gastroesophageal reflux disease)     Hemorrhoids     History of stomach ulcers     Hypertension     Hyperthyroidism     MRSA infection     Neuropathy     Sleep apnea     doesn't use c pap. feels like she is suffocating    Thyroid goiter      Past Surgical History:   Procedure Laterality Date    BELOW KNEE AMPUTATION Left 2025    Procedure: LEFT BELOW KNEE AMPUTATION;  Surgeon: Giancarlo Demarco MD;  Location:  [facility] OR;  Service: Vascular;  Laterality: Left;    BONE SPUR LEG Left 10/2/2024    Procedure: Plantar Exostectomy;  Surgeon: Ciro Mckee DPM;  Location:  [facility] OR;  Service: Podiatry;  Laterality: Left;    CHOLECYSTECTOMY      lap    COLONOSCOPY      ENDOSCOPY      FOOT HARDWARE REMOVAL Left     HYSTERECTOMY      LEG DEBRIDEMENT Left 10/2/2024    Procedure: Debridement of Bone/Plantar Exostectomy - Left Foot;  Surgeon: Ciro Mckee DPM;  Location:  [facility] OR;  Service: Podiatry;  Laterality: Left;    MULTIPLE TOOTH EXTRACTIONS      OVARY SURGERY       TOE OSTEOTOMY Left 03/07/2023    Procedure: PARTIAL REMOVAL OF BONE, FIFTH METATARSAL, LEFT FOOT;  Surgeon: Tee Dacosta DPM;  Location: [facility] OR;  Service: Podiatry;  Laterality: Left;    TUBAL ABDOMINAL LIGATION        General Information       Row Name 01/28/25 1318          Physical Therapy Time and Intention    Document Type evaluation (P)   S/p BKA LLE. Hx: Charcot's joint of foot due to diabetes, Diabetic foot infection, Bone infection, Osteomyelitis  -     Mode of Treatment physical therapy (P)   -       Row Name 01/28/25 1318          General Information    Patient Profile Reviewed yes (P)   -BL     Prior Level of Function independent:;gait;transfer;feeding;grooming;dressing;bathing;home management;ADL's;max assist:;driving;dependent:;work (P)   -       Row Name 01/28/25 1318          Living Environment    People in Home alone (P)   -       Row Name 01/28/25 1318          Home Main Entrance    Number of Stairs, Main Entrance none (P)   ramp  -     Stair Railings, Main Entrance none (P)   -       Row Name 01/28/25 1318          Stairs Within Home, Primary    Number of Stairs, Within Home, Primary none (P)   -       Row Name 01/28/25 1318          Cognition    Orientation Status (Cognition) oriented x 4 (P)   -               User Key  (r) = Recorded By, (t) = Taken By, (c) = Cosigned By      Initials Name Provider Type    BL Denny Dennison, PT Student PT Student                   Mobility       Row Name 01/28/25 1318          Bed Mobility    Bed Mobility rolling left;rolling right;supine-sit;sit-supine (P)   -BL     Rolling Left [place] (Bed Mobility) modified independence (P)   -BL     Rolling Right [place] (Bed Mobility) modified independence (P)   -BL     Supine-Sit [place] (Bed Mobility) modified independence (P)   -BL     Sit-Supine [place] (Bed Mobility) modified independence (P)   -BL     Assistive Device (Bed Mobility) bed rails;head of bed elevated (P)    -       Row Name 01/28/25 1318          Bed-Chair Transfer    Bed-Chair [place] (Transfers) contact guard;minimum assist (75% patient effort);verbal cues;2 person assist (P)   -BL     Assistive Device (Bed-Chair Transfers) bariatric;walker, front-wheeled (P)   -BL       Row Name 01/28/25 1318          Sit-Stand Transfer    Sit-Stand [place] (Transfers) verbal cues;nonverbal cues (demo/gesture);contact guard (P)   -       Row Name 01/28/25 1318          Gait/Stairs (Locomotion)    [place] Level (Gait) contact guard;verbal cues;nonverbal cues (demo/gesture) (P)   -BL     Assistive Device (Gait) bariatric equipment;walker, front-wheeled (P)   -BL     Distance in Feet (Gait) 6 (P)   -BL     Maintains Weight-bearing Status (Gait) able to maintain (P)   -BL               User Key  (r) = Recorded By, (t) = Taken By, (c) = Cosigned By      Initials Name Provider Type    BL Juma, Denny, PT Student PT Student                   Obj/Interventions       Row Name 01/28/25 1413          Range of Motion Comprehensive    Comment, General Range of Motion R ankle DF limited to neutral due to charcot foot (P)   -\[facility] [place] [facility]\"" Name 01/28/25 1413          Strength Comprehensive (MMT)    Comment, General Manual Muscle Testing (MMT) Assessment RLE grossly 5/5. LLE 3/5; no resitance given to LLE due to NWB status (P)   -BL       [facility] Name 01/28/25 1413          Motor Skills    Motor Skills functional endurance (P)   -BL     Functional Endurance ambulation w/ new amputation of LE (P)   -       Row Name 01/28/25 1413          Balance    Balance Assessment sitting static balance;sitting dynamic balance;standing static balance;standing dynamic balance (P)   -BL     Static Sitting Balance independent;standby assist (P)   -BL     Dynamic Sitting Balance independent;standby assist (P)   -BL     Position, Sitting Balance unsupported;sitting edge of bed (P)   -BL     Static Standing Balance contact guard;verbal cues (P)   -BL      Dynamic Standing Balance contact guard;verbal cues (P)   -BL     Position/Device Used, Standing Balance supported;walker, front-wheeled (P)   -BL     Balance Interventions standing;dynamic;sit to stand;supported;moderate challenge;sitting;minimal challenge (P)   -       Row Name 01/28/25 1413          Sensory Assessment (Somatosensory)    Sensory Assessment (Somatosensory) -- (P)   -BL               User Key  (r) = Recorded By, (t) = Taken By, (c) = Cosigned By      Initials Name Provider Type    BL Juma, Denny, PT Student PT Student                   Goals/Plan       Row Name 01/28/25 1315          Bed Mobility Goal 1 (PT)    Activity/Assistive Device (Bed Mobility Goal 1, PT) bed mobility activities, all (P)   -BL     [place] Level/Cues Needed (Bed Mobility Goal 1, PT) independent (P)   -BL     Time Frame (Bed Mobility Goal 1, PT) long term goal (LTG);by discharge (P)   -BL     Progress/Outcomes (Bed Mobility Goal 1, PT) new goal (P)   -       Row Name 01/28/25 1315          Transfer Goal 1 (PT)    Activity/Assistive Device (Transfer Goal 1, PT) sit-to-stand/stand-to-sit;bed-to-chair/chair-to-bed;wheelchair transfer (P)   -BL     [place] Level/Cues Needed (Transfer Goal 1, PT) modified independence (P)   -BL     Time Frame (Transfer Goal 1, PT) long term goal (LTG);by discharge (P)   -BL     Progress/Outcome (Transfer Goal 1, PT) new goal (P)   -       Row Name 01/28/25 1315          Gait Training Goal 1 (PT)    Activity/Assistive Device (Gait Training Goal 1, PT) gait (walking locomotion);assistive device use;decrease fall risk;forward stepping;improve balance and speed;increase endurance/gait distance;increase energy conservation;turning, left;turning, right;walker, rolling (P)   -BL     [place] Level (Gait Training Goal 1, PT) modified independence (P)   -BL     Distance (Gait Training Goal 1, PT) 15 ft (P)   -BL     Time Frame (Gait Training Goal 1, PT) long term goal (LTG);by  discharge (P)   -BL     Progress/Outcome (Gait Training Goal 1, PT) new goal (P)   -BL       Row Name 01/28/25 1315          Balance Goal 1 (PT)    Activity/Assistive Device (Balance Goal) standing dynamic balance;supported;with functional mobility activities;walker, rolling (P)   -BL     [place] Level/Cues Needed (Balance Goal 1, PT) modified independence (P)   -BL     Time Frame (Balance Goal 1, PT) long-term goal (LTG);by discharge (P)   -BL     Progress/Outcomes (Balance Goal 1, PT) other (see comments) (P)   New goal  -BL       Row Name 01/28/25 1315          Problem Specific Goal 1 (PT)    Problem Specific Goal 1 (PT) Patient able to ambulate w/ FWW w/ increased heel strike and decreased hopping (P)   -BL     Time Frame (Problem Specific Goal 1, PT) long-term goal (LTG);by discharge (P)   -BL     Progress/Outcome (Problem Specific Goal 1, PT) new goal (P)   -BL       Row Name 01/28/25 7765          Patient Education Goal (PT)    Activity (Patient Education Goal, PT) Patient able to adjust knee immobilizer when needed (P)   -BL     [place]/Cues/Accuracy (Memory Goal 2, PT) demonstrates adequately;independent (P)   -BL     Time Frame (Patient Education Goal, PT) long term goal (LTG);by discharge (P)   -BL     Progress/Outcome (Patient Education Goal, PT) new goal (P)   -BL       Row Name 01/28/25 8779          Therapy Assessment/Plan (PT)    Planned Therapy Interventions (PT) balance training;bed mobility training;gait training;home exercise program;neuromuscular re-education;patient/family education;strengthening;transfer training (P)   -BL               User Key  (r) = Recorded By, (t) = Taken By, (c) = Cosigned By      Initials Name Provider Type    BL Denny Dennison, PT Student PT Student                   Clinical Impression       Row Name 01/28/25 1320          Pain    Pretreatment Pain Rating 10/10 (P)   -BL     Posttreatment Pain Rating 10/10 (P)   -BL     Pain Location residual limb (P)   -BL      Pain Side/Orientation left;lower (P)   -BL     Pain Management Interventions exercise or physical activity utilized (P)   -BL     Response to Pain Interventions activity participation with tolerable pain (P)   -BL     Pre/Posttreatment Pain Comment Pain medication given by nsg near end of session (P)   -BL       Row Name 01/28/25 1320          Plan of Care Review    Plan of Care Reviewed With patient;parent (P)   -BL     Progress no change (P)   -BL     Outcome Evaluation PT eval completed. A&Ox4. Patient showed good strength in RLE w/ good strength being observed in LLE during functional mobility. No formal strength testing performed on LLE due to knee immobilizer. UE tested w/ good strength noted bilaterally. Patient showed limited ROM in R ankle DF limited to neutral from charcot foot. Patient able to roll to side and sit up in bed w/ use of bedrails. Patient able to sit EOB independently and was able to stand using bariatric FWW CGA. Sit to stand CGA. Patient able to transfer to BSC w/ CGA but Oswaldo to lower to BSC. Patient able to ambulate forward a few steps but lacks confidence in her strength to appropriately ambulate. Patient hops on RLE and was educated on other strategies to reduce jarring of hip and limb. Patientreturned to bed and questions answered to best of scope of practice. Patient requested rehab at [facility] or [facility]. Patient is a good candidate for skilled therapy. Discharge recommendation: (P)   -BL       Row Name 01/28/25 1320          Therapy Assessment/Plan (PT)    Patient/Family Therapy Goals Statement (PT) Attend rehab and consult prosthetist (P)   -BL     Rehab Potential (PT) good (P)   -BL     Criteria for Skilled Interventions Met (PT) yes;meets criteria;skilled treatment is necessary (P)   -BL     Therapy Frequency (PT) 2 times/day (P)   -BL     Predicted Duration of Therapy Intervention (PT) Until discharge (P)   -BL       Row Name 01/28/25 1320          Vital Signs    Pre Patient  Position Supine (P)   -BL     Intra Patient Position Standing (P)   -BL     Post Patient Position Supine (P)   -BL       Row Name 01/28/25 1320          Positioning and Restraints    Pre-Treatment Position in bed (P)   -BL     Post Treatment Position bed (P)   -BL     In Bed supine;call light within reach;encouraged to call for assist;with family/caregiver;side rails up x3;LLE elevated;L knee immobilizer (P)   -BL               User Key  (r) = Recorded By, (t) = Taken By, (c) = Cosigned By      Initials Name Provider Type    BL Denny Dennison, PT Student PT Student                   Outcome Measures       Row Name 01/28/25 1315          How much help from another person do you currently need...    Turning from your back to your side while in flat bed without using bedrails? 3 (P)   -BL     Moving from lying on back to sitting on the side of a flat bed without bedrails? 3 (P)   -BL     Moving to and from a bed to a chair (including a wheelchair)? 3 (P)   -BL     Standing up from a chair using your arms (e.g., wheelchair, bedside chair)? 3 (P)   -BL     Climbing 3-5 steps with a railing? 2 (P)   -BL     To walk in hospital room? 2 (P)   -BL     AM-PAC 6 Clicks Score (PT) 16 (P)   -BL     Highest Level of Mobility Goal 5 --> Static standing (P)   -BL       Row Name 01/28/25 1315          Functional Assessment    Outcome Measure Options AM-PAC 6 Clicks Basic Mobility (PT) (P)   -BL               User Key  (r) = Recorded By, (t) = Taken By, (c) = Cosigned By      Initials Name Provider Type    BL Denny Dennison, PT Student PT Student                                 Physical Therapy Education       Title: PT OT SLP Therapies (Done)       Topic: Physical Therapy (Done)       Point: Mobility training (Done)       Learning Progress Summary            Patient Acceptance E VU by MARNIE at 1/28/2025 1612    Comment: 4 way SLR, glute sets, plan of care, bed mobility, transfer techniques, residual limb managment                       Point: Home exercise program (Done)       Learning Progress Summary            Patient Acceptance, E, VU by BL at 1/28/2025 1612    Comment: 4 way SLR, glute sets, plan of care, bed mobility, transfer techniques, residual limb managment                      Point: Body mechanics (Done)       Learning Progress Summary            Patient Acceptance, E, VU by BL at 1/28/2025 1612    Comment: 4 way SLR, glute sets, plan of care, bed mobility, transfer techniques, residual limb managment                      Point: Precautions (Done)       Learning Progress Summary            Patient Acceptance, E, VU by BL at 1/28/2025 1612    Comment: 4 way SLR, glute sets, plan of care, bed mobility, transfer techniques, residual limb managment                                      User Key       Initials Effective Dates Name Provider Type Discipline     12/17/24 -  Juma, Denny, PT Student PT Student PT                  PT Recommendation and Plan  Planned Therapy Interventions (PT): (P) balance training, bed mobility training, gait training, home exercise program, neuromuscular re-education, patient/family education, strengthening, transfer training  Progress: (P) no change  Outcome Evaluation: (P) PT eval completed. A&Ox4. Patient showed good strength in RLE w/ good strength being observed in LLE during functional mobility. No formal strength testing performed on LLE due to knee immobilizer. UE tested w/ good strength noted bilaterally. Patient showed limited ROM in R ankle DF limited to neutral from charcot foot. Patient able to roll to side and sit up in bed w/ use of bedrails. Patient able to sit EOB independently and was able to stand using bariatric FWW CGA. Sit to stand CGA. Patient able to transfer to BSC w/ CGA but Oswaldo to lower to BSC. Patient able to ambulate forward a few steps but lacks confidence in her strength to appropriately ambulate. Patient hops on RLE and was educated on other strategies to reduce jarring of  hip and limb. Patientreturned to bed and questions answered to best of scope of practice. Patient requested rehab at [facility]. Patient is a good candidate for skilled therapy. Discharge recommendation:     Time Calculation:         PT Charges       Row Name 01/28/25 1453             Time Calculation    Start Time 1315 (P)   10 min chart time  -BL      Stop Time 1414 (P)   -BL      Time Calculation (min) 59 min (P)   -BL      PT Received On 01/28/25 (P)   -BL      PT Goal Re-Cert Due Date 02/07/25 (P)   -BL         Timed Charges    75312 - Gait Training Minutes  9 (P)   -BL         Untimed Charges    PT Eval/Re-eval Minutes 60 (P)   -BL         Total Minutes    Timed Charges Total Minutes 9 (P)   -BL      Untimed Charges Total Minutes 60 (P)   -BL       Total Minutes 69 (P)   -BL                User Key  (r) = Recorded By, (t) = Taken By, (c) = Cosigned By      Initials Name Provider Type    Denny Cassidy PT Student PT Student                      PT G-Codes  Outcome Measure Options: (P) AM-PAC 6 Clicks Basic Mobility (PT)  AM-PAC 6 Clicks Score (PT): (P) 16  PT Discharge Summary  Anticipated Discharge Disposition (PT): (P) inpatient rehabilitation facility (Requests [facility])    Denny Dennison PT Student  1/28/2025

## 2025-01-28 NOTE — CASE MANAGEMENT/SOCIAL WORK
Discharge Planning Assessment  [facility]     Patient Name: Donna Swain  MRN: [id number]  Today's Date: 1/28/2025    Admit Date: 1/27/2025        Discharge Needs Assessment       Row Name 01/28/25 1238       Living Environment    People in Home alone    Current Living Arrangements home    Potentially Unsafe Housing Conditions none    Primary Care Provided by self    Provides Primary Care For no one    Family Caregiver if Needed child(jaxon), adult    Quality of Family Relationships helpful;involved;supportive    Able to Return to Prior Arrangements no       Resource/Environmental Concerns    Resource/Environmental Concerns none       Transition Planning    Patient/Family Anticipates Transition to inpatient rehabilitation facility    Patient/Family Anticipated Services at Transition rehabilitation services    Transportation Anticipated health plan transportation       Discharge Needs Assessment    Readmission Within the Last 30 Days no previous admission in last 30 days    Concerns to be Addressed care coordination/care conferences;discharge planning    Anticipated Changes Related to Illness inability to care for self    Discharge Facility/Level of Care Needs rehabilitation facility    Provided Post Acute Provider List? Yes    Post Acute Provider List Inpatient Rehab    Provided Post Acute Provider Quality & Resource List? Yes    Post Acute Provider Quality and Resource List Inpatient Rehab    Delivered To Patient    Method of Delivery In person    Patient's Choice of Community Agency(s) [facility]    Discharge Coordination/Progress Pt lives at home alone. Spoke with her about d/c plans. She had an amputation and does not feel she will be able to take care of herself at home. Discussed options with her and she is refusing snf but does want acute rehab at [facility]. Will follow for therapy evals for recommendations.                   Discharge Plan    No documentation.                 Continued Care and Services - Admitted  Since 1/27/2025    No active coordination exists for this encounter.          Demographic Summary    No documentation.                  Functional Status    No documentation.                  Psychosocial    No documentation.                  Abuse/Neglect    No documentation.                  Legal    No documentation.                  Substance Abuse    No documentation.                  Patient Forms    No documentation.                     LORI Lynn

## 2025-01-28 NOTE — PLAN OF CARE
Goal Outcome Evaluation:  Plan of Care Reviewed With: (P) patient, parent        Progress: (P) no change  Outcome Evaluation: (P) PT eval completed. A&Ox4. Patient showed good strength in RLE w/ good strength being observed in LLE during functional mobility. No formal strength testing performed on LLE due to knee immobilizer. UE tested w/ good strength noted bilaterally. Patient showed limited ROM in R ankle DF limited to neutral from charcot foot. Patient able to roll to side and sit up in bed w/ use of bedrails. Patient able to sit EOB independently and was able to stand using bariatric FWW CGA. Sit to stand CGA. Patient able to transfer to BSC w/ CGA but Oswaldo to lower to BSC. Patient able to ambulate forward a few steps but lacks confidence in her strength to appropriately ambulate. Patient hops on RLE and was educated on other strategies to reduce jarring of hip and limb. Patientreturned to bed and questions answered to best of scope of practice. Patient requested rehab at [facility]. Patient is a good candidate for skilled therapy. Discharge recommendation:    Anticipated Discharge Disposition (PT): (P) inpatient rehabilitation facility (Requests [facility])

## 2025-01-28 NOTE — PLAN OF CARE
Goal Outcome Evaluation:  Plan of Care Reviewed With: patient, family        Progress: improving     VSS.  O2 4L NC.  Long leg immobilizer and ace wrap to LLE residual limb.  Medicated for pain X3.  Tolerating ADA diet.  IVF as ordered.  [place] with clear yellow urine.  Safety maintained.

## 2025-01-28 NOTE — PROGRESS NOTES
RT EQUIPMENT DEVICE RELATED - SKIN ASSESSMENT    Grabiel Score:  Grabiel Score: 17     RT Medical Equipment/Device:     NIV Mask:  Under-the-nose   size:  a    Skin Assessment:      Nose:  Intact    Device Skin Pressure Protection:  Skin-to-device areas padded:  None Required    Nurse Notification:  Wendy Cisneros, CRT

## 2025-01-28 NOTE — PLAN OF CARE
Goal Outcome Evaluation:  Plan of Care Reviewed With: patient, parent        Progress: improving  Outcome Evaluation: Patient stable overnight. C/O pain, see mar. IVF  infusing. moran cath in place. CPAP iniated as DEBRA caused drop in o2 sats into the 70s. tolerating diet. Call light within reach and safety maintained

## 2025-01-28 NOTE — PAYOR COMM NOTE
"Donna Monteiro (49 y.o. Female    [id number]   admit 1/27   [facility] phone    fax         Date of Birth   1975    Social Security Number       Address   [address] [facility] [address]    Home Phone   [phone]    MRN   [id number]       Restorationism   Other    Marital Status   Single                            Admission Date   1/27/25    Admission Type   Elective    Admitting Provider   Giancarlo Demarco MD    Attending Provider   Giancarlo Demarco MD    Department, Room/Bed   [facility] 3C, 381/1       Discharge Date       Discharge Disposition       Discharge Destination                                 Attending Provider: Giancarlo Demarco MD    Allergies: Vancomycin    Isolation: Contact   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)   Code Status: CPR    Ht: 175 cm (68.9\")   Wt: 140 kg (309 lb 4.9 oz)    Admission Cmt: None   Principal Problem: Charcot foot due to diabetes mellitus [E11.610]                   Active Insurance as of 1/27/2025       Primary Coverage       Payor Plan Insurance Group Employer/Plan Group    ANTHEM MEDICARE REPLACEMENT ANTHEM MED ADV SNP [id number]       Payor Plan Address Payor Plan Phone Number Payor Plan Fax Number Effective Dates    [address] [phone]  1/1/2025 - None Entered    [facility] [id number]         Subscriber Name Subscriber Birth Date Member ID       DONNA MONTEIRO 1975 [id number]                     Emergency Contacts        (Rel.) Home Phone Work Phone Mobile Phone    Martha Monteiro (Mother) [phone] -- --                 History & Physical        Giancarlo Demarco MD at 01/27/25 0748          H&P reviewed.  The patient was examined and there are no changes to the H&P  Electronically signed by Giancarlo Demarco MD at 01/27/25 0799   Source Note: H&P (View-Only)          1/26/2025        Benjamin Kelsey MD  [address] [place] [address] / [facility] [address]      Donna LEVINE Wiliam  1975    L " Tested the atrial lead. foot wound      Dear Benjamin Kelsey MD        HPI  I had the pleasure of seeing your patient Donna Swain in the office today.  Thank you kindly for this consultation.  As you recall, Donna Swain is a 49 y.o.  female who you are currently following for routine health maintenance.  The patient has been seeing Dr. Mckee for her left diabetic foot wound and right foot malformations.  The patient's had a longstanding history of at least 4 years of dealing with the left foot wound for which she has become frustrated with the ongoing treatment for the wound.  After a discussion with Dr. Mckee last week she was inquisitive to left below-knee amputation.  I had an extensive discussion with the patient over the risk and benefits of the procedure, and she would like to proceed with left below-knee amputation.  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    Past Medical History:   Diagnosis Date    Ankle swelling     Back pain     Charcot's joint of ankle, left     Diabetes     Hemorrhoids     History of stomach ulcers     Hypertension     Hyperthyroidism     Neuropathy     Sleep apnea     doesn't use c pap. feels like she is suffocating    Thyroid goiter        Past Surgical History:   Procedure Laterality Date    BONE SPUR LEG Left 10/2/2024    Procedure: Plantar Exostectomy;  Surgeon: Ciro Mckee DPM;  Location:  [facility] OR;  Service: Podiatry;  Laterality: Left;    CHOLECYSTECTOMY      lap    COLONOSCOPY      ENDOSCOPY      FOOT HARDWARE REMOVAL Left     HYSTERECTOMY      LEG DEBRIDEMENT Left 10/2/2024    Procedure: Debridement of Bone/Plantar Exostectomy - Left Foot;  Surgeon: Ciro Mckee DPM;  Location:  [facility] OR;  Service: Podiatry;  Laterality: Left;    MULTIPLE TOOTH EXTRACTIONS      OVARY SURGERY      TOE OSTEOTOMY Left 03/07/2023    Procedure: PARTIAL REMOVAL OF BONE, FIFTH METATARSAL, LEFT FOOT;  Surgeon: Tee Dacosta DPM;  Location:  [facility];  Service: Podiatry;  Laterality: Left;     TUBAL ABDOMINAL LIGATION         Family History   Problem Relation Age of Onset    Cancer Mother     Cancer Father     COPD Father     Diabetes Maternal Grandfather        Social History     Socioeconomic History    Marital status: Single   Tobacco Use    Smoking status: Every Day     Current packs/day: 0.50     Average packs/day: 0.5 packs/day for 1.1 years (0.5 ttl pk-yrs)     Types: Cigarettes     Start date: 2024     Passive exposure: Current    Smokeless tobacco: Never   Vaping Use    Vaping status: Never Used   Substance and Sexual Activity    Alcohol use: Not Currently    Drug use: Not Currently    Sexual activity: Defer       Allergies   Allergen Reactions    Vancomycin Hives       Current Outpatient Medications   Medication Instructions    amitriptyline (ELAVIL) 10 mg, Oral, Nightly    buPROPion HCl (WELLBUTRIN PO) 150 mg, Oral, Daily    furosemide (LASIX) 40 mg, Oral, Daily    ibuprofen (ADVIL,MOTRIN) 800 mg, Oral, Every 6 Hours PRN    pregabalin (LYRICA) 200 mg, Oral, 3 times daily    Semaglutide (OZEMPIC, 0.25 OR 0.5 MG/DOSE, SC) 1 mg, Subcutaneous, Weekly           Review of Systems   Constitutional: Negative.  Negative for diaphoresis and fever.   HENT: Negative.     Eyes: Negative.    Respiratory: Negative.  Negative for shortness of breath and wheezing.    Cardiovascular: Negative.  Negative for leg swelling.   Gastrointestinal: Negative.  Negative for abdominal pain.   Endocrine: Negative.    Genitourinary: Negative.    Musculoskeletal: Negative.  Negative for gait problem.   Skin: Negative.    Allergic/Immunologic: Negative.    Neurological: Negative.  Negative for dizziness and numbness.   Hematological: Negative.    Psychiatric/Behavioral: Negative.         There were no vitals taken for this visit.  Physical Exam  Vitals and nursing note reviewed.   Constitutional:       General: She is not in acute distress.     Appearance: Normal appearance. She is not diaphoretic.   HENT:      Head:  Normocephalic. No right periorbital erythema or left periorbital erythema.      Nose: Nose normal.   Eyes:      General: No scleral icterus.     Pupils: Pupils are equal.   Cardiovascular:      Rate and Rhythm: Normal rate and regular rhythm.      Pulses:           Dorsalis pedis pulses are 2+ on the left side.        Posterior tibial pulses are 2+ on the left side.   Pulmonary:      Effort: Pulmonary effort is normal. No respiratory distress.   Abdominal:      General: There is no distension.      Palpations: Abdomen is soft.      Tenderness: There is no abdominal tenderness. There is no guarding.   Musculoskeletal:         General: No swelling or tenderness. Normal range of motion.      Cervical back: Normal range of motion and neck supple.      Right lower leg: No edema.      Left lower leg: No edema.   Feet:      Right foot:      Skin integrity: Skin integrity normal.      Left foot:      Skin integrity: Skin integrity normal.   Skin:     General: Skin is warm and dry.      Findings: No rash.      Comments: Left foot wound with mild surrounding erythema no fluctuance or induration.  There is healthy granulation tissue at the base no overt evidence of infection.  No significant drainage.   Neurological:      General: No focal deficit present.      Mental Status: She is alert and oriented to person, place, and time. Mental status is at baseline.      Cranial Nerves: No cranial nerve deficit.      Gait: Gait normal.   Psychiatric:         Attention and Perception: Attention normal.         Mood and Affect: Mood normal.         Behavior: Behavior normal.         Thought Content: Thought content normal.         Judgment: Judgment normal.         XR Foot 3+ View Left    Result Date: 1/9/2025  Narrative: EXAMINATION: XR FOOT 3+ VW LEFT-  1/9/2025 10:49 AM  HISTORY: ATTENTION TO LEFT FOOT LATERAL TO EVALUATE FOR OSTEOMYELITIS OR OTHER ACUTE PROCESSES; M86.172-Other acute osteomyelitis, left ankle and foot  COMPARISON:  9/9/2024 and 8/7/2024  TECHNIQUE: Frontal, lateral and oblique radiographs of the LEFT foot were provided for review.  FINDINGS: Soft tissue wound along the lateral aspect of the midfoot redemonstrated. Since the previous examination, the patient has had a exostectomy in the region of the wound. The distal portion of the cuboid along its plantar lateral aspect is now changed in shape. It is unclear whether this is all postoperative or there is any underlying osteomyelitis. This is immediately adjacent to the wound.. Soft tissue wound along the plantar lateral aspect of the midfoot. Elsewhere, no interval change.       Impression:  1.  Interval change in the appearance of the plantar lateral surface of the cuboid. Some of this may be postoperative in nature; however, the margin is somewhat irregular and adjacent underlying osteomyelitis is not entirely excluded. No immediate postoperative radiographs are available for review to determine whether this has changed since the surgery in October.  This report was signed and finalized on 1/9/2025 10:55 AM by Dr. Jose Guadalupe Garcia MD.       Patient Active Problem List   Diagnosis    Sleep apnea    Hypertension    Class 3 severe obesity due to excess calories without serious comorbidity with body mass index (BMI) of 50.0 to 59.9 in adult    Hx of osteomyelitis    Lymphedema    Neuropathy    Onychomycosis    Polyneuropathy due to type 2 diabetes mellitus    Tobacco abuse    Acute osteomyelitis of left foot    Exostosis of left foot    Diabetic foot infection    Preoperative testing    Charcot foot due to diabetes mellitus         ICD-10-CM ICD-9-CM   1. Diabetic foot infection  E11.628 250.80    L08.9 686.9   2. Preoperative testing  Z01.818 V72.84   3. Encounter for monitoring antiplatelet therapy  Z51.81 V58.83    Z79.02 V58.63       Lab Frequency Next Occurrence   Instructions on coughing, deep breathing, and incentive spirometry. Once 09/21/2024   Follow Anesthesia  Guidelines / Protocol Once 01/27/2025   Provide NPO Instructions to Patient Once 01/27/2025   Chlorhexidine Skin Prep Once 01/27/2025   CBC and Differential Once 01/27/2025   Basic metabolic panel Once 01/27/2025   APTT Once 01/27/2025   Protime-INR Once 01/27/2025   ECG 12 Lead Once 01/27/2025       Plan: After thoroughly evaluating Donna Swain, I believe the best course of action is to proceed with a left below-knee amputation.  Extensively discussed risk and benefits of amputation.  The patient states that she is tired of dealing with a left foot wound and would rather proceed with left below-knee amputation.  She has palpable pulses and I see no significant risk factors for nonhealing of the below-knee amputation.  Risks/benefits were explained at great length to the patient which include but are not limited to bleeding, infection, vessel damage, nerve damage, failure of wound to heal, MI, stroke, and death.  The patient understands all the risks and wishes for me to proceed.   Patient will need to hold her Ozempic for 1 week prior to surgery.      Class 3 Severe Obesity (BMI >=40). Obesity-related health conditions include the following: hypertension. Obesity is newly identified. BMI is is above average; no BMI management plan is appropriate. We discussed portion control and increasing exercise.    This was all discussed in full with complete understanding.    Thank you for allowing me to participate in the care of your patient.  Please do not hesitate with any questions or concerns.  I will keep you aware of any further encounters with Donna Swain.        Sincerely yours,         Giancarlo Demarco MD            Electronically signed by Giancarlo Demarco MD at 01/26/25 1342                 Giancarlo Demarco MD at 01/22/25 1045          1/26/2025        Benjamin Kelsey MD  [address] [place] [address] / [place] [address]      Donna Ledesmay  1975    L foot wound      Dear Benjamin Kelsey,  MD PAR[facility]  I had the pleasure of seeing your patient Donna Swain in the office today.  Thank you kindly for this consultation.  As you recall, Donna Swain is a 49 y.o.  female who you are currently following for routine health maintenance.  The patient has been seeing Dr. Mckee for her left diabetic foot wound and right foot malformations.  The patient's had a longstanding history of at least 4 years of dealing with the left foot wound for which she has become frustrated with the ongoing treatment for the wound.  After a discussion with Dr. Mckee last week she was inquisitive to left below-knee amputation.  I had an extensive discussion with the patient over the risk and benefits of the procedure, and she would like to proceed with left below-knee amputation.  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    Past Medical History:   Diagnosis Date    Ankle swelling     Back pain     Charcot's joint of ankle, left     Diabetes     Hemorrhoids     History of stomach ulcers     Hypertension     Hyperthyroidism     Neuropathy     Sleep apnea     doesn't use c pap. feels like she is suffocating    Thyroid goiter        Past Surgical History:   Procedure Laterality Date    BONE SPUR LEG Left 10/2/2024    Procedure: Plantar Exostectomy;  Surgeon: Ciro Mckee DPM;  Location:  [facility];  Service: Podiatry;  Laterality: Left;    CHOLECYSTECTOMY      lap    COLONOSCOPY      ENDOSCOPY      FOOT HARDWARE REMOVAL Left     HYSTERECTOMY      LEG DEBRIDEMENT Left 10/2/2024    Procedure: Debridement of Bone/Plantar Exostectomy - Left Foot;  Surgeon: Ciro Mckee DPM;  Location:  [facility];  Service: Podiatry;  Laterality: Left;    MULTIPLE TOOTH EXTRACTIONS      OVARY SURGERY      TOE OSTEOTOMY Left 03/07/2023    Procedure: PARTIAL REMOVAL OF BONE, FIFTH METATARSAL, LEFT FOOT;  Surgeon: Tee Dacosta DPM;  Location:  [facility];  Service: Podiatry;  Laterality: Left;    TUBAL ABDOMINAL LIGATION         Family  History   Problem Relation Age of Onset    Cancer Mother     Cancer Father     COPD Father     Diabetes Maternal Grandfather        Social History     Socioeconomic History    Marital status: Single   Tobacco Use    Smoking status: Every Day     Current packs/day: 0.50     Average packs/day: 0.5 packs/day for 1.1 years (0.5 ttl pk-yrs)     Types: Cigarettes     Start date: 2024     Passive exposure: Current    Smokeless tobacco: Never   Vaping Use    Vaping status: Never Used   Substance and Sexual Activity    Alcohol use: Not Currently    Drug use: Not Currently    Sexual activity: Defer       Allergies   Allergen Reactions    Vancomycin Hives       Current Outpatient Medications   Medication Instructions    amitriptyline (ELAVIL) 10 mg, Oral, Nightly    buPROPion HCl (WELLBUTRIN PO) 150 mg, Oral, Daily    furosemide (LASIX) 40 mg, Oral, Daily    ibuprofen (ADVIL,MOTRIN) 800 mg, Oral, Every 6 Hours PRN    pregabalin (LYRICA) 200 mg, Oral, 3 times daily    Semaglutide (OZEMPIC, 0.25 OR 0.5 MG/DOSE, SC) 1 mg, Subcutaneous, Weekly           Review of Systems   Constitutional: Negative.  Negative for diaphoresis and fever.   HENT: Negative.     Eyes: Negative.    Respiratory: Negative.  Negative for shortness of breath and wheezing.    Cardiovascular: Negative.  Negative for leg swelling.   Gastrointestinal: Negative.  Negative for abdominal pain.   Endocrine: Negative.    Genitourinary: Negative.    Musculoskeletal: Negative.  Negative for gait problem.   Skin: Negative.    Allergic/Immunologic: Negative.    Neurological: Negative.  Negative for dizziness and numbness.   Hematological: Negative.    Psychiatric/Behavioral: Negative.         There were no vitals taken for this visit.  Physical Exam  Vitals and nursing note reviewed.   Constitutional:       General: She is not in acute distress.     Appearance: Normal appearance. She is not diaphoretic.   HENT:      Head: Normocephalic. No right periorbital erythema or  left periorbital erythema.      Nose: Nose normal.   Eyes:      General: No scleral icterus.     Pupils: Pupils are equal.   Cardiovascular:      Rate and Rhythm: Normal rate and regular rhythm.      Pulses:           Dorsalis pedis pulses are 2+ on the left side.        Posterior tibial pulses are 2+ on the left side.   Pulmonary:      Effort: Pulmonary effort is normal. No respiratory distress.   Abdominal:      General: There is no distension.      Palpations: Abdomen is soft.      Tenderness: There is no abdominal tenderness. There is no guarding.   Musculoskeletal:         General: No swelling or tenderness. Normal range of motion.      Cervical back: Normal range of motion and neck supple.      Right lower leg: No edema.      Left lower leg: No edema.   Feet:      Right foot:      Skin integrity: Skin integrity normal.      Left foot:      Skin integrity: Skin integrity normal.   Skin:     General: Skin is warm and dry.      Findings: No rash.      Comments: Left foot wound with mild surrounding erythema no fluctuance or induration.  There is healthy granulation tissue at the base no overt evidence of infection.  No significant drainage.   Neurological:      General: No focal deficit present.      Mental Status: She is alert and oriented to person, place, and time. Mental status is at baseline.      Cranial Nerves: No cranial nerve deficit.      Gait: Gait normal.   Psychiatric:         Attention and Perception: Attention normal.         Mood and Affect: Mood normal.         Behavior: Behavior normal.         Thought Content: Thought content normal.         Judgment: Judgment normal.         XR Foot 3+ View Left    Result Date: 1/9/2025  Narrative: EXAMINATION: XR FOOT 3+ VW LEFT-  1/9/2025 10:49 AM  HISTORY: ATTENTION TO LEFT FOOT LATERAL TO EVALUATE FOR OSTEOMYELITIS OR OTHER ACUTE PROCESSES; M86.172-Other acute osteomyelitis, left ankle and foot  COMPARISON: 9/9/2024 and 8/7/2024  TECHNIQUE: Frontal,  lateral and oblique radiographs of the LEFT foot were provided for review.  FINDINGS: Soft tissue wound along the lateral aspect of the midfoot redemonstrated. Since the previous examination, the patient has had a exostectomy in the region of the wound. The distal portion of the cuboid along its plantar lateral aspect is now changed in shape. It is unclear whether this is all postoperative or there is any underlying osteomyelitis. This is immediately adjacent to the wound.. Soft tissue wound along the plantar lateral aspect of the midfoot. Elsewhere, no interval change.       Impression:  1.  Interval change in the appearance of the plantar lateral surface of the cuboid. Some of this may be postoperative in nature; however, the margin is somewhat irregular and adjacent underlying osteomyelitis is not entirely excluded. No immediate postoperative radiographs are available for review to determine whether this has changed since the surgery in October.  This report was signed and finalized on 1/9/2025 10:55 AM by Dr. Jose Guadalupe Garcia MD.       Patient Active Problem List   Diagnosis    Sleep apnea    Hypertension    Class 3 severe obesity due to excess calories without serious comorbidity with body mass index (BMI) of 50.0 to 59.9 in adult    Hx of osteomyelitis    Lymphedema    Neuropathy    Onychomycosis    Polyneuropathy due to type 2 diabetes mellitus    Tobacco abuse    Acute osteomyelitis of left foot    Exostosis of left foot    Diabetic foot infection    Preoperative testing    Charcot foot due to diabetes mellitus         ICD-10-CM ICD-9-CM   1. Diabetic foot infection  E11.628 250.80    L08.9 686.9   2. Preoperative testing  Z01.818 V72.84   3. Encounter for monitoring antiplatelet therapy  Z51.81 V58.83    Z79.02 V58.63       Lab Frequency Next Occurrence   Instructions on coughing, deep breathing, and incentive spirometry. Once 09/21/2024   Follow Anesthesia Guidelines / Protocol Once 01/27/2025   Provide NPO  Instructions to Patient Once 01/27/2025   Chlorhexidine Skin Prep Once 01/27/2025   CBC and Differential Once 01/27/2025   Basic metabolic panel Once 01/27/2025   APTT Once 01/27/2025   Protime-INR Once 01/27/2025   ECG 12 Lead Once 01/27/2025       Plan: After thoroughly evaluating Donna Swain, I believe the best course of action is to proceed with a left below-knee amputation.  Extensively discussed risk and benefits of amputation.  The patient states that she is tired of dealing with a left foot wound and would rather proceed with left below-knee amputation.  She has palpable pulses and I see no significant risk factors for nonhealing of the below-knee amputation.  Risks/benefits were explained at great length to the patient which include but are not limited to bleeding, infection, vessel damage, nerve damage, failure of wound to heal, MI, stroke, and death.  The patient understands all the risks and wishes for me to proceed.   Patient will need to hold her Ozempic for 1 week prior to surgery.      Class 3 Severe Obesity (BMI >=40). Obesity-related health conditions include the following: hypertension. Obesity is newly identified. BMI is is above average; no BMI management plan is appropriate. We discussed portion control and increasing exercise.    This was all discussed in full with complete understanding.    Thank you for allowing me to participate in the care of your patient.  Please do not hesitate with any questions or concerns.  I will keep you aware of any further encounters with Donna Swain.        Sincerely yours,         Giancarlo Demarco MD            Electronically signed by Giancarlo Demarco MD at 01/26/25 1342       Vital Signs (last day)       Date/Time Temp Temp src Pulse Resp BP Patient Position SpO2    01/28/25 0737 98.6 (37) Oral 99 16 118/71 Lying 95    01/28/25 0625 -- -- 90 23 -- -- 96    01/28/25 0307 99 (37.2) Axillary 93 20 126/67 Lying 96    01/28/25 0113 -- -- -- -- -- -- 94    01/27/25  2320 97.8 (36.6) Oral 95 16 142/88 Lying 95    01/27/25 2025 98.2 (36.8) Oral 83 16 129/71 Lying 97    01/27/25 1539 97.8 (36.6) Oral 73 16 146/77 Lying 93    01/27/25 1435 98.3 (36.8) Axillary 97 16 134/75 Lying 97    01/27/25 1345 98.3 (36.8) Oral 100 15 149/93 -- 96    01/27/25 1323 98.2 (36.8) -- 94 12 159/88 -- 93    01/27/25 1308 -- -- 96 14 167/101 -- 91    01/27/25 1253 -- -- 91 12 163/88 -- 90    01/27/25 1238 -- -- 89 12 156/90 -- 92    01/27/25 1223 -- -- 90 12 143/84 -- 86    01/27/25 1205 -- -- 87 12 137/91 -- 92    01/27/25 1154 -- -- 88 16 159/81 -- 96    01/27/25 1150 -- -- 90 14 163/78 -- 96    01/27/25 1138 -- -- 98 12 143/103 -- 88    01/27/25 1123 -- -- 77 12 143/95 -- 91    01/27/25 1116 -- -- 81 12 137/89 -- 93    01/27/25 1113 -- -- 78 11 136/90 -- 85    01/27/25 1108 -- -- 87 14 159/92 -- 94    01/27/25 1100 -- -- 85 12 150/91 -- 95    01/27/25 1053 -- -- 86 12 157/91 -- 95    01/27/25 1048 -- -- 87 12 155/101 -- 96    01/27/25 1043 -- -- 88 12 154/97 -- 94    01/27/25 1038 97.8 (36.6) Temporal 92 12 138/77 Lying 95    01/27/25 0740 -- -- 81 -- -- -- 95    01/27/25 0735 -- -- 82 -- -- -- 88    01/27/25 0730 -- -- 83 -- -- -- 95    01/27/25 0725 -- -- 82 -- -- -- 88    01/27/25 0720 -- -- 78 -- -- -- 92    01/27/25 0715 -- -- 80 -- -- -- 90    01/27/25 0710 -- -- 80 -- -- -- 83    01/27/25 0705 -- -- 83 -- -- -- 90    01/27/25 0700 -- -- 81 -- -- -- 91    01/27/25 0655 -- -- 85 -- -- -- 94    01/27/25 0650 -- -- 84 -- -- -- 95    01/27/25 0645 -- -- 81 -- -- -- 95    01/27/25 0640 -- -- 84 -- -- -- 93    01/27/25 0635 -- -- 85 -- -- -- 94    01/27/25 0630 -- -- 84 -- -- -- 92    01/27/25 0530 96.5 (35.8) Temporal 82 18 141/97 Sitting 96          Current Facility-Administered Medications   Medication Dose Route Frequency Provider Last Rate Last Admin    acetaminophen (TYLENOL) tablet 650 mg  650 mg Oral Q4H PRN Giancarlo Demarco MD        Or    acetaminophen (TYLENOL) 160 MG/5ML oral solution  650 mg  650 mg Oral Q4H PRN Giancarlo Demarco MD        Or    acetaminophen (TYLENOL) suppository 650 mg  650 mg Rectal Q4H PRN Giancarlo Demarco MD        amitriptyline (ELAVIL) tablet 10 mg  10 mg Oral Nightly Giancarlo Demarco MD   10 mg at 01/27/25 2030    buPROPion XL (WELLBUTRIN XL) 24 hr tablet 150 mg  150 mg Oral Daily Giancarlo Demarco MD        dextrose (D50W) (25 g/50 mL) IV injection 25 g  25 g Intravenous Q15 Min PRN Giancarlo Demarco MD        dextrose (GLUTOSE) oral gel 15 g  15 g Oral Q15 Min PRN Giancarlo Demarco MD        furosemide (LASIX) tablet 40 mg  40 mg Oral Daily Giancarlo Demarco MD   40 mg at 01/27/25 1414    glucagon (GLUCAGEN) injection 1 mg  1 mg Intramuscular Q15 Min PRN Giancarlo Demarco MD        HYDROcodone-acetaminophen (NORCO) 5-325 MG per tablet 1 tablet  1 tablet Oral Q6H PRN Giancarlo Demarco MD   1 tablet at 01/28/25 0728    HYDROmorphone (DILAUDID) injection 0.5 mg  0.5 mg Intravenous Q2H PRN Giancarlo Demarco MD   0.5 mg at 01/28/25 0519    And    naloxone (NARCAN) injection 0.4 mg  0.4 mg Intravenous Q5 Min PRN Giancarlo Demarco MD        HYDROmorphone (DILAUDID) injection 1 mg  1 mg Intravenous Q3H PRN Giancarlo Demarco MD   1 mg at 01/28/25 0048    And    naloxone (NARCAN) injection 0.4 mg  0.4 mg Intravenous Q5 Min PRN Giancarlo Demarco MD        Insulin Lispro (humaLOG) injection 2-9 Units  2-9 Units Subcutaneous 4x Daily AC & at Bedtime Giancarlo Demarco MD        lactated ringers infusion  100 mL/hr Intravenous Continuous Giancarlo Demarco  mL/hr at 01/28/25 0308 100 mL/hr at 01/28/25 0308    nitroglycerin (NITROSTAT) SL tablet 0.4 mg  0.4 mg Sublingual Q5 Min PRN Giancarlo Demarco MD        ondansetron ODT (ZOFRAN-ODT) disintegrating tablet 4 mg  4 mg Oral Q6H PRN Giancarlo Demarco MD        pregabalin (LYRICA) capsule 200 mg  200 mg Oral TID Giancarlo Demarco MD   200 mg at 01/27/25 2030    sodium chloride 0.9 % flush 10 mL  10 mL Intravenous Q12H Giancarlo Demarco MD   10 mL  at 01/27/25 2031    sodium chloride 0.9 % flush 10 mL  10 mL Intravenous PRN Giancarlo Demarco MD        sodium chloride 0.9 % infusion 40 mL  40 mL Intravenous PRN Giancarlo Demarco MD            Operative/Procedure Notes (last 24 hours)        Giancarlo Demarco MD at 01/27/25 0841          Donna Swain  1/27/2025     PREOPERATIVE DIAGNOSIS: Diabetic foot infection [E11.628, L08.9]  Preoperative testing [Z01.818]  Chronic Left foot wound with osteo     POSTOPERATIVE DIAGNOSIS: Post-Op Diagnosis Codes:     * Diabetic foot infection [E11.628, L08.9]     * Preoperative testing [Z01.818]   same   PROCEDURE PERFORMED: Left Below knee amputation      SURGEON: Silvano Demarco MD      ANESTHESIA: General.    PREPARATION: Routine.    STAFF: Circulator: Cindy Sorto RN; Franki Echevarria RN  Scrub Person: Jocelyn Stephenson CST    Estimated Blood Loss: 100ml    SPECIMENS: Left leg/foot    COMPLICATIONS: none    INDICATIONS: Donna Swain is a 49 y.o. female with a chronic DM left foot wound with osteo.  The patient discussed further surgery with podiatry and after their discussion she desired BKA.  Risks/benefits were explained at great length to the patient which include but are not limited to bleeding, infection, vessel damage, nerve damage, failure of wound to heal, MI, stroke, and death.  The patient understands all the risks and wishes for me to proceed. The indications, risks, and possible complications of the procedure were explained to the patient, who voiced understanding and wished to proceed with surgery.     PROCEDURE IN DETAIL:     The patient was taken to the operating room and placed on the operating table in a supine position. After general anesthesia was obtained, the left lower extremity was prepped and draped in a sterile manner.  Prior to prepping a tourniquet was placed along the thigh.  The leg was venous exsanginated with elevation and an esmar. The tourniquet was then insufflated. Approximately 4 finger breaths below  the tibial plateau the standard below-knee amputation incision was made.  Careful dissection was made down through the subcutaneous tissues using the Bovie cautery to ensure hemostasis.  Any crossing veins were ligated with 3-0 silk suture.  The anterior compartment was taken down first encountering the anterior tibial artery and vein.  They were carefully dissected free with the Metzenbaum scissors and then separately ligated with 2-0 silk suture ligatures.  Next, the fibula was identified and freed from its local attachments with the periosteal elevator.  Once it was freed up proximally it was transected with the bone saw.  Next the periosteal elevator was taken to the tibia where was fully cleaned off.  A lap sponge was placed posteriorly.  The pneumatic saw was then used to transect the tibia successfully.  The tibia was lifted up on and the posterior flap was created with the amputation knife.  The popliteal vessels were quickly clamped off.  The lower leg was removed as specimen.  Any loose bleeding was clamped with hemostats.  The tourniquet was then desufflated.   Each of the hemostats were ligated with 2-0 and 3-0 silk suture ligatures.  The anterior part of the tibia was then transected with the bone saw and smoothed out with the bone file.  The wound bed was copiously irrigated with antibiotic saline and hemostasis was observed.  The sciatic nerve was also ligated with 2-0 silk suture.  The fascial layers were then closed with a 2-0 Vicryl in an interrupted fashion.  The skin was then reapproximated using staples.  The wound was then cleaned.  Sterile dressings were applied. The patient tolerated the procedure well. Sponge and needle counts were correct. The patient was then awakened and extubated in the operating room and taken to the recovery room in good condition.    Giancarlo Demarco MD  1/27/2025  10:30 CST      Electronically signed by Giancarlo Demarco MD at 01/27/25 1032          MICHEL Tapia iv x6   on 1/27   and today  1/28 dilaudid iv x3  so far today

## 2025-01-29 LAB
ANION GAP SERPL CALCULATED.3IONS-SCNC: 7 MMOL/L (ref 5–15)
BASOPHILS # BLD AUTO: 0.03 10*3/MM3 (ref 0–0.2)
BASOPHILS NFR BLD AUTO: 0.3 % (ref 0–1.5)
BUN SERPL-MCNC: 10 MG/DL (ref 6–20)
BUN/CREAT SERPL: 15.2 (ref 7–25)
CALCIUM SPEC-SCNC: 8.5 MG/DL (ref 8.6–10.5)
CHLORIDE SERPL-SCNC: 96 MMOL/L (ref 98–107)
CO2 SERPL-SCNC: 30 MMOL/L (ref 22–29)
CREAT SERPL-MCNC: 0.66 MG/DL (ref 0.57–1)
DEPRECATED RDW RBC AUTO: 44.5 FL (ref 37–54)
EGFRCR SERPLBLD CKD-EPI 2021: 109 ML/MIN/1.73
EOSINOPHIL # BLD AUTO: 0.54 10*3/MM3 (ref 0–0.4)
EOSINOPHIL NFR BLD AUTO: 5.2 % (ref 0.3–6.2)
ERYTHROCYTE [DISTWIDTH] IN BLOOD BY AUTOMATED COUNT: 13.9 % (ref 12.3–15.4)
GLUCOSE BLDC GLUCOMTR-MCNC: 136 MG/DL (ref 70–130)
GLUCOSE BLDC GLUCOMTR-MCNC: 170 MG/DL (ref 70–130)
GLUCOSE BLDC GLUCOMTR-MCNC: 196 MG/DL (ref 70–130)
GLUCOSE BLDC GLUCOMTR-MCNC: 229 MG/DL (ref 70–130)
GLUCOSE SERPL-MCNC: 191 MG/DL (ref 65–99)
HCT VFR BLD AUTO: 34.4 % (ref 34–46.6)
HGB BLD-MCNC: 10.8 G/DL (ref 12–15.9)
IMM GRANULOCYTES # BLD AUTO: 0.04 10*3/MM3 (ref 0–0.05)
IMM GRANULOCYTES NFR BLD AUTO: 0.4 % (ref 0–0.5)
LYMPHOCYTES # BLD AUTO: 2.3 10*3/MM3 (ref 0.7–3.1)
LYMPHOCYTES NFR BLD AUTO: 22.4 % (ref 19.6–45.3)
MCH RBC QN AUTO: 27.5 PG (ref 26.6–33)
MCHC RBC AUTO-ENTMCNC: 31.4 G/DL (ref 31.5–35.7)
MCV RBC AUTO: 87.5 FL (ref 79–97)
MONOCYTES # BLD AUTO: 0.52 10*3/MM3 (ref 0.1–0.9)
MONOCYTES NFR BLD AUTO: 5.1 % (ref 5–12)
NEUTROPHILS NFR BLD AUTO: 6.86 10*3/MM3 (ref 1.7–7)
NEUTROPHILS NFR BLD AUTO: 66.6 % (ref 42.7–76)
NRBC BLD AUTO-RTO: 0 /100 WBC (ref 0–0.2)
PLATELET # BLD AUTO: 278 10*3/MM3 (ref 140–450)
PMV BLD AUTO: 9.1 FL (ref 6–12)
POTASSIUM SERPL-SCNC: 3.8 MMOL/L (ref 3.5–5.2)
RBC # BLD AUTO: 3.93 10*6/MM3 (ref 3.77–5.28)
SODIUM SERPL-SCNC: 133 MMOL/L (ref 136–145)
WBC NRBC COR # BLD AUTO: 10.29 10*3/MM3 (ref 3.4–10.8)

## 2025-01-29 PROCEDURE — 82948 REAGENT STRIP/BLOOD GLUCOSE: CPT

## 2025-01-29 PROCEDURE — 97110 THERAPEUTIC EXERCISES: CPT

## 2025-01-29 PROCEDURE — 94799 UNLISTED PULMONARY SVC/PX: CPT

## 2025-01-29 PROCEDURE — 99024 POSTOP FOLLOW-UP VISIT: CPT | Performed by: NURSE PRACTITIONER

## 2025-01-29 PROCEDURE — 25010000002 HYDROMORPHONE 1 MG/ML SOLUTION: Performed by: STUDENT IN AN ORGANIZED HEALTH CARE EDUCATION/TRAINING PROGRAM

## 2025-01-29 PROCEDURE — 80048 BASIC METABOLIC PNL TOTAL CA: CPT | Performed by: STUDENT IN AN ORGANIZED HEALTH CARE EDUCATION/TRAINING PROGRAM

## 2025-01-29 PROCEDURE — 94660 CPAP INITIATION&MGMT: CPT

## 2025-01-29 PROCEDURE — 94761 N-INVAS EAR/PLS OXIMETRY MLT: CPT

## 2025-01-29 PROCEDURE — 85025 COMPLETE CBC W/AUTO DIFF WBC: CPT | Performed by: STUDENT IN AN ORGANIZED HEALTH CARE EDUCATION/TRAINING PROGRAM

## 2025-01-29 PROCEDURE — 25010000002 HYDROMORPHONE PER 4 MG: Performed by: STUDENT IN AN ORGANIZED HEALTH CARE EDUCATION/TRAINING PROGRAM

## 2025-01-29 PROCEDURE — 97530 THERAPEUTIC ACTIVITIES: CPT

## 2025-01-29 PROCEDURE — 63710000001 INSULIN LISPRO (HUMAN) PER 5 UNITS: Performed by: STUDENT IN AN ORGANIZED HEALTH CARE EDUCATION/TRAINING PROGRAM

## 2025-01-29 PROCEDURE — 97165 OT EVAL LOW COMPLEX 30 MIN: CPT

## 2025-01-29 RX ORDER — HYDROCODONE BITARTRATE AND ACETAMINOPHEN 7.5; 325 MG/1; MG/1
1 TABLET ORAL EVERY 6 HOURS PRN
Status: DISCONTINUED | OUTPATIENT
Start: 2025-01-29 | End: 2025-01-31

## 2025-01-29 RX ADMIN — Medication 10 ML: at 08:22

## 2025-01-29 RX ADMIN — HYDROMORPHONE HYDROCHLORIDE 0.5 MG: 1 INJECTION, SOLUTION INTRAMUSCULAR; INTRAVENOUS; SUBCUTANEOUS at 17:03

## 2025-01-29 RX ADMIN — HYDROCODONE BITARTRATE AND ACETAMINOPHEN 1 TABLET: 7.5; 325 TABLET ORAL at 20:07

## 2025-01-29 RX ADMIN — HYDROMORPHONE HYDROCHLORIDE 1 MG: 1 INJECTION, SOLUTION INTRAMUSCULAR; INTRAVENOUS; SUBCUTANEOUS at 00:23

## 2025-01-29 RX ADMIN — HYDROMORPHONE HYDROCHLORIDE 1 MG: 1 INJECTION, SOLUTION INTRAMUSCULAR; INTRAVENOUS; SUBCUTANEOUS at 21:46

## 2025-01-29 RX ADMIN — HYDROMORPHONE HYDROCHLORIDE 0.5 MG: 1 INJECTION, SOLUTION INTRAMUSCULAR; INTRAVENOUS; SUBCUTANEOUS at 09:23

## 2025-01-29 RX ADMIN — HYDROMORPHONE HYDROCHLORIDE 1 MG: 1 INJECTION, SOLUTION INTRAMUSCULAR; INTRAVENOUS; SUBCUTANEOUS at 04:46

## 2025-01-29 RX ADMIN — INSULIN LISPRO 4 UNITS: 100 INJECTION, SOLUTION INTRAVENOUS; SUBCUTANEOUS at 20:10

## 2025-01-29 RX ADMIN — HYDROMORPHONE HYDROCHLORIDE 0.5 MG: 1 INJECTION, SOLUTION INTRAMUSCULAR; INTRAVENOUS; SUBCUTANEOUS at 13:03

## 2025-01-29 RX ADMIN — HYDROCODONE BITARTRATE AND ACETAMINOPHEN 1 TABLET: 5; 325 TABLET ORAL at 01:34

## 2025-01-29 RX ADMIN — PREGABALIN 200 MG: 100 CAPSULE ORAL at 08:21

## 2025-01-29 RX ADMIN — PREGABALIN 200 MG: 100 CAPSULE ORAL at 20:07

## 2025-01-29 RX ADMIN — INSULIN LISPRO 2 UNITS: 100 INJECTION, SOLUTION INTRAVENOUS; SUBCUTANEOUS at 11:44

## 2025-01-29 RX ADMIN — HYDROCODONE BITARTRATE AND ACETAMINOPHEN 1 TABLET: 5; 325 TABLET ORAL at 08:21

## 2025-01-29 RX ADMIN — AMITRIPTYLINE HYDROCHLORIDE 10 MG: 10 TABLET, FILM COATED ORAL at 20:07

## 2025-01-29 RX ADMIN — HYDROCODONE BITARTRATE AND ACETAMINOPHEN 1 TABLET: 7.5; 325 TABLET ORAL at 14:27

## 2025-01-29 RX ADMIN — Medication 10 ML: at 20:08

## 2025-01-29 RX ADMIN — BUPROPION HYDROCHLORIDE 150 MG: 150 TABLET, EXTENDED RELEASE ORAL at 08:21

## 2025-01-29 RX ADMIN — PREGABALIN 200 MG: 100 CAPSULE ORAL at 16:54

## 2025-01-29 RX ADMIN — INSULIN LISPRO 2 UNITS: 100 INJECTION, SOLUTION INTRAVENOUS; SUBCUTANEOUS at 08:21

## 2025-01-29 NOTE — PROGRESS NOTES
RT EQUIPMENT DEVICE RELATED - SKIN ASSESSMENT    Grabiel Score:  Grabiel Score: 20     RT Medical Equipment/Device:     NIV Mask:  Under-the-nose   size:  a    Skin Assessment:      Nose:  Intact    Device Skin Pressure Protection:  Skin-to-device areas padded:  None Required    Nurse Notification:  Wendy Cisneros, CRT

## 2025-01-29 NOTE — PROGRESS NOTES
LOS: 2 days   Patient Care Team:  Benjamin Kelsey MD as PCP - General (Family Medicine)    Chief Complaint: POD #2-left BKA    Subjective     Patient Complaints: Patient seen/examined lying in bed.  She does complain of pain at times and is utilizing both p.o. and IV medication.  We had a discussion about moving forward with discharge and how she will not be able to utilize IV pain medication, we told her that we will increase her p.o. medication, however we want her to hold back on using IV pain medication if possible.  She agreed.  Her dressing is clean, dry, and intact.  Her glucose is controlled.  AVSS.    Objective     Vital Signs  Temp:  [98 °F (36.7 °C)-99.6 °F (37.6 °C)] 98 °F (36.7 °C)  Heart Rate:  [90-98] 90  Resp:  [16-18] 16  BP: (109-117)/(55-90) 109/55    Physical Exam  Vitals and nursing note reviewed.   Constitutional:       General: She is not in acute distress.     Appearance: Normal appearance. She is morbidly obese. She is not diaphoretic.   HENT:      Head: Normocephalic. No right periorbital erythema or left periorbital erythema.      Nose: Nose normal.   Eyes:      General: No scleral icterus.     Pupils: Pupils are equal.   Cardiovascular:      Rate and Rhythm: Normal rate and regular rhythm.      Pulses: Normal pulses.      Heart sounds: Normal heart sounds. No murmur heard.  Pulmonary:      Effort: Pulmonary effort is normal. No respiratory distress.      Breath sounds: Normal breath sounds.   Abdominal:      General: Bowel sounds are normal. There is no distension.      Palpations: Abdomen is soft.      Tenderness: There is no abdominal tenderness. There is no guarding.   Musculoskeletal:         General: No swelling or tenderness. Normal range of motion.      Cervical back: Normal range of motion and neck supple.      Right lower leg: No edema.      Left lower leg: No edema.      Left Lower Extremity: Left leg is amputated below knee.   Feet:      Right foot:      Skin  integrity: Skin integrity normal.      Left foot:      Skin integrity: Skin integrity normal.      Comments: Dressing clean, dry, intact  Skin:     General: Skin is warm and dry.      Findings: No erythema or rash.   Neurological:      General: No focal deficit present.      Mental Status: She is alert and oriented to person, place, and time. Mental status is at baseline.      Cranial Nerves: No cranial nerve deficit.      Gait: Gait normal.   Psychiatric:         Attention and Perception: Attention normal.         Mood and Affect: Mood normal.         Behavior: Behavior normal.         Thought Content: Thought content normal.         Judgment: Judgment normal.       Laboratory Data:   Results from last 7 days   Lab Units 01/29/25  0329 01/28/25  0349 01/24/25  0859   WBC 10*3/mm3 10.29 10.57 8.78   HEMOGLOBIN g/dL 10.8* 11.6* 12.2   HEMATOCRIT % 34.4 37.4 37.8   PLATELETS 10*3/mm3 278 302 374       Results from last 7 days   Lab Units 01/29/25  0329 01/28/25  0349 01/24/25  0859   SODIUM mmol/L 133* 135* 138   POTASSIUM mmol/L 3.8 4.2 4.1   CHLORIDE mmol/L 96* 95* 98   CO2 mmol/L 30.0* 32.0* 31.0*   BUN mg/dL 10 11 10   CREATININE mg/dL 0.66 0.69 0.61   CALCIUM mg/dL 8.5* 8.8 9.3   GLUCOSE mg/dL 191* 156* 89     Results from last 7 days   Lab Units 01/24/25  0859   PROTIME Seconds 13.9   INR  1.02   APTT seconds 23.9*            Medication Review: Reviewed    Assessment & Plan       Charcot foot due to diabetes mellitus    Diabetic foot infection    Preoperative testing    Osteomyelitis          Plan for disposition:  Increase p.o. Norco to 7.5 mg, decrease use of IV Dilaudid with plans of discharge soon  Glucose controlled, continue sliding scale insulin  Continue to monitor labs  Continue PT  Anticipate takedown tomorrow or Friday  Anticipate patient will need rehab at discharge    LORENA Coles  Vascular Surgery  [phone]  01/29/25  07:43 CST

## 2025-01-29 NOTE — PLAN OF CARE
Goal Outcome Evaluation:  Plan of Care Reviewed With: patient, mother, father        Progress: no change  Outcome Evaluation: OT eval completed. Pt seated in chair upon therapist arrival; A&Ox4; c/o 9/10 L residual limb pain; L KI in place; Pt's parents also present. Pt reports Mod I-I with all BADLs including fxl ambulation at baseline. Today, Pt performed all sit<>stand transfers to/from chair and BSC utilizing rwx with CGA and verbal cues for safety awareness. Pt was able to pivot on RLE from chair<>BSC utilizing rwx with CGA and verbal cues for safety awareness. Pt performed toileting task requiring Max A for clothing management in standing and SBA for vince hygiene while seated. BUE strength WFL. Skilled OT intervention indicated in order to address deficits in fxl mobility, fxl activity tolerance, balance, strength, and use of adaptive techniques/equipment during performance of BADLs. Recommend inpatient rehab at discharge.    Anticipated Discharge Disposition (OT): inpatient rehabilitation facility

## 2025-01-29 NOTE — CASE MANAGEMENT/SOCIAL WORK
Continued Stay Note  [facility]     Patient Name: Donna Swain  MRN: [id number]  Today's Date: 1/29/2025    Admit Date: 1/27/2025    Plan: [facility] pending precert   Discharge Plan       Row Name 01/29/25 1424       Plan    Plan [facility] pending precert    Patient/Family in Agreement with Plan yes    Plan Comments [facility] has offered a bed. They are starting precert.                   Discharge Codes    No documentation.                       LORI Lynn

## 2025-01-29 NOTE — PAYOR COMM NOTE
"Donna Monteiro (49 y.o. Female)   [id number]   cont stay    Please review clinical for Additional Days   [facility] phone    fax          Date of Birth   1975    Social Security Number       Address   [address] [place] [facility] [address]    Home Phone   [phone]    MRN   [id number]       [employer]   Other    Marital Status   Single                            Admission Date   1/27/25    Admission Type   Elective    Admitting Provider   Giancarlo Demarco MD    Attending Provider   Giancarlo Demarco MD    Department, Room/Bed   [facility] 3C, 381/1       Discharge Date       Discharge Disposition       Discharge Destination                                 Attending Provider: Giancarlo Demarco MD    Allergies: Vancomycin    Isolation: Contact   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)   Code Status: CPR    Ht: 175 cm (68.9\")   Wt: 140 kg (309 lb 4.9 oz)    Admission Cmt: None   Principal Problem: Charcot foot due to diabetes mellitus [E11.610]                   Active Insurance as of 1/27/2025       Primary Coverage       Payor Plan Insurance Group Employer/Plan Group    ANTHEM MEDICARE REPLACEMENT ANTHEM MED ADV SNP [id number]       Payor Plan Address Payor Plan Phone Number Payor Plan Fax Number Effective Dates    [address] [phone]  1/1/2025 - None Entered    [facility] [address]         Subscriber Name Subscriber Birth Date Member ID       DONNA MONTEIRO 1975 [id number]                     Emergency Contacts        (Rel.) Home Phone Work Phone Mobile Phone    Martha Monteiro (Mother) [phone] -- --              Vital Signs (last day)       Date/Time Temp Temp src Pulse Resp BP Patient Position SpO2    01/29/25 1207 98 (36.7) Oral 88 16 116/51 Sitting 91    01/29/25 1144 -- -- -- -- -- -- 94    01/29/25 0821 -- -- -- -- -- -- 94    01/29/25 0802 98 (36.7) Oral 87 16 148/74 Lying 96    01/29/25 0632 -- -- 90 16 -- -- 98    01/29/25 0545 " 98 (36.7) Oral 93 18 109/55 Lying 97    01/28/25 2008 98.4 (36.9) Oral 94 16 114/63 Lying 95    01/28/25 1610 99.6 (37.6) Oral 98 16 117/90 Sitting 98    01/28/25 1232 98.4 (36.9) Oral 95 18 117/58 Lying 100    01/28/25 1028 -- -- -- -- -- -- 96    01/28/25 0737 98.6 (37) Oral 99 16 118/71 Lying 95    01/28/25 0625 -- -- 90 23 -- -- 96    01/28/25 0307 99 (37.2) Axillary 93 20 126/67 Lying 96    01/28/25 0113 -- -- -- -- -- -- 94          Current Facility-Administered Medications   Medication Dose Route Frequency Provider Last Rate Last Admin    acetaminophen (TYLENOL) tablet 650 mg  650 mg Oral Q4H PRN Giancarlo Demarco MD        Or    acetaminophen (TYLENOL) 160 MG/5ML oral solution 650 mg  650 mg Oral Q4H PRN Giancarlo Demarco MD        Or    acetaminophen (TYLENOL) suppository 650 mg  650 mg Rectal Q4H PRN Giancarlo Demarco MD        amitriptyline (ELAVIL) tablet 10 mg  10 mg Oral Nightly Giancarlo Demarco MD   10 mg at 01/28/25 2027    buPROPion XL (WELLBUTRIN XL) 24 hr tablet 150 mg  150 mg Oral Daily Giancarlo Demarco MD   150 mg at 01/29/25 0821    dextrose (D50W) (25 g/50 mL) IV injection 25 g  25 g Intravenous Q15 Min PRN Giancarlo Demarco MD        dextrose (GLUTOSE) oral gel 15 g  15 g Oral Q15 Min PRN Giancarlo Demarco MD        [Held by provider] furosemide (LASIX) tablet 40 mg  40 mg Oral Daily Giancarlo Demarco MD   40 mg at 01/28/25 0836    glucagon (GLUCAGEN) injection 1 mg  1 mg Intramuscular Q15 Min PRN Giancarlo Demarco MD        HYDROcodone-acetaminophen (NORCO) 7.5-325 MG per tablet 1 tablet  1 tablet Oral Q6H PRN Lax, Bree L, APRN        HYDROmorphone (DILAUDID) injection 0.5 mg  0.5 mg Intravenous Q2H PRN Giancarlo Demarco MD   0.5 mg at 01/29/25 1303    And    naloxone (NARCAN) injection 0.4 mg  0.4 mg Intravenous Q5 Min PRN Giancarlo Demarco MD        HYDROmorphone (DILAUDID) injection 1 mg  1 mg Intravenous Q3H PRN Giancarlo Demarco MD   1 mg at 01/29/25 0446    And    naloxone (NARCAN) injection 0.4  mg  0.4 mg Intravenous Q5 Min PRN Giancarlo Demarco MD        Insulin Lispro (humaLOG) injection 2-9 Units  2-9 Units Subcutaneous 4x Daily AC & at Bedtime Giancarlo Demarco MD   2 Units at 01/29/25 1144    nitroglycerin (NITROSTAT) SL tablet 0.4 mg  0.4 mg Sublingual Q5 Min PRN Giancarlo Demarco MD        ondansetron ODT (ZOFRAN-ODT) disintegrating tablet 4 mg  4 mg Oral Q6H PRN Giancarlo Demarco MD        pregabalin (LYRICA) capsule 200 mg  200 mg Oral TID Giancarlo Demarco MD   200 mg at 01/29/25 0821    sodium chloride 0.9 % flush 10 mL  10 mL Intravenous Q12H Giancarlo Demarco MD   10 mL at 01/29/25 0822    sodium chloride 0.9 % flush 10 mL  10 mL Intravenous PRN Giancarlo Demarco MD   10 mL at 01/28/25 1626    sodium chloride 0.9 % infusion 40 mL  40 mL Intravenous PRN Giancarlo Demarco MD            Physician Progress Notes (last 24 hours)        Bree Boss, LORENA at 01/29/25 0743             LOS: 2 days   Patient Care Team:  Benjamin Kelsey MD as PCP - General (Family Medicine)    Chief Complaint: POD #2-left BKA    Subjective     Patient Complaints: Patient seen/examined lying in bed.  She does complain of pain at times and is utilizing both p.o. and IV medication.  We had a discussion about moving forward with discharge and how she will not be able to utilize IV pain medication, we told her that we will increase her p.o. medication, however we want her to hold back on using IV pain medication if possible.  She agreed.  Her dressing is clean, dry, and intact.  Her glucose is controlled.  AVSS.    Objective     Vital Signs  Temp:  [98 °F (36.7 °C)-99.6 °F (37.6 °C)] 98 °F (36.7 °C)  Heart Rate:  [90-98] 90  Resp:  [16-18] 16  BP: (109-117)/(55-90) 109/55    Physical Exam  Vitals and nursing note reviewed.   Constitutional:       General: She is not in acute distress.     Appearance: Normal appearance. She is morbidly obese. She is not diaphoretic.   HENT:      Head: Normocephalic. No right periorbital  erythema or left periorbital erythema.      Nose: Nose normal.   Eyes:      General: No scleral icterus.     Pupils: Pupils are equal.   Cardiovascular:      Rate and Rhythm: Normal rate and regular rhythm.      Pulses: Normal pulses.      Heart sounds: Normal heart sounds. No murmur heard.  Pulmonary:      Effort: Pulmonary effort is normal. No respiratory distress.      Breath sounds: Normal breath sounds.   Abdominal:      General: Bowel sounds are normal. There is no distension.      Palpations: Abdomen is soft.      Tenderness: There is no abdominal tenderness. There is no guarding.   Musculoskeletal:         General: No swelling or tenderness. Normal range of motion.      Cervical back: Normal range of motion and neck supple.      Right lower leg: No edema.      Left lower leg: No edema.      Left Lower Extremity: Left leg is amputated below knee.   Feet:      Right foot:      Skin integrity: Skin integrity normal.      Left foot:      Skin integrity: Skin integrity normal.      Comments: Dressing clean, dry, intact  Skin:     General: Skin is warm and dry.      Findings: No erythema or rash.   Neurological:      General: No focal deficit present.      Mental Status: She is alert and oriented to person, place, and time. Mental status is at baseline.      Cranial Nerves: No cranial nerve deficit.      Gait: Gait normal.   Psychiatric:         Attention and Perception: Attention normal.         Mood and Affect: Mood normal.         Behavior: Behavior normal.         Thought Content: Thought content normal.         Judgment: Judgment normal.       Laboratory Data:   Results from last 7 days   Lab Units 01/29/25  0329 01/28/25  0349 01/24/25  0859   WBC 10*3/mm3 10.29 10.57 8.78   HEMOGLOBIN g/dL 10.8* 11.6* 12.2   HEMATOCRIT % 34.4 37.4 37.8   PLATELETS 10*3/mm3 278 302 374       Results from last 7 days   Lab Units 01/29/25  0329 01/28/25  0349 01/24/25  0859   SODIUM mmol/L 133* 135* 138   POTASSIUM mmol/L 3.8  4.2 4.1   CHLORIDE mmol/L 96* 95* 98   CO2 mmol/L 30.0* 32.0* 31.0*   BUN mg/dL 10 11 10   CREATININE mg/dL 0.66 0.69 0.61   CALCIUM mg/dL 8.5* 8.8 9.3   GLUCOSE mg/dL 191* 156* 89     Results from last 7 days   Lab Units 01/24/25  0859   PROTIME Seconds 13.9   INR  1.02   APTT seconds 23.9*            Medication Review: Reviewed    Assessment & Plan       Charcot foot due to diabetes mellitus    Diabetic foot infection    Preoperative testing    Osteomyelitis          Plan for disposition:  Increase p.o. Norco to 7.5 mg, decrease use of IV Dilaudid with plans of discharge soon  Glucose controlled, continue sliding scale insulin  Continue to monitor labs  Continue PT  Anticipate takedown tomorrow or Friday  Anticipate patient will need rehab at discharge    LORENA Coles  Vascular Surgery  [phone]  01/29/25  07:43 CST      Electronically signed by Bree Boss APRN at 01/29/25 1325       DavGiancarlo galdamez MD at 01/28/25 1600             LOS: 1 day   Patient Care Team:  Benjamin Kelsey MD as PCP - General (Family Medicine)    Chief Complaint: Admitted for left diabetic foot wound    Subjective     Patient Complaints:  Patient reported pain on a.m. rounds.  Patient reports the pain is improved throughout the day.    Objective     Vital Signs  Temp:  [97.8 °F (36.6 °C)-99 °F (37.2 °C)] 98.4 °F (36.9 °C)  Heart Rate:  [83-99] 95  Resp:  [16-23] 18  BP: (117-142)/(58-88) 117/58    Physical Exam  Cardiovascular:      Rate and Rhythm: Normal rate and regular rhythm.   Pulmonary:      Effort: Pulmonary effort is normal. No respiratory distress.   Musculoskeletal:      Comments: Left BKA with dressing clean dry intact.  Knee immobilizer in place.         Laboratory Data:   Results from last 7 days   Lab Units 01/28/25  0349 01/24/25  0859   WBC 10*3/mm3 10.57 8.78   HEMOGLOBIN g/dL 11.6* 12.2   HEMATOCRIT % 37.4 37.8   PLATELETS 10*3/mm3 302 374       Results from last 7 days   Lab Units 01/28/25  0349  01/24/25  0859   SODIUM mmol/L 135* 138   POTASSIUM mmol/L 4.2 4.1   CHLORIDE mmol/L 95* 98   CO2 mmol/L 32.0* 31.0*   BUN mg/dL 11 10   CREATININE mg/dL 0.69 0.61   CALCIUM mg/dL 8.8 9.3   GLUCOSE mg/dL 156* 89     Results from last 7 days   Lab Units 01/24/25  0859   PROTIME Seconds 13.9   INR  1.02   APTT seconds 23.9*            Medication Review:     Assessment & Plan       Charcot foot due to diabetes mellitus    Diabetic foot infection    Preoperative testing    Osteomyelitis      49-year-old female admitted after left BKA on 1.27 for chronic osteo of left foot.    -Afebrile vital signs stable continue to monitor  -Pain control continue current regiment  -PT ordered  -Will hold Lasix as patient reports she does not take every day.  -Glucose controlled continue sliding scale insulin  -Anticipate patient will need rehab at discharge.    Plan for disposition:    Giancarlo Demarco MD  Vascular Surgery  [phone]  01/28/25  16:00 CST      Electronically signed by Giancarlo Demarco MD at 01/28/25 1603         1/29  Dressing to left stump dry/ intact. Splint in place. Pain meds given x4 per order. Voiding to BSC without difficulty. Pivots with 1 assist. Safety maintained. No falls or injuries this shift. Call light within reach.         PRN MEDS 1/29   Dilaudid  iv  x4 so far today

## 2025-01-29 NOTE — PLAN OF CARE
Goal Outcome Evaluation:  Plan of Care Reviewed With: patient, family        Progress: improving  Outcome Evaluation: pt in bed with L knee immobilizer, L BKA,  trans to EOB sba, sit-stand cga-min, pivot bed-BSC with rwx cga-min, then pivot to chair, pt performed RLE AROM, LLE A-AAROM with knee immobilizer, pt would benefit from acute inpt rehab

## 2025-01-29 NOTE — THERAPY TREATMENT NOTE
Acute Care - Physical Therapy Treatment Note   [place]     Patient Name: Donna Swain  : 1975  MRN: [id number]  Today's Date: 2025      Visit Dx:     ICD-10-CM ICD-9-CM   1. Gait abnormality [R26.9]  R26.9 781.2   2. Diabetic foot infection  E11.628 250.80    L08.9 686.9   3. Preoperative testing  Z01.818 V72.84     Patient Active Problem List   Diagnosis    Sleep apnea    Hypertension    Class 3 severe obesity due to excess calories without serious comorbidity with body mass index (BMI) of 50.0 to 59.9 in adult    Hx of osteomyelitis    Lymphedema    Neuropathy    Onychomycosis    Polyneuropathy due to type 2 diabetes mellitus    Tobacco abuse    Acute osteomyelitis of left foot    Exostosis of left foot    Diabetic foot infection    Preoperative testing    Charcot foot due to diabetes mellitus    Osteomyelitis     Past Medical History:   Diagnosis Date    Ankle swelling     Back pain     Charcot's joint of ankle, left     Diabetes     GERD (gastroesophageal reflux disease)     Hemorrhoids     History of stomach ulcers     Hypertension     Hyperthyroidism     MRSA infection     Neuropathy     Sleep apnea     doesn't use c pap. feels like she is suffocating    Thyroid goiter      Past Surgical History:   Procedure Laterality Date    BELOW KNEE AMPUTATION Left 2025    Procedure: LEFT BELOW KNEE AMPUTATION;  Surgeon: Giancarlo Demarco MD;  Location:  [facility];  Service: Vascular;  Laterality: Left;    BONE SPUR LEG Left 10/2/2024    Procedure: Plantar Exostectomy;  Surgeon: Ciro Mckee DPM;  Location:  [facility];  Service: Podiatry;  Laterality: Left;    CHOLECYSTECTOMY      lap    COLONOSCOPY      ENDOSCOPY      FOOT HARDWARE REMOVAL Left     HYSTERECTOMY      LEG DEBRIDEMENT Left 10/2/2024    Procedure: Debridement of Bone/Plantar Exostectomy - Left Foot;  Surgeon: Ciro Mckee DPM;  Location:  [facility];  Service: Podiatry;  Laterality: Left;    MULTIPLE TOOTH EXTRACTIONS      OVARY  SURGERY      TOE OSTEOTOMY Left 03/07/2023    Procedure: PARTIAL REMOVAL OF BONE, FIFTH METATARSAL, LEFT FOOT;  Surgeon: Tee Dacosta DPM;  Location:  [facility];  Service: Podiatry;  Laterality: Left;    TUBAL ABDOMINAL LIGATION       PT Assessment (Last 12 Hours)       PT Evaluation and Treatment       [facility] Name 01/29/25 1057          Physical Therapy Time and Intention    Subjective Information complains of;pain  -     Document Type therapy note (daily note)  -     Mode of Treatment physical therapy  -[facility] Name 01/29/25 1057          General Information    Patient/Family/Caregiver Comments/Observations family present  -     Existing Precautions/Restrictions fall  L BKA, L knee immobilizer  -[facility] Name 01/29/25 1057          Pain    Pretreatment Pain Rating 10/10  -     Posttreatment Pain Rating 10/10  -     Pain Location residual limb  -     Pain Side/Orientation left  -     Pain Management Interventions premedicated for activity;positioning techniques utilized  -     Response to Pain Interventions activity participation with tolerable pain  -AH       Row Name 01/29/25 1057          Bed Mobility    Supine-Sit [place] (Bed Mobility) standby assist  -     Sit-Supine [place] (Bed Mobility) --  CHAIR  -[facility] Name 01/29/25 1057          Transfers    Transfers toilet transfer;sit-stand transfer;stand-sit transfer  -AH       Row Name 01/29/25 1057          Sit-Stand Transfer    Sit-Stand [place] (Transfers) contact guard;minimum assist (75% patient effort);verbal cues  -[facility] Name 01/29/25 1057          Stand-Sit Transfer    Stand-Sit [place] (Transfers) contact guard;verbal cues  -AH       Row Name 01/29/25 1057          Toilet Transfer    [place] Level (Toilet Transfer) contact guard;minimum assist (75% patient effort);verbal cues  -     Assistive Device (Toilet Transfer) commode, 3-in-1;walker, front-wheeled;bariatric equipment  -        Row Name 01/29/25 1057          Gait/Stairs (Locomotion)    [place] Level (Gait) contact guard;verbal cues  -     Assistive Device (Gait) walker, front-wheeled  -     Distance in Feet (Gait) 3  pivot bed-BSC-chair  -       Row Name 01/29/25 1057          Motor Skills    Comments, Therapeutic Exercise RLE AROM x 10 reps, LLE  QS,GS AROM, SLR and hip ABD/ADD AAROM  -     Additional Documentation Comments, Therapeutic Exercise (Row)  -       Row Name             Wound 01/27/25 0844 Left lower leg    Wound - Properties Group Placement Date: 01/27/25  -EP Placement Time: 0844  -EP Side: Left  -EP Orientation: lower  -EP Location: leg  -EP Primary Wound Type: Incision  -EP Present on Original Admission: N  -EP Additional Comments: BELOW THE KNEE AMPUTATION  -EP    Retired Wound - Properties Group Placement Date: 01/27/25  -EP Placement Time: 0844  -EP Present on Original Admission: N  -EP Side: Left  -EP Orientation: lower  -EP Location: leg  -EP Primary Wound Type: Incision  -EP Additional Comments: BELOW THE KNEE AMPUTATION  -EP    Retired Wound - Properties Group Placement Date: 01/27/25  -EP Placement Time: 0844  -EP Present on Original Admission: N  -EP Side: Left  -EP Orientation: lower  -EP Location: leg  -EP Primary Wound Type: Incision  -EP Additional Comments: BELOW THE KNEE AMPUTATION  -EP    Retired Wound - Properties Group Date first assessed: 01/27/25  -EP Time first assessed: 0844  -EP Present on Original Admission: N  -EP Side: Left  -EP Location: leg  -EP Primary Wound Type: Incision  -EP Additional Comments: BELOW THE KNEE AMPUTATION  -EP      Row Name 01/29/25 1057          Plan of Care Review    Plan of Care Reviewed With patient;family  -     Progress improving  -     Outcome Evaluation pt in bed with L knee immobilizer, L BKA,  trans to EOB sba, sit-stand cga-min, pivot bed-[facility] with rwx cga-min, then pivot to chair, pt performed RLE AROM, LLE A-AAROM with knee immobilizer, pt would  benefit from acute inpt rehab  -       Row Name 01/29/25 1057          Positioning and Restraints    Pre-Treatment Position in bed  -     Post Treatment Position chair  -     In Chair notified nsg;reclined;call light within reach;encouraged to call for assist;exit alarm on;with family/caregiver  -               User Key  (r) = Recorded By, (t) = Taken By, (c) = Cosigned By      Initials Name Provider Type     Corinne Mendez, PTA Physical Therapist Assistant    Franki Gillespie, RN Registered Nurse                    Physical Therapy Education       Title: PT OT SLP Therapies (Done)       Topic: Physical Therapy (Done)       Point: Mobility training (Done)       Learning Progress Summary            Patient Acceptance, E, VU by  at 1/28/2025 1612    Comment: 4 way SLR, glute sets, plan of care, bed mobility, transfer techniques, residual limb managment                      Point: Home exercise program (Done)       Learning Progress Summary            Patient Acceptance, E, VU by  at 1/28/2025 1612    Comment: 4 way SLR, glute sets, plan of care, bed mobility, transfer techniques, residual limb managment                      Point: Body mechanics (Done)       Learning Progress Summary            Patient Acceptance, E, VU by  at 1/28/2025 1612    Comment: 4 way SLR, glute sets, plan of care, bed mobility, transfer techniques, residual limb managment                      Point: Precautions (Done)       Learning Progress Summary            Patient Acceptance, E, VU by BL at 1/28/2025 1612    Comment: 4 way SLR, glute sets, plan of care, bed mobility, transfer techniques, residual limb managment                                      User Key       Initials Effective Dates Name Provider Type [facility] 12/17/24 -  Denny Dennison, PT Student PT Student PT                  PT Recommendation and Plan     Plan of Care Reviewed With: patient, family  Progress: improving  Outcome Evaluation: pt in bed with L  knee immobilizer, L BKA,  trans to EOB sba, sit-stand cga-min, pivot bed-BSC with rwx cga-min, then pivot to chair, pt performed RLE AROM, LLE A-AAROM with knee immobilizer, pt would benefit from acute inpt rehab   Outcome Measures       Row Name 01/29/25 1100             How much help from another person do you currently need...    Turning from your back to your side while in flat bed without using bedrails? 3  -AH      Moving from lying on back to sitting on the side of a flat bed without bedrails? 3  -AH      Moving to and from a bed to a chair (including a wheelchair)? 3  -AH      Standing up from a chair using your arms (e.g., wheelchair, bedside chair)? 2  -AH      Climbing 3-5 steps with a railing? 1  -AH      To walk in hospital room? 1  -AH      AM-PAC 6 Clicks Score (PT) 13  -AH         Functional Assessment    Outcome Measure Options AM-PAC 6 Clicks Basic Mobility (PT)  -AH                User Key  (r) = Recorded By, (t) = Taken By, (c) = Cosigned By      Initials Name Provider Type    Corinne Llamas PTA Physical Therapist Assistant                     Time Calculation:    PT Charges       Row Name 01/29/25 1135             Time Calculation    Start Time 1057  -      Stop Time 1127  -      Time Calculation (min) 30 min  -      PT Received On 01/29/25  -         Time Calculation- PT    Total Timed Code Minutes- PT 30 minute(s)  -         Timed Charges    80419 - PT Therapeutic Exercise Minutes 15  -AH      41511 - PT Therapeutic Activity Minutes 15  -AH         Total Minutes    Timed Charges Total Minutes 30  -AH       Total Minutes 30  -AH                User Key  (r) = Recorded By, (t) = Taken By, (c) = Cosigned By      Initials Name Provider Type    Corinne Llamas PTA Physical Therapist Assistant                  Therapy Charges for Today       Code Description Service Date Service Provider Modifiers Qty    [id number]  PT THER PROC EA 15 MIN 1/29/2025 Corinne Mendez PTA GP 1     [id number]  PT THERAPEUTIC ACT EA 15 MIN 1/29/2025 Corinne Mendez, PTA GP 1            PT G-Codes  Outcome Measure Options: AM-PAC 6 Clicks Basic Mobility (PT)  AM-PAC 6 Clicks Score (PT): 13    Corinne Mendez, ECHO  1/29/2025

## 2025-01-29 NOTE — CASE MANAGEMENT/SOCIAL WORK
Continued Stay Note   [place]     Patient Name: Donna Swain  MRN: [id number]  Today's Date: 1/29/2025    Admit Date: 1/27/2025    Plan: Referral to [facility]   Discharge Plan       Row Name 01/29/25 1038       Plan    Plan Referral to [facility]    Patient/Family in Agreement with Plan yes    Plan Comments Pt has requested a referral to [facility]. Referral sent.                   Discharge Codes    No documentation.                       LORI Lynn

## 2025-01-29 NOTE — THERAPY EVALUATION
Patient Name: Donna Swain  : 1975    MRN: [id number]                              Today's Date: 2025       Admit Date: 2025    Visit Dx:     ICD-10-CM ICD-9-CM   1. Gait abnormality [R26.9]  R26.9 781.2   2. Diabetic foot infection  E11.628 250.80    L08.9 686.9   3. Preoperative testing  Z01.818 V72.84     Patient Active Problem List   Diagnosis    Sleep apnea    Hypertension    Class 3 severe obesity due to excess calories without serious comorbidity with body mass index (BMI) of 50.0 to 59.9 in adult    Hx of osteomyelitis    Lymphedema    Neuropathy    Onychomycosis    Polyneuropathy due to type 2 diabetes mellitus    Tobacco abuse    Acute osteomyelitis of left foot    Exostosis of left foot    Diabetic foot infection    Preoperative testing    Charcot foot due to diabetes mellitus    Osteomyelitis     Past Medical History:   Diagnosis Date    Ankle swelling     Back pain     Charcot's joint of ankle, left     Diabetes     GERD (gastroesophageal reflux disease)     Hemorrhoids     History of stomach ulcers     Hypertension     Hyperthyroidism     MRSA infection     Neuropathy     Sleep apnea     doesn't use c pap. feels like she is suffocating    Thyroid goiter      Past Surgical History:   Procedure Laterality Date    BELOW KNEE AMPUTATION Left 2025    Procedure: LEFT BELOW KNEE AMPUTATION;  Surgeon: Giancarlo Demarco MD;  Location:  [facility];  Service: Vascular;  Laterality: Left;    BONE SPUR LEG Left 10/2/2024    Procedure: Plantar Exostectomy;  Surgeon: Ciro Mckee DPM;  Location:  [facility];  Service: Podiatry;  Laterality: Left;    CHOLECYSTECTOMY      lap    COLONOSCOPY      ENDOSCOPY      FOOT HARDWARE REMOVAL Left     HYSTERECTOMY      LEG DEBRIDEMENT Left 10/2/2024    Procedure: Debridement of Bone/Plantar Exostectomy - Left Foot;  Surgeon: Ciro Mckee DPM;  Location:  [facility];  Service: Podiatry;  Laterality: Left;    MULTIPLE TOOTH EXTRACTIONS      OVARY SURGERY       TOE OSTEOTOMY Left 03/07/2023    Procedure: PARTIAL REMOVAL OF BONE, FIFTH METATARSAL, LEFT FOOT;  Surgeon: Tee Dacosta DPM;  Location:  [facility];  Service: Podiatry;  Laterality: Left;    TUBAL ABDOMINAL LIGATION        General Information       Row Name 01/29/25 1253          OT Time and Intention    Subjective Information complains of;pain  -LS     Document Type evaluation  cc: ongoing L diabetic foot wound. Dx: Diabetic foot wound, Chronic Left foot wound with osteomyelitis. Pt now s/p L BKA on 1/27.  -LS     Mode of Treatment occupational therapy  -LS     Patient Effort adequate  -LS       Row Name 01/29/25 1253          General Information    Patient Profile Reviewed yes  -LS     Prior Level of Function independent:;ADL's;all household mobility;community mobility;home management;cooking;cleaning;driving;shopping  was using knee scooter off/on for 4 years due to intermittent NWB status on LLE  -LS     Existing Precautions/Restrictions fall;other (see comments)  L BKA, L KI  -LS     Barriers to Rehab medically complex;previous functional deficit;physical barrier  -LS       Row Name 01/29/25 1253          Occupational Profile    Environmental Supports and Barriers (Occupational Profile) Walk in shower with built in bench and grab bars. Grab bars next to toilet. Other AD/DME: knee scooter  -LS       Row Name 01/29/25 1253          Living Environment    People in Home alone  -LS       Row Name 01/29/25 1253          Home Main Entrance    Number of Stairs, Main Entrance none  -LS       Row Name 01/29/25 1253          Stairs Within Home, Primary    Number of Stairs, Within Home, Primary none  -LS       Row Name 01/29/25 1253          Cognition    Orientation Status (Cognition) oriented x 4  -LS       Row Name 01/29/25 1253          Safety Issues/Impairments Affecting Functional Mobility    Impairments Affecting Function (Mobility) balance;endurance/activity tolerance;pain  -LS               User Key  (r) =  Recorded By, (t) = Taken By, (c) = Cosigned By      Initials Name Provider Type    Delma Crane OTR/L Occupational Therapist                     Mobility/ADL's       Row Name 01/29/25 1253          Transfers    Transfers sit-stand transfer;stand-sit transfer;toilet transfer  -       Row Name 01/29/25 [facility]          Sit-Stand Transfer    Sit-Stand [place] (Transfers) contact guard;verbal cues  -LS     Assistive Device (Sit-Stand Transfers) walker, front-wheeled  -LS       Row Name 01/29/25 [facility]          Stand-Sit Transfer    Stand-Sit [place] (Transfers) contact guard;verbal cues  -LS     Assistive Device (Stand-Sit Transfers) walker, front-wheeled  -LS       Row Name 01/29/25 1253          Toilet Transfer    Type (Toilet Transfer) stand pivot/stand step  -LS     [place] Level (Toilet Transfer) contact guard;verbal cues  -LS     Assistive Device (Toilet Transfer) commode, bedside without drop arms;walker, front-wheeled  -LS       Row Name 01/29/25 1253          Activities of Daily Living    BADL Assessment/Intervention toileting;lower body dressing  -       Row Name 01/29/25 [facility]          Mobility    Extremity Weight-bearing Status left lower extremity  -LS     Left Lower Extremity (Weight-bearing Status) non weight-bearing (NWB)  -       Row Name 01/29/25 [facility]          Toileting Assessment/Training    [place] Level (Toileting) toileting skills;adjust/manage clothing;maximum assist (25% patient effort);perform perineal hygiene;standby assist  -LS     Position (Toileting) supported sitting;supported standing  -       Row Name 01/29/25 [facility]          Lower Body Dressing Assessment/Training    [place] Level (Lower Body Dressing) don;socks;maximum assist (25% patient effort)  -LS     Position (Lower Body Dressing) supported sitting  -               User Key  (r) = Recorded By, (t) = Taken By, (c) = Cosigned By      Initials Name Provider Type    Delma Crane OTR/FLORES Occupational  Therapist                   Obj/Interventions       [facility] Name 01/29/25 1253          Sensory Assessment (Somatosensory)    Sensory Assessment (Somatosensory) UE sensation intact  -[facility] Name 01/29/25 1253          Vision Assessment/Intervention    Visual Impairment/Limitations WFL  -[facility] Name 01/29/25 1253          Range of Motion Comprehensive    General Range of Motion bilateral upper extremity ROM WFL  -LS       Row Name 01/29/25 1253          Strength Comprehensive (MMT)    Comment, General Manual Muscle Testing (MMT) Assessment BUE strength grossly 4+/5  -LS       [facility] Name 01/29/25 1253          Balance    Balance Assessment sitting static balance;sitting dynamic balance;standing static balance;standing dynamic balance  -LS     Static Sitting Balance independent  -LS     Dynamic Sitting Balance standby assist  -LS     Position, Sitting Balance unsupported  -LS     Static Standing Balance contact guard  -LS     Dynamic Standing Balance contact guard  -LS     Position/Device Used, Standing Balance supported;walker, front-wheeled  -LS               User Key  (r) = Recorded By, (t) = Taken By, (c) = Cosigned By      Initials Name Provider Type     Delma Rojas, OTR/L Occupational Therapist                   Goals/Plan       Row Name 01/29/25 1253          Transfer Goal 1 (OT)    Activity/Assistive Device (Transfer Goal 1, OT) commode, bedside without drop arms  -LS     [place] Level/Cues Needed (Transfer Goal 1, OT) standby assist  -LS     Time Frame (Transfer Goal 1, OT) long term goal (LTG);10 days  -LS     Progress/Outcome (Transfer Goal 1, OT) new goal  -LS       [facility] Name 01/29/25 1253          Dressing Goal 1 (OT)    Activity/Device (Dressing Goal 1, OT) dressing skills, all  -LS     [place]/Cues Needed (Dressing Goal 1, OT) minimum assist (75% or more patient effort)  -LS     Time Frame (Dressing Goal 1, OT) long term goal (LTG);10 days  -LS     Progress/Outcome (Dressing Goal 1,  OT) new goal  -LS       Row Name 01/29/25 1253          Toileting Goal 1 (OT)    Activity/Device (Toileting Goal 1, OT) toileting skills, all  -LS     [place] Level/Cues Needed (Toileting Goal 1, OT) minimum assist (75% or more patient effort)  -LS     Time Frame (Toileting Goal 1, OT) long term goal (LTG);10 days  -LS     Progress/Outcome (Toileting Goal 1, OT) new goal  -LS       Row Name 01/29/25 1253          Problem Specific Goal 1 (OT)    Problem Specific Goal 1 (OT) Pt will independently implement one pain management technique to decrease pain and improve functional adl performance.  -LS     Time Frame (Problem Specific Goal 1, OT) long term goal (LTG);by discharge  -LS     Progress/Outcome (Problem Specific Goal 1, OT) new goal  -       Row Name 01/29/25 1253          Therapy Assessment/Plan (OT)    Planned Therapy Interventions (OT) transfer/mobility retraining;strengthening exercise;patient/caregiver education/training;occupation/activity based interventions;functional balance retraining;activity tolerance training;BADL retraining;adaptive equipment training;ROM/therapeutic exercise;orthotic fabrication/fitting/training  -LS               User Key  (r) = Recorded By, (t) = Taken By, (c) = Cosigned By      Initials Name Provider Type    LS Delma Rjoas, OTR/L Occupational Therapist                   Clinical Impression       Row Name 01/29/25 1253          Pain Assessment    Pretreatment Pain Rating 9/10  -LS     Pain Location residual limb  -LS     Pain Side/Orientation left  -LS     Pain Management Interventions activity modification encouraged;exercise or physical activity utilized  -       Row Name 01/29/25 1253          Plan of Care Review    Plan of Care Reviewed With patient;mother;father  -LS     Progress no change  -LS     Outcome Evaluation OT eval completed. Pt seated in chair upon therapist arrival; A&Ox4; c/o 9/10 L residual limb pain; L KI in place; Pt's parents also present. Pt  reports Mod I-I with all BADLs including fxl ambulation at baseline. Today, Pt performed all sit<>stand transfers to/from chair and BSC utilizing rwx with CGA and verbal cues for safety awareness. Pt was able to pivot on RLE from chair<>BSC utilizing rwx with CGA and verbal cues for safety awareness. Pt performed toileting task requiring Max A for clothing management in standing and SBA for vince hygiene while seated. BUE strength WFL. Skilled OT intervention indicated in order to address deficits in fxl mobility, fxl activity tolerance, balance, strength, and use of adaptive techniques/equipment during performance of BADLs. Recommend inpatient rehab at discharge.  -       Row Name 01/29/25 1253          Therapy Assessment/Plan (OT)    Rehab Potential (OT) good  -     Criteria for Skilled Therapeutic Interventions Met (OT) yes;skilled treatment is necessary  -     Therapy Frequency (OT) 5 times/wk  -       Row Name 01/29/25 1253          Therapy Plan Review/Discharge Plan (OT)    Anticipated Discharge Disposition (OT) inpatient rehabilitation facility  -       Row Name 01/29/25 1253          Positioning and Restraints    Pre-Treatment Position sitting in chair/recliner  -LS     Post Treatment Position chair  -LS     In Chair reclined;call light within reach;encouraged to call for assist;L knee immobilizer;with family/caregiver  -               User Key  (r) = Recorded By, (t) = Taken By, (c) = Cosigned By      Initials Name Provider Type    LS Delma Rojas, OTR/L Occupational Therapist                   Outcome Measures       Row Name 01/29/25 1253          How much help from another is currently needed...    Putting on and taking off regular lower body clothing? 2  -LS     Bathing (including washing, rinsing, and drying) 2  -LS     Toileting (which includes using toilet bed pan or urinal) 2  -LS     Putting on and taking off regular upper body clothing 4  -LS     Taking care of personal grooming (such  as brushing teeth) 4  -LS     Eating meals 4  -LS     AM-PAC 6 Clicks Score (OT) 18  -LS       Row Name 01/29/25 1100 01/29/25 0821       How much help from another person do you currently need...    Turning from your back to your side while in flat bed without using bedrails? 3  -AH 3  -KW    Moving from lying on back to sitting on the side of a flat bed without bedrails? 3  -AH 3  -KW    Moving to and from a bed to a chair (including a wheelchair)? 3  -AH 2  -KW    Standing up from a chair using your arms (e.g., wheelchair, bedside chair)? 2  -AH 2  -KW    Climbing 3-5 steps with a railing? 1  -AH 1  -KW    To walk in hospital room? 1  -AH 1  -KW    AM-PAC 6 Clicks Score (PT) 13  -AH 12  -KW    Highest Level of Mobility Goal 4 --> Transfer to chair/commode  -AH 4 --> Transfer to chair/commode  -KW      Row Name 01/29/25 1253 01/29/25 1100       Functional Assessment    Outcome Measure Options AM-PAC 6 Clicks Daily Activity (OT)  - AM-PAC 6 Clicks Basic Mobility (PT)  -              User Key  (r) = Recorded By, (t) = Taken By, (c) = Cosigned By      Initials Name Provider Type     Corinne Mendez, PTA Physical Therapist Assistant    Amanda Sanchez, RN Registered Nurse    Delma Crane, OTR/L Occupational Therapist                    Occupational Therapy Education       Title: PT OT SLP Therapies (In Progress)       Topic: Occupational Therapy (In Progress)       Point: ADL training (Done)       Description:   Instruct learner(s) on proper safety adaptation and remediation techniques during self care or transfers.   Instruct in proper use of assistive devices.                  Learning Progress Summary            Patient Acceptance, E, VU by MAGGIE at 1/29/2025 1352                      Point: Home exercise program (Not Started)       Description:   Instruct learner(s) on appropriate technique for monitoring, assisting and/or progressing therapeutic exercises/activities.                  Learner  Progress:  Not documented in this visit.              Point: Precautions (Done)       Description:   Instruct learner(s) on prescribed precautions during self-care and functional transfers.                  Learning Progress Summary            Patient Acceptance, E, VU by  at 1/29/2025 1352                      Point: Body mechanics (Done)       Description:   Instruct learner(s) on proper positioning and spine alignment during self-care, functional mobility activities and/or exercises.                  Learning Progress Summary            Patient Acceptance, E, VU by  at 1/29/2025 1352                                      User Key       Initials Effective Dates Name Provider Type Discipline    MAGGIE 06/20/22 -  Delma Rojas, OTR/L Occupational Therapist OT                  OT Recommendation and Plan  Planned Therapy Interventions (OT): transfer/mobility retraining, strengthening exercise, patient/caregiver education/training, occupation/activity based interventions, functional balance retraining, activity tolerance training, BADL retraining, adaptive equipment training, ROM/therapeutic exercise, orthotic fabrication/fitting/training  Therapy Frequency (OT): 5 times/wk  Plan of Care Review  Plan of Care Reviewed With: patient, mother, father  Progress: no change  Outcome Evaluation: OT eval completed. Pt seated in chair upon therapist arrival; A&Ox4; c/o 9/10 L residual limb pain; L KI in place; Pt's parents also present. Pt reports Mod I-I with all BADLs including fxl ambulation at baseline. Today, Pt performed all sit<>stand transfers to/from chair and BSC utilizing rwx with CGA and verbal cues for safety awareness. Pt was able to pivot on RLE from chair<>BSC utilizing rwx with CGA and verbal cues for safety awareness. Pt performed toileting task requiring Max A for clothing management in standing and SBA for vince hygiene while seated. BUE strength WFL. Skilled OT intervention indicated in order to address  deficits in fxl mobility, fxl activity tolerance, balance, strength, and use of adaptive techniques/equipment during performance of BADLs. Recommend inpatient rehab at discharge.     Time Calculation:         Time Calculation- OT       Row Name 01/29/25 1253             Time Calculation- OT    OT Start Time 1253  -LS      OT Stop Time 1338  -LS      OT Time Calculation (min) 45 min  -LS      OT Non-Billable Time (min) 45 min  -LS      OT Received On 01/29/25  -      OT Goal Re-Cert Due Date 02/08/25  -                User Key  (r) = Recorded By, (t) = Taken By, (c) = Cosigned By      Initials Name Provider Type    LS Delma Rojas, OTR/L Occupational Therapist                  Therapy Charges for Today       Code Description Service Date Service Provider Modifiers Qty    10938496792 HC OT EVAL LOW COMPLEXITY 3 1/29/2025 Delma Rojas, OTR/L GO 1                 Delma Rojas OTR/L  1/29/2025

## 2025-01-29 NOTE — PROGRESS NOTES
RT EQUIPMENT DEVICE RELATED - SKIN ASSESSMENT    Grabiel Score:  Grabiel Score: 20     RT Medical Equipment/Device:     NIV Mask:  Under-the-nose   size:  A    Skin Assessment:      Cheek:  Intact  Nares:  Intact  Lips:  Intact  Mouth:  Intact    Device Skin Pressure Protection:  Skin-to-device areas padded:  None Required    Nurse Notification:  Wendy Glover, RRT

## 2025-01-29 NOTE — PLAN OF CARE
Goal Outcome Evaluation:  Plan of Care Reviewed With: patient        Progress: improving          VSS.  O2 2L NC.  Frequent request for pain medications.  Emery d/cd and patient voiding per BSC.  Dsg CDI to left lower residual limb - knee immobilizer in use.  IID.  Safety maintained.  Fall precautions.

## 2025-01-29 NOTE — PLAN OF CARE
Problem: Noninvasive Ventilation Acute  Goal: Effective Unassisted Ventilation and Oxygenation  Outcome: Progressing  Intervention: Monitor and Manage Noninvasive Ventilation  Recent Flowsheet Documentation  Taken 1/28/2025 2311 by Eliana Glover, LOBITO  Airway/Ventilation Management: airway patency maintained  NPPV/CPAP Maintenance: proper fit/secure   Goal Outcome Evaluation:   Pt wears Bipap at hs

## 2025-01-30 LAB
ANION GAP SERPL CALCULATED.3IONS-SCNC: 9 MMOL/L (ref 5–15)
BASOPHILS # BLD AUTO: 0.03 10*3/MM3 (ref 0–0.2)
BASOPHILS NFR BLD AUTO: 0.3 % (ref 0–1.5)
BUN SERPL-MCNC: 8 MG/DL (ref 6–20)
BUN/CREAT SERPL: 13.6 (ref 7–25)
CALCIUM SPEC-SCNC: 8.8 MG/DL (ref 8.6–10.5)
CHLORIDE SERPL-SCNC: 97 MMOL/L (ref 98–107)
CO2 SERPL-SCNC: 30 MMOL/L (ref 22–29)
CREAT SERPL-MCNC: 0.59 MG/DL (ref 0.57–1)
DEPRECATED RDW RBC AUTO: 45.1 FL (ref 37–54)
EGFRCR SERPLBLD CKD-EPI 2021: 112 ML/MIN/1.73
EOSINOPHIL # BLD AUTO: 0.7 10*3/MM3 (ref 0–0.4)
EOSINOPHIL NFR BLD AUTO: 7.5 % (ref 0.3–6.2)
ERYTHROCYTE [DISTWIDTH] IN BLOOD BY AUTOMATED COUNT: 14 % (ref 12.3–15.4)
GLUCOSE BLDC GLUCOMTR-MCNC: 142 MG/DL (ref 70–130)
GLUCOSE BLDC GLUCOMTR-MCNC: 146 MG/DL (ref 70–130)
GLUCOSE BLDC GLUCOMTR-MCNC: 166 MG/DL (ref 70–130)
GLUCOSE BLDC GLUCOMTR-MCNC: 217 MG/DL (ref 70–130)
GLUCOSE SERPL-MCNC: 198 MG/DL (ref 65–99)
HCT VFR BLD AUTO: 33.4 % (ref 34–46.6)
HGB BLD-MCNC: 10.6 G/DL (ref 12–15.9)
IMM GRANULOCYTES # BLD AUTO: 0.04 10*3/MM3 (ref 0–0.05)
IMM GRANULOCYTES NFR BLD AUTO: 0.4 % (ref 0–0.5)
LYMPHOCYTES # BLD AUTO: 2.4 10*3/MM3 (ref 0.7–3.1)
LYMPHOCYTES NFR BLD AUTO: 25.6 % (ref 19.6–45.3)
MCH RBC QN AUTO: 28.1 PG (ref 26.6–33)
MCHC RBC AUTO-ENTMCNC: 31.7 G/DL (ref 31.5–35.7)
MCV RBC AUTO: 88.6 FL (ref 79–97)
MONOCYTES # BLD AUTO: 0.43 10*3/MM3 (ref 0.1–0.9)
MONOCYTES NFR BLD AUTO: 4.6 % (ref 5–12)
NEUTROPHILS NFR BLD AUTO: 5.77 10*3/MM3 (ref 1.7–7)
NEUTROPHILS NFR BLD AUTO: 61.6 % (ref 42.7–76)
NRBC BLD AUTO-RTO: 0 /100 WBC (ref 0–0.2)
PLATELET # BLD AUTO: 275 10*3/MM3 (ref 140–450)
PMV BLD AUTO: 9.6 FL (ref 6–12)
POTASSIUM SERPL-SCNC: 4 MMOL/L (ref 3.5–5.2)
RBC # BLD AUTO: 3.77 10*6/MM3 (ref 3.77–5.28)
SODIUM SERPL-SCNC: 136 MMOL/L (ref 136–145)
WBC NRBC COR # BLD AUTO: 9.37 10*3/MM3 (ref 3.4–10.8)

## 2025-01-30 PROCEDURE — 63710000001 INSULIN LISPRO (HUMAN) PER 5 UNITS: Performed by: STUDENT IN AN ORGANIZED HEALTH CARE EDUCATION/TRAINING PROGRAM

## 2025-01-30 PROCEDURE — 94799 UNLISTED PULMONARY SVC/PX: CPT

## 2025-01-30 PROCEDURE — 97530 THERAPEUTIC ACTIVITIES: CPT

## 2025-01-30 PROCEDURE — 80048 BASIC METABOLIC PNL TOTAL CA: CPT | Performed by: STUDENT IN AN ORGANIZED HEALTH CARE EDUCATION/TRAINING PROGRAM

## 2025-01-30 PROCEDURE — 85025 COMPLETE CBC W/AUTO DIFF WBC: CPT | Performed by: STUDENT IN AN ORGANIZED HEALTH CARE EDUCATION/TRAINING PROGRAM

## 2025-01-30 PROCEDURE — 97110 THERAPEUTIC EXERCISES: CPT

## 2025-01-30 PROCEDURE — 82948 REAGENT STRIP/BLOOD GLUCOSE: CPT

## 2025-01-30 PROCEDURE — 94660 CPAP INITIATION&MGMT: CPT

## 2025-01-30 PROCEDURE — 25010000002 HYDROMORPHONE 1 MG/ML SOLUTION: Performed by: STUDENT IN AN ORGANIZED HEALTH CARE EDUCATION/TRAINING PROGRAM

## 2025-01-30 RX ADMIN — AMITRIPTYLINE HYDROCHLORIDE 10 MG: 10 TABLET, FILM COATED ORAL at 20:50

## 2025-01-30 RX ADMIN — HYDROCODONE BITARTRATE AND ACETAMINOPHEN 1 TABLET: 7.5; 325 TABLET ORAL at 03:48

## 2025-01-30 RX ADMIN — HYDROMORPHONE HYDROCHLORIDE 1 MG: 1 INJECTION, SOLUTION INTRAMUSCULAR; INTRAVENOUS; SUBCUTANEOUS at 23:00

## 2025-01-30 RX ADMIN — PREGABALIN 200 MG: 100 CAPSULE ORAL at 15:15

## 2025-01-30 RX ADMIN — BUPROPION HYDROCHLORIDE 150 MG: 150 TABLET, EXTENDED RELEASE ORAL at 08:52

## 2025-01-30 RX ADMIN — INSULIN LISPRO 4 UNITS: 100 INJECTION, SOLUTION INTRAVENOUS; SUBCUTANEOUS at 16:53

## 2025-01-30 RX ADMIN — HYDROMORPHONE HYDROCHLORIDE 1 MG: 1 INJECTION, SOLUTION INTRAMUSCULAR; INTRAVENOUS; SUBCUTANEOUS at 00:58

## 2025-01-30 RX ADMIN — HYDROMORPHONE HYDROCHLORIDE 1 MG: 1 INJECTION, SOLUTION INTRAMUSCULAR; INTRAVENOUS; SUBCUTANEOUS at 11:29

## 2025-01-30 RX ADMIN — HYDROMORPHONE HYDROCHLORIDE 1 MG: 1 INJECTION, SOLUTION INTRAMUSCULAR; INTRAVENOUS; SUBCUTANEOUS at 16:28

## 2025-01-30 RX ADMIN — PREGABALIN 200 MG: 100 CAPSULE ORAL at 08:52

## 2025-01-30 RX ADMIN — INSULIN LISPRO 2 UNITS: 100 INJECTION, SOLUTION INTRAVENOUS; SUBCUTANEOUS at 11:29

## 2025-01-30 RX ADMIN — HYDROMORPHONE HYDROCHLORIDE 1 MG: 1 INJECTION, SOLUTION INTRAMUSCULAR; INTRAVENOUS; SUBCUTANEOUS at 07:25

## 2025-01-30 RX ADMIN — HYDROCODONE BITARTRATE AND ACETAMINOPHEN 1 TABLET: 7.5; 325 TABLET ORAL at 20:50

## 2025-01-30 RX ADMIN — Medication 10 ML: at 20:52

## 2025-01-30 RX ADMIN — Medication 10 ML: at 08:52

## 2025-01-30 RX ADMIN — HYDROMORPHONE HYDROCHLORIDE 1 MG: 1 INJECTION, SOLUTION INTRAMUSCULAR; INTRAVENOUS; SUBCUTANEOUS at 19:32

## 2025-01-30 RX ADMIN — HYDROCODONE BITARTRATE AND ACETAMINOPHEN 1 TABLET: 7.5; 325 TABLET ORAL at 09:35

## 2025-01-30 RX ADMIN — PREGABALIN 200 MG: 100 CAPSULE ORAL at 20:50

## 2025-01-30 RX ADMIN — HYDROCODONE BITARTRATE AND ACETAMINOPHEN 1 TABLET: 7.5; 325 TABLET ORAL at 15:15

## 2025-01-30 NOTE — PLAN OF CARE
Goal Outcome Evaluation:  Plan of Care Reviewed With: patient, daughter        Progress: improving  Outcome Evaluation: pt trans to EOB sba, sit-stand cga, pivot bed-BSC-chair with rwx cga-min, BLE HEP 10 x 2 reps, RLE AROM, LLE A-AAROM with knee immobilizer, readjusted knee immobilizer for better comfort and fit, pt would benefit from inpt acute rehab

## 2025-01-30 NOTE — THERAPY TREATMENT NOTE
Acute Care - Physical Therapy Treatment Note   [place]     Patient Name: Donna Swain  : 1975  MRN: [id number]  Today's Date: 2025      Visit Dx:     ICD-10-CM ICD-9-CM   1. Gait abnormality [R26.9]  R26.9 781.2   2. Diabetic foot infection  E11.628 250.80    L08.9 686.9   3. Preoperative testing  Z01.818 V72.84     Patient Active Problem List   Diagnosis    Sleep apnea    Hypertension    Class 3 severe obesity due to excess calories without serious comorbidity with body mass index (BMI) of 50.0 to 59.9 in adult    Hx of osteomyelitis    Lymphedema    Neuropathy    Onychomycosis    Polyneuropathy due to type 2 diabetes mellitus    Tobacco abuse    Acute osteomyelitis of left foot    Exostosis of left foot    Diabetic foot infection    Preoperative testing    Charcot foot due to diabetes mellitus    Osteomyelitis     Past Medical History:   Diagnosis Date    Ankle swelling     Back pain     Charcot's joint of ankle, left     Diabetes     GERD (gastroesophageal reflux disease)     Hemorrhoids     History of stomach ulcers     Hypertension     Hyperthyroidism     MRSA infection     Neuropathy     Sleep apnea     doesn't use c pap. feels like she is suffocating    Thyroid goiter      Past Surgical History:   Procedure Laterality Date    BELOW KNEE AMPUTATION Left 2025    Procedure: LEFT BELOW KNEE AMPUTATION;  Surgeon: Giancarlo Demarco MD;  Location:  [facility];  Service: Vascular;  Laterality: Left;    BONE SPUR LEG Left 10/2/2024    Procedure: Plantar Exostectomy;  Surgeon: Ciro Mckee DPM;  Location:  [facility];  Service: Podiatry;  Laterality: Left;    CHOLECYSTECTOMY      lap    COLONOSCOPY      ENDOSCOPY      FOOT HARDWARE REMOVAL Left     HYSTERECTOMY      LEG DEBRIDEMENT Left 10/2/2024    Procedure: Debridement of Bone/Plantar Exostectomy - Left Foot;  Surgeon: Ciro Mckee DPM;  Location:  [facility];  Service: Podiatry;  Laterality: Left;    MULTIPLE TOOTH EXTRACTIONS      OVARY  SURGERY      TOE OSTEOTOMY Left 03/07/2023    Procedure: PARTIAL REMOVAL OF BONE, FIFTH METATARSAL, LEFT FOOT;  Surgeon: Tee Dacosta DPM;  Location:  [facility];  Service: Podiatry;  Laterality: Left;    TUBAL ABDOMINAL LIGATION       PT Assessment (Last 12 Hours)       PT Evaluation and Treatment       [facility] Name 01/30/25 0958          Physical Therapy Time and Intention    Subjective Information complains of;weakness;fatigue;pain  -     Document Type therapy note (daily note)  -     Mode of Treatment physical therapy  -[facility] Name 01/30/25 0958          General Information    Existing Precautions/Restrictions fall  L BKA, L knee immobilizer  -[facility] Name 01/30/25 0958          Pain    Pretreatment Pain Rating 10/10  -     Posttreatment Pain Rating 10/10  -     Pain Location residual limb  -     Pain Side/Orientation left  -     Pain Management Interventions exercise or physical activity utilized  -     Response to Pain Interventions activity participation with tolerable pain  -[facility] Name 01/30/25 0958          Bed Mobility    Supine-Sit [place] (Bed Mobility) standby assist  -     Sit-Supine [place] (Bed Mobility) --  CHAIR  -[facility] Name 01/30/25 0958          Transfers    Transfers toilet transfer;sit-stand transfer;stand-sit transfer  -[facility] Name 01/30/25 0958          Sit-Stand Transfer    Sit-Stand [place] (Transfers) contact guard;minimum assist (75% patient effort);verbal cues  -[facility] Name 01/30/25 0958          Stand-Sit Transfer    Stand-Sit [place] (Transfers) contact guard;verbal cues  -[facility] Name 01/30/25 0958          Toilet Transfer    [place]ett Level (Toilet Transfer) contact guard;minimum assist (75% patient effort);verbal cues  -     Assistive Device (Toilet Transfer) commode, 3-in-1;walker, front-wheeled;bariatric equipment  -[facility] Name 01/30/25 0958          Gait/Stairs (Locomotion)    [place]  Level (Gait) contact guard;verbal cues  -     Assistive Device (Gait) walker, front-wheeled  -     Distance in Feet (Gait) 3  pivot bed-BSC-chair  -       Row Name 01/30/25 0958          Motor Skills    Comments, Therapeutic Exercise BLE HEP 10 X 2 REPS, RLE AROM, LLE A-AAROM with knee immobilizer on,  -       Row Name             Wound 01/27/25 0844 Left lower leg    Wound - Properties Group Placement Date: 01/27/25  -EP Placement Time: 0844  -EP Side: Left  -EP Orientation: lower  -EP Location: leg  -EP Primary Wound Type: Incision  -EP Present on Original Admission: N  -EP Additional Comments: BELOW THE KNEE AMPUTATION  -EP    Retired Wound - Properties Group Placement Date: 01/27/25  -EP Placement Time: 0844  -EP Present on Original Admission: N  -EP Side: Left  -EP Orientation: lower  -EP Location: leg  -EP Primary Wound Type: Incision  -EP Additional Comments: BELOW THE KNEE AMPUTATION  -EP    Retired Wound - Properties Group Placement Date: 01/27/25  -EP Placement Time: 0844  -EP Present on Original Admission: N  -EP Side: Left  -EP Orientation: lower  -EP Location: leg  -EP Primary Wound Type: Incision  -EP Additional Comments: BELOW THE KNEE AMPUTATION  -EP    Retired Wound - Properties Group Date first assessed: 01/27/25  -EP Time first assessed: 0844  -EP Present on Original Admission: N  -EP Side: Left  -EP Location: leg  -EP Primary Wound Type: Incision  -EP Additional Comments: BELOW THE KNEE AMPUTATION  -EP      Row Name 01/30/25 0958          Plan of Care Review    Plan of Care Reviewed With patient;daughter  -     Progress improving  -     Outcome Evaluation pt trans to EOB sba, sit-stand cga, pivot bed-BSC-chair with rwx cga-min, BLE HEP 10 x 2 reps, RLE AROM, LLE A-AAROM with knee immobilizer, readjusted knee immobilizer for better comfort and fit, pt would benefit from inpt acute rehab  -       Row Name 01/30/25 0958          Positioning and Restraints    Pre-Treatment Position in  bed  -AH     Post Treatment Position chair  -AH     In Chair notified nsg;reclined;call light within reach;encouraged to call for assist;with family/caregiver;waffle cushion  -               User Key  (r) = Recorded By, (t) = Taken By, (c) = Cosigned By      Initials Name Provider Type     Corinne Mendez, PTA Physical Therapist Assistant    Franki Gillespie, RN Registered Nurse                    Physical Therapy Education       Title: PT OT SLP Therapies (In Progress)       Topic: Physical Therapy (Done)       Point: Mobility training (Done)       Learning Progress Summary            Patient Acceptance, E, VU by  at 1/28/2025 1612    Comment: 4 way SLR, glute sets, plan of care, bed mobility, transfer techniques, residual limb managment                      Point: Home exercise program (Done)       Learning Progress Summary            Patient Acceptance, E, VU by  at 1/28/2025 1612    Comment: 4 way SLR, glute sets, plan of care, bed mobility, transfer techniques, residual limb managment                      Point: Body mechanics (Done)       Learning Progress Summary            Patient Acceptance, E, VU by  at 1/28/2025 1612    Comment: 4 way SLR, glute sets, plan of care, bed mobility, transfer techniques, residual limb managment                      Point: Precautions (Done)       Learning Progress Summary            Patient Acceptance, E, VU by  at 1/28/2025 1612    Comment: 4 way SLR, glute sets, plan of care, bed mobility, transfer techniques, residual limb managment                                      User Key       Initials Effective Dates Name Provider Type Discipline     12/17/24 -  Long, Denny, PT Student PT Student PT                  PT Recommendation and Plan     Plan of Care Reviewed With: patient, daughter  Progress: improving  Outcome Evaluation: pt trans to EOB sba, sit-stand cga, pivot bed-BSC-chair with rwx cga-min, BLE HEP 10 x 2 reps, RLE AROM, LLE A-AAROM with knee  immobilizer, readjusted knee immobilizer for better comfort and fit, pt would benefit from inpt acute rehab   Outcome Measures       Row Name 01/30/25 1000 01/29/25 1100          How much help from another person do you currently need...    Turning from your back to your side while in flat bed without using bedrails? 4  -AH 3  -AH     Moving from lying on back to sitting on the side of a flat bed without bedrails? 3  -AH 3  -AH     Moving to and from a bed to a chair (including a wheelchair)? 3  -AH 3  -AH     Standing up from a chair using your arms (e.g., wheelchair, bedside chair)? 3  -AH 2  -AH     Climbing 3-5 steps with a railing? 1  -AH 1  -AH     To walk in hospital room? 1  -AH 1  -AH     AM-PAC 6 Clicks Score (PT) 15  -AH 13  -AH        Functional Assessment    Outcome Measure Options AM-PAC 6 Clicks Basic Mobility (PT)  -AH AM-PAC 6 Clicks Basic Mobility (PT)  -               User Key  (r) = Recorded By, (t) = Taken By, (c) = Cosigned By      Initials Name Provider Type    Corinne Llamas PTA Physical Therapist Assistant                     Time Calculation:    PT Charges       Row Name 01/30/25 1043             Time Calculation    Start Time 0958  -      Stop Time 1038  -AH      Time Calculation (min) 40 min  -      PT Received On 01/30/25  -         Time Calculation- PT    Total Timed Code Minutes- PT 40 minute(s)  -         Timed Charges    89927 - PT Therapeutic Exercise Minutes 25  -AH      84668 - PT Therapeutic Activity Minutes 15  -AH         Total Minutes    Timed Charges Total Minutes 40  -AH       Total Minutes 40  -AH                User Key  (r) = Recorded By, (t) = Taken By, (c) = Cosigned By      Initials Name Provider Type    Corinne Llamas PTA Physical Therapist Assistant                  Therapy Charges for Today       Code Description Service Date Service Provider Modifiers Qty    86356299480  PT THER PROC EA 15 MIN 1/29/2025 Corinne Mendez PTA GP 1    35172260190  HC PT THERAPEUTIC ACT EA 15 MIN 1/29/2025 Corinne Mendez, PTA GP 1    76517482166 HC PT THER PROC EA 15 MIN 1/30/2025 Corinne Mendez, PTA GP 2    58371585962 HC PT THERAPEUTIC ACT EA 15 MIN 1/30/2025 Corinne Mendez, PTA GP 1            PT G-Codes  Outcome Measure Options: AM-PAC 6 Clicks Basic Mobility (PT)  AM-PAC 6 Clicks Score (PT): 15  AM-PAC 6 Clicks Score (OT): 18    Corinne Mendez PTA  1/30/2025

## 2025-01-30 NOTE — CASE MANAGEMENT/SOCIAL WORK
Continued Stay Note   [place]     Patient Name: Donna Swain  MRN: [id number]  Today's Date: 1/30/2025    Admit Date: 1/27/2025    Plan: [facility] pending precert   Discharge Plan       Row Name 01/30/25 1505       Plan    Plan [facility] pending precert    Patient/Family in Agreement with Plan yes    Plan Comments Precert still pending for [facility]. Will follow for determination.                   Discharge Codes    No documentation.                       LORI Lynn

## 2025-01-30 NOTE — PLAN OF CARE
Goal Outcome Evaluation:   Pain meds given per order. Safety maintained. No falls or injuries this shift. Call ligth within reach. Family at bedside. Denies needs at this time.

## 2025-01-30 NOTE — PLAN OF CARE
Goal Outcome Evaluation:  Plan of Care Reviewed With: patient        Progress: no change  Outcome Evaluation: A&OX4, VSS. L BKA, drsg CDI, immobilizer intact. On room air and , SOB with extertion, voiding per BSC, assist x1 and walker. NSR on tele, ACHS accuchecks. C/o frequent pain, prn pain med given with relief. Contact Isolation maintained. Call light in reach, safety maintained.

## 2025-01-30 NOTE — PROGRESS NOTES
LOS: 3 days   Patient Care Team:  Benjamin Kelsey MD as PCP - General (Family Medicine)    Chief Complaint: Admitted for left diabetic foot wound    Subjective     Patient Complaints: Pain improving status post left BKA    Objective     Vital Signs  Temp:  [97.8 °F (36.6 °C)-98.5 °F (36.9 °C)] 98 °F (36.7 °C)  Heart Rate:  [87-90] 87  Resp:  [16-18] 16  BP: (101-133)/(49-74) 103/49    Physical Exam  Cardiovascular:      Rate and Rhythm: Normal rate and regular rhythm.   Pulmonary:      Effort: Pulmonary effort is normal. No respiratory distress.   Musculoskeletal:      Comments: Left BKA with dressing clean dry intact.  Knee immobilizer in place.         Laboratory Data:   Results from last 7 days   Lab Units 01/30/25  0308 01/29/25  0329 01/28/25  0349   WBC 10*3/mm3 9.37 10.29 10.57   HEMOGLOBIN g/dL 10.6* 10.8* 11.6*   HEMATOCRIT % 33.4* 34.4 37.4   PLATELETS 10*3/mm3 275 278 302       Results from last 7 days   Lab Units 01/30/25  0308 01/29/25  0329 01/28/25  0349   SODIUM mmol/L 136 133* 135*   POTASSIUM mmol/L 4.0 3.8 4.2   CHLORIDE mmol/L 97* 96* 95*   CO2 mmol/L 30.0* 30.0* 32.0*   BUN mg/dL 8 10 11   CREATININE mg/dL 0.59 0.66 0.69   CALCIUM mg/dL 8.8 8.5* 8.8   GLUCOSE mg/dL 198* 191* 156*     Results from last 7 days   Lab Units 01/24/25  0859   PROTIME Seconds 13.9   INR  1.02   APTT seconds 23.9*            Medication Review:     Assessment & Plan       Charcot foot due to diabetes mellitus    Diabetic foot infection    Preoperative testing    Osteomyelitis      49-year-old female admitted after left BKA on 1.27 for chronic osteo of left foot.    -Afebrile vital signs stable continue to monitor  -Pain controlled.    -PT ordered  -Will change dressing in a.m.  -Rehab placement pending.  -Possible DC tomorrow once placement determined.    Plan for disposition:    Giancarlo Demarco MD  Vascular Surgery  [phone]  01/30/25  14:22 CST     Recommended artificial tears to use as directed.

## 2025-01-31 ENCOUNTER — HOSPITAL ENCOUNTER (INPATIENT)
Age: 50
LOS: 13 days | Discharge: HOME HEALTH | DRG: 560 | End: 2025-02-13
Attending: PSYCHIATRY & NEUROLOGY | Admitting: PSYCHIATRY & NEUROLOGY
Payer: MEDICARE

## 2025-01-31 VITALS
WEIGHT: 293 LBS | RESPIRATION RATE: 16 BRPM | HEART RATE: 88 BPM | DIASTOLIC BLOOD PRESSURE: 76 MMHG | SYSTOLIC BLOOD PRESSURE: 149 MMHG | TEMPERATURE: 98.4 F | OXYGEN SATURATION: 92 % | HEIGHT: 69 IN | BODY MASS INDEX: 43.4 KG/M2

## 2025-01-31 DIAGNOSIS — Z89.512 S/P BKA (BELOW KNEE AMPUTATION) UNILATERAL, LEFT (HCC): Primary | ICD-10-CM

## 2025-01-31 PROBLEM — E11.42 POLYNEUROPATHY DUE TO TYPE 2 DIABETES MELLITUS (HCC): Status: ACTIVE | Noted: 2022-03-28

## 2025-01-31 PROBLEM — M86.172 ACUTE OSTEOMYELITIS OF LEFT FOOT: Status: ACTIVE | Noted: 2024-09-16

## 2025-01-31 PROBLEM — I89.0 LYMPHEDEMA: Status: ACTIVE | Noted: 2020-08-04

## 2025-01-31 PROBLEM — I10 HYPERTENSION: Status: ACTIVE | Noted: 2020-06-16

## 2025-01-31 PROBLEM — Z90.49 HX OF CHOLECYSTECTOMY: Status: ACTIVE | Noted: 2025-01-31

## 2025-01-31 PROBLEM — G47.30 SLEEP APNEA: Status: ACTIVE | Noted: 2020-06-16

## 2025-01-31 PROBLEM — Z90.711 HISTORY OF PARTIAL HYSTERECTOMY: Status: ACTIVE | Noted: 2025-01-31

## 2025-01-31 PROBLEM — D25.1 INTRAMURAL LEIOMYOMA OF UTERUS: Status: ACTIVE | Noted: 2023-12-26

## 2025-01-31 LAB
ANION GAP SERPL CALCULATED.3IONS-SCNC: 7 MMOL/L (ref 5–15)
BASOPHILS # BLD AUTO: 0.03 10*3/MM3 (ref 0–0.2)
BASOPHILS NFR BLD AUTO: 0.4 % (ref 0–1.5)
BUN SERPL-MCNC: 7 MG/DL (ref 6–20)
BUN/CREAT SERPL: 11.9 (ref 7–25)
CALCIUM SPEC-SCNC: 8.8 MG/DL (ref 8.6–10.5)
CHLORIDE SERPL-SCNC: 97 MMOL/L (ref 98–107)
CO2 SERPL-SCNC: 31 MMOL/L (ref 22–29)
CREAT SERPL-MCNC: 0.59 MG/DL (ref 0.57–1)
DEPRECATED RDW RBC AUTO: 45.5 FL (ref 37–54)
EGFRCR SERPLBLD CKD-EPI 2021: 112 ML/MIN/1.73
EOSINOPHIL # BLD AUTO: 0.71 10*3/MM3 (ref 0–0.4)
EOSINOPHIL NFR BLD AUTO: 9.3 % (ref 0.3–6.2)
ERYTHROCYTE [DISTWIDTH] IN BLOOD BY AUTOMATED COUNT: 14.3 % (ref 12.3–15.4)
GLUCOSE BLD-MCNC: 179 MG/DL (ref 70–99)
GLUCOSE BLDC GLUCOMTR-MCNC: 148 MG/DL (ref 70–130)
GLUCOSE BLDC GLUCOMTR-MCNC: 164 MG/DL (ref 70–130)
GLUCOSE SERPL-MCNC: 188 MG/DL (ref 65–99)
HCT VFR BLD AUTO: 33 % (ref 34–46.6)
HGB BLD-MCNC: 10.3 G/DL (ref 12–15.9)
IMM GRANULOCYTES # BLD AUTO: 0.03 10*3/MM3 (ref 0–0.05)
IMM GRANULOCYTES NFR BLD AUTO: 0.4 % (ref 0–0.5)
LYMPHOCYTES # BLD AUTO: 2.39 10*3/MM3 (ref 0.7–3.1)
LYMPHOCYTES NFR BLD AUTO: 31.4 % (ref 19.6–45.3)
MCH RBC QN AUTO: 27.9 PG (ref 26.6–33)
MCHC RBC AUTO-ENTMCNC: 31.2 G/DL (ref 31.5–35.7)
MCV RBC AUTO: 89.4 FL (ref 79–97)
MONOCYTES # BLD AUTO: 0.38 10*3/MM3 (ref 0.1–0.9)
MONOCYTES NFR BLD AUTO: 5 % (ref 5–12)
NEUTROPHILS NFR BLD AUTO: 4.06 10*3/MM3 (ref 1.7–7)
NEUTROPHILS NFR BLD AUTO: 53.5 % (ref 42.7–76)
NRBC BLD AUTO-RTO: 0 /100 WBC (ref 0–0.2)
PERFORMED ON: ABNORMAL
PLATELET # BLD AUTO: 277 10*3/MM3 (ref 140–450)
PMV BLD AUTO: 9.3 FL (ref 6–12)
POTASSIUM SERPL-SCNC: 4 MMOL/L (ref 3.5–5.2)
RBC # BLD AUTO: 3.69 10*6/MM3 (ref 3.77–5.28)
SODIUM SERPL-SCNC: 135 MMOL/L (ref 136–145)
WBC NRBC COR # BLD AUTO: 7.6 10*3/MM3 (ref 3.4–10.8)

## 2025-01-31 PROCEDURE — 82948 REAGENT STRIP/BLOOD GLUCOSE: CPT

## 2025-01-31 PROCEDURE — 94660 CPAP INITIATION&MGMT: CPT

## 2025-01-31 PROCEDURE — 99024 POSTOP FOLLOW-UP VISIT: CPT | Performed by: NURSE PRACTITIONER

## 2025-01-31 PROCEDURE — 80048 BASIC METABOLIC PNL TOTAL CA: CPT | Performed by: STUDENT IN AN ORGANIZED HEALTH CARE EDUCATION/TRAINING PROGRAM

## 2025-01-31 PROCEDURE — 94799 UNLISTED PULMONARY SVC/PX: CPT

## 2025-01-31 PROCEDURE — 94150 VITAL CAPACITY TEST: CPT

## 2025-01-31 PROCEDURE — 82962 GLUCOSE BLOOD TEST: CPT

## 2025-01-31 PROCEDURE — 85025 COMPLETE CBC W/AUTO DIFF WBC: CPT | Performed by: STUDENT IN AN ORGANIZED HEALTH CARE EDUCATION/TRAINING PROGRAM

## 2025-01-31 PROCEDURE — 5A09557 ASSISTANCE WITH RESPIRATORY VENTILATION, GREATER THAN 96 CONSECUTIVE HOURS, CONTINUOUS POSITIVE AIRWAY PRESSURE: ICD-10-PCS | Performed by: PSYCHIATRY & NEUROLOGY

## 2025-01-31 PROCEDURE — 97110 THERAPEUTIC EXERCISES: CPT

## 2025-01-31 PROCEDURE — 1180000000 HC REHAB R&B

## 2025-01-31 PROCEDURE — 6370000000 HC RX 637 (ALT 250 FOR IP): Performed by: PSYCHIATRY & NEUROLOGY

## 2025-01-31 PROCEDURE — 97535 SELF CARE MNGMENT TRAINING: CPT

## 2025-01-31 PROCEDURE — 25010000002 HYDROMORPHONE 1 MG/ML SOLUTION: Performed by: STUDENT IN AN ORGANIZED HEALTH CARE EDUCATION/TRAINING PROGRAM

## 2025-01-31 PROCEDURE — 97530 THERAPEUTIC ACTIVITIES: CPT

## 2025-01-31 RX ORDER — FUROSEMIDE 20 MG/1
40 TABLET ORAL DAILY
Status: DISCONTINUED | OUTPATIENT
Start: 2025-02-01 | End: 2025-02-13 | Stop reason: HOSPADM

## 2025-01-31 RX ORDER — AMITRIPTYLINE HYDROCHLORIDE 10 MG/1
10 TABLET ORAL NIGHTLY
Status: DISCONTINUED | OUTPATIENT
Start: 2025-01-31 | End: 2025-02-01

## 2025-01-31 RX ORDER — HYDROCODONE BITARTRATE AND ACETAMINOPHEN 10; 325 MG/1; MG/1
1 TABLET ORAL EVERY 6 HOURS PRN
Status: DISCONTINUED | OUTPATIENT
Start: 2025-01-31 | End: 2025-02-01

## 2025-01-31 RX ORDER — SEMAGLUTIDE 1.34 MG/ML
1 INJECTION, SOLUTION SUBCUTANEOUS
Status: ON HOLD | COMMUNITY
Start: 2025-02-01 | End: 2025-02-13 | Stop reason: HOSPADM

## 2025-01-31 RX ORDER — IBUPROFEN 800 MG/1
800 TABLET, FILM COATED ORAL EVERY 8 HOURS PRN
Status: ON HOLD | COMMUNITY
Start: 2025-01-31 | End: 2025-02-13 | Stop reason: HOSPADM

## 2025-01-31 RX ORDER — ACITRETIN 25 MG/1
25 CAPSULE ORAL
Status: DISCONTINUED | OUTPATIENT
Start: 2025-02-01 | End: 2025-02-13 | Stop reason: HOSPADM

## 2025-01-31 RX ORDER — SENNA AND DOCUSATE SODIUM 50; 8.6 MG/1; MG/1
1 TABLET, FILM COATED ORAL 2 TIMES DAILY
Status: DISCONTINUED | OUTPATIENT
Start: 2025-01-31 | End: 2025-02-13 | Stop reason: HOSPADM

## 2025-01-31 RX ORDER — AMITRIPTYLINE HYDROCHLORIDE 10 MG/1
1 TABLET ORAL NIGHTLY
Status: ON HOLD | COMMUNITY
Start: 2025-01-31 | End: 2025-02-13 | Stop reason: HOSPADM

## 2025-01-31 RX ORDER — IBUPROFEN 400 MG/1
800 TABLET, FILM COATED ORAL EVERY 8 HOURS PRN
Status: DISCONTINUED | OUTPATIENT
Start: 2025-01-31 | End: 2025-01-31

## 2025-01-31 RX ORDER — HYDROCODONE BITARTRATE AND ACETAMINOPHEN 10; 325 MG/1; MG/1
1 TABLET ORAL EVERY 6 HOURS PRN
Status: ON HOLD | COMMUNITY
Start: 2025-01-31 | End: 2025-02-13 | Stop reason: HOSPADM

## 2025-01-31 RX ORDER — HYDROCODONE BITARTRATE AND ACETAMINOPHEN 10; 325 MG/1; MG/1
1 TABLET ORAL EVERY 6 HOURS PRN
Status: DISCONTINUED | OUTPATIENT
Start: 2025-01-31 | End: 2025-01-31 | Stop reason: HOSPADM

## 2025-01-31 RX ORDER — HYDROCODONE BITARTRATE AND ACETAMINOPHEN 10; 325 MG/1; MG/1
1 TABLET ORAL EVERY 6 HOURS PRN
Start: 2025-01-31 | End: 2025-02-03

## 2025-01-31 RX ORDER — IBUPROFEN 400 MG/1
800 TABLET, FILM COATED ORAL EVERY 8 HOURS PRN
Status: DISCONTINUED | OUTPATIENT
Start: 2025-01-31 | End: 2025-02-13 | Stop reason: HOSPADM

## 2025-01-31 RX ORDER — MUPIROCIN 20 MG/G
OINTMENT TOPICAL DAILY
Status: DISCONTINUED | OUTPATIENT
Start: 2025-02-01 | End: 2025-02-01

## 2025-01-31 RX ORDER — BUPROPION HYDROCHLORIDE 150 MG/1
1 TABLET ORAL DAILY
Status: ON HOLD | COMMUNITY
Start: 2025-02-01 | End: 2025-02-13 | Stop reason: HOSPADM

## 2025-01-31 RX ORDER — ACITRETIN 25 MG/1
1 CAPSULE ORAL
COMMUNITY
Start: 2025-02-01

## 2025-01-31 RX ORDER — BUPROPION HYDROCHLORIDE 150 MG/1
150 TABLET ORAL DAILY
Status: DISCONTINUED | OUTPATIENT
Start: 2025-02-01 | End: 2025-02-13 | Stop reason: HOSPADM

## 2025-01-31 RX ORDER — BISACODYL 10 MG
10 SUPPOSITORY, RECTAL RECTAL DAILY PRN
Status: DISCONTINUED | OUTPATIENT
Start: 2025-01-31 | End: 2025-02-13 | Stop reason: HOSPADM

## 2025-01-31 RX ORDER — MUPIROCIN 20 MG/G
1 OINTMENT TOPICAL DAILY
Status: ON HOLD | COMMUNITY
Start: 2025-02-01 | End: 2025-02-13 | Stop reason: HOSPADM

## 2025-01-31 RX ORDER — ACETAMINOPHEN 325 MG/1
650 TABLET ORAL EVERY 4 HOURS PRN
Status: DISCONTINUED | OUTPATIENT
Start: 2025-01-31 | End: 2025-02-13 | Stop reason: HOSPADM

## 2025-01-31 RX ADMIN — HYDROCODONE BITARTRATE AND ACETAMINOPHEN 1 TABLET: 7.5; 325 TABLET ORAL at 02:45

## 2025-01-31 RX ADMIN — PREGABALIN 200 MG: 150 CAPSULE ORAL at 20:24

## 2025-01-31 RX ADMIN — SENNOSIDES AND DOCUSATE SODIUM 1 TABLET: 50; 8.6 TABLET ORAL at 20:23

## 2025-01-31 RX ADMIN — PREGABALIN 200 MG: 100 CAPSULE ORAL at 14:14

## 2025-01-31 RX ADMIN — PREGABALIN 200 MG: 100 CAPSULE ORAL at 07:53

## 2025-01-31 RX ADMIN — HYDROMORPHONE HYDROCHLORIDE 1 MG: 1 INJECTION, SOLUTION INTRAMUSCULAR; INTRAVENOUS; SUBCUTANEOUS at 03:54

## 2025-01-31 RX ADMIN — IBUPROFEN 800 MG: 400 TABLET, FILM COATED ORAL at 17:59

## 2025-01-31 RX ADMIN — HYDROMORPHONE HYDROCHLORIDE 1 MG: 1 INJECTION, SOLUTION INTRAMUSCULAR; INTRAVENOUS; SUBCUTANEOUS at 07:58

## 2025-01-31 RX ADMIN — BUPROPION HYDROCHLORIDE 150 MG: 150 TABLET, EXTENDED RELEASE ORAL at 07:54

## 2025-01-31 RX ADMIN — HYDROCODONE BITARTRATE AND ACETAMINOPHEN 1 TABLET: 7.5; 325 TABLET ORAL at 09:40

## 2025-01-31 RX ADMIN — HYDROCODONE BITARTRATE AND ACETAMINOPHEN 1 TABLET: 10; 325 TABLET ORAL at 14:14

## 2025-01-31 RX ADMIN — AMITRIPTYLINE HYDROCHLORIDE 10 MG: 10 TABLET, FILM COATED ORAL at 20:24

## 2025-01-31 RX ADMIN — HYDROCODONE BITARTRATE AND ACETAMINOPHEN 1 TABLET: 10; 325 TABLET ORAL at 20:23

## 2025-01-31 ASSESSMENT — PAIN DESCRIPTION - LOCATION
LOCATION: LEG
LOCATION: LEG;INCISION

## 2025-01-31 ASSESSMENT — PAIN DESCRIPTION - DESCRIPTORS
DESCRIPTORS: THROBBING
DESCRIPTORS: ACHING;DISCOMFORT

## 2025-01-31 ASSESSMENT — PAIN SCALES - GENERAL
PAINLEVEL_OUTOF10: 8
PAINLEVEL_OUTOF10: 5

## 2025-01-31 ASSESSMENT — PAIN DESCRIPTION - ORIENTATION
ORIENTATION: LEFT
ORIENTATION: LEFT

## 2025-01-31 NOTE — PLAN OF CARE
Problem: Noninvasive Ventilation Acute  Goal: Effective Unassisted Ventilation and Oxygenation  Intervention: Monitor and Manage Noninvasive Ventilation  Flowsheets (Taken 1/31/2025 0428)  NPPV/CPAP Maintenance: (Pt wears for about an hour and a half a night.) --   Goal Outcome Evaluation:Pt wears BiPAP  for hour and a half each night.

## 2025-01-31 NOTE — CASE MANAGEMENT/SOCIAL WORK
Continued Stay Note  [facility]     Patient Name: Donna Swain  MRN: [id number]  Today's Date: 1/31/2025    Admit Date: 1/27/2025    Plan: [facility] pending precert   Discharge Plan       Row Name 01/31/25 1109       Plan    Plan [facility] pending precert    Patient/Family in Agreement with Plan yes    Plan Comments Insurance is requesting a peer to peer at [phone]. Deadline is Monday at 0800 central. Informed Bree and Dr. Demarco.                   Discharge Codes    No documentation.                       LORI Lynn

## 2025-01-31 NOTE — THERAPY DISCHARGE NOTE
Acute Care - Physical Therapy Discharge Summary  [facility]       Patient Name: Donna Swain  : 1975  MRN: [id number]    Today's Date: 2025                 Admit Date: 2025      PT Recommendation and Plan    Visit Dx:    ICD-10-CM ICD-9-CM   1. Gait abnormality [R26.9]  R26.9 781.2   2. Diabetic foot infection  E11.628 250.80    L08.9 686.9   3. Preoperative testing  Z01.818 V72.84   4. Acute post-operative pain  G89.18 338.18        Outcome Measures       Row Name 25 1000 25 1000 25 1100       How much help from another person do you currently need...    Turning from your back to your side while in flat bed without using bedrails? 4  -AH 4  -AH 3  -AH    Moving from lying on back to sitting on the side of a flat bed without bedrails? 4  - 3  -AH 3  -AH    Moving to and from a bed to a chair (including a wheelchair)? 3  - 3  -AH 3  -AH    Standing up from a chair using your arms (e.g., wheelchair, bedside chair)? 3  -AH 3  -AH 2  -AH    Climbing 3-5 steps with a railing? 1  -AH 1  -AH 1  -AH    To walk in hospital room? 2  -AH 1  -AH 1  -AH    AM-PAC 6 Clicks Score (PT) 17  -AH 15  -AH 13  -       Functional Assessment    Outcome Measure Options AM-PAC 6 Clicks Basic Mobility (PT)  - AM-PAC 6 Clicks Basic Mobility (PT)  - AM-PAC 6 Clicks Basic Mobility (PT)  -              User Key  (r) = Recorded By, (t) = Taken By, (c) = Cosigned By      Initials Name Provider Type     Corinne Mendez PTA Physical Therapist Assistant                     PT Charges       Row Name 25 1059             Time Calculation    Start Time 1007  -      Stop Time 1052  -      Time Calculation (min) 45 min  -      PT Received On 25  -         Time Calculation- PT    Total Timed Code Minutes- PT 45 minute(s)  -         Timed Charges    92723 - PT Therapeutic Exercise Minutes 25  -      08010 - PT Therapeutic Activity Minutes 20  -         Total Minutes    Timed Charges  Total Minutes 45  -AH       Total Minutes 45  -AH                User Key  (r) = Recorded By, (t) = Taken By, (c) = Cosigned By      Initials Name Provider Type    Corinne Llamas, PTA Physical Therapist Assistant                     PT Rehab Goals       Row Name 01/31/25 1514             Bed Mobility Goal 1 (PT)    Activity/Assistive Device (Bed Mobility Goal 1, PT) bed mobility activities, all  -AH      [place] Level/Cues Needed (Bed Mobility Goal 1, PT) independent  -AH      Time Frame (Bed Mobility Goal 1, PT) long term goal (LTG);by discharge  -      Progress/Outcomes (Bed Mobility Goal 1, PT) goal met  -         Transfer Goal 1 (PT)    Activity/Assistive Device (Transfer Goal 1, PT) sit-to-stand/stand-to-sit;bed-to-chair/chair-to-bed;wheelchair transfer  -      [place] Level/Cues Needed (Transfer Goal 1, PT) modified independence  -      Time Frame (Transfer Goal 1, PT) long term goal (LTG);by discharge  -      Progress/Outcome (Transfer Goal 1, PT) goal met  -         Gait Training Goal 1 (PT)    Activity/Assistive Device (Gait Training Goal 1, PT) gait (walking locomotion);assistive device use;decrease fall risk;forward stepping;improve balance and speed;increase endurance/gait distance;increase energy conservation;turning, left;turning, right;walker, rolling  -      [place] Level (Gait Training Goal 1, PT) modified independence  -      Distance (Gait Training Goal 1, PT) 15 ft  -      Time Frame (Gait Training Goal 1, PT) long term goal (LTG);by discharge  -      Progress/Outcome (Gait Training Goal 1, PT) goal not met  -         Balance Goal 1 (PT)    Activity/Assistive Device (Balance Goal) standing dynamic balance;supported;with functional mobility activities;walker, rolling  -      [place] Level/Cues Needed (Balance Goal 1, PT) modified independence  -AH      Time Frame (Balance Goal 1, PT) long-term goal (LTG);by discharge  -      Progress/Outcomes  (Balance Goal 1, PT) goal met  -AH         Problem Specific Goal 1 (PT)    Problem Specific Goal 1 (PT) Patient able to ambulate w/ FWW w/ increased heel strike and decreased hopping  -      Time Frame (Problem Specific Goal 1, PT) long-term goal (LTG);by discharge  -      Progress/Outcome (Problem Specific Goal 1, PT) goal not met  -         Patient Education Goal (PT)    Activity (Patient Education Goal, PT) Patient able to adjust knee immobilizer when needed  -      Alpha/Cues/Accuracy (Memory Goal 2, PT) demonstrates adequately;independent  -      Time Frame (Patient Education Goal, PT) long term goal (LTG);by discharge  -      Progress/Outcome (Patient Education Goal, PT) goal not met  -                User Key  (r) = Recorded By, (t) = Taken By, (c) = Cosigned By      Initials Name Provider Type Discipline    Corinne Llamas PTA Physical Therapist Assistant PT                    Therapy Charges for Today       Code Description Service Date Service Provider Modifiers Qty    72468481353 HC PT THER PROC EA 15 MIN 1/30/2025 Corinne Mendez PTA GP 2    53141383509 HC PT THERAPEUTIC ACT EA 15 MIN 1/30/2025 Corinne Mendez, ECHO GP 1    46899381199 HC PT THER PROC EA 15 MIN 1/31/2025 Corinne Mendez PTA GP 2    06579851496 HC PT THERAPEUTIC ACT EA 15 MIN 1/31/2025 Corinne Mendez, ECHO GP 1            PT Discharge Summary  Reason for Discharge: Discharge from facility  Outcomes Achieved: Refer to plan of care for updates on goals achieved  Discharge Destination: Inpatient rehabilitation facility      Corinne Mendez PTA   1/31/2025

## 2025-01-31 NOTE — PROGRESS NOTES
LOS: 4 days   Patient Care Team:  Benjamin Kelsey MD as PCP - General (Family Medicine)    Chief Complaint: POD #4-left BKA    Subjective     Patient Complaints: Patient seen/examined sitting up in chair.  Pain improving.  AVSS.  Dressing removed.  Discussed care, moving forward with [employer].  Patient ready for discharge once pre-CERT is complete.    Objective     Vital Signs  Temp:  [97.8 °F (36.6 °C)-98.2 °F (36.8 °C)] 98.1 °F (36.7 °C)  Heart Rate:  [83-90] 83  Resp:  [16-20] 18  BP: (101-135)/(49-72) 126/72    Physical Exam  Vitals and nursing note reviewed.   Constitutional:       General: She is not in acute distress.     Appearance: Normal appearance. She is morbidly obese. She is not diaphoretic.   HENT:      Head: Normocephalic. No right periorbital erythema or left periorbital erythema.      Nose: Nose normal.   Eyes:      General: No scleral icterus.     Pupils: Pupils are equal.   Cardiovascular:      Rate and Rhythm: Normal rate and regular rhythm.      Pulses: Normal pulses.      Heart sounds: Normal heart sounds. No murmur heard.  Pulmonary:      Effort: Pulmonary effort is normal. No respiratory distress.      Breath sounds: Normal breath sounds.   Abdominal:      General: Bowel sounds are normal. There is no distension.      Palpations: Abdomen is soft.      Tenderness: There is no abdominal tenderness. There is no guarding.   Musculoskeletal:         General: No swelling or tenderness. Normal range of motion.      Cervical back: Normal range of motion and neck supple.      Right lower leg: No edema.      Left lower leg: No edema.      Left Lower Extremity: Left leg is amputated below knee.   Feet:      Right foot:      Skin integrity: Skin integrity normal.      Left foot:      Skin integrity: Skin integrity normal.      Comments: Incision clean, dry, intact.  Skin:     General: Skin is warm and dry.      Findings: No erythema or rash.   Neurological:      General: No focal  deficit present.      Mental Status: She is alert and oriented to person, place, and time. Mental status is at baseline.      Cranial Nerves: No cranial nerve deficit.      Gait: Gait normal.   Psychiatric:         Attention and Perception: Attention normal.         Mood and Affect: Mood normal.         Behavior: Behavior normal.         Thought Content: Thought content normal.         Judgment: Judgment normal.         Laboratory Data:   Results from last 7 days   Lab Units 01/31/25  0404 01/30/25  0308 01/29/25  0329   WBC 10*3/mm3 7.60 9.37 10.29   HEMOGLOBIN g/dL 10.3* 10.6* 10.8*   HEMATOCRIT % 33.0* 33.4* 34.4   PLATELETS 10*3/mm3 277 275 278       Results from last 7 days   Lab Units 01/31/25  0404 01/30/25  0308 01/29/25  0329   SODIUM mmol/L 135* 136 133*   POTASSIUM mmol/L 4.0 4.0 3.8   CHLORIDE mmol/L 97* 97* 96*   CO2 mmol/L 31.0* 30.0* 30.0*   BUN mg/dL 7 8 10   CREATININE mg/dL 0.59 0.59 0.66   CALCIUM mg/dL 8.8 8.8 8.5*   GLUCOSE mg/dL 188* 198* 191*     Results from last 7 days   Lab Units 01/24/25  0859   PROTIME Seconds 13.9   INR  1.02   APTT seconds 23.9*            Medication Review: Reviewed    Assessment & Plan       Charcot foot due to diabetes mellitus    Diabetic foot infection    Preoperative testing    Osteomyelitis          Plan for disposition:  [employer] [employer] to come out and fit for stump  and stump protector today.  Clean wound daily with soap and water, pat dry, paint darkened areas with Betadine, apply Mepilex, cover with stump .  Stump  on at all times.  Continue PT-Stump protector on when out of bed, working with PT, during transfers.  DC dilaudid-Norco po for pain control  Waiting on precert for transfer to rehab facility, can dc once accepted.    LORENA Coles  Vascular Surgery  [phone]  01/31/25  07:50 CST

## 2025-01-31 NOTE — DISCHARGE PLACEMENT REQUEST
Lax, Bree L, APRN   Nurse Practitioner  Vascular Surgery  Progress Notes     Addendum  Date of Service:  01/31/25 0749  Creation Time:  01/31/25 0749     Expand All Collapse All[]Expand All by Default      LOS: 4 days   Patient Care Team:  Benjamin Kelsey MD as PCP - General (Family Medicine)     Chief Complaint: POD #4-left BKA        Subjective[]Expand by Default  Patient Complaints: Patient seen/examined sitting up in chair.  Pain improving.  AVSS.  Dressing removed.  Discussed care, moving forward with [facility].  Patient ready for discharge once pre-CERT is complete.        Objective[]Expand by Default  Vital Signs  Temp:  [97.8 °F (36.6 °C)-98.2 °F (36.8 °C)] 98.1 °F (36.7 °C)  Heart Rate:  [83-90] 83  Resp:  [16-20] 18  BP: (101-135)/(49-72) 126/72     Physical Exam  Vitals and nursing note reviewed.   Constitutional:       General: She is not in acute distress.     Appearance: Normal appearance. She is morbidly obese. She is not diaphoretic.   HENT:      Head: Normocephalic. No right periorbital erythema or left periorbital erythema.      Nose: Nose normal.   Eyes:      General: No scleral icterus.     Pupils: Pupils are equal.   Cardiovascular:      Rate and Rhythm: Normal rate and regular rhythm.      Pulses: Normal pulses.      Heart sounds: Normal heart sounds. No murmur heard.  Pulmonary:      Effort: Pulmonary effort is normal. No respiratory distress.      Breath sounds: Normal breath sounds.   Abdominal:      General: Bowel sounds are normal. There is no distension.      Palpations: Abdomen is soft.      Tenderness: There is no abdominal tenderness. There is no guarding.   Musculoskeletal:         General: No swelling or tenderness. Normal range of motion.      Cervical back: Normal range of motion and neck supple.      Right lower leg: No edema.      Left lower leg: No edema.      Left Lower Extremity: Left leg is amputated below knee.   Feet:      Right foot:      Skin integrity:  Skin integrity normal.      Left foot:      Skin integrity: Skin integrity normal.      Comments: Incision clean, dry, intact.  Skin:     General: Skin is warm and dry.      Findings: No erythema or rash.   Neurological:      General: No focal deficit present.      Mental Status: She is alert and oriented to person, place, and time. Mental status is at baseline.      Cranial Nerves: No cranial nerve deficit.      Gait: Gait normal.   Psychiatric:         Attention and Perception: Attention normal.         Mood and Affect: Mood normal.         Behavior: Behavior normal.         Thought Content: Thought content normal.         Judgment: Judgment normal.            Laboratory Data:                Results from last 7 days   Lab Units 01/31/25  0404 01/30/25  0308 01/29/25  0329   WBC 10*3/mm3 7.60 9.37 10.29   HEMOGLOBIN g/dL 10.3* 10.6* 10.8*   HEMATOCRIT % 33.0* 33.4* 34.4   PLATELETS 10*3/mm3 277 275 278                Results from last 7 days   Lab Units 01/31/25  0404 01/30/25  0308 01/29/25  0329   SODIUM mmol/L 135* 136 133*   POTASSIUM mmol/L 4.0 4.0 3.8   CHLORIDE mmol/L 97* 97* 96*   CO2 mmol/L 31.0* 30.0* 30.0*   BUN mg/dL 7 8 10   CREATININE mg/dL 0.59 0.59 0.66   CALCIUM mg/dL 8.8 8.8 8.5*   GLUCOSE mg/dL 188* 198* 191*           Results from last 7 days   Lab Units 01/24/25  0859   PROTIME Seconds 13.9   INR   1.02   APTT seconds 23.9*               Medication Review: Reviewed        Assessment & Plan    Charcot foot due to diabetes mellitus    Diabetic foot infection    Preoperative testing    Osteomyelitis              Plan for disposition:  [facility] to come out and fit for stump  and stump protector today.  Clean wound daily with soap and water, pat dry, paint darkened areas with Betadine, apply Mepilex, cover with stump .  Stump  on at all times.  Continue PT-Stump protector on when out of bed, working with PT, during transfers.  DC dilaudid-Norco po for pain  control  Waiting on precert for transfer to rehab facility, can dc once accepted.     LORENA Coles  Vascular Surgery  [phone]  25  07:50 CST             Delma Rojas OTR/L   Occupational Therapist  Specialty:  Occupational Therapy  Therapy Treatment Note     Signed  Date of Service:  25  Creation Time:  25     Signed        Expand All Collapse All[]Expand All by Default  Patient Name: Donna Swain                        : 1975                        MRN: [id number]                              Today's Date: 2025                                   Admit Date: 2025                        Visit Dx:   Visit Diagnosis       ICD-10-CM ICD-9-CM   1. Gait abnormality [R26.9]  R26.9 781.2   2. Diabetic foot infection  E11.628 250.80     L08.9 686.9   3. Preoperative testing  Z01.818 V72.84         Problem List       Patient Active Problem List   Diagnosis    Sleep apnea    Hypertension    Class 3 severe obesity due to excess calories without serious comorbidity with body mass index (BMI) of 50.0 to 59.9 in adult    Hx of osteomyelitis    Lymphedema    Neuropathy    Onychomycosis    Polyneuropathy due to type 2 diabetes mellitus    Tobacco abuse    Acute osteomyelitis of left foot    Exostosis of left foot    Diabetic foot infection    Preoperative testing    Charcot foot due to diabetes mellitus    Osteomyelitis         Medical History        Past Medical History:   Diagnosis Date    Ankle swelling      Back pain      Charcot's joint of ankle, left      Diabetes      GERD (gastroesophageal reflux disease)      Hemorrhoids      History of stomach ulcers      Hypertension      Hyperthyroidism      MRSA infection      Neuropathy      Sleep apnea       doesn't use c pap. feels like she is suffocating    Thyroid goiter           Surgical History         Past Surgical History:   Procedure Laterality Date    BELOW KNEE AMPUTATION Left 2025     Procedure: LEFT BELOW KNEE  AMPUTATION;  Surgeon: Giancarlo Demarco MD;  Location:  [facility];  Service: Vascular;  Laterality: Left;    BONE SPUR LEG Left 10/2/2024     Procedure: Plantar Exostectomy;  Surgeon: Ciro Mckee DPM;  Location:  [facility];  Service: Podiatry;  Laterality: Left;    CHOLECYSTECTOMY         lap    COLONOSCOPY        ENDOSCOPY        FOOT HARDWARE REMOVAL Left      HYSTERECTOMY        LEG DEBRIDEMENT Left 10/2/2024     Procedure: Debridement of Bone/Plantar Exostectomy - Left Foot;  Surgeon: Ciro Mckee DPM;  Location:  [facility];  Service: Podiatry;  Laterality: Left;    MULTIPLE TOOTH EXTRACTIONS        OVARY SURGERY        TOE OSTEOTOMY Left 03/07/2023     Procedure: PARTIAL REMOVAL OF BONE, FIFTH METATARSAL, LEFT FOOT;  Surgeon: Tee Dacosta DPM;  Location:  [facility];  Service: Podiatry;  Laterality: Left;    TUBAL ABDOMINAL LIGATION               General Information         Row Name 01/31/25 0733                 OT Time and Intention     Subjective Information complains of;pain  -LS       Document Type therapy note (daily note)  -LS       Mode of Treatment occupational therapy  -LS       Patient Effort good  -LS          Row Name 01/31/25 0733                 General Information     Existing Precautions/Restrictions fall;other (see comments)  L BKA  -LS          Row Name 01/31/25 0733                 Cognition     Orientation Status (Cognition) oriented x 4  -LS          Row Name 01/31/25 0733                 Safety Issues/Impairments Affecting Functional Mobility     Impairments Affecting Function (Mobility) balance;endurance/activity tolerance;pain;strength  -LS                       User Key  (r) = Recorded By, (t) = Taken By, (c) = Cosigned By        Initials Name Provider Type     LS Delma Rojas OTR/L Occupational Therapist                                     Mobility/ADL's         Row Name 01/31/25 0733                 Mobility     Extremity Weight-bearing Status left lower extremity  -LS        Left Lower Extremity (Weight-bearing Status) non weight-bearing (NWB)  -LS                       User Key  (r) = Recorded By, (t) = Taken By, (c) = Cosigned By        Initials Name Provider Type     Delma Crane OTR/L Occupational Therapist                            Obj/Interventions         Row Name 01/31/25 0733                 Motor Skills     Therapeutic Exercise shoulder;elbow/forearm;other (see comments)  3 sets, 10 reps resisted BUE elbow flex/ext and overhead shoulder press utilizing 5lb dowel  -LS                       User Key  (r) = Recorded By, (t) = Taken By, (c) = Cosigned By        Initials Name Provider Type     Delma Crane OTR/L Occupational Therapist                            Goals/Plan    No documentation.                        Clinical Impression         Row Name 01/31/25 0733                 Pain Assessment     Pretreatment Pain Rating 10/10  -LS       Posttreatment Pain Rating 10/10  -LS       Pain Location residual limb  -LS       Pain Side/Orientation left  -LS       Pain Management Interventions exercise or physical activity utilized;nursing notified  -LS       Response to Pain Interventions no change per patient report  -          Row Name 01/31/25 0733                 Plan of Care Review     Plan of Care Reviewed With patient;mother;father  -LS       Progress improving  -LS       Outcome Evaluation OT tx completed. Pt seated in chair upon therapist arrival; A&Ox4; c/o 10/10 L residual limb pain. Pt reports having just gotten up to use the BSC, but agreed to BUE exercises. Pt performed 3 sets, 10 reps resisted BUE elbow flex/ext and overhead shoulder press utilizing 5lb dowel; Pt tolerated well. Pt educated on use of a long handled mirror to perform daily R foot check and L residual limb check to ensure no new redness or potential wound development; Pt verbalized understanding. Cont OT POC. Recommend inpatient rehab at discharge.  -          Row Name 01/31/25 0733                  Therapy Plan Review/Discharge Plan (OT)     Anticipated Discharge Disposition (OT) inpatient rehabilitation facility  -          Row Name 01/31/25 0733                 Positioning and Restraints     Pre-Treatment Position sitting in chair/recliner  -LS       Post Treatment Position chair  -LS       In Chair reclined;call light within reach;encouraged to call for assist;legs elevated  -LS                       User Key  (r) = Recorded By, (t) = Taken By, (c) = Cosigned By        Initials Name Provider Type     Delma Crane, OTR/L Occupational Therapist                            Outcome Measures         Row Name 01/31/25 0733                 How much help from another is currently needed...     Putting on and taking off regular lower body clothing? 2  -LS       Bathing (including washing, rinsing, and drying) 2  -LS       Toileting (which includes using toilet bed pan or urinal) 2  -LS       Putting on and taking off regular upper body clothing 4  -LS       Taking care of personal grooming (such as brushing teeth) 4  -LS       Eating meals 4  -LS       AM-PAC 6 Clicks Score (OT) 18  -          Row Name 01/30/25 2050                 How much help from another person do you currently need...     Turning from your back to your side while in flat bed without using bedrails? 4  -JH       Moving from lying on back to sitting on the side of a flat bed without bedrails? 3  -JH       Moving to and from a bed to a chair (including a wheelchair)? 3  -JH       Standing up from a chair using your arms (e.g., wheelchair, bedside chair)? 3  -JH       Climbing 3-5 steps with a railing? 1  -JH       To walk in hospital room? 1  -JH       AM-PAC 6 Clicks Score (PT) 15  -JH       Highest Level of Mobility Goal 4 --> Transfer to chair/commode  -          Row Name 01/31/25 0733                 Functional Assessment     Outcome Measure Options AM-PAC 6 Clicks Daily Activity (OT)  -                       User Key   (r) = Recorded By, (t) = Taken By, (c) = Cosigned By        Initials Name Provider Type      Khushbu Su, RN Registered Nurse     Delma Crane OTR/L Occupational Therapist                             Occupational Therapy Education            Title: PT OT SLP Therapies (In Progress)         Topic: Occupational Therapy (In Progress)         Point: ADL training (Done)         Description:   Instruct learner(s) on proper safety adaptation and remediation techniques during self care or transfers.   Instruct in proper use of assistive devices.                       Learning Progress Summary             Patient Acceptance, E, VU by  at 1/31/2025 0837     Acceptance, E, VU by  at 1/29/2025 1352                            Point: Home exercise program (Not Started)         Description:   Instruct learner(s) on appropriate technique for monitoring, assisting and/or progressing therapeutic exercises/activities.                       Learner Progress:  Not documented in this visit.                  Point: Precautions (Done)         Description:   Instruct learner(s) on prescribed precautions during self-care and functional transfers.                       Learning Progress Summary             Patient Acceptance, E, VU by  at 1/31/2025 0837     Acceptance, E, VU by  at 1/29/2025 1352                            Point: Body mechanics (Done)         Description:   Instruct learner(s) on proper positioning and spine alignment during self-care, functional mobility activities and/or exercises.                       Learning Progress Summary             Patient Acceptance, E, VU by  at 1/31/2025 0837     Acceptance, E, VU by  at 1/29/2025 1352                                                User Key         Initials Effective Dates Name Provider Type Discipline      06/20/22 -  Delma Rojas OTR/L Occupational Therapist OT                          OT Recommendation and Plan  Planned Therapy Interventions (OT):  transfer/mobility retraining, strengthening exercise, patient/caregiver education/training, occupation/activity based interventions, functional balance retraining, activity tolerance training, BADL retraining, adaptive equipment training, ROM/therapeutic exercise, orthotic fabrication/fitting/training  Therapy Frequency (OT): 5 times/wk  Plan of Care Review  Plan of Care Reviewed With: patient, mother, father  Progress: improving  Outcome Evaluation: OT tx completed. Pt seated in chair upon therapist arrival; A&Ox4; c/o 10/10 L residual limb pain. Pt reports having just gotten up to use the BSC, but agreed to BUE exercises. Pt performed 3 sets, 10 reps resisted BUE elbow flex/ext and overhead shoulder press utilizing 5lb dowel; Pt tolerated well. Pt educated on use of a long handled mirror to perform daily R foot check and L residual limb check to ensure no new redness or potential wound development; Pt verbalized understanding. Cont OT POC. Recommend inpatient rehab at discharge.      Time Calculation:        Time Calculation- OT         Row Name 01/31/25 0733                       Time Calculation- OT     OT Start Time 0733  -LS         OT Stop Time 0803  -LS         OT Time Calculation (min) 30 min  -         Total Timed Code Minutes- OT 30 minute(s)  -LS         OT Received On 01/31/25  -                      User Key  (r) = Recorded By, (t) = Taken By, (c) = Cosigned By        Initials Name Provider Type     LS Delma Rojas, OTR/L Occupational Therapist                       Therapy Charges for Today         Code Description Service Date Service Provider Modifiers Qty     03520208985 HC OT SELF CARE/MGMT/TRAIN EA 15 MIN 1/31/2025 Delma Rojas, OTR/L GO 1     62515193182 HC OT THER PROC EA 15 MIN 1/31/2025 Delma Rojas, OTR/L GO 1                   Delma Rojas, OTR/L             1/31/2025                Corinne Mendez PTA   Physical Therapist Assistant  Physical Therapy  Therapy Treatment Note      Signed  Date of Service:  25 1100  Creation Time:  25 1100     Signed        Expand All Collapse All[]Expand All by Default  Acute Care - Physical Therapy Treatment Note   [place]     Patient Name: Donna Swain                        : 1975                      MRN: [id number]  Today's Date: 2025                                  Visit Dx:   Visit Diagnosis       ICD-10-CM ICD-9-CM   1. Gait abnormality [R26.9]  R26.9 781.2   2. Diabetic foot infection  E11.628 250.80     L08.9 686.9   3. Preoperative testing  Z01.818 V72.84         Problem List       Patient Active Problem List   Diagnosis    Sleep apnea    Hypertension    Class 3 severe obesity due to excess calories without serious comorbidity with body mass index (BMI) of 50.0 to 59.9 in adult    Hx of osteomyelitis    Lymphedema    Neuropathy    Onychomycosis    Polyneuropathy due to type 2 diabetes mellitus    Tobacco abuse    Acute osteomyelitis of left foot    Exostosis of left foot    Diabetic foot infection    Preoperative testing    Charcot foot due to diabetes mellitus    Osteomyelitis         Medical History        Past Medical History:   Diagnosis Date    Ankle swelling      Back pain      Charcot's joint of ankle, left      Diabetes      GERD (gastroesophageal reflux disease)      Hemorrhoids      History of stomach ulcers      Hypertension      Hyperthyroidism      MRSA infection      Neuropathy      Sleep apnea       doesn't use c pap. feels like she is suffocating    Thyroid goiter           Surgical History         Past Surgical History:   Procedure Laterality Date    BELOW KNEE AMPUTATION Left 2025     Procedure: LEFT BELOW KNEE AMPUTATION;  Surgeon: Giancarlo Demarco MD;  Location:  [facility];  Service: Vascular;  Laterality: Left;    BONE SPUR LEG Left 10/2/2024     Procedure: Plantar Exostectomy;  Surgeon: Ciro Mckee DPM;  Location:  [facility];  Service: Podiatry;  Laterality: Left;    CHOLECYSTECTOMY          lap    COLONOSCOPY        ENDOSCOPY        FOOT HARDWARE REMOVAL Left      HYSTERECTOMY        LEG DEBRIDEMENT Left 10/2/2024     Procedure: Debridement of Bone/Plantar Exostectomy - Left Foot;  Surgeon: Ciro Mckee DPM;  Location:  [facility];  Service: Podiatry;  Laterality: Left;    MULTIPLE TOOTH EXTRACTIONS        OVARY SURGERY        TOE OSTEOTOMY Left 03/07/2023     Procedure: PARTIAL REMOVAL OF BONE, FIFTH METATARSAL, LEFT FOOT;  Surgeon: Tee Dacosta DPM;  Location:  [facility];  Service: Podiatry;  Laterality: Left;    TUBAL ABDOMINAL LIGATION             PT Assessment (Last 12 Hours)            PT Evaluation and Treatment         [facility] Name 01/31/25 1007 01/31/25 0820            Physical Therapy Time and Intention     Subjective Information complains of;pain;weakness  - --     Document Type therapy note (daily note)  - --     Mode of Treatment physical therapy  - --     Session Not Performed -- other (see comments)  -     Comment, Session Not Performed -- Breakfast  -[facility] Name 01/31/25 1007                 General Information     Patient/Family/Caregiver Comments/Observations parents  -       Existing Precautions/Restrictions fall  L BKA, L stump protector  -[facility] Name 01/31/25 1007                 Pain     Pretreatment Pain Rating 8/10  -       Posttreatment Pain Rating 8/10  -       Pain Location residual limb  -       Pain Side/Orientation left  -       Pain Management Interventions premedicated for activity  -       Response to Pain Interventions activity participation with tolerable pain  -          Row Name 01/31/25 1007                 Bed Mobility     Supine-Sit [place] (Bed Mobility) standby assist  -       Sit-Supine [place] (Bed Mobility) --  CHAIR  -[facility] Name 01/31/25 1007                 Transfers     Transfers toilet transfer;sit-stand transfer;stand-sit transfer  -          Row Name 01/31/25 1007                  Sit-Stand Transfer     Sit-Stand [place] (Transfers) contact guard;verbal cues  -          Row Name 01/31/25 1007                 Stand-Sit Transfer     Stand-Sit [place] (Transfers) contact guard;verbal cues  -          Row Name 01/31/25 1007                 Toilet Transfer     [place] Level (Toilet Transfer) contact guard;minimum assist (75% patient effort);verbal cues  -       Assistive Device (Toilet Transfer) commode, 3-in-1;walker, front-wheeled;bariatric equipment  -          Row Name 01/31/25 1007                 Gait/Stairs (Locomotion)     [place] Level (Gait) contact guard;verbal cues  -       Assistive Device (Gait) walker, front-wheeled  -       Distance in Feet (Gait) 5  bed-BSC-chair, then forward/back from chair  -          Row Name 01/31/25 1007                 Balance     Static Standing Balance contact guard  -       Position/Device Used, Standing Balance walker, front-wheeled  -          Row Name 01/31/25 1007                 Motor Skills     Comments, Therapeutic Exercise BLE HEP x 10 reps, in standing worked on L hip ext, instructed pt to perform HEP on her as well  -[facility] Name                      Wound 01/27/25 0844 Left lower leg     Wound - Properties Group Placement Date: 01/27/25  -EP Placement Time: 0844  -EP Side: Left  -EP Orientation: lower  -EP Location: leg  -EP Primary Wound Type: Incision  -EP Present on Original Admission: N  -EP Additional Comments: BELOW THE KNEE AMPUTATION  -EP     Retired Wound - Properties Group Placement Date: 01/27/25  -EP Placement Time: 0844  -EP Present on Original Admission: N  -EP Side: Left  -EP Orientation: lower  -EP Location: leg  -EP Primary Wound Type: Incision  -EP Additional Comments: BELOW THE KNEE AMPUTATION  -EP     Retired Wound - Properties Group Placement Date: 01/27/25  -EP Placement Time: 0844  -EP Present on Original Admission: N  -EP Side: Left  -EP Orientation: lower  -EP Location: leg   -EP Primary Wound Type: Incision  -EP Additional Comments: BELOW THE KNEE AMPUTATION  -EP     Retired Wound - Properties Group Date first assessed: 01/27/25  -EP Time first assessed: 0844  -EP Present on Original Admission: N  -EP Side: Left  -EP Location: leg  -EP Primary Wound Type: Incision  -EP Additional Comments: BELOW THE KNEE AMPUTATION  -EP        Row Name 01/31/25 1007                 Plan of Care Review     Plan of Care Reviewed With patient;father;mother  -       Progress improving  -       Outcome Evaluation pt trans to EOB sba, sit-stand sba, took 2-3 scoots to BSC, then 3-4 scoots to chair, sit-stand from chair cga-sba, took 2 hops forward, worked on L hip ext in standing, then took scoots back to chair with rwx cga, BLE HEP in chair, also instructed pt to perform HEP on her own. pt would benefit from acute inpt rehab  -          Row Name 01/31/25 1007                 Positioning and Restraints     Pre-Treatment Position in bed  -       Post Treatment Position chair  -AH       In Chair notified nsg;reclined;call light within reach;encouraged to call for assist;with family/caregiver;waffle cushion  -                    User Key  (r) = Recorded By, (t) = Taken By, (c) = Cosigned By        Initials Name Provider Type      Corinne Mendez PTA Physical Therapist Assistant     Franki Gillespie, RN Registered Nurse                             Physical Therapy Education            Title: PT OT SLP Therapies (In Progress)         Topic: Physical Therapy (Done)         Point: Mobility training (Done)         Learning Progress Summary             Patient Acceptance, E, VU by BL at 1/28/2025 1612     Comment: 4 way SLR, glute sets, plan of care, bed mobility, transfer techniques, residual limb managment                            Point: Home exercise program (Done)         Learning Progress Summary             Patient Acceptance, E, VU by BL at 1/28/2025 1612     Comment: 4 way SLR, glute sets, plan  of care, bed mobility, transfer techniques, residual limb managment                            Point: Body mechanics (Done)         Learning Progress Summary             Patient Acceptance, E, VU by  at 1/28/2025 1612     Comment: 4 way SLR, glute sets, plan of care, bed mobility, transfer techniques, residual limb managment                            Point: Precautions (Done)         Learning Progress Summary             Patient Acceptance, E, VU by  at 1/28/2025 1612     Comment: 4 way SLR, glute sets, plan of care, bed mobility, transfer techniques, residual limb managment                                                User Key         Initials Effective Dates Name Provider Type Discipline      12/17/24 -  Denny Dennison, PT Student PT Student PT                          PT Recommendation and Plan  Plan of Care Reviewed With: patient, father, mother  Progress: improving  Outcome Evaluation: pt trans to EOB sba, sit-stand sba, took 2-3 scoots to BSC, then 3-4 scoots to chair, sit-stand from chair cga-sba, took 2 hops forward, worked on L hip ext in standing, then took scoots back to chair with rwx cga, BLE HEP in chair, also instructed pt to perform HEP on her own. pt would benefit from acute inpt rehab    Outcome Measures         Row Name 01/31/25 1000 01/30/25 1000 01/29/25 1100             How much help from another person do you currently need...     Turning from your back to your side while in flat bed without using bedrails? 4  -AH 4  -AH 3  -AH     Moving from lying on back to sitting on the side of a flat bed without bedrails? 4  -AH 3  -AH 3  -AH     Moving to and from a bed to a chair (including a wheelchair)? 3  -AH 3  -AH 3  -AH     Standing up from a chair using your arms (e.g., wheelchair, bedside chair)? 3  -AH 3  -AH 2  -AH     Climbing 3-5 steps with a railing? 1  -AH 1  -AH 1  -AH     To walk in hospital room? 2  -AH 1  -AH 1  -AH     AM-PAC 6 Clicks Score (PT) 17  -AH 15  -AH 13  -AH              Functional Assessment     Outcome Measure Options AM-PAC 6 Clicks Basic Mobility (PT)  -AH AM-PAC 6 Clicks Basic Mobility (PT)  -AH AM-PAC 6 Clicks Basic Mobility (PT)  -                     User Key  (r) = Recorded By, (t) = Taken By, (c) = Cosigned By        Initials Name Provider Type      Corinne Mendez PTA Physical Therapist Assistant                              Time Calculation:     PT Charges         Row Name 01/31/25 1059                       Time Calculation     Start Time 1007  -         Stop Time 1052  -         Time Calculation (min) 45 min  -         PT Received On 01/31/25  -                 Time Calculation- PT     Total Timed Code Minutes- PT 45 minute(s)  -                 Timed Charges     70988 - PT Therapeutic Exercise Minutes 25  -AH         01386 - PT Therapeutic Activity Minutes 20  -AH                 Total Minutes     Timed Charges Total Minutes 45  -          Total Minutes 45  -AH                      User Key  (r) = Recorded By, (t) = Taken By, (c) = Cosigned By        Initials Name Provider Type      Corinne Mendez PTA Physical Therapist Assistant                       Therapy Charges for Today         Code Description Service Date Service Provider Modifiers Qty     54139883760 HC PT THER PROC EA 15 MIN 1/30/2025 Corinne Mendez, PTA GP 2     48085302384 HC PT THERAPEUTIC ACT EA 15 MIN 1/30/2025 Corinne Mendez, PTA GP 1     82615318502 HC PT THER PROC EA 15 MIN 1/31/2025 Corinne Mendez, PTA GP 2     24633159052 HC PT THERAPEUTIC ACT EA 15 MIN 1/31/2025 Corinne Mendez, PTA GP 1                PT G-Codes  Outcome Measure Options: AM-PAC 6 Clicks Basic Mobility (PT)  AM-PAC 6 Clicks Score (PT): 17  AM-PAC 6 Clicks Score (OT): 18     Corinne Mendez PTA                        1/31/2025

## 2025-01-31 NOTE — THERAPY TREATMENT NOTE
Patient Name: Donna Swain  : 1975    MRN: [id number]                              Today's Date: 2025       Admit Date: 2025    Visit Dx:     ICD-10-CM ICD-9-CM   1. Gait abnormality [R26.9]  R26.9 781.2   2. Diabetic foot infection  E11.628 250.80    L08.9 686.9   3. Preoperative testing  Z01.818 V72.84     Patient Active Problem List   Diagnosis    Sleep apnea    Hypertension    Class 3 severe obesity due to excess calories without serious comorbidity with body mass index (BMI) of 50.0 to 59.9 in adult    Hx of osteomyelitis    Lymphedema    Neuropathy    Onychomycosis    Polyneuropathy due to type 2 diabetes mellitus    Tobacco abuse    Acute osteomyelitis of left foot    Exostosis of left foot    Diabetic foot infection    Preoperative testing    Charcot foot due to diabetes mellitus    Osteomyelitis     Past Medical History:   Diagnosis Date    Ankle swelling     Back pain     Charcot's joint of ankle, left     Diabetes     GERD (gastroesophageal reflux disease)     Hemorrhoids     History of stomach ulcers     Hypertension     Hyperthyroidism     MRSA infection     Neuropathy     Sleep apnea     doesn't use c pap. feels like she is suffocating    Thyroid goiter      Past Surgical History:   Procedure Laterality Date    BELOW KNEE AMPUTATION Left 2025    Procedure: LEFT BELOW KNEE AMPUTATION;  Surgeon: Giancarlo Demarco MD;  Location:  [facility] OR;  Service: Vascular;  Laterality: Left;    BONE SPUR LEG Left 10/2/2024    Procedure: Plantar Exostectomy;  Surgeon: Ciro Mckee DPM;  Location:  [facility] OR;  Service: Podiatry;  Laterality: Left;    CHOLECYSTECTOMY      lap    COLONOSCOPY      ENDOSCOPY      FOOT HARDWARE REMOVAL Left     HYSTERECTOMY      LEG DEBRIDEMENT Left 10/2/2024    Procedure: Debridement of Bone/Plantar Exostectomy - Left Foot;  Surgeon: Ciro Mckee DPM;  Location:  [facility] OR;  Service: Podiatry;  Laterality: Left;    MULTIPLE TOOTH EXTRACTIONS      OVARY SURGERY       TOE OSTEOTOMY Left 03/07/2023    Procedure: PARTIAL REMOVAL OF BONE, FIFTH METATARSAL, LEFT FOOT;  Surgeon: Tee Dacosta DPM;  Location[facility];  Service: Podiatry;  Laterality: Left;    TUBAL ABDOMINAL LIGATION        General Information       Row Name 01/31/25 0733          OT Time and Intention    Subjective Information complains of;pain  -LS     Document Type therapy note (daily note)  -LS     Mode of Treatment occupational therapy  -LS     Patient Effort good  -LS       Row Name 01/31/25 0733          General Information    Existing Precautions/Restrictions fall;other (see comments)  L BKA  -LS       Row Name 01/31/25 0733          Cognition    Orientation Status (Cognition) oriented x 4  -LS       Row Name 01/31/25 0733          Safety Issues/Impairments Affecting Functional Mobility    Impairments Affecting Function (Mobility) balance;endurance/activity tolerance;pain;strength  -LS               User Key  (r) = Recorded By, (t) = Taken By, (c) = Cosigned By      Initials Name Provider Type    Delma Crane OTR/L Occupational Therapist                     Mobility/ADL's       Row Name 01/31/25 0733          Mobility    Extremity Weight-bearing Status left lower extremity  -LS     Left Lower Extremity (Weight-bearing Status) non weight-bearing (NWB)  -               User Key  (r) = Recorded By, (t) = Taken By, (c) = Cosigned By      Initials Name Provider Type    Delma Crane OTR/L Occupational Therapist                   Obj/Interventions       Row Name 01/31/25 0733          Motor Skills    Therapeutic Exercise shoulder;elbow/forearm;other (see comments)  3 sets, 10 reps resisted BUE elbow flex/ext and overhead shoulder press utilizing 5lb dowel  -               User Key  (r) = Recorded By, (t) = Taken By, (c) = Cosigned By      Initials Name Provider Type    Delma Crane OTR/L Occupational Therapist                   Goals/Plan    No documentation.                  Clinical  Impression       Row Name 01/31/25 0733          Pain Assessment    Pretreatment Pain Rating 10/10  -LS     Posttreatment Pain Rating 10/10  -LS     Pain Location residual limb  -LS     Pain Side/Orientation left  -LS     Pain Management Interventions exercise or physical activity utilized;nursing notified  -LS     Response to Pain Interventions no change per patient report  -LS       Row Name 01/31/25 0733          Plan of Care Review    Plan of Care Reviewed With patient;mother;father  -LS     Progress improving  -LS     Outcome Evaluation OT tx completed. Pt seated in chair upon therapist arrival; A&Ox4; c/o 10/10 L residual limb pain. Pt reports having just gotten up to use the BSC, but agreed to BUE exercises. Pt performed 3 sets, 10 reps resisted BUE elbow flex/ext and overhead shoulder press utilizing 5lb dowel; Pt tolerated well. Pt educated on use of a long handled mirror to perform daily R foot check and L residual limb check to ensure no new redness or potential wound development; Pt verbalized understanding. Cont OT POC. Recommend inpatient rehab at discharge.  -       Row Name 01/31/25 0733          Therapy Plan Review/Discharge Plan (OT)    Anticipated Discharge Disposition (OT) inpatient rehabilitation facility  -       Row Name 01/31/25 0733          Positioning and Restraints    Pre-Treatment Position sitting in chair/recliner  -LS     Post Treatment Position chair  -LS     In Chair reclined;call light within reach;encouraged to call for assist;legs elevated  -LS               User Key  (r) = Recorded By, (t) = Taken By, (c) = Cosigned By      Initials Name Provider Type    LS Delma Rojas OTR/L Occupational Therapist                   Outcome Measures       Row Name 01/31/25 0733          How much help from another is currently needed...    Putting on and taking off regular lower body clothing? 2  -LS     Bathing (including washing, rinsing, and drying) 2  -LS     Toileting (which includes  using toilet bed pan or urinal) 2  -LS     Putting on and taking off regular upper body clothing 4  -LS     Taking care of personal grooming (such as brushing teeth) 4  -LS     Eating meals 4  -     AM-PAC 6 Clicks Score (OT) 18  -       Row Name 01/30/25 2050          How much help from another person do you currently need...    Turning from your back to your side while in flat bed without using bedrails? 4  -JH     Moving from lying on back to sitting on the side of a flat bed without bedrails? 3  -JH     Moving to and from a bed to a chair (including a wheelchair)? 3  -JH     Standing up from a chair using your arms (e.g., wheelchair, bedside chair)? 3  -JH     Climbing 3-5 steps with a railing? 1  -JH     To walk in hospital room? 1  -JH     AM-PAC 6 Clicks Score (PT) 15  -JH     Highest Level of Mobility Goal 4 --> Transfer to chair/commode  -       Row Name 01/31/25 0733          Functional Assessment    Outcome Measure Options AM-PAC 6 Clicks Daily Activity (OT)  -               User Key  (r) = Recorded By, (t) = Taken By, (c) = Cosigned By      Initials Name Provider Type     Khusbhu Su RN Registered Nurse    Delma Crane OTR/L Occupational Therapist                    Occupational Therapy Education       Title: PT OT SLP Therapies (In Progress)       Topic: Occupational Therapy (In Progress)       Point: ADL training (Done)       Description:   Instruct learner(s) on proper safety adaptation and remediation techniques during self care or transfers.   Instruct in proper use of assistive devices.                  Learning Progress Summary            Patient Acceptance, E, VU by  at 1/31/2025 0837    Acceptance, E, VU by  at 1/29/2025 1352                      Point: Home exercise program (Not Started)       Description:   Instruct learner(s) on appropriate technique for monitoring, assisting and/or progressing therapeutic exercises/activities.                  Learner Progress:  Not  documented in this visit.              Point: Precautions (Done)       Description:   Instruct learner(s) on prescribed precautions during self-care and functional transfers.                  Learning Progress Summary            Patient Acceptance, E, VU by  at 1/31/2025 0837    Acceptance, E, VU by LS at 1/29/2025 1352                      Point: Body mechanics (Done)       Description:   Instruct learner(s) on proper positioning and spine alignment during self-care, functional mobility activities and/or exercises.                  Learning Progress Summary            Patient Acceptance, E, VU by  at 1/31/2025 0837    Acceptance, E, VU by LS at 1/29/2025 1352                                      User Key       Initials Effective Dates Name Provider Type Discipline    MAGGIE 06/20/22 -  Delma Rojas, OTR/L Occupational Therapist OT                  OT Recommendation and Plan  Planned Therapy Interventions (OT): transfer/mobility retraining, strengthening exercise, patient/caregiver education/training, occupation/activity based interventions, functional balance retraining, activity tolerance training, BADL retraining, adaptive equipment training, ROM/therapeutic exercise, orthotic fabrication/fitting/training  Therapy Frequency (OT): 5 times/wk  Plan of Care Review  Plan of Care Reviewed With: patient, mother, father  Progress: improving  Outcome Evaluation: OT tx completed. Pt seated in chair upon therapist arrival; A&Ox4; c/o 10/10 L residual limb pain. Pt reports having just gotten up to use the BSC, but agreed to BUE exercises. Pt performed 3 sets, 10 reps resisted BUE elbow flex/ext and overhead shoulder press utilizing 5lb dowel; Pt tolerated well. Pt educated on use of a long handled mirror to perform daily R foot check and L residual limb check to ensure no new redness or potential wound development; Pt verbalized understanding. Cont OT POC. Recommend inpatient rehab at discharge.     Time Calculation:          Time Calculation- OT       Row Name 01/31/25 0733             Time Calculation- OT    OT Start Time 0733  -LS      OT Stop Time 0803  -LS      OT Time Calculation (min) 30 min  -LS      Total Timed Code Minutes- OT 30 minute(s)  -LS      OT Received On 01/31/25  -                User Key  (r) = Recorded By, (t) = Taken By, (c) = Cosigned By      Initials Name Provider Type     Delma Rojas, OTR/L Occupational Therapist                  Therapy Charges for Today       Code Description Service Date Service Provider Modifiers Qty    60037312194 HC OT SELF CARE/MGMT/TRAIN EA 15 MIN 1/31/2025 Delma Rojas, OTR/L GO 1    42143501319 HC OT THER PROC EA 15 MIN 1/31/2025 Delma Rojas, OTR/L GO 1                 Delma Rojas, OTR/L  1/31/2025

## 2025-01-31 NOTE — THERAPY TREATMENT NOTE
Acute Care - Physical Therapy Treatment Note   [place]     Patient Name: Donna Swain  : 1975  MRN: [id number]  Today's Date: 2025      Visit Dx:     ICD-10-CM ICD-9-CM   1. Gait abnormality [R26.9]  R26.9 781.2   2. Diabetic foot infection  E11.628 250.80    L08.9 686.9   3. Preoperative testing  Z01.818 V72.84     Patient Active Problem List   Diagnosis    Sleep apnea    Hypertension    Class 3 severe obesity due to excess calories without serious comorbidity with body mass index (BMI) of 50.0 to 59.9 in adult    Hx of osteomyelitis    Lymphedema    Neuropathy    Onychomycosis    Polyneuropathy due to type 2 diabetes mellitus    Tobacco abuse    Acute osteomyelitis of left foot    Exostosis of left foot    Diabetic foot infection    Preoperative testing    Charcot foot due to diabetes mellitus    Osteomyelitis     Past Medical History:   Diagnosis Date    Ankle swelling     Back pain     Charcot's joint of ankle, left     Diabetes     GERD (gastroesophageal reflux disease)     Hemorrhoids     History of stomach ulcers     Hypertension     Hyperthyroidism     MRSA infection     Neuropathy     Sleep apnea     doesn't use c pap. feels like she is suffocating    Thyroid goiter      Past Surgical History:   Procedure Laterality Date    BELOW KNEE AMPUTATION Left 2025    Procedure: LEFT BELOW KNEE AMPUTATION;  Surgeon: Giancarlo Demarco MD;  Location:  [facility];  Service: Vascular;  Laterality: Left;    BONE SPUR LEG Left 10/2/2024    Procedure: Plantar Exostectomy;  Surgeon: Ciro Mckee DPM;  Location:  [facility];  Service: Podiatry;  Laterality: Left;    CHOLECYSTECTOMY      lap    COLONOSCOPY      ENDOSCOPY      FOOT HARDWARE REMOVAL Left     HYSTERECTOMY      LEG DEBRIDEMENT Left 10/2/2024    Procedure: Debridement of Bone/Plantar Exostectomy - Left Foot;  Surgeon: Ciro Mckee DPM;  Location:  [facility];  Service: Podiatry;  Laterality: Left;    MULTIPLE TOOTH EXTRACTIONS      OVARY  SURGERY      TOE OSTEOTOMY Left 03/07/2023    Procedure: PARTIAL REMOVAL OF BONE, FIFTH METATARSAL, LEFT FOOT;  Surgeon: Tee Dacosta DPM;  Location:  [facility];  Service: Podiatry;  Laterality: Left;    TUBAL ABDOMINAL LIGATION       PT Assessment (Last 12 Hours)       PT Evaluation and Treatment       [facility] Name 01/31/25 1007 01/31/25 0820       Physical Therapy Time and Intention    Subjective Information complains of;pain;weakness  - --    Document Type therapy note (daily note)  - --    Mode of Treatment physical therapy  - --    Session Not Performed -- other (see comments)  -    Comment, Session Not Performed -- Breakfast  -[facility] Name 01/31/25 1007          General Information    Patient/Family/Caregiver Comments/Observations parents  -     Existing Precautions/Restrictions fall  L BKA, L stump protector  -[facility] Name 01/31/25 1007          Pain    Pretreatment Pain Rating 8/10  -     Posttreatment Pain Rating 8/10  -     Pain Location residual limb  -     Pain Side/Orientation left  -     Pain Management Interventions premedicated for activity  -     Response to Pain Interventions activity participation with tolerable pain  -[facility] Name 01/31/25 1007          Bed Mobility    Supine-Sit [place] (Bed Mobility) standby assist  -     Sit-Supine [place] (Bed Mobility) --  CHAIR  -[facility] 01/31/25 1007          Transfers    Transfers toilet transfer;sit-stand transfer;stand-sit transfer  -AH       Row Name 01/31/25 1007          Sit-Stand Transfer    Sit-Stand [place] (Transfers) contact guard;verbal cues  -AH       Row Name 01/31/25 1007          Stand-Sit Transfer    Stand-Sit [place] (Transfers) contact guard;verbal cues  -AH       Row Name 01/31/25 1007          Toilet Transfer    [place] Level (Toilet Transfer) contact guard;minimum assist (75% patient effort);verbal cues  -     Assistive Device (Toilet Transfer) commode,  3-in-1;walker, front-wheeled;bariatric equipment  -       Row Name 01/31/25 1007          Gait/Stairs (Locomotion)    [place] Level (Gait) contact guard;verbal cues  -     Assistive Device (Gait) walker, front-wheeled  -     Distance in Feet (Gait) 5  bed-BSC-chair, then forward/back from chair  -       Row Name 01/31/25 1007          Balance    Static Standing Balance contact guard  -     Position/Device Used, Standing Balance walker, front-wheeled  -       Row Name 01/31/25 1007          Motor Skills    Comments, Therapeutic Exercise BLE HEP x 10 reps, in standing worked on L hip ext, instructed pt to perform HEP on her as well  -       Row Name             Wound 01/27/25 0844 Left lower leg    Wound - Properties Group Placement Date: 01/27/25  -EP Placement Time: 0844  -EP Side: Left  -EP Orientation: lower  -EP Location: leg  -EP Primary Wound Type: Incision  -EP Present on Original Admission: N  -EP Additional Comments: BELOW THE KNEE AMPUTATION  -EP    Retired Wound - Properties Group Placement Date: 01/27/25  -EP Placement Time: 0844  -EP Present on Original Admission: N  -EP Side: Left  -EP Orientation: lower  -EP Location: leg  -EP Primary Wound Type: Incision  -EP Additional Comments: BELOW THE KNEE AMPUTATION  -EP    Retired Wound - Properties Group Placement Date: 01/27/25  -EP Placement Time: 0844  -EP Present on Original Admission: N  -EP Side: Left  -EP Orientation: lower  -EP Location: leg  -EP Primary Wound Type: Incision  -EP Additional Comments: BELOW THE KNEE AMPUTATION  -EP    Retired Wound - Properties Group Date first assessed: 01/27/25  -EP Time first assessed: 0844  -EP Present on Original Admission: N  -EP Side: Left  -EP Location: leg  -EP Primary Wound Type: Incision  -EP Additional Comments: BELOW THE KNEE AMPUTATION  -EP      Row Name 01/31/25 1007          Plan of Care Review    Plan of Care Reviewed With patient;father;mother  -     Progress improving  -      Outcome Evaluation pt trans to EOB sba, sit-stand sba, took 2-3 scoots to BSC, then 3-4 scoots to chair, sit-stand from chair cga-sba, took 2 hops forward, worked on L hip ext in standing, then took scoots back to chair with rwx cga, BLE HEP in chair, also instructed pt to perform HEP on her own. pt would benefit from acute inpt rehab  -       Row Name 01/31/25 1007          Positioning and Restraints    Pre-Treatment Position in bed  -     Post Treatment Position chair  -AH     In Chair notified nsg;reclined;call light within reach;encouraged to call for assist;with family/caregiver;waffle cushion  -               User Key  (r) = Recorded By, (t) = Taken By, (c) = Cosigned By      Initials Name Provider Type    Corinne Llamas PTA Physical Therapist Assistant    Franki Gillespie, RN Registered Nurse                    Physical Therapy Education       Title: PT OT SLP Therapies (In Progress)       Topic: Physical Therapy (Done)       Point: Mobility training (Done)       Learning Progress Summary            Patient Acceptance, E, VU by BL at 1/28/2025 1612    Comment: 4 way SLR, glute sets, plan of care, bed mobility, transfer techniques, residual limb managment                      Point: Home exercise program (Done)       Learning Progress Summary            Patient Acceptance, E, VU by BL at 1/28/2025 1612    Comment: 4 way SLR, glute sets, plan of care, bed mobility, transfer techniques, residual limb managment                      Point: Body mechanics (Done)       Learning Progress Summary            Patient Acceptance, E, VU by BL at 1/28/2025 1612    Comment: 4 way SLR, glute sets, plan of care, bed mobility, transfer techniques, residual limb managment                      Point: Precautions (Done)       Learning Progress Summary            Patient Acceptance, E, VU by BL at 1/28/2025 1612    Comment: 4 way SLR, glute sets, plan of care, bed mobility, transfer techniques, residual limb managment                                       User Key       Initials Effective Dates Name Provider Type Discipline     12/17/24 -  Denny Dennison, PT Student PT Student PT                  PT Recommendation and Plan     Plan of Care Reviewed With: patient, father, mother  Progress: improving  Outcome Evaluation: pt trans to EOB sba, sit-stand sba, took 2-3 scoots to BSC, then 3-4 scoots to chair, sit-stand from chair cga-sba, took 2 hops forward, worked on L hip ext in standing, then took scoots back to chair with rwx cga, BLE HEP in chair, also instructed pt to perform HEP on her own. pt would benefit from acute inpt rehab   Outcome Measures       Row Name 01/31/25 1000 01/30/25 1000 01/29/25 1100       How much help from another person do you currently need...    Turning from your back to your side while in flat bed without using bedrails? 4  -AH 4  -AH 3  -AH    Moving from lying on back to sitting on the side of a flat bed without bedrails? 4  -AH 3  -AH 3  -AH    Moving to and from a bed to a chair (including a wheelchair)? 3  -AH 3  -AH 3  -AH    Standing up from a chair using your arms (e.g., wheelchair, bedside chair)? 3  -AH 3  -AH 2  -AH    Climbing 3-5 steps with a railing? 1  -AH 1  -AH 1  -AH    To walk in hospital room? 2  -AH 1  -AH 1  -AH    AM-PAC 6 Clicks Score (PT) 17  -AH 15  -AH 13  -AH       Functional Assessment    Outcome Measure Options AM-PAC 6 Clicks Basic Mobility (PT)  - AM-PAC 6 Clicks Basic Mobility (PT)  - AM-PAC 6 Clicks Basic Mobility (PT)  -              User Key  (r) = Recorded By, (t) = Taken By, (c) = Cosigned By      Initials Name Provider Type     Corinne Mendez, PTA Physical Therapist Assistant                     Time Calculation:    PT Charges       Row Name 01/31/25 1059             Time Calculation    Start Time 1007  -      Stop Time 1052  -      Time Calculation (min) 45 min  -      PT Received On 01/31/25  -         Time Calculation- PT    Total Timed Code  Minutes- PT 45 minute(s)  -AH         Timed Charges    40936 - PT Therapeutic Exercise Minutes 25  -AH      20367 - PT Therapeutic Activity Minutes 20  -AH         Total Minutes    Timed Charges Total Minutes 45  -AH       Total Minutes 45  -AH                User Key  (r) = Recorded By, (t) = Taken By, (c) = Cosigned By      Initials Name Provider Type     Corinne Mendez PTA Physical Therapist Assistant                  Therapy Charges for Today       Code Description Service Date Service Provider Modifiers Qty    34596610190 HC PT THER PROC EA 15 MIN 1/30/2025 Corinne Mendez, PTA GP 2    93995277028 HC PT THERAPEUTIC ACT EA 15 MIN 1/30/2025 Corinne Mendez, PTA GP 1    62347292126 HC PT THER PROC EA 15 MIN 1/31/2025 Corinne Mendez, PTA GP 2    47649479316 HC PT THERAPEUTIC ACT EA 15 MIN 1/31/2025 Corinne Mendez, PTA GP 1            PT G-Codes  Outcome Measure Options: AM-PAC 6 Clicks Basic Mobility (PT)  AM-PAC 6 Clicks Score (PT): 17  AM-PAC 6 Clicks Score (OT): 18    Corinne Mendez PTA  1/31/2025

## 2025-01-31 NOTE — CASE MANAGEMENT/SOCIAL WORK
Continued Stay Note  [facility]     Patient Name: Donna Swain  MRN: [id number]  Today's Date: 1/31/2025    Admit Date: 1/27/2025    Plan: [facility]   Discharge Plan       Row Name 01/31/25 1251       Plan    Plan [facility]    Patient/Family in Agreement with Plan yes    Final Discharge Disposition Code 62 - inpatient rehab facility    Final Note Insurance precert approved. [phone] She says her family will transport.       Row Name 01/31/25 1109       Plan    Plan [facility] pending precert    Patient/Family in Agreement with Plan yes    Plan Comments Insurance is requesting a peer to peer at [phone]. Deadline is Monday at 0800 central. Informed Bree and Dr. Demarco.                   Discharge Codes    No documentation.                       LORI Lynn

## 2025-01-31 NOTE — PLAN OF CARE
Goal Outcome Evaluation:  Plan of Care Reviewed With: patient, mother, father        Progress: improving  Outcome Evaluation: OT tx completed. Pt seated in chair upon therapist arrival; A&Ox4; c/o 10/10 L residual limb pain. Pt reports having just gotten up to use the BSC, but agreed to BUE exercises. Pt performed 3 sets, 10 reps resisted BUE elbow flex/ext and overhead shoulder press utilizing 5lb dowel; Pt tolerated well. Pt educated on use of a long handled mirror to perform daily R foot check and L residual limb check to ensure no new redness or potential wound development; Pt verbalized understanding. Cont OT POC. Recommend inpatient rehab at discharge.    Anticipated Discharge Disposition (OT): inpatient rehabilitation facility

## 2025-01-31 NOTE — PROGRESS NOTES
RT EQUIPMENT DEVICE RELATED - SKIN ASSESSMENT    Grabiel Score:  Grabiel Score: 20     RT Medical Equipment/Device:     NIV Mask:  Under-the-nose   size:  a    Skin Assessment:      Cheek:  Intact    Device Skin Pressure Protection:  Skin-to-device areas padded:  None Required    Nurse Notification:  Wendy Tate, RRT

## 2025-01-31 NOTE — PAYOR COMM NOTE
"1/30 CLINICAL      Donna Monteiro (49 y.o. Female)       Date of Birth   1975    Social Security Number       Address   [address] [facility] [address]    Home Phone   [phone]    MRN   [id number]       Taoist   Other    Marital Status   Single                            Admission Date   1/27/25    Admission Type   Elective    Admitting Provider   Giancarlo Demarco MD    Attending Provider   Giancarlo Demarco MD    Department, Room/Bed   [facility] 3C, 381/1       Discharge Date       Discharge Disposition       Discharge Destination                                 Attending Provider: Giancarlo Demarco MD    Allergies: Vancomycin    Isolation: Contact   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)   Code Status: CPR    Ht: 175 cm (68.9\")   Wt: 140 kg (309 lb 4.9 oz)    Admission Cmt: None   Principal Problem: Charcot foot due to diabetes mellitus [E11.610]                   Active Insurance as of 1/27/2025       Primary Coverage       Payor Plan Insurance Group Employer/Plan Group    ANTHEM MEDICARE REPLACEMENT ANTHEM MED ADV SNP [id number]       Payor Plan Address Payor Plan Phone Number Payor Plan Fax Number Effective Dates    [address] [phone]  1/1/2025 - None Entered    [facility] [id number]         Subscriber Name Subscriber Birth Date Member ID       DONNA MONTEIRO 1975 [id number]                     Emergency Contacts        (Rel.) Home Phone Work Phone Mobile Phone    Martha Monteiro (Mother) [phone] -- --              Current Facility-Administered Medications   Medication Dose Route Frequency Provider Last Rate Last Admin    acetaminophen (TYLENOL) tablet 650 mg  650 mg Oral Q4H PRN Giancarlo Demarco MD        Or    acetaminophen (TYLENOL) 160 MG/5ML oral solution 650 mg  650 mg Oral Q4H PRN Giancarlo Demarco MD        Or    acetaminophen (TYLENOL) suppository 650 mg  650 mg Rectal Q4H PRN Giancarlo Demarco MD        amitriptyline (ELAVIL) tablet 10 mg  10 mg Oral Nightly " Giancarlo Demarco MD   10 mg at 01/30/25 2050    buPROPion XL (WELLBUTRIN XL) 24 hr tablet 150 mg  150 mg Oral Daily Giancarlo Demarco MD   150 mg at 01/30/25 0852    dextrose (D50W) (25 g/50 mL) IV injection 25 g  25 g Intravenous Q15 Min PRN Giancarlo Demarco MD        dextrose (GLUTOSE) oral gel 15 g  15 g Oral Q15 Min PRN Giancarlo Demarco MD        [Held by provider] furosemide (LASIX) tablet 40 mg  40 mg Oral Daily Giancarlo Demarco MD   40 mg at 01/28/25 0836    glucagon (GLUCAGEN) injection 1 mg  1 mg Intramuscular Q15 Min PRN Ginacarlo Demarco MD        HYDROcodone-acetaminophen (NORCO) 7.5-325 MG per tablet 1 tablet  1 tablet Oral Q6H PRN Bree Boss APRN   1 tablet at 01/31/25 0245    HYDROmorphone (DILAUDID) injection 1 mg  1 mg Intravenous Q3H PRN Giancarlo Demarco MD   1 mg at 01/31/25 0354    And    naloxone (NARCAN) injection 0.4 mg  0.4 mg Intravenous Q5 Min PRN Giancarlo Demarco MD        Insulin Lispro (humaLOG) injection 2-9 Units  2-9 Units Subcutaneous 4x Daily AC & at Bedtime Giancarlo Demarco MD   4 Units at 01/30/25 1653    nitroglycerin (NITROSTAT) SL tablet 0.4 mg  0.4 mg Sublingual Q5 Min PRN Giancarlo Demarco MD        ondansetron ODT (ZOFRAN-ODT) disintegrating tablet 4 mg  4 mg Oral Q6H PRN Giancarlo Demarco MD        pregabalin (LYRICA) capsule 200 mg  200 mg Oral TID Giancarlo Demarco MD   200 mg at 01/30/25 2050    sodium chloride 0.9 % flush 10 mL  10 mL Intravenous Q12H Giancarlo Demarco MD   10 mL at 01/30/25 2052    sodium chloride 0.9 % flush 10 mL  10 mL Intravenous PRN Giancarlo Demarco MD   10 mL at 01/28/25 1626    sodium chloride 0.9 % infusion 40 mL  40 mL Intravenous PRN Giancarlo Demarco MD         Orders (last 24 hrs)        Start     Ordered    01/31/25 0600  CBC Auto Differential  PROCEDURE ONCE         01/30/25 2201    01/30/25 2105  POC Glucose Once  PROCEDURE ONCE        Comments: Complete no more than 45 minutes prior to patient eating      01/30/25 2053 01/30/25 1643  POC  Glucose Once  PROCEDURE ONCE        Comments: Complete no more than 45 minutes prior to patient eating      01/30/25 1631    01/30/25 1115  POC Glucose Once  PROCEDURE ONCE        Comments: Complete no more than 45 minutes prior to patient eating      01/30/25 1103    01/30/25 0736  POC Glucose Once  PROCEDURE ONCE        Comments: Complete no more than 45 minutes prior to patient eating      01/30/25 0724    01/29/25 0837  HYDROcodone-acetaminophen (NORCO) 7.5-325 MG per tablet 1 tablet  Every 6 Hours PRN         01/29/25 0838    01/28/25 0900  buPROPion XL (WELLBUTRIN XL) 24 hr tablet 150 mg  Daily         01/27/25 1714    01/28/25 0600  CBC & Differential  Daily       01/27/25 1354    01/28/25 0600  Basic Metabolic Panel  Daily       01/27/25 1354    01/27/25 2100  amitriptyline (ELAVIL) tablet 10 mg  Nightly         01/27/25 1354    01/27/25 1730  Insulin Lispro (humaLOG) injection 2-9 Units  4 Times Daily Before Meals & Nightly         01/27/25 1354    01/27/25 1700  POC Glucose 4x Daily Before Meals & at Bedtime  4 Times Daily Before Meals & at Bedtime      Comments: Complete no more than 45 minutes prior to patient eating      01/27/25 1354    01/27/25 1500  pregabalin (LYRICA) capsule 200 mg  3 times daily         01/27/25 1354    01/27/25 1445  [Held by provider]  furosemide (LASIX) tablet 40 mg  Daily        (On hold since Tue 1/28/2025 at 1559 until manually unheld; held by Giancarlo Demarco MDHold Reason: Other (Comment Required)Hold Comments: Patient reports not taking every day.)    01/27/25 1354    01/27/25 1445  sodium chloride 0.9 % flush 10 mL  Every 12 Hours Scheduled         01/27/25 1354    01/27/25 1400  Incentive Spirometry  Every 2 Hours While Awake       01/27/25 1354    01/27/25 1353  sodium chloride 0.9 % flush 10 mL  As Needed         01/27/25 1354    01/27/25 1353  sodium chloride 0.9 % infusion 40 mL  As Needed         01/27/25 1354    01/27/25 1353  acetaminophen (TYLENOL) tablet 650 mg  " Every 4 Hours PRN        Placed in \"Or\" Linked Group    01/27/25 1354    01/27/25 1353  acetaminophen (TYLENOL) 160 MG/5ML oral solution 650 mg  Every 4 Hours PRN        Placed in \"Or\" Linked Group    01/27/25 1354    01/27/25 1353  acetaminophen (TYLENOL) suppository 650 mg  Every 4 Hours PRN        Placed in \"Or\" Linked Group    01/27/25 1354    01/27/25 1353  HYDROmorphone (DILAUDID) injection 1 mg  Every 3 Hours PRN        Placed in \"And\" Linked Group    01/27/25 1354    01/27/25 1353  naloxone (NARCAN) injection 0.4 mg  Every 5 Minutes PRN        Placed in \"And\" Linked Group    01/27/25 1354    01/27/25 1353  ondansetron ODT (ZOFRAN-ODT) disintegrating tablet 4 mg  Every 6 Hours PRN         01/27/25 1354    01/27/25 1353  dextrose (GLUTOSE) oral gel 15 g  Every 15 Minutes PRN         01/27/25 1354    01/27/25 1353  dextrose (D50W) (25 g/50 mL) IV injection 25 g  Every 15 Minutes PRN         01/27/25 1354    01/27/25 1353  glucagon (GLUCAGEN) injection 1 mg  Every 15 Minutes PRN         01/27/25 1354    01/27/25 1353  nitroglycerin (NITROSTAT) SL tablet 0.4 mg  Every 5 Minutes PRN         01/27/25 1354    Unscheduled  Bladder Scan if Patient Unable to Void 4-6 Hours After Catheter Removal  As Needed         01/27/25 1354    Unscheduled  Straight Cath Every 4-6 Hours As Needed If Patient is Unable to Void After 4-6 Hours, Bladder Scan Volume is Greater Than 500mL & Patient Has Symptoms of Bladder Discomfort / Distention  As Needed       01/27/25 1354    Unscheduled  Schedule / Prompt Voiding For Patients With Urinary Incontinence  As Needed       01/27/25 [facility]    Unscheduled  Follow Hypoglycemia Standing Orders For Blood Glucose <70 & Notify Provider of Treatment  As Needed      Comments: Follow Hypoglycemia Orders As Outlined in Process Instructions (Open Order Report to View Full Instructions)  Notify Provider Any Time Hypoglycemia Treatment is Administered    01/27/25 [facility]    --  buPROPion XL (WELLBUTRIN XL) " 150 MG 24 hr tablet  Daily         01/27/25 1637    --  acitretin (SORIATANE) 25 MG capsule  Every Morning Before Breakfast         01/27/25 1700    --  mupirocin (BACTROBAN) 2 % ointment  Daily         01/27/25 1700    --  Semaglutide, 1 MG/DOSE, (Ozempic, 1 MG/DOSE,) 4 MG/3ML solution pen-injector  Weekly         01/27/25 1701                     Physician Progress Notes (last 24 hours)        Giancarlo Demarco MD at 01/30/25 1421             LOS: 3 days   Patient Care Team:  Benjamin Kelsey MD as PCP - General (Family Medicine)    Chief Complaint: Admitted for left diabetic foot wound    Subjective     Patient Complaints: Pain improving status post left BKA    Objective     Vital Signs  Temp:  [97.8 °F (36.6 °C)-98.5 °F (36.9 °C)] 98 °F (36.7 °C)  Heart Rate:  [87-90] 87  Resp:  [16-18] 16  BP: (101-133)/(49-74) 103/49    Physical Exam  Cardiovascular:      Rate and Rhythm: Normal rate and regular rhythm.   Pulmonary:      Effort: Pulmonary effort is normal. No respiratory distress.   Musculoskeletal:      Comments: Left BKA with dressing clean dry intact.  Knee immobilizer in place.         Laboratory Data:   Results from last 7 days   Lab Units 01/30/25 0308 01/29/25 0329 01/28/25  0349   WBC 10*3/mm3 9.37 10.29 10.57   HEMOGLOBIN g/dL 10.6* 10.8* 11.6*   HEMATOCRIT % 33.4* 34.4 37.4   PLATELETS 10*3/mm3 275 278 302       Results from last 7 days   Lab Units 01/30/25  0308 01/29/25  0329 01/28/25  0349   SODIUM mmol/L 136 133* 135*   POTASSIUM mmol/L 4.0 3.8 4.2   CHLORIDE mmol/L 97* 96* 95*   CO2 mmol/L 30.0* 30.0* 32.0*   BUN mg/dL 8 10 11   CREATININE mg/dL 0.59 0.66 0.69   CALCIUM mg/dL 8.8 8.5* 8.8   GLUCOSE mg/dL 198* 191* 156*     Results from last 7 days   Lab Units 01/24/25  0859   PROTIME Seconds 13.9   INR  1.02   APTT seconds 23.9*            Medication Review:     Assessment & Plan       Charcot foot due to diabetes mellitus    Diabetic foot infection    Preoperative testing     Osteomyelitis      49-year-old female admitted after left BKA on 1.27 for chronic osteo of left foot.    -Afebrile vital signs stable continue to monitor  -Pain controlled.    -PT ordered  -Will change dressing in a.m.  -Rehab placement pending.  -Possible DC tomorrow once placement determined.    Plan for disposition:    Giancarlo Demarco MD  Vascular Surgery  [phone]  01/30/25  14:22 CST      Electronically signed by Giancarlo Demarco MD at 01/30/25 8280       Consult Notes (last 24 hours)  Notes from 01/30/25 0616 through 01/31/25 0616   No notes of this type exist for this encounter.

## 2025-01-31 NOTE — PLAN OF CARE
Goal Outcome Evaluation:      Patient states pain is constantly a 9 or 10. Requesting IV and oral pain meds. Up to BSC. Pivots well. Safety maintained. No falls or injuries this shift. Call light within reach. Denies needs at this time.   Patient wore cpap for about 3 hours.

## 2025-01-31 NOTE — DISCHARGE SUMMARY
Date of Discharge:  1/31/2025    Discharge Diagnosis: Diabetic foot infection [E11.628, L08.9]  Preoperative testing [Z01.818]  Charcot foot due to diabetes mellitus [E11.610]  Osteomyelitis [M86.9]    Presenting Problem/History of Present Illness  Diabetic foot infection [E11.628, L08.9]  Preoperative testing [Z01.818]  Charcot foot due to diabetes mellitus [E11.610]  Osteomyelitis [M86.9]       Hospital Course  Patient is a 49 y.o. female presented with left foot wound, she has been dealing with this for at least 4 years.  She does have chronic osteo in her left foot.  She decided that she would rather move forward with left below-knee amputation.  She did undergo left below-knee amputation without incident.  She was transferred to PACU for continued monitoring.  Her vitals remained stable.  She was transferred to  for continued monitoring.  She has done well.  Vitals are stable.  Incision is clean, dry, and intact.  Pain controlled.  She is stable and ready for discharge.  We will see her back in 2 weeks for postoperative follow-up. Written and verbal instructions were given.  This was all discussed in full with complete understanding.                Procedures Performed  Procedure(s):  LEFT BELOW KNEE AMPUTATION       Consults:   Consults       No orders found from 12/29/2024 to 1/28/2025.              Condition on Discharge: Stable    Discharge Medications     Discharge Medications        New Medications        Instructions Start Date   HYDROcodone-acetaminophen  MG per tablet  Commonly known as: NORCO   1 tablet, Oral, Every 6 Hours PRN             Continue These Medications        Instructions Start Date   acitretin 25 MG capsule  Commonly known as: SORIATANE   25 mg, Every Morning Before Breakfast      amitriptyline 10 MG tablet  Commonly known as: ELAVIL   10 mg, Oral, Nightly      buPROPion  MG 24 hr tablet  Commonly known as: WELLBUTRIN XL   150 mg, Daily      furosemide 40 MG  tablet  Commonly known as: LASIX   40 mg, Oral, Daily      ibuprofen 800 MG tablet  Commonly known as: ADVIL,MOTRIN   800 mg, Oral, Every 8 Hours PRN      mupirocin 2 % ointment  Commonly known as: BACTROBAN   1 Application, Daily      Ozempic (1 MG/DOSE) 4 MG/3ML solution pen-injector  Generic drug: Semaglutide (1 MG/DOSE)   1 mg, Weekly      pregabalin 200 MG capsule  Commonly known as: LYRICA   200 mg, Oral, 3 times daily               Discharge Diet:   Diet Instructions       Diet: Regular/House Diet; Regular (IDDSI 7); Thin (IDDSI 0)      Discharge Diet: Regular/House Diet    Texture: Regular (IDDSI 7)    Fluid Consistency: Thin (IDDSI 0)            Activity at Discharge:   Activity Instructions       Bathing Restrictions      Type of Restriction: Bathing    Bathing Restrictions: No Tub Bath    Driving Restrictions      Type of Restriction: Driving    Driving Restrictions: No Driving (Time Limited)    Length: 2 Weeks            Follow-up Appointments  Future Appointments   Date Time Provider Department Center   2/7/2025 10:00 AM CHAIR 14  [facility] OP INFU ONC  [facility]   2/14/2025 10:00 AM Giancarlo Demarco MD [facility]   2/21/2025  9:00 AM CHAIR 11  [facility] OP INFU ONC  [facility] P[facility]     Additional Instructions for the Follow-ups that You Need to Schedule       Discharge Follow-up with Specialty: Dav; 2 Weeks   As directed      Specialty: Dav   Follow Up: 2 Weeks   Follow Up Details: Left BKA post op                 I did spend more than 30 minutes reviewing the chart, face to face encounter, and organizing discharge.    Bree Boss, LORENA  01/31/25  13:08 CST

## 2025-01-31 NOTE — PLAN OF CARE
Goal Outcome Evaluation:  Plan of Care Reviewed With: patient, father, mother        Progress: improving  Outcome Evaluation: pt trans to EOB sba, sit-stand sba, took 2-3 scoots to BSC, then 3-4 scoots to chair, sit-stand from chair cga-sba, took 2 hops forward, worked on L hip ext in standing, then took scoots back to chair with rwx cga, BLE HEP in chair, also instructed pt to perform HEP on her own. pt would benefit from acute inpt rehab

## 2025-02-01 LAB
ALBUMIN SERPL-MCNC: 3.3 G/DL (ref 3.5–5.2)
ALP SERPL-CCNC: 71 U/L (ref 35–104)
ALT SERPL-CCNC: 8 U/L (ref 5–33)
ANION GAP SERPL CALCULATED.3IONS-SCNC: 11 MMOL/L (ref 8–16)
AST SERPL-CCNC: 13 U/L (ref 5–32)
BASOPHILS # BLD: 0 K/UL (ref 0–0.2)
BASOPHILS NFR BLD: 0.5 % (ref 0–1)
BILIRUB SERPL-MCNC: 0.3 MG/DL (ref 0.2–1.2)
BUN SERPL-MCNC: 10 MG/DL (ref 6–20)
CALCIUM SERPL-MCNC: 9 MG/DL (ref 8.6–10)
CHLORIDE SERPL-SCNC: 101 MMOL/L (ref 98–107)
CO2 SERPL-SCNC: 28 MMOL/L (ref 22–29)
CREAT SERPL-MCNC: 0.6 MG/DL (ref 0.5–0.9)
EOSINOPHIL # BLD: 0.6 K/UL (ref 0–0.6)
EOSINOPHIL NFR BLD: 9.8 % (ref 0–5)
ERYTHROCYTE [DISTWIDTH] IN BLOOD BY AUTOMATED COUNT: 14.6 % (ref 11.5–14.5)
GLUCOSE BLD-MCNC: 132 MG/DL (ref 70–99)
GLUCOSE BLD-MCNC: 148 MG/DL (ref 70–99)
GLUCOSE BLD-MCNC: 205 MG/DL (ref 70–99)
GLUCOSE BLD-MCNC: 262 MG/DL (ref 70–99)
GLUCOSE SERPL-MCNC: 122 MG/DL (ref 70–99)
HCT VFR BLD AUTO: 35 % (ref 37–47)
HGB BLD-MCNC: 10.8 G/DL (ref 12–16)
IMM GRANULOCYTES # BLD: 0 K/UL
LYMPHOCYTES # BLD: 2.2 K/UL (ref 1.1–4.5)
LYMPHOCYTES NFR BLD: 36.4 % (ref 20–40)
MCH RBC QN AUTO: 28.2 PG (ref 27–31)
MCHC RBC AUTO-ENTMCNC: 30.9 G/DL (ref 33–37)
MCV RBC AUTO: 91.4 FL (ref 81–99)
MONOCYTES # BLD: 0.3 K/UL (ref 0–0.9)
MONOCYTES NFR BLD: 5.4 % (ref 0–10)
NEUTROPHILS # BLD: 2.9 K/UL (ref 1.5–7.5)
NEUTS SEG NFR BLD: 47.6 % (ref 50–65)
PERFORMED ON: ABNORMAL
PLATELET # BLD AUTO: 298 K/UL (ref 130–400)
PMV BLD AUTO: 9.3 FL (ref 9.4–12.3)
POTASSIUM SERPL-SCNC: 4.4 MMOL/L (ref 3.5–5.1)
PREALB SERPL-MCNC: 12 MG/DL (ref 20–40)
PROT SERPL-MCNC: 7.3 G/DL (ref 6.4–8.3)
RBC # BLD AUTO: 3.83 M/UL (ref 4.2–5.4)
SODIUM SERPL-SCNC: 140 MMOL/L (ref 136–145)
WBC # BLD AUTO: 6.2 K/UL (ref 4.8–10.8)

## 2025-02-01 PROCEDURE — 85025 COMPLETE CBC W/AUTO DIFF WBC: CPT

## 2025-02-01 PROCEDURE — 97110 THERAPEUTIC EXERCISES: CPT

## 2025-02-01 PROCEDURE — 94660 CPAP INITIATION&MGMT: CPT

## 2025-02-01 PROCEDURE — 1180000000 HC REHAB R&B

## 2025-02-01 PROCEDURE — 6360000002 HC RX W HCPCS: Performed by: PSYCHIATRY & NEUROLOGY

## 2025-02-01 PROCEDURE — 94760 N-INVAS EAR/PLS OXIMETRY 1: CPT

## 2025-02-01 PROCEDURE — 97161 PT EVAL LOW COMPLEX 20 MIN: CPT

## 2025-02-01 PROCEDURE — 80053 COMPREHEN METABOLIC PANEL: CPT

## 2025-02-01 PROCEDURE — 82962 GLUCOSE BLOOD TEST: CPT

## 2025-02-01 PROCEDURE — 97165 OT EVAL LOW COMPLEX 30 MIN: CPT

## 2025-02-01 PROCEDURE — 84134 ASSAY OF PREALBUMIN: CPT

## 2025-02-01 PROCEDURE — 99222 1ST HOSP IP/OBS MODERATE 55: CPT | Performed by: PSYCHIATRY & NEUROLOGY

## 2025-02-01 PROCEDURE — 6370000000 HC RX 637 (ALT 250 FOR IP): Performed by: PSYCHIATRY & NEUROLOGY

## 2025-02-01 PROCEDURE — 36415 COLL VENOUS BLD VENIPUNCTURE: CPT

## 2025-02-01 PROCEDURE — 97116 GAIT TRAINING THERAPY: CPT

## 2025-02-01 PROCEDURE — 97535 SELF CARE MNGMENT TRAINING: CPT

## 2025-02-01 RX ORDER — MINERAL OIL/HYDROPHIL PETROLAT
OINTMENT (GRAM) TOPICAL 2 TIMES DAILY PRN
Status: DISCONTINUED | OUTPATIENT
Start: 2025-02-01 | End: 2025-02-13 | Stop reason: HOSPADM

## 2025-02-01 RX ORDER — INSULIN LISPRO 100 [IU]/ML
0-8 INJECTION, SOLUTION INTRAVENOUS; SUBCUTANEOUS
Status: DISCONTINUED | OUTPATIENT
Start: 2025-02-01 | End: 2025-02-01

## 2025-02-01 RX ORDER — DEXTROSE MONOHYDRATE 100 MG/ML
INJECTION, SOLUTION INTRAVENOUS CONTINUOUS PRN
Status: DISCONTINUED | OUTPATIENT
Start: 2025-02-01 | End: 2025-02-08 | Stop reason: SDUPTHER

## 2025-02-01 RX ORDER — ENOXAPARIN SODIUM 100 MG/ML
30 INJECTION SUBCUTANEOUS 2 TIMES DAILY
Status: DISCONTINUED | OUTPATIENT
Start: 2025-02-01 | End: 2025-02-13 | Stop reason: HOSPADM

## 2025-02-01 RX ORDER — AMITRIPTYLINE HYDROCHLORIDE 10 MG/1
25 TABLET ORAL NIGHTLY
Status: DISCONTINUED | OUTPATIENT
Start: 2025-02-01 | End: 2025-02-13 | Stop reason: HOSPADM

## 2025-02-01 RX ORDER — INSULIN LISPRO 100 [IU]/ML
0-8 INJECTION, SOLUTION INTRAVENOUS; SUBCUTANEOUS
Status: DISCONTINUED | OUTPATIENT
Start: 2025-02-01 | End: 2025-02-13 | Stop reason: HOSPADM

## 2025-02-01 RX ORDER — POLYETHYLENE GLYCOL 3350 17 G/17G
17 POWDER, FOR SOLUTION ORAL DAILY
Status: DISCONTINUED | OUTPATIENT
Start: 2025-02-01 | End: 2025-02-13 | Stop reason: HOSPADM

## 2025-02-01 RX ORDER — HYDROCODONE BITARTRATE AND ACETAMINOPHEN 10; 325 MG/1; MG/1
1 TABLET ORAL EVERY 4 HOURS PRN
Status: DISCONTINUED | OUTPATIENT
Start: 2025-02-01 | End: 2025-02-13 | Stop reason: HOSPADM

## 2025-02-01 RX ORDER — GLUCAGON 1 MG/ML
1 KIT INJECTION PRN
Status: DISCONTINUED | OUTPATIENT
Start: 2025-02-01 | End: 2025-02-08 | Stop reason: SDUPTHER

## 2025-02-01 RX ADMIN — HYDROCODONE BITARTRATE AND ACETAMINOPHEN 1 TABLET: 10; 325 TABLET ORAL at 15:42

## 2025-02-01 RX ADMIN — PREGABALIN 200 MG: 150 CAPSULE ORAL at 08:03

## 2025-02-01 RX ADMIN — HYDROCODONE BITARTRATE AND ACETAMINOPHEN 1 TABLET: 10; 325 TABLET ORAL at 23:44

## 2025-02-01 RX ADMIN — PREGABALIN 200 MG: 150 CAPSULE ORAL at 13:49

## 2025-02-01 RX ADMIN — HYDROCODONE BITARTRATE AND ACETAMINOPHEN 1 TABLET: 10; 325 TABLET ORAL at 11:29

## 2025-02-01 RX ADMIN — ENOXAPARIN SODIUM 30 MG: 100 INJECTION SUBCUTANEOUS at 20:28

## 2025-02-01 RX ADMIN — INSULIN LISPRO 2 UNITS: 100 INJECTION, SOLUTION INTRAVENOUS; SUBCUTANEOUS at 20:39

## 2025-02-01 RX ADMIN — AMITRIPTYLINE HYDROCHLORIDE 25 MG: 10 TABLET, FILM COATED ORAL at 20:28

## 2025-02-01 RX ADMIN — HYDROCODONE BITARTRATE AND ACETAMINOPHEN 1 TABLET: 10; 325 TABLET ORAL at 19:45

## 2025-02-01 RX ADMIN — FUROSEMIDE 40 MG: 20 TABLET ORAL at 08:03

## 2025-02-01 RX ADMIN — BUPROPION HYDROCHLORIDE 150 MG: 150 TABLET, EXTENDED RELEASE ORAL at 08:03

## 2025-02-01 RX ADMIN — ENOXAPARIN SODIUM 30 MG: 100 INJECTION SUBCUTANEOUS at 11:29

## 2025-02-01 RX ADMIN — SENNOSIDES AND DOCUSATE SODIUM 1 TABLET: 50; 8.6 TABLET ORAL at 20:29

## 2025-02-01 RX ADMIN — INSULIN LISPRO 4 UNITS: 100 INJECTION, SOLUTION INTRAVENOUS; SUBCUTANEOUS at 08:02

## 2025-02-01 RX ADMIN — PREGABALIN 200 MG: 150 CAPSULE ORAL at 20:29

## 2025-02-01 RX ADMIN — SENNOSIDES AND DOCUSATE SODIUM 1 TABLET: 50; 8.6 TABLET ORAL at 08:03

## 2025-02-01 RX ADMIN — HYDROCODONE BITARTRATE AND ACETAMINOPHEN 1 TABLET: 10; 325 TABLET ORAL at 04:46

## 2025-02-01 ASSESSMENT — PAIN DESCRIPTION - ORIENTATION
ORIENTATION: LEFT
ORIENTATION: LEFT;LOWER
ORIENTATION: LEFT

## 2025-02-01 ASSESSMENT — PAIN SCALES - GENERAL
PAINLEVEL_OUTOF10: 7
PAINLEVEL_OUTOF10: 7
PAINLEVEL_OUTOF10: 6
PAINLEVEL_OUTOF10: 9
PAINLEVEL_OUTOF10: 7
PAINLEVEL_OUTOF10: 4
PAINLEVEL_OUTOF10: 9

## 2025-02-01 ASSESSMENT — PAIN DESCRIPTION - DESCRIPTORS
DESCRIPTORS: THROBBING
DESCRIPTORS: ACHING;THROBBING
DESCRIPTORS: THROBBING
DESCRIPTORS: ACHING;THROBBING
DESCRIPTORS: ACHING

## 2025-02-01 ASSESSMENT — PAIN DESCRIPTION - LOCATION
LOCATION: LEG

## 2025-02-01 ASSESSMENT — PAIN - FUNCTIONAL ASSESSMENT
PAIN_FUNCTIONAL_ASSESSMENT: ACTIVITIES ARE NOT PREVENTED
PAIN_FUNCTIONAL_ASSESSMENT: ACTIVITIES ARE NOT PREVENTED

## 2025-02-01 NOTE — PAYOR COMM NOTE
"DC TO REHAB 1-31-25    Donna Monteiro (49 y.o. Female)       Date of Birth   1975    Social Security Number       Address   [address] [facility] [address]    Home Phone   [phone]    MRN   [id number]       Confucianism   Other    Marital Status   Single                            Admission Date   1/27/25    Admission Type   Elective    Admitting Provider   Giancarlo Demarco MD    Attending Provider       Department, Room/Bed   [facility] 3C, 381/1       Discharge Date   1/31/2025    Discharge Disposition   Rehab Facility or Unit (DC - External)    Discharge Destination                                 Attending Provider: (none)   Allergies: Vancomycin    Isolation: None   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)   Code Status: Prior    Ht: 175 cm (68.9\")   Wt: 140 kg (309 lb 4.9 oz)    Admission Cmt: None   Principal Problem: Charcot foot due to diabetes mellitus [E11.610]                   Active Insurance as of 1/27/2025       Primary Coverage       Payor Plan Insurance Group Employer/Plan Group    ANTHEM MEDICARE REPLACEMENT ANTHEM MED ADV SNP [id number]       Payor Plan Address Payor Plan Phone Number Payor Plan Fax Number Effective Dates    [address] [phone]  1/1/2025 - None Entered    [facility] [address]         Subscriber Name Subscriber Birth Date Member ID       DONNA MONTEIRO 1975 [id number]                     Emergency Contacts        (Rel.) Home Phone Work Phone Mobile Phone    Martha Monteiro (Mother) [phone] -- --                 Discharge Summary        Bree Boss APRN at 01/31/25 1308       Attestation signed by Giancarlo Demarco MD at 01/31/25 1420    I have reviewed this documentation and agree.                    Date of Discharge:  1/31/2025    Discharge Diagnosis: Diabetic foot infection [E11.628, L08.9]  Preoperative testing [Z01.818]  Charcot foot due to diabetes mellitus [E11.610]  Osteomyelitis [M86.9]    Presenting Problem/History of Present " Illness  Diabetic foot infection [E11.628, L08.9]  Preoperative testing [Z01.818]  Charcot foot due to diabetes mellitus [E11.610]  Osteomyelitis [M86.9]       Hospital Course  Patient is a 49 y.o. female presented with left foot wound, she has been dealing with this for at least 4 years.  She does have chronic osteo in her left foot.  She decided that she would rather move forward with left below-knee amputation.  She did undergo left below-knee amputation without incident.  She was transferred to PACU for continued monitoring.  Her vitals remained stable.  She was transferred to  for continued monitoring.  She has done well.  Vitals are stable.  Incision is clean, dry, and intact.  Pain controlled.  She is stable and ready for discharge.  We will see her back in 2 weeks for postoperative follow-up. Written and verbal instructions were given.  This was all discussed in full with complete understanding.                Procedures Performed  Procedure(s):  LEFT BELOW KNEE AMPUTATION       Consults:   Consults       No orders found from 12/29/2024 to 1/28/2025.              Condition on Discharge: Stable    Discharge Medications     Discharge Medications        New Medications        Instructions Start Date   HYDROcodone-acetaminophen  MG per tablet  Commonly known as: NORCO   1 tablet, Oral, Every 6 Hours PRN             Continue These Medications        Instructions Start Date   acitretin 25 MG capsule  Commonly known as: SORIATANE   25 mg, Every Morning Before Breakfast      amitriptyline 10 MG tablet  Commonly known as: ELAVIL   10 mg, Oral, Nightly      buPROPion  MG 24 hr tablet  Commonly known as: WELLBUTRIN XL   150 mg, Daily      furosemide 40 MG tablet  Commonly known as: LASIX   40 mg, Oral, Daily      ibuprofen 800 MG tablet  Commonly known as: ADVIL,MOTRIN   800 mg, Oral, Every 8 Hours PRN      mupirocin 2 % ointment  Commonly known as: BACTROBAN   1 Application, Daily      Ozempic (1  MG/DOSE) 4 MG/3ML solution pen-injector  Generic drug: Semaglutide (1 MG/DOSE)   1 mg, Weekly      pregabalin 200 MG capsule  Commonly known as: LYRICA   200 mg, Oral, 3 times daily               Discharge Diet:   Diet Instructions       Diet: Regular/House Diet; Regular (IDDSI 7); Thin (IDDSI 0)      Discharge Diet: Regular/House Diet    Texture: Regular (IDDSI 7)    Fluid Consistency: Thin (IDDSI 0)            Activity at Discharge:   Activity Instructions       Bathing Restrictions      Type of Restriction: Bathing    Bathing Restrictions: No Tub Bath    Driving Restrictions      Type of Restriction: Driving    Driving Restrictions: No Driving (Time Limited)    Length: 2 Weeks            Follow-up Appointments  Future Appointments   Date Time Provider Department Center   2/7/2025 10:00 AM CHAIR 14  [facility] OP INFU ONC  [facility] PAD   2/14/2025 10:00 AM Giancarlo Demarco MD [facility] P[facility]   2/21/2025  9:00 AM CHAIR 11  PAD OP INFU ONC  [facility] PAD     Additional Instructions for the Follow-ups that You Need to Schedule       Discharge Follow-up with Specialty: Dav; 2 Weeks   As directed      Specialty: Dav   Follow Up: 2 Weeks   Follow Up Details: Left BKA post op                 I did spend more than 30 minutes reviewing the chart, face to face encounter, and organizing discharge.    LORENA Coles  01/31/25  13:08 CST        Electronically signed by Giancarlo Demarco MD at 01/31/25 6252

## 2025-02-01 NOTE — H&P
education, independent ADLs, pain management, precautions, range of motion, safety, strength, and transfers    I have reviewed the preadmission screening documents and concur with the findings. I believe the patient meets criteria and is sufficiently stable to allow participation in the program. This requires an intensive level of therapy, close medical supervision, and an interdisciplinary team approach provided through an individualized plan of care. I approve admitting this patient for an intensive inpatient rehabilitation program.      Raf Solano MD

## 2025-02-01 NOTE — THERAPY DISCHARGE NOTE
Acute Care - Occupational Therapy Discharge Summary  [facility]     Patient Name: Donna Swain  : 1975  MRN: [id number]    Today's Date: 2025                 Admit Date: 2025        OT Recommendation and Plan    Visit Dx:    ICD-10-CM ICD-9-CM   1. Gait abnormality [R26.9]  R26.9 781.2   2. Diabetic foot infection  E11.628 250.80    L08.9 686.9   3. Preoperative testing  Z01.818 V72.84   4. Acute post-operative pain  G89.18 338.18                OT Rehab Goals       Row Name 25 1500             Transfer Goal 1 (OT)    Activity/Assistive Device (Transfer Goal 1, OT) commode, bedside without drop arms  -JW      [place] Level/Cues Needed (Transfer Goal 1, OT) standby assist  -JW      Time Frame (Transfer Goal 1, OT) long term goal (LTG);10 days  -JW      Progress/Outcome (Transfer Goal 1, OT) goal not met  -JW         Dressing Goal 1 (OT)    Activity/Device (Dressing Goal 1, OT) dressing skills, all  -JW      [place]/Cues Needed (Dressing Goal 1, OT) minimum assist (75% or more patient effort)  -JW      Time Frame (Dressing Goal 1, OT) long term goal (LTG);10 days  -JW      Progress/Outcome (Dressing Goal 1, OT) goal not met  -JW         Toileting Goal 1 (OT)    Activity/Device (Toileting Goal 1, OT) toileting skills, all  -JW      [place] Level/Cues Needed (Toileting Goal 1, OT) minimum assist (75% or more patient effort)  -JW      Time Frame (Toileting Goal 1, OT) long term goal (LTG);10 days  -JW      Progress/Outcome (Toileting Goal 1, OT) goal not met  -         Problem Specific Goal 1 (OT)    Problem Specific Goal 1 (OT) Pt will independently implement one pain management technique to decrease pain and improve functional adl performance.  -      Time Frame (Problem Specific Goal 1, OT) long term goal (LTG);by discharge  -JW      Progress/Outcome (Problem Specific Goal 1, OT) goal not met  -                User Key  (r) = Recorded By, (t) = Taken By, (c) = Cosigned By       Initials Name Provider Type [facility] Tammi Singh OTR/L, CSRS Occupational Therapist OT                     Outcome Measures       Row Name 01/31/25 1000 01/30/25 1000          How much help from another person do you currently need...    Turning from your back to your side while in flat bed without using bedrails? 4  -AH 4  -AH     Moving from lying on back to sitting on the side of a flat bed without bedrails? 4  -AH 3  -AH     Moving to and from a bed to a chair (including a wheelchair)? 3  -AH 3  -AH     Standing up from a chair using your arms (e.g., wheelchair, bedside chair)? 3  -AH 3  -AH     Climbing 3-5 steps with a railing? 1  -AH 1  -AH     To walk in hospital room? 2  -AH 1  -AH     AM-PAC 6 Clicks Score (PT) 17  -AH 15  -AH        Functional Assessment    Outcome Measure Options AM-PAC 6 Clicks Basic Mobility (PT)  -AH AM-PAC 6 Clicks Basic Mobility (PT)  -AH               User Key  (r) = Recorded By, (t) = Taken By, (c) = Cosigned By      Initials Name Provider Type     Corinne Mendez, PTA Physical Therapist Assistant                            OT Discharge Summary  Anticipated Discharge Disposition (OT): inpatient rehabilitation facility  Reason for Discharge: Discharge from facility  Outcomes Achieved: Refer to plan of care for updates on goals achieved  Discharge Destination: Inpatient rehabilitation facility      CHRISTOPHER Wilde/L, CSRS  2/1/2025

## 2025-02-01 NOTE — PAYOR COMM NOTE
"DC TO REHAB 1-31-25    Donna Monteiro (49 y.o. Female)       Date of Birth   1975    Social Security Number       Address   [address] [facility] [address]    Home Phone   [phone]    MRN   [id number]       Synagogue   Other    Marital Status   Single                            Admission Date   1/27/25    Admission Type   Elective    Admitting Provider   Giancarlo Demarco MD    Attending Provider       Department, Room/Bed   [facility] 3C, 381/1       Discharge Date   1/31/2025    Discharge Disposition   Rehab Facility or Unit (DC - External)    Discharge Destination                                 Attending Provider: (none)   Allergies: Vancomycin    Isolation: None   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)   Code Status: Prior    Ht: 175 cm (68.9\")   Wt: 140 kg (309 lb 4.9 oz)    Admission Cmt: None   Principal Problem: Charcot foot due to diabetes mellitus [E11.610]                   Active Insurance as of 1/27/2025       Primary Coverage       Payor Plan Insurance Group Employer/Plan Group    ANTHEM MEDICARE REPLACEMENT ANTHEM MED ADV SNP [id number]       Payor Plan Address Payor Plan Phone Number Payor Plan Fax Number Effective Dates    [address] [phone]  1/1/2025 - None Entered    [facility] [address]         Subscriber Name Subscriber Birth Date Member ID       DONNA MONTEIRO 1975 [id number]                     Emergency Contacts        (Rel.) Home Phone Work Phone Mobile Phone    Martha Monteiro (Mother) [phone] -- --                 Discharge Summary        Bree Boss APRN at 01/31/25 1308       Attestation signed by Giancarlo Demarco MD at 01/31/25 1420    I have reviewed this documentation and agree.                    Date of Discharge:  1/31/2025    Discharge Diagnosis: Diabetic foot infection [E11.628, L08.9]  Preoperative testing [Z01.818]  Charcot foot due to diabetes mellitus [E11.610]  Osteomyelitis [M86.9]    Presenting Problem/History of Present " Illness  Diabetic foot infection [E11.628, L08.9]  Preoperative testing [Z01.818]  Charcot foot due to diabetes mellitus [E11.610]  Osteomyelitis [M86.9]       Hospital Course  Patient is a 49 y.o. female presented with left foot wound, she has been dealing with this for at least 4 years.  She does have chronic osteo in her left foot.  She decided that she would rather move forward with left below-knee amputation.  She did undergo left below-knee amputation without incident.  She was transferred to PACU for continued monitoring.  Her vitals remained stable.  She was transferred to  for continued monitoring.  She has done well.  Vitals are stable.  Incision is clean, dry, and intact.  Pain controlled.  She is stable and ready for discharge.  We will see her back in 2 weeks for postoperative follow-up. Written and verbal instructions were given.  This was all discussed in full with complete understanding.                Procedures Performed  Procedure(s):  LEFT BELOW KNEE AMPUTATION       Consults:   Consults       No orders found from 12/29/2024 to 1/28/2025.              Condition on Discharge: Stable    Discharge Medications     Discharge Medications        New Medications        Instructions Start Date   HYDROcodone-acetaminophen  MG per tablet  Commonly known as: NORCO   1 tablet, Oral, Every 6 Hours PRN             Continue These Medications        Instructions Start Date   acitretin 25 MG capsule  Commonly known as: SORIATANE   25 mg, Every Morning Before Breakfast      amitriptyline 10 MG tablet  Commonly known as: ELAVIL   10 mg, Oral, Nightly      buPROPion  MG 24 hr tablet  Commonly known as: WELLBUTRIN XL   150 mg, Daily      furosemide 40 MG tablet  Commonly known as: LASIX   40 mg, Oral, Daily      ibuprofen 800 MG tablet  Commonly known as: ADVIL,MOTRIN   800 mg, Oral, Every 8 Hours PRN      mupirocin 2 % ointment  Commonly known as: BACTROBAN   1 Application, Daily      Ozempic (1  MG/DOSE) 4 MG/3ML solution pen-injector  Generic drug: Semaglutide (1 MG/DOSE)   1 mg, Weekly      pregabalin 200 MG capsule  Commonly known as: LYRICA   200 mg, Oral, 3 times daily               Discharge Diet:   Diet Instructions       Diet: Regular/House Diet; Regular (IDDSI 7); Thin (IDDSI 0)      Discharge Diet: Regular/House Diet    Texture: Regular (IDDSI 7)    Fluid Consistency: Thin (IDDSI 0)            Activity at Discharge:   Activity Instructions       Bathing Restrictions      Type of Restriction: Bathing    Bathing Restrictions: No Tub Bath    Driving Restrictions      Type of Restriction: Driving    Driving Restrictions: No Driving (Time Limited)    Length: 2 Weeks            Follow-up Appointments  Future Appointments   Date Time Provider Department Center   2/7/2025 10:00 AM CHAIR 14  [facility] OP INFU ONC  [facility] PAD   2/14/2025 10:00 AM Giancarlo Demarco MD [facility] PAD   2/21/2025  9:00 AM CHAIR 11  PAD OP INFU ONC  [facility] PAD     Additional Instructions for the Follow-ups that You Need to Schedule       Discharge Follow-up with Specialty: Dav; 2 Weeks   As directed      Specialty: Dav   Follow Up: 2 Weeks   Follow Up Details: Left BKA post op                 I did spend more than 30 minutes reviewing the chart, face to face encounter, and organizing discharge.    LORENA Coles  01/31/25  13:08 CST        Electronically signed by Giancarlo Demarco MD at 01/31/25 4903

## 2025-02-01 NOTE — THERAPY EVALUATION
Physical Therapy EVALUATION Note  DATE:  2025  NAME:  Bárbara Ambrocio  :  1975  (49 y.o.,female)  MRN:  [id number]    HEIGHT:  Height: 175.3 cm (5' 9\")  WEIGHT:  Weight - Scale: (!) 139.7 kg (308 lb)    PATIENT DIAGNOSIS(ES):    Diagnosis: LLE BELOW KNEE AMPUTATION    Additional Pertinent Hx: CHARCOT'S JOINT LEFT ANKLE, 4 YEAR HX OF LEFT FOOT WOUND, OSTEOMYELITIS LEFT ANKLE/FOOT,  DM2, LYMPHEDEMA, MORBID OBESITY, NICOTINE DEPENDENCE,  RESTRICTIONS/PRECAUTIONS:    Restrictions/Precautions  Restrictions/Precautions: Fall Risk, Weight Bearing  Required Braces or Orthoses?: Yes  Position Activity Restriction  Other Position/Activity Restrictions: BE-DAYRON TYPE DEVICE FOR LLE  OVERALL  ORIENTATION STATUS:  Overall Orientation Status: Within Normal Limits  PAIN:  Pain Level: 9       Pain Location: Leg     Pain Orientation: Left      GROSS ASSESSMENT        POSTURE/BALANCE  Balance  Sitting - Static: Good  Standing - Dynamic: Fair, +       ACTIVITY TOLERANCE         BED MOBILITY  Bed mobility  Rolling to Left: Stand by assistance  Rolling to Right: Stand by assistance  Supine to Sit: Stand by assistance  Sit to Supine: Stand by assistance  Scooting: Stand by assistance        TRANSFERS  Transfers  Sit to Stand: Contact guard assistance  Stand to Sit: Contact guard assistance, Minimal Assistance  Bed to Chair: Minimal assistance       AMBULATION 1  Ambulation  Surface: Level tile  Device: Parallel Bars  Other Apparatus: Wheelchair follow (STUMP PROTECTOR)  Assistance: Minimal assistance, Moderate assistance  Quality of Gait: TENDS TO HOP, THERAPIST ASSSIT REQUIRED TO OFF LOAD WEIGHT DUE TO DECREASED SHOULDER STRENGTH, SMALL HOP/SWINGS  Distance: 4  STAIRS   UNABLE TO PERFORM  WHEELCHAIR PROPULSION 1  Propulsion 1  Propulsion: Manual  Level: Level Tile  Method: JAMIE PULIDO  Level of Assistance: Stand by assistance  Description/ Details: 2 TURNS  Distance: 50  WHEELCHAIR PROPULSION 2  Propulsion 2  Propulsion 2: Manual  Level

## 2025-02-02 LAB
GLUCOSE BLD-MCNC: 131 MG/DL (ref 70–99)
GLUCOSE BLD-MCNC: 137 MG/DL (ref 70–99)
GLUCOSE BLD-MCNC: 177 MG/DL (ref 70–99)
GLUCOSE BLD-MCNC: 242 MG/DL (ref 70–99)
PERFORMED ON: ABNORMAL

## 2025-02-02 PROCEDURE — 94660 CPAP INITIATION&MGMT: CPT

## 2025-02-02 PROCEDURE — 6360000002 HC RX W HCPCS: Performed by: PSYCHIATRY & NEUROLOGY

## 2025-02-02 PROCEDURE — 6370000000 HC RX 637 (ALT 250 FOR IP): Performed by: PSYCHIATRY & NEUROLOGY

## 2025-02-02 PROCEDURE — 94760 N-INVAS EAR/PLS OXIMETRY 1: CPT

## 2025-02-02 PROCEDURE — 82962 GLUCOSE BLOOD TEST: CPT

## 2025-02-02 PROCEDURE — 1180000000 HC REHAB R&B

## 2025-02-02 RX ADMIN — ENOXAPARIN SODIUM 30 MG: 100 INJECTION SUBCUTANEOUS at 20:25

## 2025-02-02 RX ADMIN — PREGABALIN 200 MG: 150 CAPSULE ORAL at 07:52

## 2025-02-02 RX ADMIN — HYDROCODONE BITARTRATE AND ACETAMINOPHEN 1 TABLET: 10; 325 TABLET ORAL at 05:15

## 2025-02-02 RX ADMIN — HYDROCODONE BITARTRATE AND ACETAMINOPHEN 1 TABLET: 10; 325 TABLET ORAL at 09:35

## 2025-02-02 RX ADMIN — FUROSEMIDE 40 MG: 20 TABLET ORAL at 07:51

## 2025-02-02 RX ADMIN — PREGABALIN 200 MG: 150 CAPSULE ORAL at 13:46

## 2025-02-02 RX ADMIN — HYDROCODONE BITARTRATE AND ACETAMINOPHEN 1 TABLET: 10; 325 TABLET ORAL at 13:46

## 2025-02-02 RX ADMIN — ENOXAPARIN SODIUM 30 MG: 100 INJECTION SUBCUTANEOUS at 07:52

## 2025-02-02 RX ADMIN — BUPROPION HYDROCHLORIDE 150 MG: 150 TABLET, EXTENDED RELEASE ORAL at 07:52

## 2025-02-02 RX ADMIN — SENNOSIDES AND DOCUSATE SODIUM 1 TABLET: 50; 8.6 TABLET ORAL at 07:52

## 2025-02-02 RX ADMIN — INSULIN LISPRO 2 UNITS: 100 INJECTION, SOLUTION INTRAVENOUS; SUBCUTANEOUS at 16:52

## 2025-02-02 RX ADMIN — HYDROCODONE BITARTRATE AND ACETAMINOPHEN 1 TABLET: 10; 325 TABLET ORAL at 17:45

## 2025-02-02 RX ADMIN — AMITRIPTYLINE HYDROCHLORIDE 25 MG: 10 TABLET, FILM COATED ORAL at 20:25

## 2025-02-02 RX ADMIN — PREGABALIN 200 MG: 150 CAPSULE ORAL at 20:25

## 2025-02-02 RX ADMIN — HYDROCODONE BITARTRATE AND ACETAMINOPHEN 1 TABLET: 10; 325 TABLET ORAL at 21:50

## 2025-02-02 RX ADMIN — WHITE PETROLATUM: 1.75 OINTMENT TOPICAL at 08:01

## 2025-02-02 ASSESSMENT — PAIN SCALES - GENERAL
PAINLEVEL_OUTOF10: 4
PAINLEVEL_OUTOF10: 3
PAINLEVEL_OUTOF10: 7
PAINLEVEL_OUTOF10: 7
PAINLEVEL_OUTOF10: 4
PAINLEVEL_OUTOF10: 8
PAINLEVEL_OUTOF10: 7
PAINLEVEL_OUTOF10: 7

## 2025-02-02 ASSESSMENT — PAIN DESCRIPTION - ORIENTATION
ORIENTATION: LEFT

## 2025-02-02 ASSESSMENT — PAIN - FUNCTIONAL ASSESSMENT
PAIN_FUNCTIONAL_ASSESSMENT: ACTIVITIES ARE NOT PREVENTED

## 2025-02-02 ASSESSMENT — PAIN DESCRIPTION - LOCATION
LOCATION: LEG

## 2025-02-02 ASSESSMENT — PAIN DESCRIPTION - DESCRIPTORS
DESCRIPTORS: ACHING
DESCRIPTORS: ACHING;THROBBING
DESCRIPTORS: THROBBING;ACHING
DESCRIPTORS: ACHING;THROBBING
DESCRIPTORS: ACHING;SHARP

## 2025-02-03 LAB
ANION GAP SERPL CALCULATED.3IONS-SCNC: 8 MMOL/L (ref 8–16)
BASOPHILS # BLD: 0 K/UL (ref 0–0.2)
BASOPHILS NFR BLD: 0.4 % (ref 0–1)
BUN SERPL-MCNC: 14 MG/DL (ref 6–20)
CALCIUM SERPL-MCNC: 8.9 MG/DL (ref 8.6–10)
CHLORIDE SERPL-SCNC: 100 MMOL/L (ref 98–107)
CO2 SERPL-SCNC: 31 MMOL/L (ref 22–29)
CREAT SERPL-MCNC: 0.6 MG/DL (ref 0.5–0.9)
EOSINOPHIL # BLD: 0.7 K/UL (ref 0–0.6)
EOSINOPHIL NFR BLD: 11.6 % (ref 0–5)
ERYTHROCYTE [DISTWIDTH] IN BLOOD BY AUTOMATED COUNT: 14.5 % (ref 11.5–14.5)
GLUCOSE BLD-MCNC: 127 MG/DL (ref 70–99)
GLUCOSE BLD-MCNC: 127 MG/DL (ref 70–99)
GLUCOSE BLD-MCNC: 199 MG/DL (ref 70–99)
GLUCOSE BLD-MCNC: 267 MG/DL (ref 70–99)
GLUCOSE SERPL-MCNC: 228 MG/DL (ref 70–99)
HCT VFR BLD AUTO: 35.6 % (ref 37–47)
HGB BLD-MCNC: 11.2 G/DL (ref 12–16)
IMM GRANULOCYTES # BLD: 0 K/UL
LYMPHOCYTES # BLD: 2.2 K/UL (ref 1.1–4.5)
LYMPHOCYTES NFR BLD: 38.3 % (ref 20–40)
MCH RBC QN AUTO: 28.2 PG (ref 27–31)
MCHC RBC AUTO-ENTMCNC: 31.5 G/DL (ref 33–37)
MCV RBC AUTO: 89.7 FL (ref 81–99)
MONOCYTES # BLD: 0.3 K/UL (ref 0–0.9)
MONOCYTES NFR BLD: 5 % (ref 0–10)
NEUTROPHILS # BLD: 2.5 K/UL (ref 1.5–7.5)
NEUTS SEG NFR BLD: 44.3 % (ref 50–65)
PERFORMED ON: ABNORMAL
PLATELET # BLD AUTO: 281 K/UL (ref 130–400)
PMV BLD AUTO: 9 FL (ref 9.4–12.3)
POTASSIUM SERPL-SCNC: 4 MMOL/L (ref 3.5–5)
RBC # BLD AUTO: 3.97 M/UL (ref 4.2–5.4)
SODIUM SERPL-SCNC: 139 MMOL/L (ref 136–145)
WBC # BLD AUTO: 5.6 K/UL (ref 4.8–10.8)

## 2025-02-03 PROCEDURE — 97116 GAIT TRAINING THERAPY: CPT

## 2025-02-03 PROCEDURE — 85025 COMPLETE CBC W/AUTO DIFF WBC: CPT

## 2025-02-03 PROCEDURE — 97110 THERAPEUTIC EXERCISES: CPT

## 2025-02-03 PROCEDURE — 1180000000 HC REHAB R&B

## 2025-02-03 PROCEDURE — 6360000002 HC RX W HCPCS: Performed by: PSYCHIATRY & NEUROLOGY

## 2025-02-03 PROCEDURE — 94760 N-INVAS EAR/PLS OXIMETRY 1: CPT

## 2025-02-03 PROCEDURE — 80048 BASIC METABOLIC PNL TOTAL CA: CPT

## 2025-02-03 PROCEDURE — 82962 GLUCOSE BLOOD TEST: CPT

## 2025-02-03 PROCEDURE — 99232 SBSQ HOSP IP/OBS MODERATE 35: CPT | Performed by: PSYCHIATRY & NEUROLOGY

## 2025-02-03 PROCEDURE — 36415 COLL VENOUS BLD VENIPUNCTURE: CPT

## 2025-02-03 PROCEDURE — 97535 SELF CARE MNGMENT TRAINING: CPT

## 2025-02-03 PROCEDURE — 97530 THERAPEUTIC ACTIVITIES: CPT

## 2025-02-03 PROCEDURE — 6370000000 HC RX 637 (ALT 250 FOR IP): Performed by: PSYCHIATRY & NEUROLOGY

## 2025-02-03 PROCEDURE — 94660 CPAP INITIATION&MGMT: CPT

## 2025-02-03 RX ORDER — GLUCAGON 1 MG/ML
1 KIT INJECTION PRN
Status: DISCONTINUED | OUTPATIENT
Start: 2025-02-03 | End: 2025-02-13 | Stop reason: HOSPADM

## 2025-02-03 RX ORDER — METHOCARBAMOL 500 MG/1
1000 TABLET, FILM COATED ORAL 3 TIMES DAILY
Status: DISCONTINUED | OUTPATIENT
Start: 2025-02-03 | End: 2025-02-13 | Stop reason: HOSPADM

## 2025-02-03 RX ORDER — DEXTROSE MONOHYDRATE 100 MG/ML
INJECTION, SOLUTION INTRAVENOUS CONTINUOUS PRN
Status: DISCONTINUED | OUTPATIENT
Start: 2025-02-03 | End: 2025-02-13 | Stop reason: HOSPADM

## 2025-02-03 RX ADMIN — ENOXAPARIN SODIUM 30 MG: 100 INJECTION SUBCUTANEOUS at 08:30

## 2025-02-03 RX ADMIN — ENOXAPARIN SODIUM 30 MG: 100 INJECTION SUBCUTANEOUS at 20:19

## 2025-02-03 RX ADMIN — PREGABALIN 200 MG: 150 CAPSULE ORAL at 14:17

## 2025-02-03 RX ADMIN — INSULIN LISPRO 4 UNITS: 100 INJECTION, SOLUTION INTRAVENOUS; SUBCUTANEOUS at 08:31

## 2025-02-03 RX ADMIN — INSULIN LISPRO 2 UNITS: 100 INJECTION, SOLUTION INTRAVENOUS; SUBCUTANEOUS at 20:29

## 2025-02-03 RX ADMIN — PREGABALIN 200 MG: 150 CAPSULE ORAL at 20:19

## 2025-02-03 RX ADMIN — SENNOSIDES AND DOCUSATE SODIUM 1 TABLET: 50; 8.6 TABLET ORAL at 20:20

## 2025-02-03 RX ADMIN — METHOCARBAMOL 1000 MG: 500 TABLET ORAL at 18:10

## 2025-02-03 RX ADMIN — HYDROCODONE BITARTRATE AND ACETAMINOPHEN 1 TABLET: 10; 325 TABLET ORAL at 12:08

## 2025-02-03 RX ADMIN — INSULIN HUMAN 18 UNITS: 100 INJECTION, SUSPENSION SUBCUTANEOUS at 12:08

## 2025-02-03 RX ADMIN — INSULIN HUMAN 18 UNITS: 100 INJECTION, SUSPENSION SUBCUTANEOUS at 20:29

## 2025-02-03 RX ADMIN — METHOCARBAMOL 1000 MG: 500 TABLET ORAL at 12:07

## 2025-02-03 RX ADMIN — HYDROCODONE BITARTRATE AND ACETAMINOPHEN 1 TABLET: 10; 325 TABLET ORAL at 02:34

## 2025-02-03 RX ADMIN — HYDROCODONE BITARTRATE AND ACETAMINOPHEN 1 TABLET: 10; 325 TABLET ORAL at 07:04

## 2025-02-03 RX ADMIN — BUPROPION HYDROCHLORIDE 150 MG: 150 TABLET, EXTENDED RELEASE ORAL at 08:30

## 2025-02-03 RX ADMIN — AMITRIPTYLINE HYDROCHLORIDE 25 MG: 10 TABLET, FILM COATED ORAL at 20:20

## 2025-02-03 RX ADMIN — PREGABALIN 200 MG: 150 CAPSULE ORAL at 08:29

## 2025-02-03 RX ADMIN — FUROSEMIDE 40 MG: 20 TABLET ORAL at 12:09

## 2025-02-03 RX ADMIN — HYDROCODONE BITARTRATE AND ACETAMINOPHEN 1 TABLET: 10; 325 TABLET ORAL at 20:20

## 2025-02-03 RX ADMIN — HYDROCODONE BITARTRATE AND ACETAMINOPHEN 1 TABLET: 10; 325 TABLET ORAL at 16:17

## 2025-02-03 ASSESSMENT — PAIN DESCRIPTION - DESCRIPTORS
DESCRIPTORS: ACHING;THROBBING
DESCRIPTORS: ACHING
DESCRIPTORS: ACHING;THROBBING
DESCRIPTORS: ACHING;DISCOMFORT;THROBBING
DESCRIPTORS: ACHING;SPASM;THROBBING

## 2025-02-03 ASSESSMENT — PAIN - FUNCTIONAL ASSESSMENT
PAIN_FUNCTIONAL_ASSESSMENT: ACTIVITIES ARE NOT PREVENTED

## 2025-02-03 ASSESSMENT — PAIN DESCRIPTION - ORIENTATION
ORIENTATION: LEFT

## 2025-02-03 ASSESSMENT — PAIN SCALES - GENERAL
PAINLEVEL_OUTOF10: 8
PAINLEVEL_OUTOF10: 6
PAINLEVEL_OUTOF10: 8
PAINLEVEL_OUTOF10: 8
PAINLEVEL_OUTOF10: 5
PAINLEVEL_OUTOF10: 6
PAINLEVEL_OUTOF10: 9
PAINLEVEL_OUTOF10: 9
PAINLEVEL_OUTOF10: 5

## 2025-02-03 ASSESSMENT — PAIN DESCRIPTION - LOCATION
LOCATION: LEG

## 2025-02-03 NOTE — PATIENT CARE CONFERENCE
[facility] ACUTE INPATIENT REHABILITATION  TEAM CONFERENCE NOTE    Date: 2/3/2025  Patient Name: Bárbara Ambrocio        MRN: [id number]    : 1975  (49 y.o.)  Gender: female      Diagnosis: LLE BELOW KNEE AMPUTATION      PHYSICAL THERAPY  GROSS ASSESSMENT       BED MOBILITY  Bed mobility  Rolling to Left: Stand by assistance  Rolling to Right: Stand by assistance  Supine to Sit: Stand by assistance  Sit to Supine: Stand by assistance  Scooting: Stand by assistance       TRANSFERS  Transfers  Sit to Stand: Contact guard assistance  Stand to Sit: Contact guard assistance, Minimal Assistance  Bed to Chair: Minimal assistance  WHEELCHAIR PROPULSION  Propulsion 1  Propulsion: Manual  Level: Level Tile  Method: JAMIE PULIDO  Level of Assistance: Stand by assistance  Description/ Details: 2 TURNS  Distance: 50  AMBULATION  Ambulation  Surface: Level tile  Device: Parallel Bars  Other Apparatus: Wheelchair follow (STUMP PROTECTOR)  Assistance: Minimal assistance, Moderate assistance  Quality of Gait: TENDS TO HOP, THERAPIST ASSSIT REQUIRED TO OFF LOAD WEIGHT DUE TO DECREASED SHOULDER STRENGTH, SMALL HOP/SWINGS  Distance: 4  STAIRS     GOALS:  Short Term Goals  Time Frame for Short Term Goals: 1 WEEK  Short Term Goal 1: INDEP ON BED MOBILITY  Short Term Goal 2: SBA TRANSFERS  Short Term Goal 3: MIN A AMB 10 FEET WITH AD  Short Term Goal 4: INDEP W/C PROPULSION 150 FEET  Short Term Goal 5: MIN A CAR TRANSFER    Long Term Goals  Time Frame for Long Term Goals : 2WEEKS  Long Term Goal 1: INDEP TRANSFERS  Long Term Goal 2: INDEP AMB 10 FEET WITH AD  Long Term Goal 3: SBA CAR TRANSFER  Long Term Goal 4: INDEP HEP    ASSESSMENT:  Assessment: DECREASED UE STRENGTH, DECREASED DYNAMIC STANDING BALANCE, TENDS TO HOP VS. SWING WITH AMB DUE TO INADEQUATE UPPER BODY STRENGTH TO SUPPORT BODY WT THROUGH AD. DOES NOT HAVE STEPS TO HANPARKERP ACCESIBLE APT.      SPEECH THERAPY      OCCUPATIONAL THERAPY  Cognitive Patterns:  Cognitive Pattern

## 2025-02-04 LAB
GLUCOSE BLD-MCNC: 109 MG/DL (ref 70–99)
GLUCOSE BLD-MCNC: 115 MG/DL (ref 70–99)
GLUCOSE BLD-MCNC: 146 MG/DL (ref 70–99)
GLUCOSE BLD-MCNC: 219 MG/DL (ref 70–99)
HBA1C MFR BLD: 5.9 % (ref 4–5.6)
PERFORMED ON: ABNORMAL

## 2025-02-04 PROCEDURE — 6370000000 HC RX 637 (ALT 250 FOR IP): Performed by: PSYCHIATRY & NEUROLOGY

## 2025-02-04 PROCEDURE — 83036 HEMOGLOBIN GLYCOSYLATED A1C: CPT

## 2025-02-04 PROCEDURE — 36415 COLL VENOUS BLD VENIPUNCTURE: CPT

## 2025-02-04 PROCEDURE — 97535 SELF CARE MNGMENT TRAINING: CPT

## 2025-02-04 PROCEDURE — 97116 GAIT TRAINING THERAPY: CPT

## 2025-02-04 PROCEDURE — 94660 CPAP INITIATION&MGMT: CPT

## 2025-02-04 PROCEDURE — 97110 THERAPEUTIC EXERCISES: CPT

## 2025-02-04 PROCEDURE — 82962 GLUCOSE BLOOD TEST: CPT

## 2025-02-04 PROCEDURE — 97530 THERAPEUTIC ACTIVITIES: CPT

## 2025-02-04 PROCEDURE — 6360000002 HC RX W HCPCS: Performed by: PSYCHIATRY & NEUROLOGY

## 2025-02-04 PROCEDURE — 1180000000 HC REHAB R&B

## 2025-02-04 PROCEDURE — 99232 SBSQ HOSP IP/OBS MODERATE 35: CPT | Performed by: PSYCHIATRY & NEUROLOGY

## 2025-02-04 RX ADMIN — METHOCARBAMOL 1000 MG: 500 TABLET ORAL at 20:35

## 2025-02-04 RX ADMIN — HYDROCODONE BITARTRATE AND ACETAMINOPHEN 1 TABLET: 10; 325 TABLET ORAL at 17:54

## 2025-02-04 RX ADMIN — METHOCARBAMOL 1000 MG: 500 TABLET ORAL at 08:08

## 2025-02-04 RX ADMIN — PREGABALIN 200 MG: 150 CAPSULE ORAL at 14:28

## 2025-02-04 RX ADMIN — PREGABALIN 200 MG: 150 CAPSULE ORAL at 20:36

## 2025-02-04 RX ADMIN — FUROSEMIDE 40 MG: 20 TABLET ORAL at 08:08

## 2025-02-04 RX ADMIN — METHOCARBAMOL 1000 MG: 500 TABLET ORAL at 14:28

## 2025-02-04 RX ADMIN — BUPROPION HYDROCHLORIDE 150 MG: 150 TABLET, EXTENDED RELEASE ORAL at 08:08

## 2025-02-04 RX ADMIN — AMITRIPTYLINE HYDROCHLORIDE 25 MG: 10 TABLET, FILM COATED ORAL at 20:35

## 2025-02-04 RX ADMIN — INSULIN LISPRO 2 UNITS: 100 INJECTION, SOLUTION INTRAVENOUS; SUBCUTANEOUS at 20:51

## 2025-02-04 RX ADMIN — HYDROCODONE BITARTRATE AND ACETAMINOPHEN 1 TABLET: 10; 325 TABLET ORAL at 08:31

## 2025-02-04 RX ADMIN — ENOXAPARIN SODIUM 30 MG: 100 INJECTION SUBCUTANEOUS at 08:07

## 2025-02-04 RX ADMIN — INSULIN HUMAN 18 UNITS: 100 INJECTION, SUSPENSION SUBCUTANEOUS at 20:52

## 2025-02-04 RX ADMIN — INSULIN HUMAN 18 UNITS: 100 INJECTION, SUSPENSION SUBCUTANEOUS at 09:38

## 2025-02-04 RX ADMIN — SENNOSIDES AND DOCUSATE SODIUM 1 TABLET: 50; 8.6 TABLET ORAL at 20:36

## 2025-02-04 RX ADMIN — SENNOSIDES AND DOCUSATE SODIUM 1 TABLET: 50; 8.6 TABLET ORAL at 08:08

## 2025-02-04 RX ADMIN — ENOXAPARIN SODIUM 30 MG: 100 INJECTION SUBCUTANEOUS at 20:36

## 2025-02-04 RX ADMIN — HYDROCODONE BITARTRATE AND ACETAMINOPHEN 1 TABLET: 10; 325 TABLET ORAL at 01:08

## 2025-02-04 RX ADMIN — HYDROCODONE BITARTRATE AND ACETAMINOPHEN 1 TABLET: 10; 325 TABLET ORAL at 13:15

## 2025-02-04 RX ADMIN — PREGABALIN 200 MG: 150 CAPSULE ORAL at 08:07

## 2025-02-04 ASSESSMENT — PAIN SCALES - GENERAL
PAINLEVEL_OUTOF10: 5
PAINLEVEL_OUTOF10: 0
PAINLEVEL_OUTOF10: 5
PAINLEVEL_OUTOF10: 8
PAINLEVEL_OUTOF10: 3
PAINLEVEL_OUTOF10: 7
PAINLEVEL_OUTOF10: 0
PAINLEVEL_OUTOF10: 0
PAINLEVEL_OUTOF10: 3
PAINLEVEL_OUTOF10: 7
PAINLEVEL_OUTOF10: 8
PAINLEVEL_OUTOF10: 9
PAINLEVEL_OUTOF10: 10
PAINLEVEL_OUTOF10: 7
PAINLEVEL_OUTOF10: 3

## 2025-02-04 ASSESSMENT — PAIN DESCRIPTION - ORIENTATION
ORIENTATION: LEFT

## 2025-02-04 ASSESSMENT — PAIN DESCRIPTION - DESCRIPTORS
DESCRIPTORS: THROBBING;ACHING
DESCRIPTORS: ACHING;THROBBING;TENDER
DESCRIPTORS: TENDER;THROBBING
DESCRIPTORS: ACHING
DESCRIPTORS: ACHING;TENDER;THROBBING

## 2025-02-04 ASSESSMENT — PAIN - FUNCTIONAL ASSESSMENT
PAIN_FUNCTIONAL_ASSESSMENT: ACTIVITIES ARE NOT PREVENTED
PAIN_FUNCTIONAL_ASSESSMENT: PREVENTS OR INTERFERES SOME ACTIVE ACTIVITIES AND ADLS
PAIN_FUNCTIONAL_ASSESSMENT: ACTIVITIES ARE NOT PREVENTED
PAIN_FUNCTIONAL_ASSESSMENT: PREVENTS OR INTERFERES SOME ACTIVE ACTIVITIES AND ADLS

## 2025-02-04 ASSESSMENT — PAIN DESCRIPTION - LOCATION
LOCATION: LEG

## 2025-02-04 ASSESSMENT — PAIN SCALES - WONG BAKER: WONGBAKER_NUMERICALRESPONSE: NO HURT

## 2025-02-05 LAB
GLUCOSE BLD-MCNC: 125 MG/DL (ref 70–99)
GLUCOSE BLD-MCNC: 160 MG/DL (ref 70–99)
GLUCOSE BLD-MCNC: 184 MG/DL (ref 70–99)
GLUCOSE BLD-MCNC: 92 MG/DL (ref 70–99)
LAB AP CASE REPORT: NORMAL
Lab: NORMAL
PATH REPORT.FINAL DX SPEC: NORMAL
PATH REPORT.GROSS SPEC: NORMAL
PERFORMED ON: ABNORMAL
PERFORMED ON: NORMAL

## 2025-02-05 PROCEDURE — 99233 SBSQ HOSP IP/OBS HIGH 50: CPT | Performed by: PSYCHIATRY & NEUROLOGY

## 2025-02-05 PROCEDURE — 97116 GAIT TRAINING THERAPY: CPT

## 2025-02-05 PROCEDURE — 1180000000 HC REHAB R&B

## 2025-02-05 PROCEDURE — 6360000002 HC RX W HCPCS: Performed by: PSYCHIATRY & NEUROLOGY

## 2025-02-05 PROCEDURE — 97535 SELF CARE MNGMENT TRAINING: CPT

## 2025-02-05 PROCEDURE — 97530 THERAPEUTIC ACTIVITIES: CPT

## 2025-02-05 PROCEDURE — 94660 CPAP INITIATION&MGMT: CPT

## 2025-02-05 PROCEDURE — 94760 N-INVAS EAR/PLS OXIMETRY 1: CPT

## 2025-02-05 PROCEDURE — 97110 THERAPEUTIC EXERCISES: CPT

## 2025-02-05 PROCEDURE — 82962 GLUCOSE BLOOD TEST: CPT

## 2025-02-05 PROCEDURE — 6370000000 HC RX 637 (ALT 250 FOR IP): Performed by: PSYCHIATRY & NEUROLOGY

## 2025-02-05 RX ADMIN — AMITRIPTYLINE HYDROCHLORIDE 25 MG: 10 TABLET, FILM COATED ORAL at 20:31

## 2025-02-05 RX ADMIN — ENOXAPARIN SODIUM 30 MG: 100 INJECTION SUBCUTANEOUS at 07:46

## 2025-02-05 RX ADMIN — HYDROCODONE BITARTRATE AND ACETAMINOPHEN 1 TABLET: 10; 325 TABLET ORAL at 00:13

## 2025-02-05 RX ADMIN — METHOCARBAMOL 1000 MG: 500 TABLET ORAL at 14:33

## 2025-02-05 RX ADMIN — SENNOSIDES AND DOCUSATE SODIUM 1 TABLET: 50; 8.6 TABLET ORAL at 07:47

## 2025-02-05 RX ADMIN — HYDROCODONE BITARTRATE AND ACETAMINOPHEN 1 TABLET: 10; 325 TABLET ORAL at 12:24

## 2025-02-05 RX ADMIN — HYDROCODONE BITARTRATE AND ACETAMINOPHEN 1 TABLET: 10; 325 TABLET ORAL at 17:38

## 2025-02-05 RX ADMIN — HYDROCODONE BITARTRATE AND ACETAMINOPHEN 1 TABLET: 10; 325 TABLET ORAL at 23:56

## 2025-02-05 RX ADMIN — PREGABALIN 200 MG: 150 CAPSULE ORAL at 07:47

## 2025-02-05 RX ADMIN — METHOCARBAMOL 1000 MG: 500 TABLET ORAL at 20:30

## 2025-02-05 RX ADMIN — FUROSEMIDE 40 MG: 20 TABLET ORAL at 07:47

## 2025-02-05 RX ADMIN — INSULIN HUMAN 18 UNITS: 100 INJECTION, SUSPENSION SUBCUTANEOUS at 20:29

## 2025-02-05 RX ADMIN — SENNOSIDES AND DOCUSATE SODIUM 1 TABLET: 50; 8.6 TABLET ORAL at 20:30

## 2025-02-05 RX ADMIN — ENOXAPARIN SODIUM 30 MG: 100 INJECTION SUBCUTANEOUS at 20:31

## 2025-02-05 RX ADMIN — HYDROCODONE BITARTRATE AND ACETAMINOPHEN 1 TABLET: 10; 325 TABLET ORAL at 06:49

## 2025-02-05 RX ADMIN — INSULIN HUMAN 18 UNITS: 100 INJECTION, SUSPENSION SUBCUTANEOUS at 07:49

## 2025-02-05 RX ADMIN — BUPROPION HYDROCHLORIDE 150 MG: 150 TABLET, EXTENDED RELEASE ORAL at 07:47

## 2025-02-05 RX ADMIN — PREGABALIN 200 MG: 150 CAPSULE ORAL at 20:30

## 2025-02-05 RX ADMIN — METHOCARBAMOL 1000 MG: 500 TABLET ORAL at 07:47

## 2025-02-05 RX ADMIN — PREGABALIN 200 MG: 150 CAPSULE ORAL at 14:34

## 2025-02-05 ASSESSMENT — PAIN SCALES - GENERAL
PAINLEVEL_OUTOF10: 3
PAINLEVEL_OUTOF10: 10
PAINLEVEL_OUTOF10: 7
PAINLEVEL_OUTOF10: 5
PAINLEVEL_OUTOF10: 0
PAINLEVEL_OUTOF10: 9
PAINLEVEL_OUTOF10: 7
PAINLEVEL_OUTOF10: 6
PAINLEVEL_OUTOF10: 0
PAINLEVEL_OUTOF10: 3
PAINLEVEL_OUTOF10: 8
PAINLEVEL_OUTOF10: 8
PAINLEVEL_OUTOF10: 0

## 2025-02-05 ASSESSMENT — PAIN DESCRIPTION - LOCATION
LOCATION: LEG
LOCATION: SHOULDER
LOCATION: LEG

## 2025-02-05 ASSESSMENT — PAIN DESCRIPTION - ORIENTATION
ORIENTATION: LEFT
ORIENTATION: RIGHT

## 2025-02-05 ASSESSMENT — PAIN - FUNCTIONAL ASSESSMENT
PAIN_FUNCTIONAL_ASSESSMENT: ACTIVITIES ARE NOT PREVENTED
PAIN_FUNCTIONAL_ASSESSMENT: ACTIVITIES ARE NOT PREVENTED
PAIN_FUNCTIONAL_ASSESSMENT: PREVENTS OR INTERFERES SOME ACTIVE ACTIVITIES AND ADLS
PAIN_FUNCTIONAL_ASSESSMENT: ACTIVITIES ARE NOT PREVENTED
PAIN_FUNCTIONAL_ASSESSMENT: PREVENTS OR INTERFERES WITH MANY ACTIVE NOT PASSIVE ACTIVITIES
PAIN_FUNCTIONAL_ASSESSMENT: PREVENTS OR INTERFERES SOME ACTIVE ACTIVITIES AND ADLS

## 2025-02-05 ASSESSMENT — PAIN DESCRIPTION - DESCRIPTORS
DESCRIPTORS: ACHING;TENDER;THROBBING
DESCRIPTORS: ACHING;DISCOMFORT
DESCRIPTORS: ACHING;TENDER;THROBBING
DESCRIPTORS: THROBBING
DESCRIPTORS: THROBBING
DESCRIPTORS: ACHING;DISCOMFORT

## 2025-02-06 LAB
ANION GAP SERPL CALCULATED.3IONS-SCNC: 12 MMOL/L (ref 8–16)
BASOPHILS # BLD: 0 K/UL (ref 0–0.2)
BASOPHILS NFR BLD: 0.3 % (ref 0–1)
BUN SERPL-MCNC: 15 MG/DL (ref 6–20)
CALCIUM SERPL-MCNC: 9.1 MG/DL (ref 8.6–10)
CHLORIDE SERPL-SCNC: 99 MMOL/L (ref 98–107)
CO2 SERPL-SCNC: 27 MMOL/L (ref 22–29)
CREAT SERPL-MCNC: 0.7 MG/DL (ref 0.5–0.9)
EOSINOPHIL # BLD: 0.8 K/UL (ref 0–0.6)
EOSINOPHIL NFR BLD: 10.4 % (ref 0–5)
ERYTHROCYTE [DISTWIDTH] IN BLOOD BY AUTOMATED COUNT: 14.9 % (ref 11.5–14.5)
GLUCOSE BLD-MCNC: 114 MG/DL (ref 70–99)
GLUCOSE BLD-MCNC: 156 MG/DL (ref 70–99)
GLUCOSE BLD-MCNC: 159 MG/DL (ref 70–99)
GLUCOSE BLD-MCNC: 89 MG/DL (ref 70–99)
GLUCOSE SERPL-MCNC: 196 MG/DL (ref 70–99)
HCT VFR BLD AUTO: 35.6 % (ref 37–47)
HGB BLD-MCNC: 11.3 G/DL (ref 12–16)
IMM GRANULOCYTES # BLD: 0 K/UL
LYMPHOCYTES # BLD: 2.3 K/UL (ref 1.1–4.5)
LYMPHOCYTES NFR BLD: 31.5 % (ref 20–40)
MCH RBC QN AUTO: 28.8 PG (ref 27–31)
MCHC RBC AUTO-ENTMCNC: 31.7 G/DL (ref 33–37)
MCV RBC AUTO: 90.6 FL (ref 81–99)
MONOCYTES # BLD: 0.4 K/UL (ref 0–0.9)
MONOCYTES NFR BLD: 5.2 % (ref 0–10)
NEUTROPHILS # BLD: 3.8 K/UL (ref 1.5–7.5)
NEUTS SEG NFR BLD: 52.2 % (ref 50–65)
PERFORMED ON: ABNORMAL
PERFORMED ON: NORMAL
PLATELET # BLD AUTO: 277 K/UL (ref 130–400)
PMV BLD AUTO: 9.5 FL (ref 9.4–12.3)
POTASSIUM SERPL-SCNC: 4.2 MMOL/L (ref 3.5–5)
RBC # BLD AUTO: 3.93 M/UL (ref 4.2–5.4)
SODIUM SERPL-SCNC: 138 MMOL/L (ref 136–145)
WBC # BLD AUTO: 7.3 K/UL (ref 4.8–10.8)

## 2025-02-06 PROCEDURE — 97535 SELF CARE MNGMENT TRAINING: CPT

## 2025-02-06 PROCEDURE — 85025 COMPLETE CBC W/AUTO DIFF WBC: CPT

## 2025-02-06 PROCEDURE — 82962 GLUCOSE BLOOD TEST: CPT

## 2025-02-06 PROCEDURE — 6370000000 HC RX 637 (ALT 250 FOR IP): Performed by: PSYCHIATRY & NEUROLOGY

## 2025-02-06 PROCEDURE — 97116 GAIT TRAINING THERAPY: CPT

## 2025-02-06 PROCEDURE — 6360000002 HC RX W HCPCS: Performed by: PSYCHIATRY & NEUROLOGY

## 2025-02-06 PROCEDURE — 80048 BASIC METABOLIC PNL TOTAL CA: CPT

## 2025-02-06 PROCEDURE — 94660 CPAP INITIATION&MGMT: CPT

## 2025-02-06 PROCEDURE — 94760 N-INVAS EAR/PLS OXIMETRY 1: CPT

## 2025-02-06 PROCEDURE — 1180000000 HC REHAB R&B

## 2025-02-06 PROCEDURE — 97530 THERAPEUTIC ACTIVITIES: CPT

## 2025-02-06 PROCEDURE — 36415 COLL VENOUS BLD VENIPUNCTURE: CPT

## 2025-02-06 PROCEDURE — 99232 SBSQ HOSP IP/OBS MODERATE 35: CPT | Performed by: PSYCHIATRY & NEUROLOGY

## 2025-02-06 PROCEDURE — 97110 THERAPEUTIC EXERCISES: CPT

## 2025-02-06 RX ORDER — LANOLIN ALCOHOL/MO/W.PET/CERES
400 CREAM (GRAM) TOPICAL NIGHTLY
Status: DISCONTINUED | OUTPATIENT
Start: 2025-02-06 | End: 2025-02-13 | Stop reason: HOSPADM

## 2025-02-06 RX ADMIN — SENNOSIDES AND DOCUSATE SODIUM 1 TABLET: 50; 8.6 TABLET ORAL at 20:57

## 2025-02-06 RX ADMIN — METHOCARBAMOL 1000 MG: 500 TABLET ORAL at 13:26

## 2025-02-06 RX ADMIN — AMITRIPTYLINE HYDROCHLORIDE 25 MG: 10 TABLET, FILM COATED ORAL at 20:57

## 2025-02-06 RX ADMIN — BUPROPION HYDROCHLORIDE 150 MG: 150 TABLET, EXTENDED RELEASE ORAL at 07:48

## 2025-02-06 RX ADMIN — PREGABALIN 200 MG: 150 CAPSULE ORAL at 20:57

## 2025-02-06 RX ADMIN — FUROSEMIDE 40 MG: 20 TABLET ORAL at 07:48

## 2025-02-06 RX ADMIN — PREGABALIN 200 MG: 150 CAPSULE ORAL at 13:26

## 2025-02-06 RX ADMIN — INSULIN HUMAN 18 UNITS: 100 INJECTION, SUSPENSION SUBCUTANEOUS at 20:58

## 2025-02-06 RX ADMIN — METHOCARBAMOL 1000 MG: 500 TABLET ORAL at 07:48

## 2025-02-06 RX ADMIN — HYDROCODONE BITARTRATE AND ACETAMINOPHEN 1 TABLET: 10; 325 TABLET ORAL at 13:26

## 2025-02-06 RX ADMIN — Medication 400 MG: at 20:57

## 2025-02-06 RX ADMIN — SENNOSIDES AND DOCUSATE SODIUM 1 TABLET: 50; 8.6 TABLET ORAL at 07:48

## 2025-02-06 RX ADMIN — METHOCARBAMOL 1000 MG: 500 TABLET ORAL at 20:57

## 2025-02-06 RX ADMIN — PREGABALIN 200 MG: 150 CAPSULE ORAL at 07:48

## 2025-02-06 RX ADMIN — HYDROCODONE BITARTRATE AND ACETAMINOPHEN 1 TABLET: 10; 325 TABLET ORAL at 08:15

## 2025-02-06 RX ADMIN — HYDROCODONE BITARTRATE AND ACETAMINOPHEN 1 TABLET: 10; 325 TABLET ORAL at 18:42

## 2025-02-06 RX ADMIN — INSULIN HUMAN 18 UNITS: 100 INJECTION, SUSPENSION SUBCUTANEOUS at 07:47

## 2025-02-06 RX ADMIN — ENOXAPARIN SODIUM 30 MG: 100 INJECTION SUBCUTANEOUS at 07:47

## 2025-02-06 RX ADMIN — ENOXAPARIN SODIUM 30 MG: 100 INJECTION SUBCUTANEOUS at 20:57

## 2025-02-06 ASSESSMENT — PAIN DESCRIPTION - DESCRIPTORS
DESCRIPTORS: ACHING;DISCOMFORT

## 2025-02-06 ASSESSMENT — PAIN SCALES - GENERAL
PAINLEVEL_OUTOF10: 9
PAINLEVEL_OUTOF10: 2
PAINLEVEL_OUTOF10: 7
PAINLEVEL_OUTOF10: 3
PAINLEVEL_OUTOF10: 4
PAINLEVEL_OUTOF10: 7

## 2025-02-06 ASSESSMENT — PAIN DESCRIPTION - LOCATION
LOCATION: LEG

## 2025-02-06 ASSESSMENT — PAIN - FUNCTIONAL ASSESSMENT
PAIN_FUNCTIONAL_ASSESSMENT: ACTIVITIES ARE NOT PREVENTED

## 2025-02-06 ASSESSMENT — PAIN DESCRIPTION - ORIENTATION
ORIENTATION: LEFT

## 2025-02-07 LAB
GLUCOSE BLD-MCNC: 111 MG/DL (ref 70–99)
GLUCOSE BLD-MCNC: 123 MG/DL (ref 70–99)
GLUCOSE BLD-MCNC: 137 MG/DL (ref 70–99)
GLUCOSE BLD-MCNC: 220 MG/DL (ref 70–99)
PERFORMED ON: ABNORMAL

## 2025-02-07 PROCEDURE — 97116 GAIT TRAINING THERAPY: CPT

## 2025-02-07 PROCEDURE — 82962 GLUCOSE BLOOD TEST: CPT

## 2025-02-07 PROCEDURE — 97110 THERAPEUTIC EXERCISES: CPT

## 2025-02-07 PROCEDURE — 1180000000 HC REHAB R&B

## 2025-02-07 PROCEDURE — 94660 CPAP INITIATION&MGMT: CPT

## 2025-02-07 PROCEDURE — 6360000002 HC RX W HCPCS: Performed by: PSYCHIATRY & NEUROLOGY

## 2025-02-07 PROCEDURE — 99232 SBSQ HOSP IP/OBS MODERATE 35: CPT | Performed by: PSYCHIATRY & NEUROLOGY

## 2025-02-07 PROCEDURE — 6370000000 HC RX 637 (ALT 250 FOR IP): Performed by: PSYCHIATRY & NEUROLOGY

## 2025-02-07 PROCEDURE — 97530 THERAPEUTIC ACTIVITIES: CPT

## 2025-02-07 RX ADMIN — HYDROCODONE BITARTRATE AND ACETAMINOPHEN 1 TABLET: 10; 325 TABLET ORAL at 05:51

## 2025-02-07 RX ADMIN — SENNOSIDES AND DOCUSATE SODIUM 1 TABLET: 50; 8.6 TABLET ORAL at 20:12

## 2025-02-07 RX ADMIN — Medication 400 MG: at 20:12

## 2025-02-07 RX ADMIN — FUROSEMIDE 40 MG: 20 TABLET ORAL at 08:51

## 2025-02-07 RX ADMIN — METHOCARBAMOL 1000 MG: 500 TABLET ORAL at 14:43

## 2025-02-07 RX ADMIN — INSULIN HUMAN 18 UNITS: 100 INJECTION, SUSPENSION SUBCUTANEOUS at 08:50

## 2025-02-07 RX ADMIN — AMITRIPTYLINE HYDROCHLORIDE 25 MG: 10 TABLET, FILM COATED ORAL at 20:12

## 2025-02-07 RX ADMIN — SENNOSIDES AND DOCUSATE SODIUM 1 TABLET: 50; 8.6 TABLET ORAL at 08:51

## 2025-02-07 RX ADMIN — PREGABALIN 200 MG: 150 CAPSULE ORAL at 08:51

## 2025-02-07 RX ADMIN — HYDROCODONE BITARTRATE AND ACETAMINOPHEN 1 TABLET: 10; 325 TABLET ORAL at 19:12

## 2025-02-07 RX ADMIN — ENOXAPARIN SODIUM 30 MG: 100 INJECTION SUBCUTANEOUS at 20:11

## 2025-02-07 RX ADMIN — ENOXAPARIN SODIUM 30 MG: 100 INJECTION SUBCUTANEOUS at 08:57

## 2025-02-07 RX ADMIN — PREGABALIN 200 MG: 150 CAPSULE ORAL at 20:12

## 2025-02-07 RX ADMIN — METHOCARBAMOL 1000 MG: 500 TABLET ORAL at 08:51

## 2025-02-07 RX ADMIN — METHOCARBAMOL 1000 MG: 500 TABLET ORAL at 20:12

## 2025-02-07 RX ADMIN — BUPROPION HYDROCHLORIDE 150 MG: 150 TABLET, EXTENDED RELEASE ORAL at 08:51

## 2025-02-07 RX ADMIN — PREGABALIN 200 MG: 150 CAPSULE ORAL at 14:43

## 2025-02-07 RX ADMIN — HYDROCODONE BITARTRATE AND ACETAMINOPHEN 1 TABLET: 10; 325 TABLET ORAL at 12:11

## 2025-02-07 RX ADMIN — INSULIN LISPRO 2 UNITS: 100 INJECTION, SOLUTION INTRAVENOUS; SUBCUTANEOUS at 20:13

## 2025-02-07 RX ADMIN — INSULIN HUMAN 18 UNITS: 100 INJECTION, SUSPENSION SUBCUTANEOUS at 20:13

## 2025-02-07 ASSESSMENT — PAIN DESCRIPTION - ORIENTATION
ORIENTATION: LEFT

## 2025-02-07 ASSESSMENT — PAIN DESCRIPTION - DESCRIPTORS
DESCRIPTORS: ACHING;DISCOMFORT
DESCRIPTORS: DISCOMFORT
DESCRIPTORS: DISCOMFORT
DESCRIPTORS: ACHING;DISCOMFORT
DESCRIPTORS: DISCOMFORT;ACHING

## 2025-02-07 ASSESSMENT — PAIN DESCRIPTION - PAIN TYPE: TYPE: ACUTE PAIN

## 2025-02-07 ASSESSMENT — PAIN SCALES - GENERAL
PAINLEVEL_OUTOF10: 8
PAINLEVEL_OUTOF10: 7
PAINLEVEL_OUTOF10: 7
PAINLEVEL_OUTOF10: 0
PAINLEVEL_OUTOF10: 8
PAINLEVEL_OUTOF10: 7

## 2025-02-07 ASSESSMENT — PAIN DESCRIPTION - LOCATION
LOCATION: LEG
LOCATION: LEG
LOCATION: GENERALIZED
LOCATION: GENERALIZED
LOCATION: LEG

## 2025-02-08 LAB
GLUCOSE BLD-MCNC: 153 MG/DL (ref 70–99)
GLUCOSE BLD-MCNC: 156 MG/DL (ref 70–99)
GLUCOSE BLD-MCNC: 163 MG/DL (ref 70–99)
PERFORMED ON: ABNORMAL

## 2025-02-08 PROCEDURE — 97116 GAIT TRAINING THERAPY: CPT

## 2025-02-08 PROCEDURE — 97110 THERAPEUTIC EXERCISES: CPT

## 2025-02-08 PROCEDURE — 94660 CPAP INITIATION&MGMT: CPT

## 2025-02-08 PROCEDURE — 99232 SBSQ HOSP IP/OBS MODERATE 35: CPT | Performed by: PSYCHIATRY & NEUROLOGY

## 2025-02-08 PROCEDURE — 6360000002 HC RX W HCPCS: Performed by: PSYCHIATRY & NEUROLOGY

## 2025-02-08 PROCEDURE — 82962 GLUCOSE BLOOD TEST: CPT

## 2025-02-08 PROCEDURE — 1180000000 HC REHAB R&B

## 2025-02-08 PROCEDURE — 6370000000 HC RX 637 (ALT 250 FOR IP): Performed by: PSYCHIATRY & NEUROLOGY

## 2025-02-08 PROCEDURE — 94760 N-INVAS EAR/PLS OXIMETRY 1: CPT

## 2025-02-08 PROCEDURE — 97535 SELF CARE MNGMENT TRAINING: CPT

## 2025-02-08 RX ADMIN — HYDROCODONE BITARTRATE AND ACETAMINOPHEN 1 TABLET: 10; 325 TABLET ORAL at 19:02

## 2025-02-08 RX ADMIN — PREGABALIN 200 MG: 150 CAPSULE ORAL at 09:51

## 2025-02-08 RX ADMIN — BUPROPION HYDROCHLORIDE 150 MG: 150 TABLET, EXTENDED RELEASE ORAL at 09:51

## 2025-02-08 RX ADMIN — ENOXAPARIN SODIUM 30 MG: 100 INJECTION SUBCUTANEOUS at 20:41

## 2025-02-08 RX ADMIN — METHOCARBAMOL 1000 MG: 500 TABLET ORAL at 20:40

## 2025-02-08 RX ADMIN — METHOCARBAMOL 1000 MG: 500 TABLET ORAL at 14:37

## 2025-02-08 RX ADMIN — METHOCARBAMOL 1000 MG: 500 TABLET ORAL at 09:51

## 2025-02-08 RX ADMIN — ENOXAPARIN SODIUM 30 MG: 100 INJECTION SUBCUTANEOUS at 09:51

## 2025-02-08 RX ADMIN — INSULIN HUMAN 18 UNITS: 100 INJECTION, SUSPENSION SUBCUTANEOUS at 09:52

## 2025-02-08 RX ADMIN — AMITRIPTYLINE HYDROCHLORIDE 25 MG: 10 TABLET, FILM COATED ORAL at 20:41

## 2025-02-08 RX ADMIN — FUROSEMIDE 40 MG: 20 TABLET ORAL at 09:51

## 2025-02-08 RX ADMIN — HYDROCODONE BITARTRATE AND ACETAMINOPHEN 1 TABLET: 10; 325 TABLET ORAL at 04:47

## 2025-02-08 RX ADMIN — SENNOSIDES AND DOCUSATE SODIUM 1 TABLET: 50; 8.6 TABLET ORAL at 20:41

## 2025-02-08 RX ADMIN — PREGABALIN 200 MG: 150 CAPSULE ORAL at 20:40

## 2025-02-08 RX ADMIN — INSULIN HUMAN 18 UNITS: 100 INJECTION, SUSPENSION SUBCUTANEOUS at 20:41

## 2025-02-08 RX ADMIN — HYDROCODONE BITARTRATE AND ACETAMINOPHEN 1 TABLET: 10; 325 TABLET ORAL at 14:37

## 2025-02-08 RX ADMIN — Medication 400 MG: at 20:41

## 2025-02-08 RX ADMIN — PREGABALIN 200 MG: 150 CAPSULE ORAL at 14:37

## 2025-02-08 RX ADMIN — SENNOSIDES AND DOCUSATE SODIUM 1 TABLET: 50; 8.6 TABLET ORAL at 09:51

## 2025-02-08 ASSESSMENT — PAIN DESCRIPTION - ORIENTATION
ORIENTATION: LEFT
ORIENTATION: LEFT
ORIENTATION: RIGHT

## 2025-02-08 ASSESSMENT — PAIN SCALES - GENERAL
PAINLEVEL_OUTOF10: 3
PAINLEVEL_OUTOF10: 8
PAINLEVEL_OUTOF10: 6
PAINLEVEL_OUTOF10: 0
PAINLEVEL_OUTOF10: 8
PAINLEVEL_OUTOF10: 0
PAINLEVEL_OUTOF10: 6

## 2025-02-08 ASSESSMENT — PAIN - FUNCTIONAL ASSESSMENT: PAIN_FUNCTIONAL_ASSESSMENT: ACTIVITIES ARE NOT PREVENTED

## 2025-02-08 ASSESSMENT — PAIN DESCRIPTION - DESCRIPTORS
DESCRIPTORS: THROBBING

## 2025-02-08 ASSESSMENT — PAIN DESCRIPTION - LOCATION
LOCATION: GENERALIZED
LOCATION: LEG

## 2025-02-08 ASSESSMENT — PAIN DESCRIPTION - PAIN TYPE: TYPE: ACUTE PAIN

## 2025-02-09 LAB
BACTERIA URNS QL MICRO: ABNORMAL /HPF
BILIRUB UR QL STRIP: NEGATIVE
CLARITY UR: ABNORMAL
COLOR UR: YELLOW
CRYSTALS URNS MICRO: ABNORMAL /HPF
EPI CELLS #/AREA URNS AUTO: 1 /HPF (ref 0–5)
GLUCOSE BLD-MCNC: 104 MG/DL (ref 70–99)
GLUCOSE BLD-MCNC: 112 MG/DL (ref 70–99)
GLUCOSE BLD-MCNC: 154 MG/DL (ref 70–99)
GLUCOSE BLD-MCNC: 161 MG/DL (ref 70–99)
GLUCOSE UR STRIP.AUTO-MCNC: NEGATIVE MG/DL
HGB UR STRIP.AUTO-MCNC: ABNORMAL MG/L
HYALINE CASTS #/AREA URNS AUTO: 3 /HPF (ref 0–8)
KETONES UR STRIP.AUTO-MCNC: NEGATIVE MG/DL
LEUKOCYTE ESTERASE UR QL STRIP.AUTO: ABNORMAL
NITRITE UR QL STRIP.AUTO: POSITIVE
PERFORMED ON: ABNORMAL
PH UR STRIP.AUTO: 6 [PH] (ref 5–8)
PROT UR STRIP.AUTO-MCNC: NEGATIVE MG/DL
RBC #/AREA URNS AUTO: 1 /HPF (ref 0–4)
SP GR UR STRIP.AUTO: 1.01 (ref 1–1.03)
UROBILINOGEN UR STRIP.AUTO-MCNC: 0.2 E.U./DL
WBC #/AREA URNS AUTO: 74 /HPF (ref 0–5)

## 2025-02-09 PROCEDURE — 1180000000 HC REHAB R&B

## 2025-02-09 PROCEDURE — 87186 SC STD MICRODIL/AGAR DIL: CPT

## 2025-02-09 PROCEDURE — 6370000000 HC RX 637 (ALT 250 FOR IP): Performed by: PSYCHIATRY & NEUROLOGY

## 2025-02-09 PROCEDURE — 94760 N-INVAS EAR/PLS OXIMETRY 1: CPT

## 2025-02-09 PROCEDURE — 82962 GLUCOSE BLOOD TEST: CPT

## 2025-02-09 PROCEDURE — 87086 URINE CULTURE/COLONY COUNT: CPT

## 2025-02-09 PROCEDURE — 81001 URINALYSIS AUTO W/SCOPE: CPT

## 2025-02-09 PROCEDURE — 6360000002 HC RX W HCPCS: Performed by: PSYCHIATRY & NEUROLOGY

## 2025-02-09 PROCEDURE — 87077 CULTURE AEROBIC IDENTIFY: CPT

## 2025-02-09 PROCEDURE — 94660 CPAP INITIATION&MGMT: CPT

## 2025-02-09 PROCEDURE — 2500000003 HC RX 250 WO HCPCS: Performed by: PSYCHIATRY & NEUROLOGY

## 2025-02-09 RX ORDER — PHENAZOPYRIDINE HYDROCHLORIDE 200 MG/1
200 TABLET, FILM COATED ORAL
Status: DISCONTINUED | OUTPATIENT
Start: 2025-02-09 | End: 2025-02-13 | Stop reason: HOSPADM

## 2025-02-09 RX ORDER — SULFAMETHOXAZOLE AND TRIMETHOPRIM 800; 160 MG/1; MG/1
1 TABLET ORAL EVERY 12 HOURS SCHEDULED
Status: DISCONTINUED | OUTPATIENT
Start: 2025-02-09 | End: 2025-02-11

## 2025-02-09 RX ORDER — PHENAZOPYRIDINE HYDROCHLORIDE 200 MG/1
200 TABLET, FILM COATED ORAL
Status: DISCONTINUED | OUTPATIENT
Start: 2025-02-10 | End: 2025-02-09

## 2025-02-09 RX ADMIN — INSULIN HUMAN 18 UNITS: 100 INJECTION, SUSPENSION SUBCUTANEOUS at 20:20

## 2025-02-09 RX ADMIN — BUPROPION HYDROCHLORIDE 150 MG: 150 TABLET, EXTENDED RELEASE ORAL at 07:36

## 2025-02-09 RX ADMIN — METHOCARBAMOL 1000 MG: 500 TABLET ORAL at 07:36

## 2025-02-09 RX ADMIN — PREGABALIN 200 MG: 150 CAPSULE ORAL at 07:36

## 2025-02-09 RX ADMIN — INSULIN HUMAN 18 UNITS: 100 INJECTION, SUSPENSION SUBCUTANEOUS at 07:47

## 2025-02-09 RX ADMIN — SULFAMETHOXAZOLE AND TRIMETHOPRIM 1 TABLET: 800; 160 TABLET ORAL at 20:19

## 2025-02-09 RX ADMIN — SENNOSIDES AND DOCUSATE SODIUM 1 TABLET: 50; 8.6 TABLET ORAL at 07:36

## 2025-02-09 RX ADMIN — AMITRIPTYLINE HYDROCHLORIDE 25 MG: 10 TABLET, FILM COATED ORAL at 20:19

## 2025-02-09 RX ADMIN — HYDROCODONE BITARTRATE AND ACETAMINOPHEN 1 TABLET: 10; 325 TABLET ORAL at 17:18

## 2025-02-09 RX ADMIN — MICONAZOLE NITRATE: 2 POWDER TOPICAL at 14:21

## 2025-02-09 RX ADMIN — HYDROCODONE BITARTRATE AND ACETAMINOPHEN 1 TABLET: 10; 325 TABLET ORAL at 00:02

## 2025-02-09 RX ADMIN — METHOCARBAMOL 1000 MG: 500 TABLET ORAL at 14:21

## 2025-02-09 RX ADMIN — HYDROCODONE BITARTRATE AND ACETAMINOPHEN 1 TABLET: 10; 325 TABLET ORAL at 21:22

## 2025-02-09 RX ADMIN — HYDROCODONE BITARTRATE AND ACETAMINOPHEN 1 TABLET: 10; 325 TABLET ORAL at 07:43

## 2025-02-09 RX ADMIN — SENNOSIDES AND DOCUSATE SODIUM 1 TABLET: 50; 8.6 TABLET ORAL at 20:19

## 2025-02-09 RX ADMIN — FUROSEMIDE 40 MG: 20 TABLET ORAL at 07:36

## 2025-02-09 RX ADMIN — METHOCARBAMOL 1000 MG: 500 TABLET ORAL at 20:19

## 2025-02-09 RX ADMIN — PHENAZOPYRIDINE HYDROCHLORIDE 200 MG: 200 TABLET ORAL at 21:35

## 2025-02-09 RX ADMIN — PREGABALIN 200 MG: 150 CAPSULE ORAL at 14:21

## 2025-02-09 RX ADMIN — PREGABALIN 200 MG: 150 CAPSULE ORAL at 20:19

## 2025-02-09 RX ADMIN — HYDROCODONE BITARTRATE AND ACETAMINOPHEN 1 TABLET: 10; 325 TABLET ORAL at 11:54

## 2025-02-09 RX ADMIN — Medication 400 MG: at 20:19

## 2025-02-09 RX ADMIN — MICONAZOLE NITRATE: 2 POWDER TOPICAL at 21:23

## 2025-02-09 RX ADMIN — ENOXAPARIN SODIUM 30 MG: 100 INJECTION SUBCUTANEOUS at 20:19

## 2025-02-09 RX ADMIN — ENOXAPARIN SODIUM 30 MG: 100 INJECTION SUBCUTANEOUS at 07:44

## 2025-02-09 SDOH — ECONOMIC STABILITY: INCOME INSECURITY: HOW HARD IS IT FOR YOU TO PAY FOR THE VERY BASICS LIKE FOOD, HOUSING, MEDICAL CARE, AND HEATING?: NOT VERY HARD

## 2025-02-09 SDOH — ECONOMIC STABILITY: FOOD INSECURITY: WITHIN THE PAST 12 MONTHS, YOU WORRIED THAT YOUR FOOD WOULD RUN OUT BEFORE YOU GOT MONEY TO BUY MORE.: NEVER TRUE

## 2025-02-09 SDOH — ECONOMIC STABILITY: INCOME INSECURITY: IN THE PAST 12 MONTHS, HAS THE ELECTRIC, GAS, OIL, OR WATER COMPANY THREATENED TO SHUT OFF SERVICE IN YOUR HOME?: NO

## 2025-02-09 ASSESSMENT — PAIN DESCRIPTION - PAIN TYPE: TYPE: ACUTE PAIN

## 2025-02-09 ASSESSMENT — PATIENT HEALTH QUESTIONNAIRE - PHQ9
SUM OF ALL RESPONSES TO PHQ QUESTIONS 1-9: 0
1. LITTLE INTEREST OR PLEASURE IN DOING THINGS: NOT AT ALL
SUM OF ALL RESPONSES TO PHQ QUESTIONS 1-9: 0
SUM OF ALL RESPONSES TO PHQ9 QUESTIONS 1 & 2: 0
2. FEELING DOWN, DEPRESSED OR HOPELESS: NOT AT ALL

## 2025-02-09 ASSESSMENT — PAIN SCALES - WONG BAKER: WONGBAKER_NUMERICALRESPONSE: NO HURT

## 2025-02-09 ASSESSMENT — PAIN DESCRIPTION - LOCATION
LOCATION: LEG
LOCATION: LEG
LOCATION: LEG;KNEE;OTHER (COMMENT)
LOCATION: LEG
LOCATION: LEG
LOCATION: LEG;KNEE;OTHER (COMMENT)
LOCATION: LEG

## 2025-02-09 ASSESSMENT — PAIN - FUNCTIONAL ASSESSMENT: PAIN_FUNCTIONAL_ASSESSMENT: ACTIVITIES ARE NOT PREVENTED

## 2025-02-09 ASSESSMENT — PAIN SCALES - GENERAL
PAINLEVEL_OUTOF10: 4
PAINLEVEL_OUTOF10: 8
PAINLEVEL_OUTOF10: 9
PAINLEVEL_OUTOF10: 0
PAINLEVEL_OUTOF10: 10
PAINLEVEL_OUTOF10: 6
PAINLEVEL_OUTOF10: 4
PAINLEVEL_OUTOF10: 8
PAINLEVEL_OUTOF10: 6
PAINLEVEL_OUTOF10: 8
PAINLEVEL_OUTOF10: 10
PAINLEVEL_OUTOF10: 0

## 2025-02-09 ASSESSMENT — PAIN DESCRIPTION - DESCRIPTORS
DESCRIPTORS: BURNING;THROBBING
DESCRIPTORS: ACHING;POUNDING;THROBBING
DESCRIPTORS: BURNING;THROBBING
DESCRIPTORS: ACHING;DISCOMFORT
DESCRIPTORS: THROBBING;POUNDING

## 2025-02-09 ASSESSMENT — PAIN DESCRIPTION - ORIENTATION
ORIENTATION: LEFT

## 2025-02-10 LAB
ANION GAP SERPL CALCULATED.3IONS-SCNC: 11 MMOL/L (ref 8–16)
BASOPHILS # BLD: 0 K/UL (ref 0–0.2)
BASOPHILS NFR BLD: 0.4 % (ref 0–1)
BUN SERPL-MCNC: 15 MG/DL (ref 6–20)
CALCIUM SERPL-MCNC: 8.7 MG/DL (ref 8.6–10)
CHLORIDE SERPL-SCNC: 100 MMOL/L (ref 98–107)
CO2 SERPL-SCNC: 29 MMOL/L (ref 22–29)
CREAT SERPL-MCNC: 0.7 MG/DL (ref 0.5–0.9)
EOSINOPHIL # BLD: 0.9 K/UL (ref 0–0.6)
EOSINOPHIL NFR BLD: 11.6 % (ref 0–5)
ERYTHROCYTE [DISTWIDTH] IN BLOOD BY AUTOMATED COUNT: 14.9 % (ref 11.5–14.5)
GLUCOSE BLD-MCNC: 106 MG/DL (ref 70–99)
GLUCOSE BLD-MCNC: 130 MG/DL (ref 70–99)
GLUCOSE BLD-MCNC: 159 MG/DL (ref 70–99)
GLUCOSE BLD-MCNC: 91 MG/DL (ref 70–99)
GLUCOSE SERPL-MCNC: 112 MG/DL (ref 70–99)
HCT VFR BLD AUTO: 36.7 % (ref 37–47)
HGB BLD-MCNC: 11.6 G/DL (ref 12–16)
IMM GRANULOCYTES # BLD: 0 K/UL
LYMPHOCYTES # BLD: 2.5 K/UL (ref 1.1–4.5)
LYMPHOCYTES NFR BLD: 33.5 % (ref 20–40)
MCH RBC QN AUTO: 28.9 PG (ref 27–31)
MCHC RBC AUTO-ENTMCNC: 31.6 G/DL (ref 33–37)
MCV RBC AUTO: 91.3 FL (ref 81–99)
MONOCYTES # BLD: 0.4 K/UL (ref 0–0.9)
MONOCYTES NFR BLD: 5.1 % (ref 0–10)
NEUTROPHILS # BLD: 3.6 K/UL (ref 1.5–7.5)
NEUTS SEG NFR BLD: 48.9 % (ref 50–65)
PERFORMED ON: ABNORMAL
PERFORMED ON: NORMAL
PLATELET # BLD AUTO: 236 K/UL (ref 130–400)
PMV BLD AUTO: 9.6 FL (ref 9.4–12.3)
POTASSIUM SERPL-SCNC: 4.1 MMOL/L (ref 3.5–5)
RBC # BLD AUTO: 4.02 M/UL (ref 4.2–5.4)
SODIUM SERPL-SCNC: 140 MMOL/L (ref 136–145)
WBC # BLD AUTO: 7.3 K/UL (ref 4.8–10.8)

## 2025-02-10 PROCEDURE — 1180000000 HC REHAB R&B

## 2025-02-10 PROCEDURE — 82962 GLUCOSE BLOOD TEST: CPT

## 2025-02-10 PROCEDURE — 97110 THERAPEUTIC EXERCISES: CPT

## 2025-02-10 PROCEDURE — 94660 CPAP INITIATION&MGMT: CPT

## 2025-02-10 PROCEDURE — 99232 SBSQ HOSP IP/OBS MODERATE 35: CPT | Performed by: PSYCHIATRY & NEUROLOGY

## 2025-02-10 PROCEDURE — 94760 N-INVAS EAR/PLS OXIMETRY 1: CPT

## 2025-02-10 PROCEDURE — 85025 COMPLETE CBC W/AUTO DIFF WBC: CPT

## 2025-02-10 PROCEDURE — 6370000000 HC RX 637 (ALT 250 FOR IP): Performed by: PSYCHIATRY & NEUROLOGY

## 2025-02-10 PROCEDURE — 80048 BASIC METABOLIC PNL TOTAL CA: CPT

## 2025-02-10 PROCEDURE — 97535 SELF CARE MNGMENT TRAINING: CPT

## 2025-02-10 PROCEDURE — 36415 COLL VENOUS BLD VENIPUNCTURE: CPT

## 2025-02-10 PROCEDURE — 6360000002 HC RX W HCPCS: Performed by: PSYCHIATRY & NEUROLOGY

## 2025-02-10 PROCEDURE — 97116 GAIT TRAINING THERAPY: CPT

## 2025-02-10 PROCEDURE — 97530 THERAPEUTIC ACTIVITIES: CPT

## 2025-02-10 RX ADMIN — BUPROPION HYDROCHLORIDE 150 MG: 150 TABLET, EXTENDED RELEASE ORAL at 07:48

## 2025-02-10 RX ADMIN — ENOXAPARIN SODIUM 30 MG: 100 INJECTION SUBCUTANEOUS at 20:18

## 2025-02-10 RX ADMIN — HYDROCODONE BITARTRATE AND ACETAMINOPHEN 1 TABLET: 10; 325 TABLET ORAL at 11:52

## 2025-02-10 RX ADMIN — PREGABALIN 200 MG: 150 CAPSULE ORAL at 14:09

## 2025-02-10 RX ADMIN — PREGABALIN 200 MG: 150 CAPSULE ORAL at 20:19

## 2025-02-10 RX ADMIN — INSULIN HUMAN 18 UNITS: 100 INJECTION, SUSPENSION SUBCUTANEOUS at 20:20

## 2025-02-10 RX ADMIN — PHENAZOPYRIDINE HYDROCHLORIDE 200 MG: 200 TABLET ORAL at 16:18

## 2025-02-10 RX ADMIN — ENOXAPARIN SODIUM 30 MG: 100 INJECTION SUBCUTANEOUS at 07:46

## 2025-02-10 RX ADMIN — AMITRIPTYLINE HYDROCHLORIDE 25 MG: 10 TABLET, FILM COATED ORAL at 20:19

## 2025-02-10 RX ADMIN — METHOCARBAMOL 1000 MG: 500 TABLET ORAL at 14:09

## 2025-02-10 RX ADMIN — FUROSEMIDE 40 MG: 20 TABLET ORAL at 07:48

## 2025-02-10 RX ADMIN — SENNOSIDES AND DOCUSATE SODIUM 1 TABLET: 50; 8.6 TABLET ORAL at 20:19

## 2025-02-10 RX ADMIN — Medication 400 MG: at 20:19

## 2025-02-10 RX ADMIN — METHOCARBAMOL 1000 MG: 500 TABLET ORAL at 20:19

## 2025-02-10 RX ADMIN — IBUPROFEN 800 MG: 400 TABLET, FILM COATED ORAL at 17:40

## 2025-02-10 RX ADMIN — SULFAMETHOXAZOLE AND TRIMETHOPRIM 1 TABLET: 800; 160 TABLET ORAL at 20:18

## 2025-02-10 RX ADMIN — SULFAMETHOXAZOLE AND TRIMETHOPRIM 1 TABLET: 800; 160 TABLET ORAL at 07:47

## 2025-02-10 RX ADMIN — SENNOSIDES AND DOCUSATE SODIUM 1 TABLET: 50; 8.6 TABLET ORAL at 07:48

## 2025-02-10 RX ADMIN — HYDROCODONE BITARTRATE AND ACETAMINOPHEN 1 TABLET: 10; 325 TABLET ORAL at 16:18

## 2025-02-10 RX ADMIN — MICONAZOLE NITRATE: 2 POWDER TOPICAL at 20:20

## 2025-02-10 RX ADMIN — PREGABALIN 200 MG: 150 CAPSULE ORAL at 07:47

## 2025-02-10 RX ADMIN — INSULIN HUMAN 18 UNITS: 100 INJECTION, SUSPENSION SUBCUTANEOUS at 07:46

## 2025-02-10 RX ADMIN — PHENAZOPYRIDINE HYDROCHLORIDE 200 MG: 200 TABLET ORAL at 11:53

## 2025-02-10 RX ADMIN — METHOCARBAMOL 1000 MG: 500 TABLET ORAL at 07:47

## 2025-02-10 RX ADMIN — PHENAZOPYRIDINE HYDROCHLORIDE 200 MG: 200 TABLET ORAL at 07:47

## 2025-02-10 RX ADMIN — HYDROCODONE BITARTRATE AND ACETAMINOPHEN 1 TABLET: 10; 325 TABLET ORAL at 20:19

## 2025-02-10 ASSESSMENT — PAIN DESCRIPTION - ORIENTATION
ORIENTATION: LEFT
ORIENTATION: LEFT
ORIENTATION: ANTERIOR
ORIENTATION: LEFT
ORIENTATION: LEFT

## 2025-02-10 ASSESSMENT — PAIN SCALES - GENERAL
PAINLEVEL_OUTOF10: 9
PAINLEVEL_OUTOF10: 10
PAINLEVEL_OUTOF10: 8
PAINLEVEL_OUTOF10: 9
PAINLEVEL_OUTOF10: 7
PAINLEVEL_OUTOF10: 0
PAINLEVEL_OUTOF10: 9
PAINLEVEL_OUTOF10: 7

## 2025-02-10 ASSESSMENT — PAIN DESCRIPTION - DESCRIPTORS
DESCRIPTORS: ACHING;SORE
DESCRIPTORS: ACHING;SORE
DESCRIPTORS: ACHING
DESCRIPTORS: THROBBING;SQUEEZING
DESCRIPTORS: ACHING;SORE

## 2025-02-10 ASSESSMENT — PAIN - FUNCTIONAL ASSESSMENT
PAIN_FUNCTIONAL_ASSESSMENT: ACTIVITIES ARE NOT PREVENTED
PAIN_FUNCTIONAL_ASSESSMENT: PREVENTS OR INTERFERES SOME ACTIVE ACTIVITIES AND ADLS

## 2025-02-10 ASSESSMENT — PAIN DESCRIPTION - LOCATION
LOCATION: LEG
LOCATION: HEAD
LOCATION: LEG

## 2025-02-10 NOTE — PATIENT CARE CONFERENCE
[facility]  TEAM CONFERENCE NOTE    Date: 2025  Patient Name: Bárbara Ambrocio        MRN: [id number]    : 1975  (49 y.o.)  Gender: female      Diagnosis: LLE BELOW KNEE AMPUTATION      PHYSICAL THERAPY  GROSS ASSESSMENT       BED MOBILITY  Bed mobility  Rolling to Left: Modified independent  Rolling to Right: Modified independent  Supine to Sit: Modified independent (bedrail)  Sit to Supine: Independent (triplanar from R EOB)  Scooting: Modified independent  Bed Mobility Comments: W/ bedrails       TRANSFERS  Transfers  Sit to Stand: Contact guard assistance, Stand by assistance (from w/c to RW)  Stand to Sit: Contact guard assistance, Stand by assistance (to w/c from RW)  Bed to Chair: Contact guard assistance (partial stand pivot to left to w/c w/ RW)  Stand Pivot Transfers: Contact guard assistance (partial stand pivot to L from EOB to w/c)  Squat Pivot Transfers: Stand by assistance (from w/c towards R to R EOB; pt able to set up transfer independently)  Comment: pt performing sit stand wc<>wx, bed<>wc, toilet<>wc with stand pivot tfers   pt was able to tfer in bathroom pull up to grab bar and stand pivot tfer to toilet and reverse with cga and cues for safety  WHEELCHAIR PROPULSION  Propulsion 1  Propulsion: Manual  Level: Level Tile  Method: JAMIE PULIDO RLE  Level of Assistance: Independent  Description/ Details: slow propulsion, no rest breaks; using RLE since pt felt R wheel wasn't moving as well as L wheel  Distance: 50 ' + 2 turns  AMBULATION  Ambulation  Surface: Level tile  Device: Parallel Bars  Other Apparatus: Wheelchair follow  Assistance: Contact guard assistance  Quality of Gait: Hops due to decreased UE strength, severity of hop incresed with fatigue  Gait Deviations: Decreased step length, Decreased step height, Slow Nelia  Distance: 10 ft  Comments: ~4-5 steps fwd/bkwd then again right away  STAIRS     GOALS:  Short Term Goals  Time Frame for Short Term

## 2025-02-11 PROBLEM — E11.9 TYPE II DIABETES MELLITUS (HCC): Status: ACTIVE | Noted: 2025-02-11

## 2025-02-11 PROBLEM — N30.00 ACUTE CYSTITIS WITHOUT HEMATURIA: Status: ACTIVE | Noted: 2025-02-11

## 2025-02-11 LAB
GLUCOSE BLD-MCNC: 103 MG/DL (ref 70–99)
GLUCOSE BLD-MCNC: 105 MG/DL (ref 70–99)
GLUCOSE BLD-MCNC: 106 MG/DL (ref 70–99)
GLUCOSE BLD-MCNC: 144 MG/DL (ref 70–99)
PERFORMED ON: ABNORMAL

## 2025-02-11 PROCEDURE — 97116 GAIT TRAINING THERAPY: CPT

## 2025-02-11 PROCEDURE — 94760 N-INVAS EAR/PLS OXIMETRY 1: CPT

## 2025-02-11 PROCEDURE — 97535 SELF CARE MNGMENT TRAINING: CPT

## 2025-02-11 PROCEDURE — 97110 THERAPEUTIC EXERCISES: CPT

## 2025-02-11 PROCEDURE — 1180000000 HC REHAB R&B

## 2025-02-11 PROCEDURE — 82962 GLUCOSE BLOOD TEST: CPT

## 2025-02-11 PROCEDURE — 99232 SBSQ HOSP IP/OBS MODERATE 35: CPT | Performed by: PSYCHIATRY & NEUROLOGY

## 2025-02-11 PROCEDURE — 6370000000 HC RX 637 (ALT 250 FOR IP)

## 2025-02-11 PROCEDURE — 97530 THERAPEUTIC ACTIVITIES: CPT

## 2025-02-11 PROCEDURE — 94660 CPAP INITIATION&MGMT: CPT

## 2025-02-11 PROCEDURE — 6370000000 HC RX 637 (ALT 250 FOR IP): Performed by: PSYCHIATRY & NEUROLOGY

## 2025-02-11 PROCEDURE — 6360000002 HC RX W HCPCS: Performed by: PSYCHIATRY & NEUROLOGY

## 2025-02-11 RX ORDER — LEVOFLOXACIN 750 MG/1
750 TABLET, FILM COATED ORAL DAILY
Status: DISCONTINUED | OUTPATIENT
Start: 2025-02-11 | End: 2025-02-13 | Stop reason: HOSPADM

## 2025-02-11 RX ORDER — LEVOFLOXACIN 500 MG/1
500 TABLET, FILM COATED ORAL DAILY
Status: DISCONTINUED | OUTPATIENT
Start: 2025-02-11 | End: 2025-02-11

## 2025-02-11 RX ADMIN — INSULIN HUMAN 18 UNITS: 100 INJECTION, SUSPENSION SUBCUTANEOUS at 08:11

## 2025-02-11 RX ADMIN — PHENAZOPYRIDINE HYDROCHLORIDE 200 MG: 200 TABLET ORAL at 17:15

## 2025-02-11 RX ADMIN — METHOCARBAMOL 1000 MG: 500 TABLET ORAL at 08:13

## 2025-02-11 RX ADMIN — AMITRIPTYLINE HYDROCHLORIDE 25 MG: 10 TABLET, FILM COATED ORAL at 20:19

## 2025-02-11 RX ADMIN — MICONAZOLE NITRATE: 2 POWDER TOPICAL at 08:15

## 2025-02-11 RX ADMIN — SULFAMETHOXAZOLE AND TRIMETHOPRIM 1 TABLET: 800; 160 TABLET ORAL at 08:13

## 2025-02-11 RX ADMIN — BUPROPION HYDROCHLORIDE 150 MG: 150 TABLET, EXTENDED RELEASE ORAL at 08:13

## 2025-02-11 RX ADMIN — FUROSEMIDE 40 MG: 20 TABLET ORAL at 08:13

## 2025-02-11 RX ADMIN — PREGABALIN 200 MG: 150 CAPSULE ORAL at 20:19

## 2025-02-11 RX ADMIN — Medication 400 MG: at 20:19

## 2025-02-11 RX ADMIN — MICONAZOLE NITRATE: 2 POWDER TOPICAL at 20:25

## 2025-02-11 RX ADMIN — PREGABALIN 200 MG: 150 CAPSULE ORAL at 08:12

## 2025-02-11 RX ADMIN — HYDROCODONE BITARTRATE AND ACETAMINOPHEN 1 TABLET: 10; 325 TABLET ORAL at 20:28

## 2025-02-11 RX ADMIN — ENOXAPARIN SODIUM 30 MG: 100 INJECTION SUBCUTANEOUS at 08:12

## 2025-02-11 RX ADMIN — HYDROCODONE BITARTRATE AND ACETAMINOPHEN 1 TABLET: 10; 325 TABLET ORAL at 14:36

## 2025-02-11 RX ADMIN — PHENAZOPYRIDINE HYDROCHLORIDE 200 MG: 200 TABLET ORAL at 11:54

## 2025-02-11 RX ADMIN — PHENAZOPYRIDINE HYDROCHLORIDE 200 MG: 200 TABLET ORAL at 08:13

## 2025-02-11 RX ADMIN — METHOCARBAMOL 1000 MG: 500 TABLET ORAL at 20:18

## 2025-02-11 RX ADMIN — LEVOFLOXACIN 750 MG: 750 TABLET, FILM COATED ORAL at 16:14

## 2025-02-11 RX ADMIN — METHOCARBAMOL 1000 MG: 500 TABLET ORAL at 14:07

## 2025-02-11 RX ADMIN — SENNOSIDES AND DOCUSATE SODIUM 1 TABLET: 50; 8.6 TABLET ORAL at 20:18

## 2025-02-11 RX ADMIN — SENNOSIDES AND DOCUSATE SODIUM 1 TABLET: 50; 8.6 TABLET ORAL at 08:13

## 2025-02-11 RX ADMIN — INSULIN HUMAN 18 UNITS: 100 INJECTION, SUSPENSION SUBCUTANEOUS at 20:21

## 2025-02-11 RX ADMIN — ENOXAPARIN SODIUM 30 MG: 100 INJECTION SUBCUTANEOUS at 20:19

## 2025-02-11 RX ADMIN — PREGABALIN 200 MG: 150 CAPSULE ORAL at 14:07

## 2025-02-11 ASSESSMENT — PAIN SCALES - GENERAL
PAINLEVEL_OUTOF10: 3
PAINLEVEL_OUTOF10: 8
PAINLEVEL_OUTOF10: 4
PAINLEVEL_OUTOF10: 3
PAINLEVEL_OUTOF10: 8

## 2025-02-11 ASSESSMENT — PAIN DESCRIPTION - ORIENTATION
ORIENTATION: LEFT
ORIENTATION: LEFT

## 2025-02-11 ASSESSMENT — PAIN DESCRIPTION - DESCRIPTORS
DESCRIPTORS: ACHING;DISCOMFORT
DESCRIPTORS: ACHING;DISCOMFORT

## 2025-02-11 ASSESSMENT — PAIN DESCRIPTION - LOCATION
LOCATION: LEG
LOCATION: LEG

## 2025-02-11 ASSESSMENT — ENCOUNTER SYMPTOMS: COLOR CHANGE: 1
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
## 2025-02-12 LAB
BACTERIA UR CULT: ABNORMAL
GLUCOSE BLD-MCNC: 142 MG/DL (ref 70–99)
GLUCOSE BLD-MCNC: 150 MG/DL (ref 70–99)
GLUCOSE BLD-MCNC: 202 MG/DL (ref 70–99)
GLUCOSE BLD-MCNC: 91 MG/DL (ref 70–99)
ORGANISM: ABNORMAL
ORGANISM: ABNORMAL
PERFORMED ON: ABNORMAL
PERFORMED ON: NORMAL

## 2025-02-12 PROCEDURE — 97110 THERAPEUTIC EXERCISES: CPT

## 2025-02-12 PROCEDURE — 1180000000 HC REHAB R&B

## 2025-02-12 PROCEDURE — 6370000000 HC RX 637 (ALT 250 FOR IP): Performed by: PSYCHIATRY & NEUROLOGY

## 2025-02-12 PROCEDURE — 94760 N-INVAS EAR/PLS OXIMETRY 1: CPT

## 2025-02-12 PROCEDURE — 99233 SBSQ HOSP IP/OBS HIGH 50: CPT | Performed by: PSYCHIATRY & NEUROLOGY

## 2025-02-12 PROCEDURE — 6370000000 HC RX 637 (ALT 250 FOR IP)

## 2025-02-12 PROCEDURE — 94660 CPAP INITIATION&MGMT: CPT

## 2025-02-12 PROCEDURE — 97535 SELF CARE MNGMENT TRAINING: CPT

## 2025-02-12 PROCEDURE — 6360000002 HC RX W HCPCS: Performed by: PSYCHIATRY & NEUROLOGY

## 2025-02-12 PROCEDURE — 97530 THERAPEUTIC ACTIVITIES: CPT

## 2025-02-12 PROCEDURE — 82962 GLUCOSE BLOOD TEST: CPT

## 2025-02-12 PROCEDURE — 97116 GAIT TRAINING THERAPY: CPT

## 2025-02-12 RX ORDER — DOXYCYCLINE 100 MG/1
100 CAPSULE ORAL EVERY 12 HOURS SCHEDULED
Status: DISCONTINUED | OUTPATIENT
Start: 2025-02-12 | End: 2025-02-13 | Stop reason: HOSPADM

## 2025-02-12 RX ADMIN — FUROSEMIDE 40 MG: 20 TABLET ORAL at 08:26

## 2025-02-12 RX ADMIN — PHENAZOPYRIDINE HYDROCHLORIDE 200 MG: 200 TABLET ORAL at 12:10

## 2025-02-12 RX ADMIN — PREGABALIN 200 MG: 150 CAPSULE ORAL at 20:35

## 2025-02-12 RX ADMIN — METHOCARBAMOL 1000 MG: 500 TABLET ORAL at 08:26

## 2025-02-12 RX ADMIN — MICONAZOLE NITRATE: 2 POWDER TOPICAL at 08:28

## 2025-02-12 RX ADMIN — SENNOSIDES AND DOCUSATE SODIUM 1 TABLET: 50; 8.6 TABLET ORAL at 08:26

## 2025-02-12 RX ADMIN — PHENAZOPYRIDINE HYDROCHLORIDE 200 MG: 200 TABLET ORAL at 16:51

## 2025-02-12 RX ADMIN — MICONAZOLE NITRATE: 2 POWDER TOPICAL at 20:53

## 2025-02-12 RX ADMIN — Medication 400 MG: at 20:36

## 2025-02-12 RX ADMIN — PHENAZOPYRIDINE HYDROCHLORIDE 200 MG: 200 TABLET ORAL at 08:26

## 2025-02-12 RX ADMIN — PREGABALIN 200 MG: 150 CAPSULE ORAL at 08:26

## 2025-02-12 RX ADMIN — SENNOSIDES AND DOCUSATE SODIUM 1 TABLET: 50; 8.6 TABLET ORAL at 20:35

## 2025-02-12 RX ADMIN — HYDROCODONE BITARTRATE AND ACETAMINOPHEN 1 TABLET: 10; 325 TABLET ORAL at 20:52

## 2025-02-12 RX ADMIN — METHOCARBAMOL 1000 MG: 500 TABLET ORAL at 20:34

## 2025-02-12 RX ADMIN — DOXYCYCLINE HYCLATE 100 MG: 100 CAPSULE ORAL at 20:35

## 2025-02-12 RX ADMIN — HYDROCODONE BITARTRATE AND ACETAMINOPHEN 1 TABLET: 10; 325 TABLET ORAL at 08:25

## 2025-02-12 RX ADMIN — HYDROCODONE BITARTRATE AND ACETAMINOPHEN 1 TABLET: 10; 325 TABLET ORAL at 00:45

## 2025-02-12 RX ADMIN — LEVOFLOXACIN 750 MG: 750 TABLET, FILM COATED ORAL at 08:26

## 2025-02-12 RX ADMIN — HYDROCODONE BITARTRATE AND ACETAMINOPHEN 1 TABLET: 10; 325 TABLET ORAL at 17:08

## 2025-02-12 RX ADMIN — ENOXAPARIN SODIUM 30 MG: 100 INJECTION SUBCUTANEOUS at 20:33

## 2025-02-12 RX ADMIN — INSULIN HUMAN 18 UNITS: 100 INJECTION, SUSPENSION SUBCUTANEOUS at 20:33

## 2025-02-12 RX ADMIN — ENOXAPARIN SODIUM 30 MG: 100 INJECTION SUBCUTANEOUS at 08:27

## 2025-02-12 RX ADMIN — METHOCARBAMOL 1000 MG: 500 TABLET ORAL at 14:11

## 2025-02-12 RX ADMIN — BUPROPION HYDROCHLORIDE 150 MG: 150 TABLET, EXTENDED RELEASE ORAL at 08:26

## 2025-02-12 RX ADMIN — INSULIN HUMAN 18 UNITS: 100 INJECTION, SUSPENSION SUBCUTANEOUS at 08:28

## 2025-02-12 RX ADMIN — AMITRIPTYLINE HYDROCHLORIDE 25 MG: 10 TABLET, FILM COATED ORAL at 20:35

## 2025-02-12 RX ADMIN — PREGABALIN 200 MG: 150 CAPSULE ORAL at 14:11

## 2025-02-12 RX ADMIN — INSULIN LISPRO 2 UNITS: 100 INJECTION, SOLUTION INTRAVENOUS; SUBCUTANEOUS at 20:33

## 2025-02-12 ASSESSMENT — PAIN SCALES - GENERAL
PAINLEVEL_OUTOF10: 8
PAINLEVEL_OUTOF10: 4
PAINLEVEL_OUTOF10: 9
PAINLEVEL_OUTOF10: 3
PAINLEVEL_OUTOF10: 8
PAINLEVEL_OUTOF10: 2
PAINLEVEL_OUTOF10: 6
PAINLEVEL_OUTOF10: 6
PAINLEVEL_OUTOF10: 8
PAINLEVEL_OUTOF10: 3
PAINLEVEL_OUTOF10: 7
PAINLEVEL_OUTOF10: 4

## 2025-02-12 ASSESSMENT — PAIN DESCRIPTION - LOCATION
LOCATION: LEG

## 2025-02-12 ASSESSMENT — PAIN - FUNCTIONAL ASSESSMENT
PAIN_FUNCTIONAL_ASSESSMENT: ACTIVITIES ARE NOT PREVENTED

## 2025-02-12 ASSESSMENT — PAIN DESCRIPTION - ORIENTATION
ORIENTATION: LEFT

## 2025-02-12 ASSESSMENT — PAIN DESCRIPTION - DESCRIPTORS
DESCRIPTORS: ACHING;DISCOMFORT
DESCRIPTORS: ACHING;SPASM
DESCRIPTORS: ACHING

## 2025-02-12 NOTE — CARE COORDINATION
[facility] start of care Monday, 2/17.     Electronically signed by ANAY Harmon on 2/12/2025 at 8:40 AM

## 2025-02-13 ENCOUNTER — TELEPHONE (OUTPATIENT)
Dept: VASCULAR SURGERY | Facility: CLINIC | Age: 50
End: 2025-02-13
Payer: MEDICARE

## 2025-02-13 VITALS
RESPIRATION RATE: 18 BRPM | SYSTOLIC BLOOD PRESSURE: 127 MMHG | DIASTOLIC BLOOD PRESSURE: 90 MMHG | WEIGHT: 293 LBS | HEART RATE: 107 BPM | BODY MASS INDEX: 43.4 KG/M2 | OXYGEN SATURATION: 94 % | TEMPERATURE: 96.6 F | HEIGHT: 69 IN

## 2025-02-13 LAB
ANION GAP SERPL CALCULATED.3IONS-SCNC: 10 MMOL/L (ref 8–16)
BASOPHILS # BLD: 0 K/UL (ref 0–0.2)
BASOPHILS NFR BLD: 0.5 % (ref 0–1)
BUN SERPL-MCNC: 16 MG/DL (ref 6–20)
CALCIUM SERPL-MCNC: 8.7 MG/DL (ref 8.6–10)
CHLORIDE SERPL-SCNC: 102 MMOL/L (ref 98–107)
CO2 SERPL-SCNC: 28 MMOL/L (ref 22–29)
CREAT SERPL-MCNC: 0.8 MG/DL (ref 0.5–0.9)
ECHO BSA: 2.61 M2
EOSINOPHIL # BLD: 0.6 K/UL (ref 0–0.6)
EOSINOPHIL NFR BLD: 9.9 % (ref 0–5)
ERYTHROCYTE [DISTWIDTH] IN BLOOD BY AUTOMATED COUNT: 15.1 % (ref 11.5–14.5)
GLUCOSE BLD-MCNC: 164 MG/DL (ref 70–99)
GLUCOSE SERPL-MCNC: 199 MG/DL (ref 70–99)
HCT VFR BLD AUTO: 35.3 % (ref 37–47)
HGB BLD-MCNC: 11.1 G/DL (ref 12–16)
IMM GRANULOCYTES # BLD: 0 K/UL
LYMPHOCYTES # BLD: 2.5 K/UL (ref 1.1–4.5)
LYMPHOCYTES NFR BLD: 45 % (ref 20–40)
MCH RBC QN AUTO: 28.9 PG (ref 27–31)
MCHC RBC AUTO-ENTMCNC: 31.4 G/DL (ref 33–37)
MCV RBC AUTO: 91.9 FL (ref 81–99)
MONOCYTES # BLD: 0.3 K/UL (ref 0–0.9)
MONOCYTES NFR BLD: 6 % (ref 0–10)
NEUTROPHILS # BLD: 2.1 K/UL (ref 1.5–7.5)
NEUTS SEG NFR BLD: 38.2 % (ref 50–65)
PERFORMED ON: ABNORMAL
PLATELET # BLD AUTO: 232 K/UL (ref 130–400)
PMV BLD AUTO: 9.3 FL (ref 9.4–12.3)
POTASSIUM SERPL-SCNC: 4.2 MMOL/L (ref 3.5–5)
RBC # BLD AUTO: 3.84 M/UL (ref 4.2–5.4)
SODIUM SERPL-SCNC: 140 MMOL/L (ref 136–145)
WBC # BLD AUTO: 5.5 K/UL (ref 4.8–10.8)

## 2025-02-13 PROCEDURE — 6370000000 HC RX 637 (ALT 250 FOR IP)

## 2025-02-13 PROCEDURE — 82962 GLUCOSE BLOOD TEST: CPT

## 2025-02-13 PROCEDURE — 6360000002 HC RX W HCPCS: Performed by: PSYCHIATRY & NEUROLOGY

## 2025-02-13 PROCEDURE — 94660 CPAP INITIATION&MGMT: CPT

## 2025-02-13 PROCEDURE — 36415 COLL VENOUS BLD VENIPUNCTURE: CPT

## 2025-02-13 PROCEDURE — 6370000000 HC RX 637 (ALT 250 FOR IP): Performed by: PSYCHIATRY & NEUROLOGY

## 2025-02-13 PROCEDURE — 85025 COMPLETE CBC W/AUTO DIFF WBC: CPT

## 2025-02-13 PROCEDURE — 99239 HOSP IP/OBS DSCHRG MGMT >30: CPT | Performed by: PSYCHIATRY & NEUROLOGY

## 2025-02-13 PROCEDURE — 80048 BASIC METABOLIC PNL TOTAL CA: CPT

## 2025-02-13 RX ORDER — PREGABALIN 200 MG/1
200 CAPSULE ORAL 3 TIMES DAILY
Qty: 90 CAPSULE | Refills: 0 | Status: SHIPPED | OUTPATIENT
Start: 2025-02-13 | End: 2025-03-13

## 2025-02-13 RX ORDER — BUPROPION HYDROCHLORIDE 150 MG/1
150 TABLET ORAL DAILY
Qty: 30 TABLET | Refills: 3 | Status: SHIPPED | OUTPATIENT
Start: 2025-02-14

## 2025-02-13 RX ORDER — LANOLIN ALCOHOL/MO/W.PET/CERES
400 CREAM (GRAM) TOPICAL NIGHTLY
Qty: 30 TABLET | Refills: 0 | Status: SHIPPED | OUTPATIENT
Start: 2025-02-13

## 2025-02-13 RX ORDER — HYDROCODONE BITARTRATE AND ACETAMINOPHEN 10; 325 MG/1; MG/1
1 TABLET ORAL EVERY 4 HOURS PRN
Qty: 28 TABLET | Refills: 0 | Status: SHIPPED | OUTPATIENT
Start: 2025-02-13 | End: 2025-02-20

## 2025-02-13 RX ORDER — DOXYCYCLINE 100 MG/1
100 CAPSULE ORAL EVERY 12 HOURS SCHEDULED
Qty: 20 CAPSULE | Refills: 0 | Status: SHIPPED | OUTPATIENT
Start: 2025-02-13 | End: 2025-02-23

## 2025-02-13 RX ORDER — LEVOFLOXACIN 750 MG/1
750 TABLET, FILM COATED ORAL DAILY
Qty: 4 TABLET | Refills: 0 | Status: SHIPPED | OUTPATIENT
Start: 2025-02-14 | End: 2025-02-18

## 2025-02-13 RX ORDER — METHOCARBAMOL 1000 MG/1
1000 TABLET, FILM COATED ORAL 3 TIMES DAILY
Qty: 90 TABLET | Refills: 0 | Status: SHIPPED | OUTPATIENT
Start: 2025-02-13 | End: 2025-02-23

## 2025-02-13 RX ADMIN — ENOXAPARIN SODIUM 30 MG: 100 INJECTION SUBCUTANEOUS at 08:12

## 2025-02-13 RX ADMIN — METHOCARBAMOL 1000 MG: 500 TABLET ORAL at 08:11

## 2025-02-13 RX ADMIN — MICONAZOLE NITRATE: 2 POWDER TOPICAL at 08:14

## 2025-02-13 RX ADMIN — HYDROCODONE BITARTRATE AND ACETAMINOPHEN 1 TABLET: 10; 325 TABLET ORAL at 01:05

## 2025-02-13 RX ADMIN — LEVOFLOXACIN 750 MG: 750 TABLET, FILM COATED ORAL at 08:11

## 2025-02-13 RX ADMIN — PREGABALIN 200 MG: 150 CAPSULE ORAL at 08:12

## 2025-02-13 RX ADMIN — DOXYCYCLINE HYCLATE 100 MG: 100 CAPSULE ORAL at 08:12

## 2025-02-13 RX ADMIN — SENNOSIDES AND DOCUSATE SODIUM 1 TABLET: 50; 8.6 TABLET ORAL at 08:12

## 2025-02-13 RX ADMIN — BUPROPION HYDROCHLORIDE 150 MG: 150 TABLET, EXTENDED RELEASE ORAL at 08:12

## 2025-02-13 RX ADMIN — INSULIN HUMAN 18 UNITS: 100 INJECTION, SUSPENSION SUBCUTANEOUS at 08:13

## 2025-02-13 RX ADMIN — FUROSEMIDE 40 MG: 20 TABLET ORAL at 08:12

## 2025-02-13 RX ADMIN — HYDROCODONE BITARTRATE AND ACETAMINOPHEN 1 TABLET: 10; 325 TABLET ORAL at 05:30

## 2025-02-13 RX ADMIN — PHENAZOPYRIDINE HYDROCHLORIDE 200 MG: 200 TABLET ORAL at 08:12

## 2025-02-13 ASSESSMENT — PAIN SCALES - GENERAL
PAINLEVEL_OUTOF10: 9
PAINLEVEL_OUTOF10: 4
PAINLEVEL_OUTOF10: 9
PAINLEVEL_OUTOF10: 4

## 2025-02-13 ASSESSMENT — PAIN DESCRIPTION - LOCATION
LOCATION: LEG
LOCATION: LEG

## 2025-02-13 ASSESSMENT — PAIN DESCRIPTION - ORIENTATION
ORIENTATION: LEFT
ORIENTATION: LEFT

## 2025-02-13 ASSESSMENT — PAIN DESCRIPTION - DESCRIPTORS
DESCRIPTORS: ACHING
DESCRIPTORS: ACHING

## 2025-02-13 NOTE — PROGRESS NOTES
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   (General- R shoulder, LLE)   Transfers   Sit to Stand Contact guard assistance   Stand to Sit Contact guard assistance   Bed to Chair Contact guard assistance  (w/c<>toilet from L/R using grab bar)   Ambulation   Surface Level tile   Device Parallel Bars   Assistance Contact guard assistance   Quality of Gait decreased foot clearance   Gait Deviations Slow Nelia;Decreased step length;Decreased step height   Distance 8' X 2  (~2-3 min rest in between)   Comments ~4-5 steps fwd/bkwd then again right away   Activity Tolerance   Activity Tolerance Patient tolerated treatment well   Assessment   Assessment Pt able to amb. up to 8' X 2 in // requiring CGA including steps fwd/bwkd. Plan to progress pt to RW soon, 
  Name: Bárbara Ambrocio  MRN: [id number]  Date of Service:  2/7/2025 02/07/25 1100   Restrictions/Precautions   Restrictions/Precautions Fall Risk;Weight Bearing   Required Braces or Orthoses? Yes   Lower Extremity Weight Bearing Restrictions   Right Lower Extremity Weight Bearing Weight Bearing As Tolerated   Left Lower Extremity Weight Bearing Non Weight Bearing   Required Braces or Orthoses   Left Lower Extremity Brace Knee Immobilizer   LLE Brace Type BE-TECH   Position Activity Restriction   Other Position/Activity Restrictions BE-DAYRON TYPE DEVICE FOR LLE   General   Chart Reviewed Yes   Patient assessed for rehabilitation services? Yes   Additional Pertinent Hx CHARCOT'S JOINT LEFT ANKLE, 6 YEAR HX OF LEFT FOOT WOUND, OSTEOMYELITIS LEFT ANKLE/FOOT,  DM2, LYMPHEDEMA, MORBID OBESITY, NICOTINE DEPENDENCE,   Family/Caregiver Present Yes  (Initially)   Diagnosis LLE BELOW KNEE AMPUTATION   General   General Comments Pt able to sit on toilet and independently perform personal hygiene post urination, requires Min A to brandyn pants while maintaining balance- 2X.   Subjective   Subjective Pt sitting in w/c in room- had pushed call light to use BR, once finished she agreed to participatein therapy.   Pain   Pre-Pain 3   Post-Pain 3   Pain Location   (General- R shoulder, LLE)   Transfers   Sit to Stand Contact guard assistance   Stand to Sit Contact guard assistance   Bed to Chair Contact guard assistance  (w/c<>toilet using grab bar from L/R and w/c<recliner from R)   PT Exercises   Exercise Treatment Sitting BLE ther-ex: RLE DF/PF X 20/ankle circles CC/CCW X 20 each, LAQ X 10, hip flexion X 10 and LLE (w/o Be-Tech)- LAQ X 15  (Requested to use BR during ther-ex)   Activity Tolerance   Activity Tolerance Patient tolerated treatment well   Assessment   Assessment Pt able to tolerate overall tx w/o c/o of pain from baseline, does report some stiffness/discomfort.  in R ankle w/ ther-ex.   Discharge Recommendations Continue 
  Name: Bárbara Ambrocio  MRN: [id number]  Date of Service:  2/6/2025 02/06/25 1445   Restrictions/Precautions   Restrictions/Precautions Fall Risk;Weight Bearing   Required Braces or Orthoses? Yes   Lower Extremity Weight Bearing Restrictions   Right Lower Extremity Weight Bearing Weight Bearing As Tolerated   Left Lower Extremity Weight Bearing Non Weight Bearing   Required Braces or Orthoses   Left Lower Extremity Brace Knee Immobilizer   LLE Brace Type BE-TECH   Position Activity Restriction   Other Position/Activity Restrictions BE-DAYRON TYPE DEVICE FOR LLE   General   Chart Reviewed Yes   Patient assessed for rehabilitation services? Yes   Additional Pertinent Hx CHARCOT'S JOINT LEFT ANKLE, 6 YEAR HX OF LEFT FOOT WOUND, OSTEOMYELITIS LEFT ANKLE/FOOT,  DM2, LYMPHEDEMA, MORBID OBESITY, NICOTINE DEPENDENCE,   Family/Caregiver Present No   Diagnosis LLE BELOW KNEE AMPUTATION   Subjective   Subjective Pt laying in bed, agrees to participate in therapy.   Pain   Pre-Pain 6   Post-Pain 6   Pain Location   (General- R shoulder, LLE)   Bed mobility   Rolling to Left Modified independent   Rolling to Right Modified independent   Supine to Sit Modified independent   Sit to Supine Modified independent   Scooting Modified independent   Bed Mobility Comments W/ bedrails   PT Exercises   Exercise Treatment Supine BLE ther-ex: B QS X 20, LLE hip ext w/ towel X 20, LLE knee flex/ext w/ towels under knee X 15, B hip add w/ towel X 20 and Sidelying on R side: LLE hip abd X 10, hip ext X 10 and Prone: hip flexor stretch w/ towels X ~1 min, hip ext X 10 AAROM   Activity Tolerance   Activity Tolerance Patient tolerated treatment well   Assessment   Assessment Pt able to tolerate supine/sidelying/prone ther-ex w/o c/o of increased pain from baseline, able to perform aspects of bed mobility w/ MI (w/ rails). Pt reports she will be d/c from hospital in av height SUV w/o a running board (reports she got in car to come from previous 
  Name: Bárbara Ambrocio  MRN: [id number]  Date of Service:  2/12/2025 02/12/25 1000   Restrictions/Precautions   Restrictions/Precautions Fall Risk;Weight Bearing   Required Braces or Orthoses? Yes   Lower Extremity Weight Bearing Restrictions   Right Lower Extremity Weight Bearing Weight Bearing As Tolerated   Left Lower Extremity Weight Bearing Non Weight Bearing   Required Braces or Orthoses   Left Lower Extremity Brace Knee Immobilizer   LLE Brace Type BE-TECH   Position Activity Restriction   Other Position/Activity Restrictions BE-DAYRON TYPE DEVICE FOR LLE   General   Chart Reviewed Yes   Patient assessed for rehabilitation services? Yes   Additional Pertinent Hx CHARCOT'S JOINT LEFT ANKLE, 6 YEAR HX OF LEFT FOOT WOUND, OSTEOMYELITIS LEFT ANKLE/FOOT,  DM2, LYMPHEDEMA, MORBID OBESITY, NICOTINE DEPENDENCE,   Family/Caregiver Present No   Diagnosis LLE BELOW KNEE AMPUTATION   General   General Comments Pt requested to go to BR in middle of session- pt able to sit on toilet and independently perform personal hygiene post urination, able to maintain balance while mostly independently doffing/donning pants (for ~1 min., required Min A some due to gaitbelt/Be-Tech belts).   Subjective   Subjective Pt in longsitting on EOB/just finished a shower w/ OT, agrees to continu participating in therapy.   Pain   Pre-Pain 0   Post-Pain 0   Bed mobility   Bridging Modified independent ;Independent  (Minimally)   Rolling to Left Modified independent;Independent   Rolling to Right Modified independent;Independent   Supine to Sit Modified independent;Independent   Sit to Supine Modified independent;Independent   Scooting Modified independent;Independent   Bed Mobility Comments On R side of mat table- no rails   Transfers   Sit to Stand Contact guard assistance;Stand by assistance   Stand to Sit Contact guard assistance;Stand by assistance   Bed to Chair Contact guard assistance;Stand by assistance  (w/c<>toilet from pt L/R using grab 
  Pharmacy Adjustment per [facility] protocol    Bárbara Ambrocio is a 49 y.o. female. Pharmacy has adjusted medications per [facility] protocol.    Recent Labs     02/10/25  0435   BUN 15       Recent Labs     02/10/25  0435   CREATININE 0.7       Estimated Creatinine Clearance: 152 mL/min (based on SCr of 0.7 mg/dL).    Height:   Ht Readings from Last 1 Encounters:   02/03/25 1.753 m (5' 9.02\")     Weight:  Wt Readings from Last 1 Encounters:   02/08/25 (!) 142.8 kg (314 lb 14.4 oz)    BMI:  BMI Readings from Last 1 Encounters:   02/08/25 46.48 kg/m²         Plan: Adjust the following medications based on [facility] protocol:           Levofloxacin to 750 mg by mouth daily for UTI and cellulitis.    Electronically signed by Sandra Polanco RPH on 2/11/2025 at 3:05 PM    
4 Eyes Skin Assessment     NAME:  Bárbara Ambrocio  YOB: 1975  MEDICAL RECORD NUMBER:  [id number]    The patient is being assessed for  Admission    I agree that at least one RN has performed a thorough Head to Toe Skin Assessment on the patient. ALL assessment sites listed below have been assessed.      Areas assessed by both nurses:    Head, Face, Ears, Shoulders, Back, Chest, Arms, Elbows, Hands, Sacrum. Buttock, Coccyx, Ischium, Legs. Feet and Heels, and Under Medical Devices         Does the Patient have a Wound? Yes wound(s) were present on assessment. LDA wound assessment was Initiated and completed by RN       Kamran Prevention initiated by RN: Yes  Wound Care Orders initiated by RN: Yes    Pressure Injury (Stage 3,4, Unstageable, DTI, NWPT, and Complex wounds) if present, place Wound referral order by RN under : No    New Ostomies, if present place, Ostomy referral order under : No     Nurse 1 eSignature: Electronically signed by ANASTASIIA Riggs, RN on 1/31/2025 at 4:17 PM      Nurse 2 eSignature: Electronically signed by KARLENE ELISE LPN on 1/31/25 at 4:23 PM CST    
Bárbara Ambrocio arrived to room # 816.   Presented with: Osteomyelitis s/p Left BKA  Mental Status: Patient is oriented, alert, and thought processes intact.     Patient safety contract and falls prevention contract reviewed with patient Yes.  Oriented Patient and Family to room.  Call light within reach. Yes.  Needs, issues or concerns expressed at this time: no.    Electronically signed by AKOSUA RiggsN, RN on 1/31/2025 at 4:06 PM      
Comprehensive Nutrition Assessment    Type and Reason for Visit:  Initial    Nutrition Recommendations/Plan:   Continue current diet and supplements     Malnutrition Assessment:  Malnutrition Status:  At risk for malnutrition (02/03/25 7095)    Context:  Acute Illness     Findings of the 6 clinical characteristics of malnutrition:  Energy Intake:  No decrease in energy intake  Weight Loss:  No weight loss     Body Fat Loss:  No body fat loss     Muscle Mass Loss:  No muscle mass loss    Fluid Accumulation:  Moderate to Severe     Strength:  Not Performed    Nutrition Assessment:    Pt evaluated for admission to rehab. Pt reports good intake and appetite. Pt states she previous was having desired weight loss on Ozempic however, due to change in insurance she has been off it and gained weight back. Pt states her UBW is ~270-280 lbs.  Pt has new L BKA. Denies problems with chewing or swallowing. Pt on Henry BID to aid in wound healing.    Nutrition Related Findings:    +2 pitting edema RLE, non-pitting LLE, BM 2-2, Glu 131-267 Wound Type: Surgical Incision       Current Nutrition Intake & Therapies:    Average Meal Intake: %  Average Supplements Intake: Unable to assess  ADULT DIET; Regular; 5 carb choices (75 gm/meal)  ADULT ORAL NUTRITION SUPPLEMENT; Dinner, Lunch; Wound Healing Oral Supplement    Anthropometric Measures:  Height: 175.3 cm (5' 9.02\")  Ideal Body Weight (IBW): 145 lbs (66 kg)    Admission Body Weight: 139.7 kg (307 lb 15.7 oz)  Current Body Weight: 141.6 kg (312 lb 2.7 oz), 215.3 % IBW. Weight Source: Standing scale  Current BMI (kg/m2): 46.1  Usual Body Weight: 122.7 kg (270 lb 8.1 oz) (Stated)   % Weight Change (Calculated): 15.4  Weight Adjustment For: Amputation  Total Adjusted Percentage (Calculated): 5.9  Adjusted Ideal Body Weight (lbs) (Calculated): 136.4 lbs  Adjusted Ideal Body Weight (kg) (Calculated): 62 kg  Adjusted % Ideal Body Weight (Calculated): 228.9  Adjusted BMI (kg/m2) 
Durable Medical Equipment   Physician Order     Patient Name Bárbara Ambrocio  Patient Phone:[phone] (Mobile)     Patient Address: [address] [place] [address]     Patient Height Height: 175.3 cm (5' 9.02\")  Patient Weight (!) 142.8 kg (314 lb 14.4 oz)   1975             DME NEEDED:    ** 22INCH WIDE, STANDARD HEIGHT WHEELCHAIR WITH FULL LENGTH ARMRESTS, RIGHT SWING AWAY FOOTREST, LEFT ELEVATING LEG REST FOR AMPUTATED LIMB, AND ANTI TIPPERS    **STANDARD WHEELCHAIR CUSHION,    **EXTRA WIDE DROP ARM BEDSIDE COMMODE    **ROLLING WALKER        Coverage Information (for Hospital Account #[id number])    1. [facility] MEDICARE/[place] MEDICARE [place]AGE [place]    F/O Payor/Plan Precert #   [facility] MEDICARE/[place]    Subscriber Subscriber #   Bárbara Ambrocio [id number]   Address Phone   [id number]  [place] [address]      2. [place]    F/O Payor/Plan Precert #   [place]    Subscriber Subscriber #   Bárbara Ambrocio [id number]   Address Phone   [address]  [place] [address]             Medical Problems  CommentHospital Problem List  Date Reviewed: 2024     ICD-10-CM Priority Class Noted Diagnosed POA    S/P BKA (below knee amputation) unilateral, left (HCC) Z89.512   2025  Yes     Non-Hospital Problem List  Date Reviewed: 2024     ICD-10-CM Priority Class Noted Diagnosed   Therapy failure due to antibiotic resistance Z16.20 Medium  2022    Thyroid nodule E04.1 Medium  2022    ARIEL on CPAP G47.33 Medium  2022    Neuropathy G62.9 Medium  2022    Morbid obesity due to excess calories E66.01 Medium  2022    Noncompliance with CPAP treatment Z91.199 Medium  2022    Acute respiratory failure with hypoxemia J96.01   2017    Tobacco abuse (Chronic) Z72.0   2017    Acute bronchitis J20.9   2017    Respiratory failure J96.90   2018    Vitamin D deficiency E55.9   2018    Hypothyroidism 
Facility/Department: [facility] REHAB UNIT  Occupational Therapy     Name: Bárbara Ambrocio  : 1975  MRN: [id number]  Date of Service: 2025    Discharge Recommendations:  Home with assist PRN, Home with Home health OT        Past Medical History:  has a past medical history of Diabetic ulcer of left midfoot associated with type 2 diabetes mellitus, with fat layer exposed (HCC), Hypertension, Hypothyroidism, Morbid obesity due to excess calories, Morbid obesity due to excess calories, Neuropathy, ARIEL on CPAP, ARIEL on CPAP, Thyroid nodule, Tobacco abuse, and Vitamin D deficiency.  Past Surgical History:  has a past surgical history that includes Tubal ligation (Bilateral, ); Ovary removal (); Nose surgery (); Foot surgery (Left, ); Cholecystectomy, laparoscopic (); and  section ().    Treatment Diagnosis: s/p L BKA      Assessment   Performance deficits / Impairments: Decreased functional mobility ;Decreased ADL status;Decreased strength;Decreased endurance;Decreased high-level IADLs;Decreased balance  Treatment Diagnosis: s/p L BKA  Prognosis: Good  Activity Tolerance  Activity Tolerance: Patient Tolerated treatment well            Plan   Occupational Therapy Plan  Hours Per Day: 1.5 hours  Specific Instructions for Next Treatment: AE education/demo for ADL  Current Treatment Recommendations: Strengthening, Balance training, Functional mobility training, Endurance training, Patient/Caregiver education & training, Self-Care / ADL, Safety education & training, Positioning, Equipment evaluation, education, & procurement, Home management training, Wheelchair mobility training     Restrictions  Restrictions/Precautions  Restrictions/Precautions: Fall Risk, Weight Bearing  Required Braces or Orthoses?: Yes  Lower Extremity Weight Bearing Restrictions  Right Lower Extremity Weight Bearing: Weight Bearing As Tolerated  Left Lower Extremity Weight Bearing: Non Weight Bearing  Required Braces or 
Facility/Department: [facility] REHAB UNIT  Occupational Therapy Treatment Note    Name: Bárabra Ambrocio  : 1975  MRN: [id number]  Date of Service: 2/3/2025    Discharge Recommendations:  Home with assist PRN, Home with Home health OT          Past Medical History:  has a past medical history of Diabetic ulcer of left midfoot associated with type 2 diabetes mellitus, with fat layer exposed (HCC), Hypertension, Hypothyroidism, Morbid obesity due to excess calories, Morbid obesity due to excess calories, Neuropathy, ARIEL on CPAP, ARIEL on CPAP, Thyroid nodule, Tobacco abuse, and Vitamin D deficiency.  Past Surgical History:  has a past surgical history that includes Tubal ligation (Bilateral, ); Ovary removal (); Nose surgery (); Foot surgery (Left, ); Cholecystectomy, laparoscopic (); and  section ().    Treatment Diagnosis: s/p L BKA    Assessment   Performance deficits / Impairments: Decreased functional mobility ;Decreased ADL status;Decreased strength;Decreased endurance;Decreased high-level IADLs  Treatment Diagnosis: s/p L BKA  Activity Tolerance  Activity Tolerance: Patient Tolerated treatment well              Plan   Occupational Therapy Plan  Hours Per Day: 1.5 hours  Specific Instructions for Next Treatment: AE education/demo for ADL  Current Treatment Recommendations: Strengthening, Balance training, Functional mobility training, Endurance training, Patient/Caregiver education & training, Self-Care / ADL, Safety education & training, Positioning, Equipment evaluation, education, & procurement, Home management training     Restrictions  Restrictions/Precautions  Restrictions/Precautions: Fall Risk, Weight Bearing  Required Braces or Orthoses?: Yes  Lower Extremity Weight Bearing Restrictions  Right Lower Extremity Weight Bearing: Weight Bearing As Tolerated  Left Lower Extremity Weight Bearing: Non Weight Bearing  Required Braces or Orthoses  Left Lower Extremity Brace: Knee 
Facility/Department: [facility] REHAB UNIT  Occupational Therapy     Name: Bárbara Ambrocio  : 1975  MRN: [id number]  Date of Service: 2/3/2025    Discharge Recommendations:  Home with assist PRN, Home with Home health OT     Past Medical History:  has a past medical history of Diabetic ulcer of left midfoot associated with type 2 diabetes mellitus, with fat layer exposed (HCC), Hypertension, Hypothyroidism, Morbid obesity due to excess calories, Morbid obesity due to excess calories, Neuropathy, ARIEL on CPAP, ARIEL on CPAP, Thyroid nodule, Tobacco abuse, and Vitamin D deficiency.  Past Surgical History:  has a past surgical history that includes Tubal ligation (Bilateral, ); Ovary removal (); Nose surgery (); Foot surgery (Left, ); Cholecystectomy, laparoscopic (); and  section ().    Treatment Diagnosis: s/p L BKA      Assessment   Performance deficits / Impairments: Decreased functional mobility ;Decreased ADL status;Decreased strength;Decreased endurance;Decreased high-level IADLs  Treatment Diagnosis: s/p L BKA  Prognosis: Good  Activity Tolerance  Activity Tolerance: Patient Tolerated treatment well            Plan   Occupational Therapy Plan  Hours Per Day: 1.5 hours  Specific Instructions for Next Treatment: AE education/demo for ADL  Current Treatment Recommendations: Strengthening, Balance training, Functional mobility training, Endurance training, Patient/Caregiver education & training, Self-Care / ADL, Safety education & training, Positioning, Equipment evaluation, education, & procurement, Home management training     Restrictions  Restrictions/Precautions  Restrictions/Precautions: Fall Risk, Weight Bearing  Required Braces or Orthoses?: Yes  Lower Extremity Weight Bearing Restrictions  Right Lower Extremity Weight Bearing: Weight Bearing As Tolerated  Left Lower Extremity Weight Bearing: Non Weight Bearing  Required Braces or Orthoses  Left Lower Extremity Brace: Knee 
Facility/Department: [facility] REH[facility]  Occupational Therapy Treatment Note    Name: Bárbara Ambrocio  : 1975  MRN: [id number]  Date of Service: 2025    Discharge Recommendations:  Home with assist PRN, Home with Home health OT          Patient Diagnosis(es): s/p L BKA  Past Medical History:  has a past medical history of Diabetic ulcer of left midfoot associated with type 2 diabetes mellitus, with fat layer exposed (HCC), Hypertension, Hypothyroidism, Morbid obesity due to excess calories, Morbid obesity due to excess calories, Neuropathy, ARIEL on CPAP, ARIEL on CPAP, Thyroid nodule, Tobacco abuse, and Vitamin D deficiency.  Past Surgical History:  has a past surgical history that includes Tubal ligation (Bilateral, ); Ovary removal (); Nose surgery (); Foot surgery (Left, ); Cholecystectomy, laparoscopic (); and  section ().    Treatment Diagnosis: s/p L BKA    Assessment   Performance deficits / Impairments: Decreased functional mobility ;Decreased ADL status;Decreased strength;Decreased endurance;Decreased high-level IADLs  Assessment: Evaluation completed and tx initiated.  The patient would benefit from further skilled therapy to upgrade safety and functional independence.  Treatment Diagnosis: s/p L BKA  Prognosis: Good  Decision Making: Low Complexity  Activity Tolerance  Activity Tolerance: Patient Tolerated treatment well              Plan   Occupational Therapy Plan  Hours Per Day: 1.5 hours  Specific Instructions for Next Treatment: AE education/demo for ADL  Current Treatment Recommendations: Strengthening, Balance training, Functional mobility training, Endurance training, Patient/Caregiver education & training, Self-Care / ADL, Safety education & training, Positioning, Equipment evaluation, education, & procurement, Home management training     Restrictions  Restrictions/Precautions  Restrictions/Precautions: Fall Risk, Weight Bearing  Required Braces or Orthoses?: 
[facility] is in network with patient's insurance and is only option for home health at this time. Zoie with [facility] says that patient will likely be accepted but start of care will need to be Monday, 2/17, due to staffing. Will continue to work on home health arrangements for discharge on Thursday, 2/13.     Electronically signed by ANAY Harmon on 2/10/25 at 2:57 PM CST   
Name: Bárbara Ambrocio  MRN:  [id number]  Date of service:  2/5/2025 02/05/25 1000   Restrictions/Precautions   Restrictions/Precautions Fall Risk;Weight Bearing   Required Braces or Orthoses? Yes   Lower Extremity Weight Bearing Restrictions   Right Lower Extremity Weight Bearing Weight Bearing As Tolerated   Left Lower Extremity Weight Bearing Non Weight Bearing   Required Braces or Orthoses   Left Lower Extremity Brace Knee Immobilizer   LLE Brace Type BE-TECH   Position Activity Restriction   Other Position/Activity Restrictions BE-DAYRON TYPE DEVICE FOR LLE   General   Chart Reviewed Yes   Additional Pertinent Hx CHARCOT'S JOINT LEFT ANKLE, 6 YEAR HX OF LEFT FOOT WOUND, OSTEOMYELITIS LEFT ANKLE/FOOT,  DM2, LYMPHEDEMA, MORBID OBESITY, NICOTINE DEPENDENCE,   Family/Caregiver Present Yes   Subjective   Subjective pt in bed, agrees to therapy   Pain Assessment   Pain Assessment 0-10   Pain Level 0   Oxygen Therapy   O2 Device None (Room air)   Orientation   Overall Orientation Status WNL   Bed Mobility   Rolling Modified independent   Supine to Sit Modified independent   Sit to Supine Modified independent   Scooting Supervision   Transfers   Sit to Stand Contact guard assistance   Stand to Sit Contact guard assistance   Bed to Chair Contact guard assistance   Stand Pivot Transfers Contact guard assistance   Comment pt performing sit stand wc<>wx, bed<>wc, toilet<>wc with stand pivot tfers   pt was able to tfer in bathroom pull up to grab bar and stand pivot tfer to toilet and reverse with cga and cues for safety   Propulsion 1   Propulsion Manual   Level Level Tile   Method RUE;LUE   Level of Assistance Supervision   Description/ Details able to negotiate obstacles today, slight fatigue with 1-2 short rest breaks needed   Distance 150'   Balance   Comments static dyn stand bal in // bars with cga, pt performing hip flex, hip abd and hip ext x 5 reps , mild fatigue noted, seated rest break between   Exercises   Comments 
Name: Bárbara Ambrocio  MRN:  [id number]  Date of service:  2/5/2025 02/05/25 1515   Restrictions/Precautions   Restrictions/Precautions Fall Risk;Weight Bearing   Required Braces or Orthoses? Yes   Lower Extremity Weight Bearing Restrictions   Right Lower Extremity Weight Bearing Weight Bearing As Tolerated   Left Lower Extremity Weight Bearing Non Weight Bearing   Required Braces or Orthoses   Left Lower Extremity Brace Knee Immobilizer   LLE Brace Type BE-TECH   Position Activity Restriction   Other Position/Activity Restrictions BE-DAYRON TYPE DEVICE FOR LLE   General   Chart Reviewed Yes   Additional Pertinent Hx CHARCOT'S JOINT LEFT ANKLE, 6 YEAR HX OF LEFT FOOT WOUND, OSTEOMYELITIS LEFT ANKLE/FOOT,  DM2, LYMPHEDEMA, MORBID OBESITY, NICOTINE DEPENDENCE,   Family/Caregiver Present Yes   Subjective   Subjective pt sitting in wc, agrees to therapy   Pain Assessment   Pain Assessment 0-10   Pain Level 5   Pain Location Shoulder   Pain Orientation Right   Pain Descriptors Aching;Tender;Throbbing   Functional Pain Assessment Prevents or interferes some active activities and ADLs   Oxygen Therapy   Oxygen Therapy None (Room air)   Orientation   Overall Orientation Status WNL   Transfers   Sit to Stand Contact guard assistance   Stand to Sit Contact guard assistance   Ambulation   Surface Level tile   Device Parallel Bars   Other Apparatus Wheelchair follow   Assistance Contact guard assistance   Quality of Gait Hops due to insufficient shoulder strength to push through to lift the RLE   Distance 10'   Propulsion 1   Propulsion Manual   Level Level Tile   Method RUE;LUE   Level of Assistance Supervision   Description/ Details working on turns, obstacles   Distance 50'   Exercises   Comments seated rt le therex with green tband x 10, left le qs propped on pta leg, hip abd   Patient Goals    Patient Goals  INDEPENDENT HOUSEHOLD AMBULATION WITH ASSISITIVE DEVICE   Short Term Goals   Time Frame for Short Term Goals 1 WEEK 
Nutrition Assessment     Type and Reason for Visit: Reassess    Nutrition Recommendations/Plan:   Continue current diet and supplements     Malnutrition Assessment:  Malnutrition Status: At risk for malnutrition    Nutrition Assessment:  Pt is eating well for meals, usually consuming % of meals. Pt is taking Henry BID to aid in wound healing. Pt noted to have weight decrease, pt is on Lasix. Pt remains morbidly obese.    Estimated Daily Nutrient Needs:  Energy (kcal):  4176-5881 (8-15/kg) Weight Used for Energy Requirements: Current     Protein (g):  >/=124 Weight Used for Protein Requirements: Ideal        Fluid (ml/day):  4283-7691 (8-15/kg) Method Used for Fluid Requirements: 1 ml/kcal    Nutrition Related Findings:   +2 pitting edema RLE,  LLE non-pitting edema, Glu , BM 2-9 Wound Type: Surgical Incision    Current Nutrition Therapies:    ADULT DIET; Regular; 5 carb choices (75 gm/meal)  ADULT ORAL NUTRITION SUPPLEMENT; Dinner, Lunch; Wound Healing Oral Supplement    Anthropometric Measures:  Height: 175.3 cm (5' 9.02\")  Current Body Wt: 142.8 kg (314 lb 13.1 oz)   BMI: 46.5      Nutrition Diagnosis:   Overweight/obese related to excessive energy intake as evidenced by BMI, wounds    Nutrition Interventions:   Food and/or Nutrient Delivery: Continue Current Diet, Continue Oral Nutrition Supplement  Nutrition Education/Counseling: No recommendation at this time  Coordination of Nutrition Care: Continue to monitor while inpatient  Plan of Care discussed with: Pt    Goals:  Goals: PO intake 75% or greater  Type of Goal: Continue current goal  Previous Goal Met: Goal(s) Achieved    Nutrition Monitoring and Evaluation:   Behavioral-Environmental Outcomes: None Identified  Food/Nutrient Intake Outcomes: Food and Nutrient Intake, Supplement Intake  Physical Signs/Symptoms Outcomes: Biochemical Data, Fluid Status or Edema, Skin, Weight    Discharge Planning:    Continue Oral Nutrition Supplement     Vickie 
Nutrition Assessment     Type and Reason for Visit: Reassess    Nutrition Recommendations/Plan:   Continue current diet/supplements     Malnutrition Assessment:  Malnutrition Status: At risk for malnutrition    Nutrition Assessment:  Pt is eating well for meals with good appetite. Usually consumes 100% of most meals. Pt reports drinking Henry well to aid in wound healing. Weight noted to fluctuate/decrease. Pt is on Lasix, remains morbidly obese.    Estimated Daily Nutrient Needs:  Energy (kcal):  2829-3305 (8-15/kg) Weight Used for Energy Requirements: Current     Protein (g):  >/=124 Weight Used for Protein Requirements: Ideal        Fluid (ml/day):  5194-2515 (8-15/kg) Method Used for Fluid Requirements: 1 ml/kcal    Nutrition Related Findings:   +1 pitting edema RLE, non-pitting LLE, BM 2-5, Glu  Wound Type: Surgical Incision    Current Nutrition Therapies:    ADULT DIET; Regular; 5 carb choices (75 gm/meal)  ADULT ORAL NUTRITION SUPPLEMENT; Dinner, Lunch; Wound Healing Oral Supplement    Anthropometric Measures:  Height: 175.3 cm (5' 9.02\")  Current Body Wt: 141.6 kg (312 lb 2.7 oz)   BMI: 46.1      Nutrition Diagnosis:   Overweight/obese related to excessive energy intake as evidenced by BMI, wounds    Nutrition Interventions:   Food and/or Nutrient Delivery: Continue Current Diet, Continue Oral Nutrition Supplement  Nutrition Education/Counseling: No recommendation at this time  Coordination of Nutrition Care: Continue to monitor while inpatient  Plan of Care discussed with: Pt    Goals:  Goals: PO intake 75% or greater  Type of Goal: New goal  Previous Goal Met: New Goal    Nutrition Monitoring and Evaluation:   Behavioral-Environmental Outcomes: None Identified  Food/Nutrient Intake Outcomes: Food and Nutrient Intake, Supplement Intake  Physical Signs/Symptoms Outcomes: Biochemical Data, Fluid Status or Edema, Skin, Weight    Discharge Planning:    Too soon to determine     Vickie Benoit RD  Contact: 
Occupational Therapy  Facility/Department: [facility] 8 REHAB UNIT  Rehabilitation Occupational Therapy Daily Treatment Note    Date: 25  Patient Name: Bárbara Ambrocio       Room: 0816/816-02  MRN: [id number]  Account: [id number]   : 1975  (49 y.o.) Gender: female                    Past Medical History:  has a past medical history of Diabetic ulcer of left midfoot associated with type 2 diabetes mellitus, with fat layer exposed (HCC), Hypertension, Hypothyroidism, Morbid obesity due to excess calories, Morbid obesity due to excess calories, Neuropathy, ARIEL on CPAP, ARIEL on CPAP, Thyroid nodule, Tobacco abuse, and Vitamin D deficiency.  Past Surgical History:   has a past surgical history that includes Tubal ligation (Bilateral, ); Ovary removal (); Nose surgery (); Foot surgery (Left, ); Cholecystectomy, laparoscopic (); and  section ().    Restrictions  Restrictions/Precautions: Fall Risk, Weight Bearing  Other Position/Activity Restrictions: BE-DAYRON TYPE DEVICE FOR LLE  Right Lower Extremity Weight Bearing: Weight Bearing As Tolerated  Left Lower Extremity Weight Bearing: Non Weight Bearing  Required Braces or Orthoses  Left Lower Extremity Brace: Knee Immobilizer  LLE Brace Type: BE-TECH  Required Braces or Orthoses?: Yes         Plan  Occupational Therapy Plan  Current Treatment Recommendations: Strengthening;Balance training;Functional mobility training;Endurance training;Patient/Caregiver education & training;Self-Care / ADL;Safety education & training;Positioning;Equipment evaluation, education, & procurement;Home management training;Wheelchair mobility training           25 0815   Pain   Pre-Pain 0   Post-Pain 0        25 0815   General   Family / Caregiver Present Yes   Balance   Sitting Balance Independent   Functional Mobility   Functional - Mobility Device Wheelchair   Activity Retrieve items;Transport items   Assist Level Stand by assistance   Functional 
Occupational Therapy  Facility/Department: [facility] 8 REHAB UNIT  Rehabilitation Occupational Therapy Daily Treatment Note    Date: 25  Patient Name: Bárbara Ambrocio       Room: 0816/816-02  MRN: [id number]  Account: [id number]   : 1975  (49 y.o.) Gender: female                Treatment Diagnosis: (P) s/p L BKA   Past Medical History:  has a past medical history of Diabetic ulcer of left midfoot associated with type 2 diabetes mellitus, with fat layer exposed (HCC), Hypertension, Hypothyroidism, Morbid obesity due to excess calories, Morbid obesity due to excess calories, Neuropathy, ARIEL on CPAP, ARIEL on CPAP, Thyroid nodule, Tobacco abuse, and Vitamin D deficiency.  Past Surgical History:   has a past surgical history that includes Tubal ligation (Bilateral, ); Ovary removal (); Nose surgery (); Foot surgery (Left, ); Cholecystectomy, laparoscopic (); and  section ().     25 0900   Restrictions/Precautions   Restrictions/Precautions Fall Risk;Weight Bearing   Lower Extremity Weight Bearing Restrictions   Left Lower Extremity Weight Bearing Non Weight Bearing   Required Braces or Orthoses   Left Lower Extremity Brace Knee Immobilizer   LLE Brace Type BE-TECH   ADL   Toileting Contact guard assistance   Functional Mobility   Functional - Mobility Device Wheelchair   Activity Other;To/from bathroom  (in room, matute, OT kitchen)   Assist Level Stand by assistance   Functional Mobility Comments partial distance room to therapy gym then fatigues (about shelter)   Bed mobility   Supine to Sit Modified independent   Sit to Supine Modified independent   Transfers   Sit to stand Contact guard assistance   Stand to sit Contact guard assistance   Transfer Comments completed multiple  SPS t/fs and trialed various w/cs   Toilet Transfers   Toilet - Technique Stand pivot   Equipment Used Grab bars   Toilet Transfer Contact guard assistance   Activity Tolerance   Activity Tolerance Patient 
Occupational Therapy  Facility/Department: [facility] 8 REHAB UNIT  Rehabilitation Occupational Therapy Daily Treatment Note    Date: 25  Patient Name: Bárbara Ambrocio       Room: 0816/816-02  MRN: [id number]  Account: [id number]   : 1975  (49 y.o.) Gender: female                    Past Medical History:  has a past medical history of Diabetic ulcer of left midfoot associated with type 2 diabetes mellitus, with fat layer exposed (HCC), Hypertension, Hypothyroidism, Morbid obesity due to excess calories, Morbid obesity due to excess calories, Neuropathy, ARIEL on CPAP, ARIEL on CPAP, Thyroid nodule, Tobacco abuse, and Vitamin D deficiency.  Past Surgical History:   has a past surgical history that includes Tubal ligation (Bilateral, ); Ovary removal (); Nose surgery (); Foot surgery (Left, ); Cholecystectomy, laparoscopic (); and  section ().    Restrictions  Restrictions/Precautions: Fall Risk, Weight Bearing  Other Position/Activity Restrictions: BE-DAYRON TYPE DEVICE FOR LLE  Right Lower Extremity Weight Bearing: Weight Bearing As Tolerated  Left Lower Extremity Weight Bearing: Non Weight Bearing  Required Braces or Orthoses  Left Lower Extremity Brace: Knee Immobilizer  LLE Brace Type: BE-TECH  Required Braces or Orthoses?: Yes      Plan  Occupational Therapy Plan  Specific Instructions for Next Treatment: w/c level home making tasks           25 1305   General   Family / Caregiver Present No            25 1305   General   Family / Caregiver Present No   Functional Mobility   Functional - Mobility Device Wheelchair   Activity Other   Assist Level Independent   Assessment   Performance deficits / Impairments Decreased functional mobility ;Decreased ADL status;Decreased strength;Decreased endurance;Decreased balance;Decreased high-level IADLs   Activity Tolerance   Activity Tolerance Patient Tolerated treatment well   Occupational Therapy Plan   Specific Instructions for Next 
Occupational Therapy  Facility/Department: [facility] 8 REHAB UNIT  Rehabilitation Occupational Therapy Daily Treatment Note    Date: 2/10/25  Patient Name: Bárbara Ambrocio       Room: 0816/816-02  MRN: [id number]  Account: [id number]   : 1975  (49 y.o.) Gender: female                    Past Medical History:  has a past medical history of Diabetic ulcer of left midfoot associated with type 2 diabetes mellitus, with fat layer exposed (HCC), Hypertension, Hypothyroidism, Morbid obesity due to excess calories, Morbid obesity due to excess calories, Neuropathy, ARIEL on CPAP, ARIEL on CPAP, Thyroid nodule, Tobacco abuse, and Vitamin D deficiency.  Past Surgical History:   has a past surgical history that includes Tubal ligation (Bilateral, ); Ovary removal (); Nose surgery (); Foot surgery (Left, ); Cholecystectomy, laparoscopic (); and  section ().    Restrictions  Restrictions/Precautions: Fall Risk, Weight Bearing  Other Position/Activity Restrictions: BE-DAYRON TYPE DEVICE FOR LLE  Right Lower Extremity Weight Bearing: Weight Bearing As Tolerated  Left Lower Extremity Weight Bearing: Non Weight Bearing  Required Braces or Orthoses  Left Lower Extremity Brace: Knee Immobilizer  LLE Brace Type: BE-TECH  Required Braces or Orthoses?: Yes        Plan  Occupational Therapy Plan  Current Treatment Recommendations: Strengthening;Balance training;Functional mobility training;Endurance training;Patient/Caregiver education & training;Self-Care / ADL;Safety education & training;Positioning;Equipment evaluation, education, & procurement;Home management training;Wheelchair mobility training   02/10/25 0905   General   Family / Caregiver Present No   Functional Mobility   Functional - Mobility Device Wheelchair   Activity Other   Assist Level Supervision   Transfers   Sit to stand Stand by assistance   Stand to sit Stand by assistance   Toilet Transfers   Toilet - Technique Stand pivot   Equipment Used Mike 
Occupational Therapy  Facility/Department: [facility] 8 REHAB UNIT  Rehabilitation Occupational Therapy Daily Treatment Note    Date: 25  Patient Name: Bárbara Ambrocio       Room: 0816/816-02  MRN: [id number]  Account: [id number]   : 1975  (49 y.o.) Gender: female                Treatment Diagnosis: (P) s/p L BKA   Past Medical History:  has a past medical history of Diabetic ulcer of left midfoot associated with type 2 diabetes mellitus, with fat layer exposed (HCC), Hypertension, Hypothyroidism, Morbid obesity due to excess calories, Morbid obesity due to excess calories, Neuropathy, ARIEL on CPAP, ARIEL on CPAP, Thyroid nodule, Tobacco abuse, and Vitamin D deficiency.  Past Surgical History:   has a past surgical history that includes Tubal ligation (Bilateral, ); Ovary removal (); Nose surgery (); Foot surgery (Left, ); Cholecystectomy, laparoscopic (); and  section ().     25 1300   Restrictions/Precautions   Restrictions/Precautions Fall Risk;Weight Bearing   Lower Extremity Weight Bearing Restrictions   Right Lower Extremity Weight Bearing Weight Bearing As Tolerated   Required Braces or Orthoses   Left Lower Extremity Brace Knee Immobilizer   LLE Brace Type BE-TECH   Balance   Sitting Balance Independent   Standing Balance Stand by assistance   Functional Mobility   Functional - Mobility Device Wheelchair   Assist Level Independent   Functional Mobility Comments to/from bathroom, in room, in therapy gym   Transfers   Stand Pivot Transfers Stand by assistance   Sit to stand Stand by assistance   Stand to sit Stand by assistance   Transfer Comments stationary recliner <-> w/c, w/c <-> toilet   Toilet Transfers   Toilet - Technique Stand pivot   Toilet Transfer Stand by assistance   Shower Transfers   Shower Transfers Comments reviewed shower recs for set up at home- pt has a drop down bench but would benefit from a TTB to span the small stepover- rec addressing further with 
Occupational Therapy  Facility/Department: [facility] 8 REHAB UNIT  Rehabilitation Occupational Therapy Daily Treatment Note    Date: 25  Patient Name: Bárbara Ambrocio       Room: 0816/816-02  MRN: [id number]  Account: [id number]   : 1975  (49 y.o.) Gender: female                Treatment Diagnosis: s/p L BKA   Past Medical History:  has a past medical history of Diabetic ulcer of left midfoot associated with type 2 diabetes mellitus, with fat layer exposed (HCC), Hypertension, Hypothyroidism, Morbid obesity due to excess calories, Morbid obesity due to excess calories, Neuropathy, ARIEL on CPAP, ARIEL on CPAP, Thyroid nodule, Tobacco abuse, and Vitamin D deficiency.  Past Surgical History:   has a past surgical history that includes Tubal ligation (Bilateral, ); Ovary removal (); Nose surgery (); Foot surgery (Left, ); Cholecystectomy, laparoscopic (); and  section ().     25 1100   ADL   Putting On/Taking Off Footwear Minimal assistance   Putting On/Taking Off Footwear Skilled Clinical Factors assist for heel, would benefit from practicing with shoe horn   Balance   Standing Balance Contact guard assistance   Standing Balance   Activity at kitchen counter, [place] [facility] [place]   Functional Mobility   Functional - Mobility Device Wheelchair   Activity Retrieve items;Transport items;Other;To/From therapy gym  (partial way to tx area then fatigued)   Assist Level Stand by assistance   Functional Mobility Comments kitchen act, retrieving item fromhigh cabinet and transport ing to table   Toilet Transfers   Toilet - Technique Stand pivot   Equipment Used Extra wide bedside commode   Toilet Transfer Contact guard assistance   OT Exercises   Exercise Treatment Blanca 15#   Activity Tolerance   Activity Tolerance Patient Tolerated treatment well   Assessment   Performance deficits / Impairments Decreased functional mobility ;Decreased ADL status;Decreased strength;Decreased 
Occupational Therapy  Facility/Department: [facility] 8 REHAB UNIT  Rehabilitation Occupational Therapy Daily Treatment Note    Date: 2/10/25  Patient Name: Bárbara Ambrocio       Room: 0816/816-02  MRN: [id number]  Account: [id number]   : 1975  (49 y.o.) Gender: female                    Past Medical History:  has a past medical history of Diabetic ulcer of left midfoot associated with type 2 diabetes mellitus, with fat layer exposed (HCC), Hypertension, Hypothyroidism, Morbid obesity due to excess calories, Morbid obesity due to excess calories, Neuropathy, ARIEL on CPAP, ARIEL on CPAP, Thyroid nodule, Tobacco abuse, and Vitamin D deficiency.  Past Surgical History:   has a past surgical history that includes Tubal ligation (Bilateral, ); Ovary removal (); Nose surgery (); Foot surgery (Left, ); Cholecystectomy, laparoscopic (); and  section ().    Restrictions  Restrictions/Precautions: Fall Risk, Weight Bearing  Other Position/Activity Restrictions: BE-DAYRON TYPE DEVICE FOR LLE  Right Lower Extremity Weight Bearing: Weight Bearing As Tolerated  Left Lower Extremity Weight Bearing: Non Weight Bearing  Required Braces or Orthoses  Left Lower Extremity Brace: Knee Immobilizer  LLE Brace Type: BE-TECH  Required Braces or Orthoses?: Yes          Plan  Occupational Therapy Plan  Current Treatment Recommendations: Strengthening;Balance training;Functional mobility training;Endurance training;Patient/Caregiver education & training;Self-Care / ADL;Safety education & training;Positioning;Equipment evaluation, education, & procurement;Home management training;Wheelchair mobility training       02/10/25 1310   General   Family / Caregiver Present No   Functional Mobility   Functional - Mobility Device Wheelchair   Activity Other   Assist Level Supervision   Transfers   Transfer Comments CGA w/c to recliner( in therapy gym); however, mod A recliner to w/c--recommended not using rocking recliner at home or 
Occupational Therapy  Facility/Department: [facility] 8 REHAB UNIT  Rehabilitation Occupational Therapy Daily Treatment Note    Date: 25  Patient Name: Bárbara Ambrocio       Room: 0816/816-02  MRN: [id number]  Account: [id number]   : 1975  (49 y.o.) Gender: female                Treatment Diagnosis: s/p L BKA   Past Medical History:  has a past medical history of Diabetic ulcer of left midfoot associated with type 2 diabetes mellitus, with fat layer exposed (HCC), Hypertension, Hypothyroidism, Morbid obesity due to excess calories, Morbid obesity due to excess calories, Neuropathy, ARIEL on CPAP, ARIEL on CPAP, Thyroid nodule, Tobacco abuse, and Vitamin D deficiency.  Past Surgical History:   has a past surgical history that includes Tubal ligation (Bilateral, ); Ovary removal (); Nose surgery (); Foot surgery (Left, ); Cholecystectomy, laparoscopic (); and  section ().     25 1345   Restrictions/Precautions   Restrictions/Precautions Fall Risk;Weight Bearing   Lower Extremity Weight Bearing Restrictions   Left Lower Extremity Weight Bearing Non Weight Bearing   Required Braces or Orthoses   Left Lower Extremity Brace Knee Immobilizer   LLE Brace Type BE-TECH   Balance   Standing Balance Contact guard assistance   Standing Balance   Time 1.5 min   Activity 1 hand laundry task   Functional Mobility   Functional - Mobility Device Wheelchair   Assist Level Stand by assistance   Transfers   Sit to stand Contact guard assistance   Stand to sit Contact guard assistance   Toilet Transfers   Toilet - Technique Stand pivot   Equipment Used Grab bars   Toilet Transfer Contact guard assistance   Toilet Transfers Comments becoming more confident with toilet t/f   OT Exercises   Exercise Treatment 3# FW distal   Activity Tolerance   Activity Tolerance Patient Tolerated treatment well   Assessment   Performance deficits / Impairments Decreased functional mobility ;Decreased ADL status;Decreased 
Occupational Therapy  Facility/Department: [facility] 8 REHAB UNIT  Rehabilitation Occupational Therapy Daily Treatment Note    Date: 25  Patient Name: Bárbara Ambrocio       Room: 0816/816-02  MRN: [id number]  Account: [id number]   : 1975  (49 y.o.) Gender: female                    Past Medical History:  has a past medical history of Diabetic ulcer of left midfoot associated with type 2 diabetes mellitus, with fat layer exposed (HCC), Hypertension, Hypothyroidism, Morbid obesity due to excess calories, Morbid obesity due to excess calories, Neuropathy, ARIEL on CPAP, ARIEL on CPAP, Thyroid nodule, Tobacco abuse, and Vitamin D deficiency.  Past Surgical History:   has a past surgical history that includes Tubal ligation (Bilateral, ); Ovary removal (); Nose surgery (); Foot surgery (Left, ); Cholecystectomy, laparoscopic (); and  section ().    Restrictions  Restrictions/Precautions: Fall Risk, Weight Bearing  Other Position/Activity Restrictions: BE-DAYRON TYPE DEVICE FOR LLE  Right Lower Extremity Weight Bearing: Weight Bearing As Tolerated  Left Lower Extremity Weight Bearing: Non Weight Bearing  Required Braces or Orthoses  Left Lower Extremity Brace: Knee Immobilizer  LLE Brace Type: BE-TECH  Required Braces or Orthoses?: Yes        Plan  Occupational Therapy Plan  Current Treatment Recommendations: Strengthening;Balance training;Functional mobility training;Endurance training;Patient/Caregiver education & training;Self-Care / ADL;Safety education & training;Positioning;Equipment evaluation, education, & procurement;Home management training;Wheelchair mobility training     25 1000   Pain   Pre-Pain 0   Post-Pain 0      25 1000   General   Family / Caregiver Present No   ADL   Grooming Setup   Balance   Sitting Balance Independent   Bed mobility   Supine to Sit Modified independent  (bedrail)   Transfers   Stand Pivot Transfers Contact guard assistance   Sit to stand Contact 
Occupational Therapy  Facility/Department: [facility] 8 REHAB UNIT  Rehabilitation Occupational Therapy Daily Treatment Note    Date: 25  Patient Name: Bárbara Ambrocio       Room: 0816/816-02  MRN: [id number]  Account: [id number]   : 1975  (49 y.o.) Gender: female                Treatment Diagnosis: (P) s/p L BKA   Past Medical History:  has a past medical history of Diabetic ulcer of left midfoot associated with type 2 diabetes mellitus, with fat layer exposed (HCC), Hypertension, Hypothyroidism, Morbid obesity due to excess calories, Morbid obesity due to excess calories, Neuropathy, ARIEL on CPAP, ARIEL on CPAP, Thyroid nodule, Tobacco abuse, and Vitamin D deficiency.  Past Surgical History:   has a past surgical history that includes Tubal ligation (Bilateral, ); Ovary removal (); Nose surgery (); Foot surgery (Left, ); Cholecystectomy, laparoscopic (); and  section ().     25 1100   Restrictions/Precautions   Restrictions/Precautions Fall Risk;Weight Bearing   Lower Extremity Weight Bearing Restrictions   Left Lower Extremity Weight Bearing Non Weight Bearing   Required Braces or Orthoses   Left Lower Extremity Brace Knee Immobilizer   LLE Brace Type BE-TECH   ADL   Toileting Stand by assistance   Balance   Sitting Balance Independent   Standing Balance Stand by assistance   Functional Mobility   Functional - Mobility Device Wheelchair   Assist Level Stand by assistance   Functional Mobility Comments able to self propel new w/c partial distance and then required assist d/t fatigue and hands slipping on rims   Transfers   Sit to stand Stand by assistance   Stand to sit Stand by assistance   Toilet Transfers   Toilet - Technique Stand pivot   Equipment Used Grab bars   Toilet Transfer Stand by assistance   Toilet Transfers Comments encouragement to utilize low GB vs towel bar for support, pt will have a low GB at home   OT Exercises   Exercise Treatment 17# L, 15# R werner 
Occupational Therapy  Facility/Department: [facility] 8 REHAB UNIT  Rehabilitation Occupational Therapy Daily Treatment Note    Date: 25  Patient Name: Bárbara Ambrocio       Room: 0816/816-02  MRN: [id number]  Account: [id number]   : 1975  (49 y.o.) Gender: female                Treatment Diagnosis: s/p L BKA   Past Medical History:  has a past medical history of Diabetic ulcer of left midfoot associated with type 2 diabetes mellitus, with fat layer exposed (HCC), Hypertension, Hypothyroidism, Morbid obesity due to excess calories, Morbid obesity due to excess calories, Neuropathy, ARIEL on CPAP, ARIEL on CPAP, Thyroid nodule, Tobacco abuse, and Vitamin D deficiency.  Past Surgical History:   has a past surgical history that includes Tubal ligation (Bilateral, ); Ovary removal (); Nose surgery (); Foot surgery (Left, ); Cholecystectomy, laparoscopic (); and  section ().     25 0900   Restrictions/Precautions   Restrictions/Precautions Fall Risk;Weight Bearing   Lower Extremity Weight Bearing Restrictions   Left Lower Extremity Weight Bearing Non Weight Bearing   Required Braces or Orthoses   Left Lower Extremity Brace Knee Immobilizer   LLE Brace Type BE-TECH   ADL   Equipment Provided Dressing stick  (for doffing shoe/sock)   Additional Comments see CARE scores   Balance   Sitting Balance Independent   Standing Balance Stand by assistance   Functional Mobility   Functional - Mobility Device Wheelchair   Assist Level Independent   Transfers   Sit to stand Stand by assistance   Stand to sit Stand by assistance   Toilet Transfers   Toilet - Technique Stand pivot   Toilet Transfer Stand by assistance   Shower Transfers   Shower - Transfer From Wheelchair   Shower - Transfer Type To and From   Shower - Transfer To Transfer tub bench   Shower - Technique Stand pivot   Shower Transfers Stand by assistance   Wheelchair Bed Transfers   Wheelchair/Bed - Technique Stand pivot   Level of 
Occupational Therapy  Facility/Department: [facility] 8 REHAB UNIT  Rehabilitation Occupational Therapy Daily Treatment Note    Date: 25  Patient Name: Bárbara Ambrocio       Room: 0816/816-02  MRN: [id number]  Account: [id number]   : 1975  (49 y.o.) Gender: female                Treatment Diagnosis: (P) s/p L BKA   Past Medical History:  has a past medical history of Diabetic ulcer of left midfoot associated with type 2 diabetes mellitus, with fat layer exposed (HCC), Hypertension, Hypothyroidism, Morbid obesity due to excess calories, Morbid obesity due to excess calories, Neuropathy, ARIEL on CPAP, ARIEL on CPAP, Thyroid nodule, Tobacco abuse, and Vitamin D deficiency.  Past Surgical History:   has a past surgical history that includes Tubal ligation (Bilateral, ); Ovary removal (); Nose surgery (); Foot surgery (Left, ); Cholecystectomy, laparoscopic (); and  section ().     25 1345   Restrictions/Precautions   Restrictions/Precautions Fall Risk;Weight Bearing   Lower Extremity Weight Bearing Restrictions   Left Lower Extremity Weight Bearing Non Weight Bearing   Required Braces or Orthoses   Left Lower Extremity Brace Knee Immobilizer   LLE Brace Type BE-TECH   Functional Mobility   Functional - Mobility Device Wheelchair   Assist Level Stand by assistance   Functional Mobility Comments in OT gym, room   Transfers   Transfer Comments w/c, standard bed, recliner   Wheelchair Bed Transfers   Wheelchair/Bed - Technique Stand pivot   Wheelchair Transfers Comments to standard bed in OT- cues initiallty for technique and then able to carryover   OT Exercises   Exercise Treatment Blanca 15#, 3# FW distal   Time Code Minutes    Timed Code Treatment Minutes 45 Minutes   OT Individual Minutes   Time In 1345   Time Out 1430   Minutes 45       
Occupational Therapy  Facility/Department: [facility] 8 REHAB UNIT  Rehabilitation Occupational Therapy Daily Treatment Note    Date: 25  Patient Name: Bárbara Ambrocio       Room: 0816/816-02  MRN: [id number]  Account: [id number]   : 1975  (49 y.o.) Gender: female                Treatment Diagnosis: (P) s/p L BKA   Past Medical History:  has a past medical history of Diabetic ulcer of left midfoot associated with type 2 diabetes mellitus, with fat layer exposed (HCC), Hypertension, Hypothyroidism, Morbid obesity due to excess calories, Morbid obesity due to excess calories, Neuropathy, ARIEL on CPAP, ARIEL on CPAP, Thyroid nodule, Tobacco abuse, and Vitamin D deficiency.  Past Surgical History:   has a past surgical history that includes Tubal ligation (Bilateral, ); Ovary removal (); Nose surgery (); Foot surgery (Left, ); Cholecystectomy, laparoscopic (); and  section ().   25 1345   Restrictions/Precautions   Restrictions/Precautions Fall Risk;Weight Bearing   Lower Extremity Weight Bearing Restrictions   Left Lower Extremity Weight Bearing Non Weight Bearing   Required Braces or Orthoses   Left Lower Extremity Brace Knee Immobilizer   LLE Brace Type BE-TECH   Position Activity Restriction   Other Position/Activity Restrictions BE-DAYRON TYPE DEVICE FOR LLE   Pain   Pain Location Shoulder;Neck   Pain Descriptor Aching   Pain Interventions Rest;Other (Comment)  (vibration, movement)   Balance   Sitting Balance Independent   Standing Balance Contact guard assistance   Standing Balance   Activity toileting/LB clothing management   Functional Mobility   Functional - Mobility Device Wheelchair   Assist Level Stand by assistance   Functional Mobility Comments d/t B sh pain/fatigue   Transfers   Stand Pivot Transfers Contact guard assistance   Sit to stand Contact guard assistance   Stand to sit Contact guard assistance   Transfer Comments with RW- w/c, toilet, recliner   Toilet Transfers 
Patient is discharged home today. MSW notifided Deb MAHMOOD. MSW faxed HH order, discharge summary, and SOC note to Deb MAHMOOD at [phone]. Discharge summary has also been faxed to patient's insurance at [phone]. IMM letter signed.     Electronically signed by ANAY Harmon on 2/13/25 at 8:57 AM CST   
Patient is requesting that her lasix be held until she has worked with therapy a few times. Will pass along to day shift RN.  
Patient:   Bárbara Ambrocio  MR#:    [id number]   Room:    [facility]/816-02   YOB: 1975  Date of Progress Note: 2/7/2025  Time of Note                           7:55 AM  Consulting Physician:   Raf Solano M.D.  Attending Physician:  Raf Solano MD       CHIEF COMPLAINT: Left leg pain     Subjective:  This 49 y.o. female  admitted to [facility] on 1/27/2025. The patient has been seeing Dr. Carreno for her left diabetic foot wound and right foot malformations.  The patient's had a longstanding history of at least 4 years of dealing with the left foot wound for which she has become frustrated with the ongoing treatment for the wound.  After a discussion with Dr. Carreno last week she was inquisitive to left below-knee amputation.  After an extensive discussion with the patient over the risk and benefits of the procedure, and she would like to proceed with left below-knee amputation.  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   On 1/27/2025 she underwent a L BKA by Dr. Rubi.  She tolerated procedure well.    She is now medically stable and is participating with therapy  No c/o today.  Leg cramps better last night.  REVIEW OF SYSTEMS:  Constitutional: No fevers No chills  Neck:No stiffness  Respiratory: No shortness of breath  Cardiovascular: No chest pain No palpitations  Gastrointestinal: No abdominal pain    Genitourinary: No Dysuria  Neurological: No headache, no confusion      PHYSICAL EXAM:  BP (!) 148/77   Pulse 95   Temp 96.8 °F (36 °C) (Temporal)   Resp 18   Ht 1.753 m (5' 9.02\")   Wt (!) 141.6 kg (312 lb 3.2 oz)   LMP 01/01/2019   SpO2 93%   BMI 46.08 kg/m²     Constitutional: she appears well-developed and well-nourished.   Eyes - conjunctiva normal.  Pupils react to light  Ear, nose, throat -hearing intact to voice. No scars, masses, or lesions over external nose or ears, no atrophy of tongue  Neck-symmetric, no masses noted, no jugular vein distension  Respiration- chest wall appears symmetric, good 
Patient:   Bárbara Ambrocio  MR#:    [id number]   Room:    0816/816-02   YOB: 1975  Date of Progress Note: 2/10/2025  Time of Note                           8:25 AM  Consulting Physician:   Raf Solano M.D.  Attending Physician:  Raf Solano MD       CHIEF COMPLAINT: Left leg pain     Subjective:  This 49 y.o. female  admitted to [facility] on 1/27/2025. The patient has been seeing Dr. Carreno for her left diabetic foot wound and right foot malformations.  The patient's had a longstanding history of at least 4 years of dealing with the left foot wound for which she has become frustrated with the ongoing treatment for the wound.  After a discussion with Dr. Carreno last week she was inquisitive to left below-knee amputation.  After an extensive discussion with the patient over the risk and benefits of the procedure, and she would like to proceed with left below-knee amputation.  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   On 1/27/2025 she underwent a L BKA by Dr. Rubi.  She tolerated procedure well.    She is now medically stable and is participating with therapy  No c/o today.  Having urinary frequency and burning over the weekend.  Urinalysis abnormal.  REVIEW OF SYSTEMS:  Constitutional: No fevers No chills  Neck:No stiffness  Respiratory: No shortness of breath  Cardiovascular: No chest pain No palpitations  Gastrointestinal: No abdominal pain    Genitourinary: No Dysuria  Neurological: No headache, no confusion      PHYSICAL EXAM:  BP (!) 140/88   Pulse 75   Temp 97 °F (36.1 °C) (Temporal)   Resp 18   Ht 1.753 m (5' 9.02\")   Wt (!) 142.8 kg (314 lb 14.4 oz)   LMP 01/01/2019   SpO2 94%   BMI 46.48 kg/m²     Constitutional: she appears well-developed and well-nourished.   Eyes - conjunctiva normal.  Pupils react to light  Ear, nose, throat -hearing intact to voice. No scars, masses, or lesions over external nose or ears, no atrophy of tongue  Neck-symmetric, no masses noted, no jugular vein 
Patient:   Bárbara Ambrocio  MR#:    [id number]   Room:    [facility]/816-02   YOB: 1975  Date of Progress Note: 2/3/2025  Time of Note                           9:37 AM  Consulting Physician:   Raf Solano M.D.  Attending Physician:  Raf Solano MD       CHIEF COMPLAINT: Left leg pain     Subjective:  This 49 y.o. female  admitted to [facility] on 1/27/2025. The patient has been seeing Dr. Carreno for her left diabetic foot wound and right foot malformations.  The patient's had a longstanding history of at least 4 years of dealing with the left foot wound for which she has become frustrated with the ongoing treatment for the wound.  After a discussion with Dr. Carreno last week she was inquisitive to left below-knee amputation.  After an extensive discussion with the patient over the risk and benefits of the procedure, and she would like to proceed with left below-knee amputation.  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   On 1/27/2025 she underwent a L BKA by Dr. Rubi.  She tolerated procedure well.    She is now medically stable and is participating with therapy  Complaining of some muscle cramps in her leg  REVIEW OF SYSTEMS:  Constitutional: No fevers No chills  Neck:No stiffness  Respiratory: No shortness of breath  Cardiovascular: No chest pain No palpitations  Gastrointestinal: No abdominal pain    Genitourinary: No Dysuria  Neurological: No headache, no confusion      PHYSICAL EXAM:  /72   Pulse 80   Temp 98.1 °F (36.7 °C)   Resp 17   Ht 1.753 m (5' 9.02\")   Wt (!) 141.6 kg (312 lb 3.2 oz)   LMP 01/01/2019   SpO2 95%   BMI 46.08 kg/m²     Constitutional: she appears well-developed and well-nourished.   Eyes - conjunctiva normal.  Pupils react to light  Ear, nose, throat -hearing intact to voice. No scars, masses, or lesions over external nose or ears, no atrophy of tongue  Neck-symmetric, no masses noted, no jugular vein distension  Respiration- chest wall appears symmetric, good expansion, 
Patient:   Bárbara Ambrocio  MR#:    [id number]   Room:    0816/816-02   YOB: 1975  Date of Progress Note: 2/4/2025  Time of Note                           8:40 AM  Consulting Physician:   Raf Solano M.D.  Attending Physician:  Raf Solano MD       CHIEF COMPLAINT: Left leg pain     Subjective:  This 49 y.o. female  admitted to [facility] on 1/27/2025. The patient has been seeing Dr. Carreno for her left diabetic foot wound and right foot malformations.  The patient's had a longstanding history of at least 4 years of dealing with the left foot wound for which she has become frustrated with the ongoing treatment for the wound.  After a discussion with Dr. Carreno last week she was inquisitive to left below-knee amputation.  After an extensive discussion with the patient over the risk and benefits of the procedure, and she would like to proceed with left below-knee amputation.  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   On 1/27/2025 she underwent a L BKA by Dr. Rubi.  She tolerated procedure well.    She is now medically stable and is participating with therapy  No new complaints today  REVIEW OF SYSTEMS:  Constitutional: No fevers No chills  Neck:No stiffness  Respiratory: No shortness of breath  Cardiovascular: No chest pain No palpitations  Gastrointestinal: No abdominal pain    Genitourinary: No Dysuria  Neurological: No headache, no confusion      PHYSICAL EXAM:  /72   Pulse 91   Temp 97.2 °F (36.2 °C) (Temporal)   Resp 18   Ht 1.753 m (5' 9.02\")   Wt (!) 141.6 kg (312 lb 3.2 oz)   LMP 01/01/2019   SpO2 96%   BMI 46.08 kg/m²     Constitutional: she appears well-developed and well-nourished.   Eyes - conjunctiva normal.  Pupils react to light  Ear, nose, throat -hearing intact to voice. No scars, masses, or lesions over external nose or ears, no atrophy of tongue  Neck-symmetric, no masses noted, no jugular vein distension  Respiration- chest wall appears symmetric, good expansion,   normal 
Patient:   Bárbara Ambrocio  MR#:    [id number]   Room:    [facility]/816-02   YOB: 1975  Date of Progress Note: 2/11/2025  Time of Note                           8:33 AM  Consulting Physician:   Raf Solano M.D.  Attending Physician:  Raf Solano MD       CHIEF COMPLAINT: Left leg pain     Subjective:  This 49 y.o. female  admitted to [facility] on 1/27/2025. The patient has been seeing Dr. Carreno for her left diabetic foot wound and right foot malformations.  The patient's had a longstanding history of at least 4 years of dealing with the left foot wound for which she has become frustrated with the ongoing treatment for the wound.  After a discussion with Dr. Carreno last week she was inquisitive to left below-knee amputation.  After an extensive discussion with the patient over the risk and benefits of the procedure, and she would like to proceed with left below-knee amputation.  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   On 1/27/2025 she underwent a L BKA by Dr. Rubi.  She tolerated procedure well.    She is now medically stable and is participating with therapy  No c/o this morning although has developed redness overnight in the right calf/tibial region.  REVIEW OF SYSTEMS:  Constitutional: No fevers No chills  Neck:No stiffness  Respiratory: No shortness of breath  Cardiovascular: No chest pain No palpitations  Gastrointestinal: No abdominal pain    Genitourinary: No Dysuria  Neurological: No headache, no confusion      PHYSICAL EXAM:  /74   Pulse 84   Temp 97.4 °F (36.3 °C) (Temporal)   Resp 16   Ht 1.753 m (5' 9.02\")   Wt (!) 142.8 kg (314 lb 14.4 oz)   LMP 01/01/2019   SpO2 97%   BMI 46.48 kg/m²     Constitutional: she appears well-developed and well-nourished.   Eyes - conjunctiva normal.  Pupils react to light  Ear, nose, throat -hearing intact to voice. No scars, masses, or lesions over external nose or ears, no atrophy of tongue  Neck-symmetric, no masses noted, no jugular vein 
Patient:   Bárbara Ambrocio  MR#:    [id number]   Room:    0816/816-02   YOB: 1975  Date of Progress Note: 2/12/2025  Time of Note                           8:11 AM  Consulting Physician:   Raf Solano M.D.  Attending Physician:  Raf Solano MD       CHIEF COMPLAINT: Left leg pain     Subjective:  This 49 y.o. female  admitted to [facility] on 1/27/2025. The patient has been seeing Dr. Carreno for her left diabetic foot wound and right foot malformations.  The patient's had a longstanding history of at least 4 years of dealing with the left foot wound for which she has become frustrated with the ongoing treatment for the wound.  After a discussion with Dr. Carreno last week she was inquisitive to left below-knee amputation.  After an extensive discussion with the patient over the risk and benefits of the procedure, and she would like to proceed with left below-knee amputation.  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   On 1/27/2025 she underwent a L BKA by Dr. Rubi.  She tolerated procedure well.    She is now medically stable and is participating with therapy  No c/o today  REVIEW OF SYSTEMS:  Constitutional: No fevers No chills  Neck:No stiffness  Respiratory: No shortness of breath  Cardiovascular: No chest pain No palpitations  Gastrointestinal: No abdominal pain    Genitourinary: No Dysuria  Neurological: No headache, no confusion      PHYSICAL EXAM:  /86   Pulse 89   Temp 98.1 °F (36.7 °C) (Oral)   Resp 18   Ht 1.753 m (5' 9.02\")   Wt (!) 142.8 kg (314 lb 14.4 oz)   LMP 01/01/2019   SpO2 94%   BMI 46.48 kg/m²     Constitutional: she appears well-developed and well-nourished.   Eyes - conjunctiva normal.  Pupils react to light  Ear, nose, throat -hearing intact to voice. No scars, masses, or lesions over external nose or ears, no atrophy of tongue  Neck-symmetric, no masses noted, no jugular vein distension  Respiration- chest wall appears symmetric, good expansion,   normal effort without 
Patient:   Bárbara Ambrocio  MR#:    [id number]   Room:    [facility]/816-02   YOB: 1975  Date of Progress Note: 2/6/2025  Time of Note                           7:49 AM  Consulting Physician:   Raf Solano M.D.  Attending Physician:  Raf Solano MD       CHIEF COMPLAINT: Left leg pain     Subjective:  This 49 y.o. female  admitted to [facility] on 1/27/2025. The patient has been seeing Dr. Carreno for her left diabetic foot wound and right foot malformations.  The patient's had a longstanding history of at least 4 years of dealing with the left foot wound for which she has become frustrated with the ongoing treatment for the wound.  After a discussion with Dr. Carreno last week she was inquisitive to left below-knee amputation.  After an extensive discussion with the patient over the risk and benefits of the procedure, and she would like to proceed with left below-knee amputation.  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   On 1/27/2025 she underwent a L BKA by Dr. Rubi.  She tolerated procedure well.    She is now medically stable and is participating with therapy  No c/o this morning except for some \"charley horses\" through the night  REVIEW OF SYSTEMS:  Constitutional: No fevers No chills  Neck:No stiffness  Respiratory: No shortness of breath  Cardiovascular: No chest pain No palpitations  Gastrointestinal: No abdominal pain    Genitourinary: No Dysuria  Neurological: No headache, no confusion      PHYSICAL EXAM:  /74   Pulse 90   Temp 96.8 °F (36 °C) (Temporal)   Resp 18   Ht 1.753 m (5' 9.02\")   Wt (!) 141.6 kg (312 lb 3.2 oz)   LMP 01/01/2019   SpO2 97%   BMI 46.08 kg/m²     Constitutional: she appears well-developed and well-nourished.   Eyes - conjunctiva normal.  Pupils react to light  Ear, nose, throat -hearing intact to voice. No scars, masses, or lesions over external nose or ears, no atrophy of tongue  Neck-symmetric, no masses noted, no jugular vein distension  Respiration- chest wall 
Placed on CPAP for HS  
Placed on CPAP for the night  
Placed pt on CPAP 10 cm H20 for HS  
Placed pt on Cpap 10 on room air   
Pt placed on CPAP 10 cm H20 for HS  
Pt placed on CPAP of 10  
Spoke to patient in room re discharge planning. Next insurance review is 2/12 and CMG is 2/13. MSW did inform patient that another week would likely be approved by her insurance, if she agrees to another week of therapy. Patient says she would like to discharge on CMG date and continue therapy at home with home health care. Will start working on home health arrangements and will update staff on plan for dc on Thursday.    Electronically signed by ANAY Harmon on 2/10/25 at 1:11 PM CST   
Spoke to patient in room to inform of CMG date (2/13) and to discuss discharge planning. Patient plans to return home at discharge. Patient is unsure if she would like home health or to go outpatient to continue therapy. Will follow closely.     Electronically signed by ANAY Harmon on 2/4/25 at 12:59 PM CST   
Unable to assess patient's incision dressing. Patient only wants stump  removed during dressing changes.   
alarm in place;Left in bed   PT Individual Minutes   Time In 1345   Time Out 1425   Minutes 40     Electronically signed by PAOLO Scott on 2/11/2025 at 2:51 PM    
mobility training           02/06/25 0830   OT Individual Minutes   Time In 0830   Time Out 0915   Minutes 45   Time Code Minutes    Timed Code Treatment Minutes 45 Minutes           Electronically signed by Shiela Casanova OT on 2/6/25 at 8:30 [facility]               Shiela Casanova OT     
resistance, R HS curls w/ green theraband, resisted R hip IR/ER with green theraband   Static Standing Balance Exercises static standing in // bars: attempting to use one UE support and no UE support. x 1 min; static standing x 2 min in RW   Assessment   Assessment Pt requested to use the bathroom at the beginning of session, so provided assistance with stand/squat pivot transfers from bed > w/c > toilet. She perfomed transfers w/ CGA. Pt able to stand for up to 2 minutes using UE support on her RW before getting fatigued. She ambulated in parallel bars using a hopping technique, but she did not fatigue as quickly this morning compared to previous sessions, as her hops did not increase in severity until the last attempt. Overall, pt is progressing well.   Safety Devices   Type of Devices Gait belt;Left in chair  (left w/ OT)   PT Individual Minutes   Time In 1000   Time Out 1100   Minutes 60     Electronically signed by PAOLO Scott on 2/11/2025 at 11:02 AM    
Contact guard assistance, Minimal Assistance  Bed to Chair: Minimal assistance  WHEELCHAIR PROPULSION  Propulsion 1  Propulsion: Manual  Level: Level Tile  Method: JAMIE PULIDO  Level of Assistance: Stand by assistance  Description/ Details: 2 TURNS  Distance: 50  AMBULATION  Ambulation  Surface: Level tile  Device: Parallel Bars  Other Apparatus: Wheelchair follow (STUMP PROTECTOR)  Assistance: Minimal assistance, Moderate assistance  Quality of Gait: TENDS TO HOP, THERAPIST ASSSIT REQUIRED TO OFF LOAD WEIGHT DUE TO DECREASED SHOULDER STRENGTH, SMALL HOP/SWINGS  Distance: 4  STAIRS  GOALS:  Short Term Goals  Time Frame for Short Term Goals: 1 WEEK  Short Term Goal 1: INDEP ON BED MOBILITY  Short Term Goal 2: SBA TRANSFERS  Short Term Goal 3: MIN A AMB 10 FEET WITH AD  Short Term Goal 4: INDEP W/C PROPULSION 150 FEET  Short Term Goal 5: MIN A CAR TRANSFER     Long Term Goals  Time Frame for Long Term Goals : 2WEEKS  Long Term Goal 1: INDEP TRANSFERS  Long Term Goal 2: INDEP AMB 10 FEET WITH AD  Long Term Goal 3: SBA CAR TRANSFER  Long Term Goal 4: INDEP HEP     ASSESSMENT:  Assessment: DECREASED UE STRENGTH, DECREASED DYNAMIC STANDING BALANCE, TENDS TO HOP VS. SWING WITH AMB DUE TO INADEQUATE UPPER BODY STRENGTH TO SUPPORT BODY WT THROUGH AD. DOES NOT HAVE STEPS TO [facility] ACCESIBLE APT.        SPEECH THERAPY        OCCUPATIONAL THERAPY  Cognitive Patterns:  Cognitive Pattern Assessment Used: BIMS  Cognitive Assessment Method (CAM):  Confusion Assessment Method (CAM)  Is there evidence of an acute change in mental status from the patient's baseline?: No  Inattention: Behavior not present  Disorganized thinking: Behavior not present  Altered level of consciousness: Behavior not present  Health Literacy:           CURRENT IRF-NATI SCORES  Eating: CARE Score: 5     Oral Hygiene: CARE Score: 5         Toileting: CARE Score: 3         Shower/Bathe: CARE Score: 3           Upper Body Dressing: CARE Score: 4          Lower Body 
  OT Individual Minutes   Time In 1345   Time Out 1430   Minutes 45   Minute Variance   Variance 35   Reason Make up minutes                   Cosigned by: Shiela Casanova OT at 2/5/2025  4:18 PM         RECORD REVIEW: Previous medical records, medications were reviewed at today's visit    IMPRESSION:     1.  Left BKA-pain control/PT/OT.  Robaxin increased for muscle cramps  2.  Mood disorder-Elavil, Wellbutrin  3.  Diabetes-sliding scale insulin and NPH 18 units twice a day.  Better controlled.  4.  Edema-Lasix  5.  DVT prophylaxis-subcu Lovenox  6.  GI-bowel regimen.  MiraLAX and stool softener for now  Continue mag oxide for leg cramps  Continue current treatment  ELOS:2/13

## 2025-02-13 NOTE — PLAN OF CARE
Problem: ABCDS Injury Assessment  Goal: Absence of physical injury  2/10/2025 1922 by Mitch Garcia, RN  Outcome: Progressing  2/10/2025 0658 by Dodie Mackay, RN  Outcome: Progressing     
  Problem: ABCDS Injury Assessment  Goal: Absence of physical injury  2/7/2025 1926 by Mitch Garcia, RN  Outcome: Progressing  2/7/2025 1419 by Vida Saleem, RN  Outcome: Progressing     
  Problem: ABCDS Injury Assessment  Goal: Absence of physical injury  Outcome: Progressing     
  Problem: ABCDS Injury Assessment  Goal: Absence of physical injury  Outcome: Progressing     
  Problem: Chronic Conditions and Co-morbidities  Goal: Patient's chronic conditions and co-morbidity symptoms are monitored and maintained or improved  2/1/2025 2032 by Irma Lau RN  Outcome: Progressing  2/1/2025 1155 by Sandra Perry RN  Outcome: Progressing     Problem: Discharge Planning  Goal: Discharge to home or other facility with appropriate resources  2/1/2025 2032 by Irma Lau RN  Outcome: Progressing  2/1/2025 1155 by Sandra Perry RN  Outcome: Progressing     Problem: ABCDS Injury Assessment  Goal: Absence of physical injury  2/1/2025 2032 by Irma Lau RN  Outcome: Progressing  2/1/2025 1155 by Sandra Perry RN  Outcome: Progressing     Problem: Neurosensory - Adult  Goal: Achieves stable or improved neurological status  2/1/2025 2032 by Irma Lau RN  Outcome: Progressing  2/1/2025 1155 by Sandra Perry RN  Outcome: Progressing  Goal: Achieves maximal functionality and self care  2/1/2025 2032 by Irma Lau RN  Outcome: Progressing  2/1/2025 1155 by Sandra Perry RN  Outcome: Progressing     Problem: Respiratory - Adult  Goal: Achieves optimal ventilation and oxygenation  2/1/2025 2032 by Irma Lau RN  Outcome: Progressing  2/1/2025 1155 by Sandra Perry RN  Outcome: Progressing     Problem: Cardiovascular - Adult  Goal: Maintains optimal cardiac output and hemodynamic stability  2/1/2025 2032 by Irma Lau RN  Outcome: Progressing  2/1/2025 1155 by Sandra Perry RN  Outcome: Progressing     Problem: Skin/Tissue Integrity - Adult  Goal: Skin integrity remains intact  2/1/2025 2032 by Irma Lau RN  Outcome: Progressing  2/1/2025 1155 by Sandra Perry RN  Outcome: Progressing  Goal: Incisions, wounds, or drain sites healing without S/S of infection  2/1/2025 2032 by Irma Lau RN  Outcome: Progressing  2/1/2025 1155 by Sandra Perry RN  Outcome: Progressing  Goal: Oral mucous membranes remain intact  2/1/2025 2032 by Irma Lau RN  Outcome: 
  Problem: Chronic Conditions and Co-morbidities  Goal: Patient's chronic conditions and co-morbidity symptoms are monitored and maintained or improved  2/11/2025 2147 by Heydi Nettles RN  Outcome: Progressing  Flowsheets (Taken 2/11/2025 2015)  Care Plan - Patient's Chronic Conditions and Co-Morbidity Symptoms are Monitored and Maintained or Improved: Monitor and assess patient's chronic conditions and comorbid symptoms for stability, deterioration, or improvement  2/11/2025 1026 by Ruthie Garcia LPN  Outcome: Progressing  Flowsheets (Taken 2/11/2025 0822)  Care Plan - Patient's Chronic Conditions and Co-Morbidity Symptoms are Monitored and Maintained or Improved: Monitor and assess patient's chronic conditions and comorbid symptoms for stability, deterioration, or improvement     Problem: Discharge Planning  Goal: Discharge to home or other facility with appropriate resources  2/11/2025 2147 by Heydi Nettles RN  Outcome: Progressing  Flowsheets (Taken 2/11/2025 2015)  Discharge to home or other facility with appropriate resources: Refer to discharge planning if patient needs post-hospital services based on physician order or complex needs related to functional status, cognitive ability or social support system  2/11/2025 1026 by Ruthie Garcia LPN  Outcome: Progressing  Flowsheets (Taken 2/11/2025 0822)  Discharge to home or other facility with appropriate resources: Refer to discharge planning if patient needs post-hospital services based on physician order or complex needs related to functional status, cognitive ability or social support system     Problem: ABCDS Injury Assessment  Goal: Absence of physical injury  2/11/2025 2147 by Heydi Nettles RN  Outcome: Progressing  2/11/2025 1026 by Ruthie Garcia LPN  Outcome: Progressing     Problem: Neurosensory - Adult  Goal: Achieves stable or improved neurological status  2/11/2025 2147 by Heydi Nettles RN  Outcome: Progressing  Flowsheets (Taken 
  Problem: Chronic Conditions and Co-morbidities  Goal: Patient's chronic conditions and co-morbidity symptoms are monitored and maintained or improved  2/12/2025 0937 by Sandra Perry RN  Outcome: Progressing  2/11/2025 2147 by Heydi Nettles RN  Outcome: Progressing  Flowsheets (Taken 2/11/2025 2015)  Care Plan - Patient's Chronic Conditions and Co-Morbidity Symptoms are Monitored and Maintained or Improved: Monitor and assess patient's chronic conditions and comorbid symptoms for stability, deterioration, or improvement     Problem: Discharge Planning  Goal: Discharge to home or other facility with appropriate resources  2/12/2025 0937 by Sandra Perry RN  Outcome: Progressing  2/11/2025 2147 by Heydi Nettles RN  Outcome: Progressing  Flowsheets (Taken 2/11/2025 2015)  Discharge to home or other facility with appropriate resources: Refer to discharge planning if patient needs post-hospital services based on physician order or complex needs related to functional status, cognitive ability or social support system     Problem: ABCDS Injury Assessment  Goal: Absence of physical injury  2/12/2025 0937 by Sandra Perry RN  Outcome: Progressing  2/11/2025 2147 by Heydi Nettles RN  Outcome: Progressing     Problem: Neurosensory - Adult  Goal: Achieves stable or improved neurological status  2/12/2025 0937 by Sandra Perry RN  Outcome: Progressing  2/11/2025 2147 by Heydi Nettles RN  Outcome: Progressing  Flowsheets (Taken 2/11/2025 2015)  Achieves stable or improved neurological status: Assess for and report changes in neurological status  Goal: Achieves maximal functionality and self care  2/12/2025 0937 by Sandra Perry RN  Outcome: Progressing  2/11/2025 2147 by Heydi Nettles RN  Outcome: Progressing     Problem: Respiratory - Adult  Goal: Achieves optimal ventilation and oxygenation  2/12/2025 0937 by Sandra Perry RN  Outcome: Progressing  2/11/2025 2147 by Heydi Nettles RN  Outcome: Progressing  Flowsheets 
  Problem: Chronic Conditions and Co-morbidities  Goal: Patient's chronic conditions and co-morbidity symptoms are monitored and maintained or improved  2/12/2025 2155 by Claudia Anguiano RN  Outcome: Progressing  2/12/2025 0937 by Sandra Perry RN  Outcome: Progressing     Problem: Discharge Planning  Goal: Discharge to home or other facility with appropriate resources  2/12/2025 2155 by Claudia Anguiano RN  Outcome: Progressing  2/12/2025 0937 by Sandra Perry RN  Outcome: Progressing     Problem: ABCDS Injury Assessment  Goal: Absence of physical injury  2/12/2025 2155 by Claudia Anguiano RN  Outcome: Progressing  2/12/2025 0937 by Sandra Perry RN  Outcome: Progressing     Problem: Neurosensory - Adult  Goal: Achieves stable or improved neurological status  2/12/2025 2155 by Claudia Anguiano RN  Outcome: Progressing  2/12/2025 0937 by Sandra Perry RN  Outcome: Progressing  Goal: Achieves maximal functionality and self care  2/12/2025 2155 by Claudia Anguiano RN  Outcome: Progressing  2/12/2025 0937 by Sandra Perry RN  Outcome: Progressing     
  Problem: Chronic Conditions and Co-morbidities  Goal: Patient's chronic conditions and co-morbidity symptoms are monitored and maintained or improved  Outcome: Progressing     Problem: Discharge Planning  Goal: Discharge to home or other facility with appropriate resources  Outcome: Progressing     Problem: ABCDS Injury Assessment  Goal: Absence of physical injury  Outcome: Progressing     Problem: Neurosensory - Adult  Goal: Achieves stable or improved neurological status  Outcome: Progressing  Goal: Achieves maximal functionality and self care  Outcome: Progressing     Problem: Respiratory - Adult  Goal: Achieves optimal ventilation and oxygenation  Outcome: Progressing     Problem: Cardiovascular - Adult  Goal: Maintains optimal cardiac output and hemodynamic stability  Outcome: Progressing     Problem: Skin/Tissue Integrity - Adult  Goal: Skin integrity remains intact  Description: 1.  Monitor for areas of redness and/or skin breakdown  2.  Assess vascular access sites hourly  3.  Every 4-6 hours minimum:  Change oxygen saturation probe site  4.  Every 4-6 hours:  If on nasal continuous positive airway pressure, respiratory therapy assess nares and determine need for appliance change or resting period  Outcome: Progressing  Goal: Incisions, wounds, or drain sites healing without S/S of infection  Outcome: Progressing  Goal: Oral mucous membranes remain intact  Outcome: Progressing     Problem: Musculoskeletal - Adult  Goal: Return mobility to safest level of function  Outcome: Progressing  Goal: Maintain proper alignment of affected body part  Outcome: Progressing  Goal: Return ADL status to a safe level of function  Outcome: Progressing     Problem: Gastrointestinal - Adult  Goal: Minimal or absence of nausea and vomiting  Outcome: Progressing  Goal: Maintains or returns to baseline bowel function  Outcome: Progressing  Goal: Maintains adequate nutritional intake  Outcome: Progressing     Problem: 
  Problem: Chronic Conditions and Co-morbidities  Goal: Patient's chronic conditions and co-morbidity symptoms are monitored and maintained or improved  Outcome: Progressing     Problem: Discharge Planning  Goal: Discharge to home or other facility with appropriate resources  Outcome: Progressing     Problem: ABCDS Injury Assessment  Goal: Absence of physical injury  Outcome: Progressing     Problem: Neurosensory - Adult  Goal: Achieves stable or improved neurological status  Outcome: Progressing  Goal: Achieves maximal functionality and self care  Outcome: Progressing     Problem: Respiratory - Adult  Goal: Achieves optimal ventilation and oxygenation  Outcome: Progressing     Problem: Cardiovascular - Adult  Goal: Maintains optimal cardiac output and hemodynamic stability  Outcome: Progressing     Problem: Skin/Tissue Integrity - Adult  Goal: Skin integrity remains intact  Outcome: Progressing  Flowsheets (Taken 2/4/2025 1216)  Skin Integrity Remains Intact:   Monitor for areas of redness and/or skin breakdown   Assess vascular access sites hourly   Every 4-6 hours minimum: Change oxygen saturation probe site   Every 4-6 hours: If on nasal continuous positive airway pressure, respiratory therapy assesses nares and determine need for appliance change or resting period  Goal: Incisions, wounds, or drain sites healing without S/S of infection  Outcome: Progressing  Goal: Oral mucous membranes remain intact  Outcome: Progressing     Problem: Musculoskeletal - Adult  Goal: Return mobility to safest level of function  Outcome: Progressing  Goal: Maintain proper alignment of affected body part  Outcome: Progressing  Goal: Return ADL status to a safe level of function  Outcome: Progressing     Problem: Gastrointestinal - Adult  Goal: Minimal or absence of nausea and vomiting  Outcome: Progressing  Goal: Maintains or returns to baseline bowel function  Outcome: Progressing  Goal: Maintains adequate nutritional 
  Problem: Chronic Conditions and Co-morbidities  Goal: Patient's chronic conditions and co-morbidity symptoms are monitored and maintained or improved  Outcome: Progressing     Problem: Discharge Planning  Goal: Discharge to home or other facility with appropriate resources  Outcome: Progressing     Problem: ABCDS Injury Assessment  Goal: Absence of physical injury  Outcome: Progressing     Problem: Neurosensory - Adult  Goal: Achieves stable or improved neurological status  Outcome: Progressing  Goal: Achieves maximal functionality and self care  Outcome: Progressing     Problem: Respiratory - Adult  Goal: Achieves optimal ventilation and oxygenation  Outcome: Progressing     Problem: Cardiovascular - Adult  Goal: Maintains optimal cardiac output and hemodynamic stability  Outcome: Progressing     Problem: Skin/Tissue Integrity - Adult  Goal: Skin integrity remains intact  Outcome: Progressing  Goal: Incisions, wounds, or drain sites healing without S/S of infection  Outcome: Progressing  Goal: Oral mucous membranes remain intact  Outcome: Progressing     Problem: Musculoskeletal - Adult  Goal: Return mobility to safest level of function  Outcome: Progressing  Goal: Maintain proper alignment of affected body part  Outcome: Progressing  Goal: Return ADL status to a safe level of function  Outcome: Progressing     Problem: Gastrointestinal - Adult  Goal: Minimal or absence of nausea and vomiting  Outcome: Progressing  Goal: Maintains or returns to baseline bowel function  Outcome: Progressing  Goal: Maintains adequate nutritional intake  Outcome: Progressing     Problem: Genitourinary - Adult  Goal: Absence of urinary retention  Outcome: Progressing     Problem: Infection - Adult  Goal: Absence of infection at discharge  Outcome: Progressing  Goal: Absence of infection during hospitalization  Outcome: Progressing  Goal: Absence of fever/infection during anticipated neutropenic period  Outcome: Progressing   
  Problem: Chronic Conditions and Co-morbidities  Goal: Patient's chronic conditions and co-morbidity symptoms are monitored and maintained or improved  Outcome: Progressing  Flowsheets (Taken 2/11/2025 0822)  Care Plan - Patient's Chronic Conditions and Co-Morbidity Symptoms are Monitored and Maintained or Improved: Monitor and assess patient's chronic conditions and comorbid symptoms for stability, deterioration, or improvement     Problem: Discharge Planning  Goal: Discharge to home or other facility with appropriate resources  Outcome: Progressing  Flowsheets (Taken 2/11/2025 0822)  Discharge to home or other facility with appropriate resources: Refer to discharge planning if patient needs post-hospital services based on physician order or complex needs related to functional status, cognitive ability or social support system     Problem: ABCDS Injury Assessment  Goal: Absence of physical injury  Outcome: Progressing     Problem: Neurosensory - Adult  Goal: Achieves stable or improved neurological status  Outcome: Progressing  Flowsheets (Taken 2/11/2025 0822)  Achieves stable or improved neurological status: Assess for and report changes in neurological status  Goal: Achieves maximal functionality and self care  Outcome: Progressing  Flowsheets (Taken 2/11/2025 0822)  Achieves maximal functionality and self care: Encourage and assist patient to increase activity and self care with guidance from physical therapy/occupational therapy     Problem: Respiratory - Adult  Goal: Achieves optimal ventilation and oxygenation  Outcome: Progressing  Flowsheets (Taken 2/11/2025 0822)  Achieves optimal ventilation and oxygenation: Assess for changes in respiratory status     Problem: Cardiovascular - Adult  Goal: Maintains optimal cardiac output and hemodynamic stability  Outcome: Progressing  Flowsheets (Taken 2/11/2025 0822)  Maintains optimal cardiac output and hemodynamic stability: Monitor blood pressure and heart rate   
  Problem: Chronic Conditions and Co-morbidities  Goal: Patient's chronic conditions and co-morbidity symptoms are monitored and maintained or improved  Outcome: Progressing  Flowsheets (Taken 2/7/2025 3518)  Care Plan - Patient's Chronic Conditions and Co-Morbidity Symptoms are Monitored and Maintained or Improved: Monitor and assess patient's chronic conditions and comorbid symptoms for stability, deterioration, or improvement     Problem: Discharge Planning  Goal: Discharge to home or other facility with appropriate resources  Outcome: Progressing  Flowsheets (Taken 2/7/2025 0746)  Discharge to home or other facility with appropriate resources: Refer to discharge planning if patient needs post-hospital services based on physician order or complex needs related to functional status, cognitive ability or social support system     Problem: ABCDS Injury Assessment  Goal: Absence of physical injury  Outcome: Progressing     Problem: Neurosensory - Adult  Goal: Achieves stable or improved neurological status  Outcome: Progressing  Goal: Achieves maximal functionality and self care  Outcome: Progressing     Problem: Respiratory - Adult  Goal: Achieves optimal ventilation and oxygenation  Outcome: Progressing     Problem: Cardiovascular - Adult  Goal: Maintains optimal cardiac output and hemodynamic stability  Outcome: Progressing     Problem: Skin/Tissue Integrity - Adult  Goal: Skin integrity remains intact  Description: 1.  Monitor for areas of redness and/or skin breakdown  2.  Assess vascular access sites hourly  3.  Every 4-6 hours minimum:  Change oxygen saturation probe site  4.  Every 4-6 hours:  If on nasal continuous positive airway pressure, respiratory therapy assess nares and determine need for appliance change or resting period  Outcome: Progressing  Flowsheets (Taken 2/7/2025 0835)  Skin Integrity Remains Intact: Monitor for areas of redness and/or skin breakdown  Goal: Incisions, wounds, or drain sites 
oxygenation: Assess for changes in respiratory status  2/5/2025 1330 by Susanne Rosales LPN  Outcome: Progressing     Problem: Cardiovascular - Adult  Goal: Maintains optimal cardiac output and hemodynamic stability  2/5/2025 2154 by Heydi Nettles RN  Outcome: Progressing  Flowsheets (Taken 2/5/2025 2030)  Maintains optimal cardiac output and hemodynamic stability: Monitor blood pressure and heart rate  2/5/2025 1330 by Susanne Rosales LPN  Outcome: Progressing     Problem: Skin/Tissue Integrity - Adult  Goal: Skin integrity remains intact  Description: 1.  Monitor for areas of redness and/or skin breakdown  2.  Assess vascular access sites hourly  3.  Every 4-6 hours minimum:  Change oxygen saturation probe site  4.  Every 4-6 hours:  If on nasal continuous positive airway pressure, respiratory therapy assess nares and determine need for appliance change or resting period  2/5/2025 2154 by Heydi Nettles RN  Outcome: Progressing  Flowsheets (Taken 2/5/2025 2030)  Skin Integrity Remains Intact: Monitor for areas of redness and/or skin breakdown  2/5/2025 1330 by Susanne Rosales LPN  Outcome: Progressing  Goal: Incisions, wounds, or drain sites healing without S/S of infection  2/5/2025 2154 by Heydi Nettles RN  Outcome: Progressing  2/5/2025 1330 by Susanne Rosales LPN  Outcome: Progressing  Goal: Oral mucous membranes remain intact  2/5/2025 2154 by Heydi Nettles RN  Outcome: Progressing  2/5/2025 1330 by Susanne Rosales LPN  Outcome: Progressing     Problem: Musculoskeletal - Adult  Goal: Return mobility to safest level of function  2/5/2025 2154 by Heydi Nettles RN  Outcome: Progressing  Flowsheets (Taken 2/5/2025 2030)  Return Mobility to Safest Level of Function: Assess patient stability and activity tolerance for standing, transferring and ambulating with or without assistive devices  2/5/2025 1330 by Susanne Rosales LPN  Outcome: Progressing  Goal: Maintain proper alignment of affected body 
Adult  Goal: Electrolytes maintained within normal limits  2/2/2025 2035 by Irma Lau RN  Outcome: Progressing  2/2/2025 1243 by Sandra Perry RN  Outcome: Progressing  Goal: Hemodynamic stability and optimal renal function maintained  2/2/2025 2035 by Irma Lau RN  Outcome: Progressing  2/2/2025 1243 by Sandra Perry RN  Outcome: Progressing  Goal: Glucose maintained within prescribed range  2/2/2025 2035 by Irma Lau RN  Outcome: Progressing  2/2/2025 1243 by Sandra Perry RN  Outcome: Progressing     Problem: Hematologic - Adult  Goal: Maintains hematologic stability  2/2/2025 2035 by Irma Lau RN  Outcome: Progressing  2/2/2025 1243 by Sandra Perry RN  Outcome: Progressing     Problem: Safety - Adult  Goal: Free from fall injury  2/2/2025 2035 by Irma Lau RN  Outcome: Progressing  2/2/2025 1243 by Sandra Perry RN  Outcome: Progressing     Problem: Anxiety  Goal: Will report anxiety at manageable levels  Description: INTERVENTIONS:  1. Administer medication as ordered  2. Teach and rehearse alternative coping skills  3. Provide emotional support with 1:1 interaction with staff  2/2/2025 2035 by Irma Lau RN  Outcome: Progressing  2/2/2025 1243 by Sandra Perry RN  Outcome: Progressing     Problem: Coping  Goal: Pt/Family able to verbalize concerns and demonstrate effective coping strategies  Description: INTERVENTIONS:  1. Assist patient/family to identify coping skills, available support systems and cultural and spiritual values  2. Provide emotional support, including active listening and acknowledgement of concerns of patient and caregivers  3. Reduce environmental stimuli, as able  4. Instruct patient/family in relaxation techniques, as appropriate  5. Assess for spiritual pain/suffering and initiate Spiritual Care, Psychosocial Clinical Specialist consults as needed  2/2/2025 2035 by Irma Lau RN  Outcome: Progressing  2/2/2025 1243 by Sandra Perry 
sustaining relationships within family system and community, or [place]/spiritual traditions  6. Initiate Spiritual Care, Psychosocial Clinical Specialist consult as needed  Outcome: Progressing     Problem: Behavior  Goal: Pt/Family maintain appropriate behavior and adhere to behavioral management agreement, if implemented  Description: INTERVENTIONS:  1. Assess patient/family's coping skills and  non-compliant behavior (including use of illegal substances)  2. Notify security of behavior or suspected illegal substances which indicate the need for search of the family and/or belongings  3. Encourage verbalization of thoughts and concerns in a socially appropriate manner  4. Utilize positive, consistent limit setting strategies supporting safety of patient, staff and others  5. Encourage participation in the decision making process about the behavioral management agreement  6. If a visitor's behavior poses a threat to safety call refer to organization policy.  7. Initiate consult with , Psychosocial CNS, Spiritual Care as appropriate  Outcome: Progressing     Problem: Depression/Self Harm  Goal: Effect of psychiatric condition will be minimized and patient will be protected from self harm  Description: INTERVENTIONS:  1. Assess impact of patient's symptoms on level of functioning, self care needs and offer support as indicated  2. Assess patient/family knowledge of depression, impact on illness and need for teaching  3. Provide emotional support, presence and reassurance  4. Assess for possible suicidal thoughts or ideation. If patient expresses suicidal thoughts or statements do not leave alone, initiate Suicide Precautions, move to a room close to the nursing station and obtain sitter  5. Initiate consults as appropriate with Mental Health Professional, Spiritual Care, Psychosocial CNS, and consider a recommendation to the [facility] for a Psychiatric Consultation  Outcome: Progressing     
sustaining relationships within family system and community, or [place]/spiritual traditions  6. Initiate Spiritual Care, Psychosocial Clinical Specialist consult as needed  Outcome: Progressing     Problem: Behavior  Goal: Pt/Family maintain appropriate behavior and adhere to behavioral management agreement, if implemented  Description: INTERVENTIONS:  1. Assess patient/family's coping skills and  non-compliant behavior (including use of illegal substances)  2. Notify security of behavior or suspected illegal substances which indicate the need for search of the family and/or belongings  3. Encourage verbalization of thoughts and concerns in a socially appropriate manner  4. Utilize positive, consistent limit setting strategies supporting safety of patient, staff and others  5. Encourage participation in the decision making process about the behavioral management agreement  6. If a visitor's behavior poses a threat to safety call refer to organization policy.  7. Initiate consult with , Psychosocial CNS, Spiritual Care as appropriate  Outcome: Progressing     Problem: Depression/Self Harm  Goal: Effect of psychiatric condition will be minimized and patient will be protected from self harm  Description: INTERVENTIONS:  1. Assess impact of patient's symptoms on level of functioning, self care needs and offer support as indicated  2. Assess patient/family knowledge of depression, impact on illness and need for teaching  3. Provide emotional support, presence and reassurance  4. Assess for possible suicidal thoughts or ideation. If patient expresses suicidal thoughts or statements do not leave alone, initiate Suicide Precautions, move to a room close to the nursing station and obtain sitter  5. Initiate consults as appropriate with Mental Health Professional, Spiritual Care, Psychosocial CNS, and consider a recommendation to the LIP for a Psychiatric Consultation  Outcome: Progressing     
Verbalizes/displays adequate comfort level or baseline comfort level  Outcome: Progressing     
infection at discharge  2/6/2025 2104 by Irma Lau RN  Outcome: Progressing  2/6/2025 1109 by Ruthie Garcia LPN  Outcome: Progressing  Flowsheets (Taken 2/6/2025 0807)  Absence of infection at discharge: Assess and monitor for signs and symptoms of infection  Goal: Absence of infection during hospitalization  2/6/2025 2104 by Irma Lau RN  Outcome: Progressing  2/6/2025 1109 by Ruthie Garcia LPN  Outcome: Progressing  Flowsheets (Taken 2/6/2025 0807)  Absence of infection during hospitalization: Assess and monitor for signs and symptoms of infection  Goal: Absence of fever/infection during anticipated neutropenic period  Outcome: Progressing     Problem: Metabolic/Fluid and Electrolytes - Adult  Goal: Electrolytes maintained within normal limits  2/6/2025 2104 by Irma Lau RN  Outcome: Progressing  2/6/2025 1109 by Ruthei Garcia LPN  Outcome: Progressing  Flowsheets (Taken 2/6/2025 0807)  Electrolytes maintained within normal limits: Monitor labs and assess patient for signs and symptoms of electrolyte imbalances  Goal: Hemodynamic stability and optimal renal function maintained  2/6/2025 2104 by Irma Lau RN  Outcome: Progressing  2/6/2025 1109 by Ruthie Garcia LPN  Outcome: Progressing  Flowsheets (Taken 2/6/2025 0807)  Hemodynamic stability and optimal renal function maintained: Monitor labs and assess for signs and symptoms of volume excess or deficit  Goal: Glucose maintained within prescribed range  2/6/2025 2104 by Irma Lau RN  Outcome: Progressing  2/6/2025 1109 by Ruthie Garcia LPN  Outcome: Progressing  Flowsheets (Taken 2/6/2025 0807)  Glucose maintained within prescribed range: Monitor blood glucose as ordered     Problem: Hematologic - Adult  Goal: Maintains hematologic stability  2/6/2025 2104 by Irma Lau RN  Outcome: Progressing  2/6/2025 1109 by Ruthie Garcia LPN  Outcome: Progressing  Flowsheets (Taken 2/6/2025 0807)  Maintains 
staff  2/13/2025 1013 by Sandra Perry RN  Outcome: Adequate for Discharge  2/12/2025 2155 by Claudia Anguiano RN  Outcome: Progressing     Problem: Coping  Goal: Pt/Family able to verbalize concerns and demonstrate effective coping strategies  Description: INTERVENTIONS:  1. Assist patient/family to identify coping skills, available support systems and cultural and spiritual values  2. Provide emotional support, including active listening and acknowledgement of concerns of patient and caregivers  3. Reduce environmental stimuli, as able  4. Instruct patient/family in relaxation techniques, as appropriate  5. Assess for spiritual pain/suffering and initiate Spiritual Care, Psychosocial Clinical Specialist consults as needed  2/13/2025 1013 by Sandra Perry RN  Outcome: Adequate for Discharge  2/12/2025 2155 by Claudia Anguiano RN  Outcome: Progressing     Problem: Change in Body Image  Goal: Pt/Family communicate acceptance of loss or change in body image and feel psychological comfort and peace  Description: INTERVENTIONS:  1. Assess patient/family anxiety and grief process related to change in body image, loss of functional status, loss of sense of self, and forgiveness  2. Provide emotional and spiritual support  3. Provide information about the patient's health status with consideration of family and cultural values  4. Communicate willingness to discuss loss and facilitate grief process with patient/family as appropriate  5. Emphasize sustaining relationships within family system and community, or [place]/spiritual traditions  6. Initiate Spiritual Care, Psychosocial Clinical Specialist consult as needed  2/13/2025 1013 by Sandra Perry RN  Outcome: Adequate for Discharge  2/12/2025 2155 by Claudia Anguiano RN  Outcome: Progressing     Problem: Behavior  Goal: Pt/Family maintain appropriate behavior and adhere to behavioral management agreement, if implemented  Description: INTERVENTIONS:  1. 
Adult  Goal: Free from fall injury  2/6/2025 1109 by Ruthie Garcia LPN  Outcome: Progressing  2/5/2025 2154 by Heydi Nettles RN  Outcome: Progressing     Problem: Pain  Goal: Verbalizes/displays adequate comfort level or baseline comfort level  2/6/2025 1109 by Ruthie Garcia LPN  Outcome: Progressing  2/5/2025 2154 by Heydi Nettles RN  Outcome: Progressing     Problem: Skin/Tissue Integrity  Goal: Skin integrity remains intact  Description: 1.  Monitor for areas of redness and/or skin breakdown  2.  Assess vascular access sites hourly  3.  Every 4-6 hours minimum:  Change oxygen saturation probe site  4.  Every 4-6 hours:  If on nasal continuous positive airway pressure, respiratory therapy assess nares and determine need for appliance change or resting period  2/6/2025 1109 by Ruthie Garcia LPN  Outcome: Progressing  Flowsheets (Taken 2/6/2025 1046)  Skin Integrity Remains Intact: Monitor for areas of redness and/or skin breakdown  2/5/2025 2154 by Heydi Nettles, RN  Outcome: Progressing  Flowsheets (Taken 2/5/2025 2030)  Skin Integrity Remains Intact: Monitor for areas of redness and/or skin breakdown     
equipment evaluation/ training/procurement, neuromuscular reeducation, wheelchair mobility training.    SPEECH THERAPY:   Plan of Care and Goals:   LTG    FIM Goals: Comprehension:    Expression:    Social:    Problem Solving:    Memory:                                                  IRF-NATI  Hearing, Speech, and Vision  Expression of Ideas and Wants: Without difficulty  Understanding Verbal and Non-Verbal Content: Understands  Ability to Hear: Adequate  Ability to See in Adequate Light: Adequate  Cognitive Patterns  Cognitive Pattern Assessment Used: BIMS  Repetition of Three Words (First Attempt): 3  Temporal Orientation: Year: Correct  Temporal Orientation: Month: Accurate within 5 days  Temporal Orientation: Day: Correct  Able to recall \"sock”: Yes, after cueing (\"something to wear\")  Able to recall \"blue\": Yes, no cue required  Able to recall \"bed\": Yes, no cue required  BIMS Summary Score: 14  Prior Functioning: Everyday Activities  Self Care: Independent  Indoor Mobility (Ambulation): Independent  Stairs: Independent  Functional Cognition: Independent  Prior Device Use: None of the given options (none)       Plan of Care:  Frequency:   [] 5 days per week, 60 minutes per day                            [x] Not appropriate for Speech Therapy  Treatments may include speech/language/communication therapy, cognitive training, group therapy, education, and/or dysphagia therapy based on the above goals.           These goals were reviewed with this patient at the time of assessment and Bárbara Ambrocio is in agreement.    CASE MANAGEMENT:  Goals:   Assist patient/family with discharge planning, patient/family counseling,  and coordination with insurance during ARU stay.  Patient Goals: Adapt to amputation, regain mobility, be able to complete ADLs. Be able to return home at discharge.                Activities Prior to Admit:      Active : Yes     Occupation: Unemployed            Bárbara Ambrocio will be seen a minimum of 
of functioning, self care needs and offer support as indicated  2. Assess patient/family knowledge of depression, impact on illness and need for teaching  3. Provide emotional support, presence and reassurance  4. Assess for possible suicidal thoughts or ideation. If patient expresses suicidal thoughts or statements do not leave alone, initiate Suicide Precautions, move to a room close to the nursing station and obtain sitter  5. Initiate consults as appropriate with Mental Health Professional, Spiritual Care, Psychosocial CNS, and consider a recommendation to the LIP for a Psychiatric Consultation  2/3/2025 2208 by Jasmina Silva LPN  Outcome: Progressing  2/3/2025 1233 by Sandra Perry, RN  Outcome: Progressing     Problem: Pain  Goal: Verbalizes/displays adequate comfort level or baseline comfort level  2/3/2025 2208 by Jasmina Silva LPN  Outcome: Progressing  2/3/2025 1233 by Sandra Perry, RN  Outcome: Progressing     Problem: Nutrition Deficit:  Goal: Optimize nutritional status  2/3/2025 2208 by Jasmina Silva LPN  Outcome: Progressing  2/3/2025 1233 by Sandra Perry, RN  Outcome: Progressing     
saturation probe site  4.  Every 4-6 hours:  If on nasal continuous positive airway pressure, respiratory therapy assess nares and determine need for appliance change or resting period  2/4/2025 2301 by Jasmina Silva LPN  Outcome: Progressing  2/4/2025 1526 by Susanne Rosales LPN  Outcome: Progressing  Flowsheets (Taken 2/4/2025 0816)  Skin Integrity Remains Intact:   Monitor for areas of redness and/or skin breakdown   Assess vascular access sites hourly   Every 4-6 hours minimum: Change oxygen saturation probe site   Every 4-6 hours: If on nasal continuous positive airway pressure, respiratory therapy assesses nares and determine need for appliance change or resting period

## 2025-02-13 NOTE — DISCHARGE SUMMARY
Occupational Therapy Discharge Summary         Date: 2025  Patient Name: Bárbara Ambrocio        MRN: [id number]    : 1975  (49 y.o.)  Gender: female      Diagnosis: LLE BELOW KNEE AMPUTATION  Restrictions/Precautions  Restrictions/Precautions: Fall Risk, Weight Bearing  Required Braces or Orthoses?: Yes      Discharge Date: 2025    UE Functioning:  BUE AROM WFL     Home Management:  Functional Mobility  Functional - Mobility Device: Wheelchair  Activity: Other  Assist Level: Independent  Functional Mobility Comments: to/from bathroom, in room, in therapy gym    Adaptive Equipment/DME Status:  Bathroom Equipment: Grab bars in shower, Hand-held shower  Home Equipment: Electric scooter  Ordered 22 inch wide wheelchair with wheelchair cushion, Extra wide drop arm bedside commode, Rolling walker    Pain Assessment:  No pain         Remaining Problems:  Decreased functional mobility ; Decreased ADL status; Decreased strength; Decreased endurance; Decreased balance; Decreased high-level IADLs     STGs:  Short Term Goals  Time Frame for Short Term Goals: 1 week  Short Term Goal 1: Pt will perform LB dressing with SBA using AE PRN  Short Term Goal 2: Pt will perform toileting with SBA  Short Term Goal 3: Pt will perform all aspects of bathing with SBA using AE PRN  Short Term Goal 4: Pt will perform 1-2 handed standing level task with supervision for ~5 minutes  Short Term Goal 5: Pt will perform B UE strengthening HEP x5 occassions to improve overall ADL performance and mobility.  MET 4/5 Short Term Goals MET     LTGs:  Long Term Goals  Time Frame for Long Term Goals : 2 weeks  Long Term Goal 1: Pt will perform all aspects of dressing with mod I  Long Term Goal 2: Pt will perform all aspects of toileting with mod I  Long Term Goal 3: Pt will perform all aspects of bathing wih mod I  Long Term Goal 4: Pt will perform simple home making task with supervision.  Long Term Goal 5: Pt will verbalize DME 
  amitriptyline (ELAVIL) 25 MG tablet Take 1 tablet by mouth nightly  Qty: 30 tablet, Refills: 3      buPROPion (WELLBUTRIN XL) 150 MG extended release tablet Take 1 tablet by mouth daily  Qty: 30 tablet, Refills: 3                Details   acitretin (SORIATANE) 25 MG capsule Take 1 capsule by mouth every morning (before breakfast)      furosemide (LASIX) 20 MG tablet Take 2 tablets by mouth daily  Refills: 5           Current Discharge Medication List        STOP taking these medications       ibuprofen (ADVIL;MOTRIN) 800 MG tablet Comments:   Reason for Stopping:         mupirocin (BACTROBAN) 2 % ointment Comments:   Reason for Stopping:         Semaglutide, 1 MG/DOSE, (OZEMPIC, 1 MG/DOSE,) 4 MG/3ML SOPN sc injection Comments:   Reason for Stopping:         clindamycin (CLEOCIN) 300 MG capsule Comments:   Reason for Stopping:         Semaglutide, 1 MG/DOSE, (OZEMPIC, 1 MG/DOSE,) 2 MG/1.5ML SOPN Comments:   Reason for Stopping:                 Consults:   Hospitalist    Hospital Course: The patient did fairly well during her stay in the rehab unit.  Pain was controlled and blood sugar controlled.  Did develop a urinary tract infection and some cellulitis on her right leg.  Hospitalist was consulted.  Patient was placed on Levaquin and doxycycline was added until she can follow-up with Dr. Rubi.  Cellulitis appears to be stable/improving.  Disposition upon discharge-stable/improving          Discharge Instructions     Patient Instructions:   Home  Therapy orders: PT and OT   Discharge lab work: none  Code status: Full Code   Activity: activity as tolerated  Diet: ADULT DIET; Regular; 5 carb choices (75 gm/meal)  ADULT ORAL NUTRITION SUPPLEMENT; Dinner, Lunch; Wound Healing Oral Supplement    Wound Care: as directed  Equipment: as per therapy      Raf Solano MD, MD    At least 35 minutes were spent in discharging the patient    
Live on Main level with bedroom/bathroom  Home Access: Level entry        ASSESSMENT (IMPAIRMENTS/BARRIERS):  Assessment  Assessment: Pt able to tolerate overall tx w/ no c/o of increased pain, requires less assist for functional mobility vs previous txs.  Performance Deficits/Impairments: Decreased ROM, Decreased strength, Decreased endurance, Decreased balance, Decreased coordination  Treatment Diagnosis: INTERFERENCE WITH ACTIVITY  Therapy Prognosis: Good  Decision Making: Low Complexity  History: LLE BELOW KNEE AMPUTATION, CHARCOT'S JOINT LEFT ANKLE, 6 YEAR HX OF LEFT FOOT WOUND, OSTEOMYELITIS LEFT ANKLE/FOOT, DM2, LYMPHEDEMA, MORBID OBESITY, NICOTINE DEPENDENCE,  Exam: DECREASED UE STRENGTH, DECREASED DYNAMIC STANDING BALANCE, TENDS TO HOP VS. SWING WITH AMB DUE TO INADEQUATE UPPER BODY STRENGTH TO SUPPORT BODY WT THROUGH AD. DOES NOT HAVE STEPS TO HANICAP ACCESIBLE APT.  Clinical Presentation: STABLE  Discharge Recommendations: Continue to assess pending progress, Home with Home health PT, Home with assist PRN    PLAN:  Discharge Recommendations: Home with Home health PT, Home with assist PRN     PATIENT GOAL FOR REHAB:  RETURN TO PRIOR LEVEL OF FUNCTION       IRF/NATI  Roll Left and Right  Assistance Needed: Independent  CARE Score: 6  Discharge Goal: Independent    Sit to Lying  Assistance Needed: Independent  CARE Score: 6  Discharge Goal: Independent    Lying to Sitting on Side of Bed  Assistance Needed: Independent  CARE Score: 6  Discharge Goal: Independent    Sit to Stand  Assistance Needed: Supervision or touching assistance  CARE Score: 4  Discharge Goal: Independent    Chair/Bed-to-Chair Transfer  Assistance Needed: Supervision or touching assistance  CARE Score: 4  Discharge Goal: Independent    Car Transfer  Assistance Needed: Supervision or touching assistance  Reason if not Attempted: Not attempted due to medical condition or safety concerns  CARE Score: 4  Discharge Goal: Supervision or touching

## 2025-02-14 ENCOUNTER — OFFICE VISIT (OUTPATIENT)
Dept: VASCULAR SURGERY | Facility: CLINIC | Age: 50
End: 2025-02-14
Payer: MEDICARE

## 2025-02-14 VITALS
HEIGHT: 69 IN | SYSTOLIC BLOOD PRESSURE: 132 MMHG | DIASTOLIC BLOOD PRESSURE: 78 MMHG | OXYGEN SATURATION: 97 % | WEIGHT: 293 LBS | BODY MASS INDEX: 43.4 KG/M2 | HEART RATE: 87 BPM

## 2025-02-14 DIAGNOSIS — E11.621 TYPE 2 DIABETES MELLITUS WITH FOOT ULCER, UNSPECIFIED WHETHER LONG TERM INSULIN USE: ICD-10-CM

## 2025-02-14 DIAGNOSIS — Z89.512 S/P BKA (BELOW KNEE AMPUTATION) UNILATERAL, LEFT: Primary | ICD-10-CM

## 2025-02-14 DIAGNOSIS — I89.0 LYMPHEDEMA: ICD-10-CM

## 2025-02-14 DIAGNOSIS — E66.01 MORBID OBESITY DUE TO EXCESS CALORIES: ICD-10-CM

## 2025-02-14 DIAGNOSIS — L97.509 TYPE 2 DIABETES MELLITUS WITH FOOT ULCER, UNSPECIFIED WHETHER LONG TERM INSULIN USE: ICD-10-CM

## 2025-02-14 DIAGNOSIS — I10 ESSENTIAL HYPERTENSION: ICD-10-CM

## 2025-02-14 PROBLEM — E11.610 CHARCOT FOOT DUE TO DIABETES MELLITUS: Status: ACTIVE | Noted: 2020-05-06

## 2025-02-14 PROBLEM — R73.9 HYPERGLYCEMIA: Status: RESOLVED | Noted: 2018-04-05 | Resolved: 2025-02-14

## 2025-02-14 PROBLEM — G47.00 INSOMNIA: Status: ACTIVE | Noted: 2024-04-12

## 2025-02-14 PROBLEM — N30.00 ACUTE CYSTITIS WITHOUT HEMATURIA: Status: RESOLVED | Noted: 2025-02-11 | Resolved: 2025-02-14

## 2025-02-14 PROBLEM — N39.0 UTI (URINARY TRACT INFECTION): Status: RESOLVED | Noted: 2025-02-14 | Resolved: 2025-02-14

## 2025-02-14 PROBLEM — H02.9 LESION OF EYELID: Status: RESOLVED | Noted: 2022-06-10 | Resolved: 2025-02-14

## 2025-02-14 PROBLEM — J80 ARDS (ADULT RESPIRATORY DISTRESS SYNDROME): Status: RESOLVED | Noted: 2025-02-14 | Resolved: 2025-02-14

## 2025-02-14 PROBLEM — H92.02 OTALGIA OF LEFT EAR: Status: RESOLVED | Noted: 2022-10-23 | Resolved: 2025-02-14

## 2025-02-14 PROBLEM — Z98.890 H/O FOOT SURGERY: Status: ACTIVE | Noted: 2025-02-14

## 2025-02-14 PROBLEM — Z91.199 NONCOMPLIANCE WITH CPAP TREATMENT: Status: ACTIVE | Noted: 2022-12-28

## 2025-02-14 PROBLEM — D25.9 FIBROID UTERUS: Status: RESOLVED | Noted: 2023-12-26 | Resolved: 2025-02-14

## 2025-02-14 PROBLEM — Z90.49 HX OF CHOLECYSTECTOMY: Status: ACTIVE | Noted: 2025-01-31

## 2025-02-14 PROBLEM — E55.9 VITAMIN D DEFICIENCY: Status: ACTIVE | Noted: 2018-04-05

## 2025-02-14 PROBLEM — K59.00 CONSTIPATION: Status: RESOLVED | Noted: 2019-12-18 | Resolved: 2025-02-14

## 2025-02-14 PROBLEM — G47.33 OSA ON CPAP: Status: ACTIVE | Noted: 2022-12-28

## 2025-02-14 PROBLEM — J44.9 CHRONIC OBSTRUCTIVE PULMONARY DISEASE: Status: ACTIVE | Noted: 2020-06-03

## 2025-02-14 PROBLEM — R07.9 CHEST PAIN: Status: RESOLVED | Noted: 2025-02-14 | Resolved: 2025-02-14

## 2025-02-14 PROBLEM — R00.0 TACHYCARDIA: Status: RESOLVED | Noted: 2025-02-14 | Resolved: 2025-02-14

## 2025-02-14 PROBLEM — R09.02 HYPOXIA: Status: RESOLVED | Noted: 2025-02-14 | Resolved: 2025-02-14

## 2025-02-14 PROBLEM — L40.3 PUSTULAR PSORIASIS OF PALMS AND SOLES: Status: ACTIVE | Noted: 2023-08-11

## 2025-02-14 PROBLEM — K21.9 GASTROESOPHAGEAL REFLUX DISEASE: Status: ACTIVE | Noted: 2019-12-18

## 2025-02-14 PROBLEM — R21 RASH: Status: RESOLVED | Noted: 2023-05-30 | Resolved: 2025-02-14

## 2025-02-14 PROBLEM — I50.30 HEART FAILURE WITH NORMAL EJECTION FRACTION: Status: ACTIVE | Noted: 2020-06-03

## 2025-02-14 PROBLEM — Z90.711 HISTORY OF PARTIAL HYSTERECTOMY: Status: ACTIVE | Noted: 2025-01-31

## 2025-02-14 PROBLEM — L03.116 CELLULITIS OF LEFT FOOT: Status: ACTIVE | Noted: 2022-10-27

## 2025-02-14 PROBLEM — B08.1 MOLLUSCUM CONTAGIOSUM INFECTION: Status: RESOLVED | Noted: 2022-06-08 | Resolved: 2025-02-14

## 2025-02-14 PROBLEM — E03.9 HYPOTHYROIDISM: Status: ACTIVE | Noted: 2018-04-05

## 2025-02-14 PROBLEM — J96.90 RESPIRATORY FAILURE: Status: RESOLVED | Noted: 2018-04-04 | Resolved: 2025-02-14

## 2025-02-14 PROBLEM — J96.01 ACUTE RESPIRATORY FAILURE WITH HYPOXEMIA: Status: RESOLVED | Noted: 2017-04-08 | Resolved: 2025-02-14

## 2025-02-14 PROBLEM — J20.9 ACUTE BRONCHITIS: Status: RESOLVED | Noted: 2017-04-08 | Resolved: 2025-02-14

## 2025-02-14 PROBLEM — E04.1 THYROID NODULE: Status: ACTIVE | Noted: 2020-12-22

## 2025-02-14 PROBLEM — D21.9 LEIOMYOMA: Status: RESOLVED | Noted: 2023-12-21 | Resolved: 2025-02-14

## 2025-02-14 PROBLEM — E11.9 TYPE II DIABETES MELLITUS: Status: ACTIVE | Noted: 2025-02-11

## 2025-02-14 PROBLEM — N76.0 VAGINITIS: Status: RESOLVED | Noted: 2024-12-24 | Resolved: 2025-02-14

## 2025-02-14 PROBLEM — M19.079 OSTEOARTHRITIS OF FOOT JOINT: Status: RESOLVED | Noted: 2020-05-06 | Resolved: 2025-02-14

## 2025-02-14 PROBLEM — J01.90 ACUTE SINUSITIS: Status: RESOLVED | Noted: 2020-10-21 | Resolved: 2025-02-14

## 2025-02-14 PROBLEM — L97.422 DIABETIC ULCER OF LEFT MIDFOOT ASSOCIATED WITH TYPE 2 DIABETES MELLITUS, WITH FAT LAYER EXPOSED: Status: ACTIVE | Noted: 2023-04-17

## 2025-02-14 PROBLEM — M19.90 OSTEOARTHRITIS: Status: ACTIVE | Noted: 2022-10-27

## 2025-02-14 PROBLEM — E04.2 MULTINODULAR GOITER: Status: RESOLVED | Noted: 2020-09-18 | Resolved: 2025-02-14

## 2025-02-14 PROBLEM — N88.9 LESION OF CERVIX: Status: RESOLVED | Noted: 2023-12-21 | Resolved: 2025-02-14

## 2025-02-14 PROBLEM — Z16.20 THERAPY FAILURE DUE TO ANTIBIOTIC RESISTANCE: Status: RESOLVED | Noted: 2022-12-27 | Resolved: 2025-02-14

## 2025-02-14 PROBLEM — M79.676 PAIN IN TOE: Status: RESOLVED | Noted: 2017-07-20 | Resolved: 2025-02-14

## 2025-02-14 PROBLEM — M20.12 ACQUIRED HALLUX VALGUS OF LEFT FOOT: Status: ACTIVE | Noted: 2017-10-25

## 2025-02-14 PROBLEM — Z98.890 H/O CARPAL TUNNEL REPAIR: Status: ACTIVE | Noted: 2025-02-14

## 2025-02-14 PROBLEM — J18.9 PNEUMONIA: Status: RESOLVED | Noted: 2023-01-03 | Resolved: 2025-02-14

## 2025-02-14 PROBLEM — F32.A CHRONIC DEPRESSION: Status: ACTIVE | Noted: 2024-11-18

## 2025-02-14 PROBLEM — D25.1 INTRAMURAL LEIOMYOMA OF UTERUS: Status: RESOLVED | Noted: 2023-12-26 | Resolved: 2025-02-14

## 2025-02-14 RX ORDER — DOXYCYCLINE 100 MG/1
100 CAPSULE ORAL 2 TIMES DAILY
COMMUNITY
Start: 2025-02-13 | End: 2025-02-24

## 2025-02-14 RX ORDER — LEVOFLOXACIN 750 MG/1
750 TABLET, FILM COATED ORAL DAILY
COMMUNITY
Start: 2025-02-14 | End: 2025-02-19

## 2025-02-14 RX ORDER — HYDROCODONE BITARTRATE AND ACETAMINOPHEN 10; 325 MG/1; MG/1
1 TABLET ORAL EVERY 4 HOURS PRN
COMMUNITY

## 2025-02-14 RX ORDER — FLUTICASONE PROPIONATE 50 MCG
1 SPRAY, SUSPENSION (ML) NASAL DAILY
COMMUNITY
Start: 2024-11-27

## 2025-02-14 RX ORDER — METHOCARBAMOL 1000 MG/1
1000 TABLET, FILM COATED ORAL
COMMUNITY
Start: 2025-02-13 | End: 2025-02-24

## 2025-02-14 RX ORDER — LANOLIN ALCOHOL/MO/W.PET/CERES
1 CREAM (GRAM) TOPICAL NIGHTLY
COMMUNITY
Start: 2025-02-13

## 2025-02-14 NOTE — PROGRESS NOTES
02/14/2025      Benjamin Kelsey MD  [address] [place] [address] [place] [address]      Donna Swain  1975    Chief Complaint   Patient presents with    left BKA     Continues with pain, stump  on       Dear Benjamin Kelsey MD        HPI  I had the pleasure of seeing your patient Donna Swain in the office today.  Thank you kindly for this consultation.  As you recall, Donna Swain is a 49 y.o.  female who you are currently following for routine health maintenance.  She is here for 2 week postop after undergoing left BKA on 1/27/25.  Patient was seen by Dr. Mckee for left diabetic foot wound and right foot malformation.  She has a longstanding history of at least 4 years of dealing with left foot wound for which she has become frustrated with the ongoing treatment of the wound.  After discussion with Dr. Mckee she was inquisitive to left below-knee amputation.  She did have chronic osteo in the left foot.  Overall she is doing well.  Overall she is doing well, she is experiencing phantom pain, she is on Lyrica.  The incision looks good, closure with sutures and staples.  She is now home.  She mentions that they were treating her for cellulitis to her right lower extremity.  She does not wear a compression stocking.  It does look like she has some lymphedema to that right lower extremity.  She does have a  to her left BKA and also an immobilizer currently in place.    Past Medical History:   Diagnosis Date    Acute bronchitis 04/08/2017    Acute cystitis without hematuria 02/11/2025    Acute respiratory failure with hypoxemia 04/08/2017    Ankle swelling     Back pain     Charcot's joint of ankle, left     Chest pain 02/14/2025    Closed fracture of base of fifth metacarpal bone 10/25/2016    Constipation 12/18/2019    Diabetes     Fibroid uterus 12/26/2023    GERD (gastroesophageal reflux disease)     Hemorrhoids     History of stomach ulcers     Hypertension     Hypertensive disorder  09/18/2020    Hypertension      Hyperthyroidism     Hypoxia 02/14/2025    Intramural leiomyoma of uterus 12/26/2023    Lesion of cervix 12/21/2023    Lesion of eyelid 06/10/2022    Molluscum contagiosum infection 06/08/2022    Molluscum contagiosum infection      MRSA infection     Neuropathy     Nicotine dependence 10/25/2016    Pain in limb 07/14/2015    Pain in toe 07/20/2017          Pneumonia 01/03/2023    Rash 05/30/2023    Sleep apnea     doesn't use c pap. feels like she is suffocating    Therapy failure due to antibiotic resistance 12/27/2022    Thyroid goiter     UTI (urinary tract infection) 02/14/2025    Vaginitis 12/24/2024       Past Surgical History:   Procedure Laterality Date    BELOW KNEE AMPUTATION Left 1/27/2025    Procedure: LEFT BELOW KNEE AMPUTATION;  Surgeon: Giancarlo Demarco MD;  Location:  [facility];  Service: Vascular;  Laterality: Left;    BONE SPUR LEG Left 10/2/2024    Procedure: Plantar Exostectomy;  Surgeon: Ciro Mckee DPM;  Location:  [facility];  Service: Podiatry;  Laterality: Left;    CHOLECYSTECTOMY      lap    COLONOSCOPY      ENDOSCOPY      FOOT HARDWARE REMOVAL Left     HYSTERECTOMY      LEG DEBRIDEMENT Left 10/2/2024    Procedure: Debridement of Bone/Plantar Exostectomy - Left Foot;  Surgeon: Ciro Mckee DPM;  Location:  [facility];  Service: Podiatry;  Laterality: Left;    MULTIPLE TOOTH EXTRACTIONS      OVARY SURGERY      TOE OSTEOTOMY Left 03/07/2023    Procedure: PARTIAL REMOVAL OF BONE, FIFTH METATARSAL, LEFT FOOT;  Surgeon: Tee Dacosta DPM;  Location:  [facility];  Service: Podiatry;  Laterality: Left;    TUBAL ABDOMINAL LIGATION         Family History   Problem Relation Age of Onset    Cancer Mother     Cancer Father     COPD Father     Diabetes Maternal Grandfather        Social History     Socioeconomic History    Marital status: Single   Tobacco Use    Smoking status: Former     Current packs/day: 0.50     Average packs/day: 0.5 packs/day for 1.1  years (0.6 ttl pk-yrs)     Types: Cigarettes     Start date: 2024     Passive exposure: Current    Smokeless tobacco: Never    Tobacco comments:     Quit Dec 31, 2024   Vaping Use    Vaping status: Never Used   Substance and Sexual Activity    Alcohol use: Not Currently    Drug use: Not Currently    Sexual activity: Defer       Allergies   Allergen Reactions    Vancomycin Hives       Current Outpatient Medications   Medication Instructions    acitretin (SORIATANE) 25 mg, Every Morning Before Breakfast    amitriptyline (ELAVIL) 10 mg, Nightly    buPROPion XL (WELLBUTRIN XL) 150 mg, Daily    doxycycline (VIBRAMYCIN) 100 mg, 2 Times Daily    fluticasone (FLONASE) 50 MCG/ACT nasal spray 1 spray, Daily    furosemide (LASIX) 40 mg, Daily    HYDROcodone-acetaminophen (NORCO)  MG per tablet 1 tablet, Every 4 Hours PRN    ibuprofen (ADVIL,MOTRIN) 800 mg, Every 8 Hours PRN    insulin NPH (HUMULIN N,NOVOLIN N) 18 Units, Every 12 Hours Scheduled    levoFLOXacin (LEVAQUIN) 750 mg, Daily    Magnesium Oxide -Mg Supplement 400 (240 Mg) MG tablet 1 tablet, Nightly    Methocarbamol 1,000 mg, 3 Times Daily Before Meals    mupirocin (BACTROBAN) 2 % ointment 1 Application, Daily    Ozempic (1 MG/DOSE) 1 mg, Weekly    pregabalin (LYRICA) 200 mg, 3 times daily             Review of Systems   Constitutional: Negative.  Negative for diaphoresis and fever.   HENT: Negative.     Eyes: Negative.    Respiratory: Negative.  Negative for shortness of breath and wheezing.    Cardiovascular: Negative.  Negative for leg swelling.   Gastrointestinal: Negative.  Negative for abdominal pain.   Endocrine: Negative.    Genitourinary: Negative.    Musculoskeletal: Negative.  Negative for gait problem.   Skin: Negative.    Allergic/Immunologic: Negative.    Neurological: Negative.  Negative for dizziness and numbness.        Phantom pain to left BKA   Hematological: Negative.    Psychiatric/Behavioral: Negative.         /78   Pulse 87   Ht  "175.3 cm (69\")   Wt (!) 141 kg (310 lb)   SpO2 97%   BMI 45.78 kg/m²       Physical Exam  Vitals and nursing note reviewed.   Constitutional:       General: She is not in acute distress.     Appearance: Normal appearance. She is morbidly obese. She is not diaphoretic.   HENT:      Head: Normocephalic. No right periorbital erythema or left periorbital erythema.      Nose: Nose normal.   Eyes:      General: No scleral icterus.     Pupils: Pupils are equal.   Cardiovascular:      Rate and Rhythm: Normal rate and regular rhythm.      Pulses: Normal pulses.      Heart sounds: Normal heart sounds. No murmur heard.     Comments: Lymphedema present to right lower extremity  Pulmonary:      Effort: Pulmonary effort is normal. No respiratory distress.      Breath sounds: Normal breath sounds.   Abdominal:      General: Bowel sounds are normal. There is no distension.      Palpations: Abdomen is soft.      Tenderness: There is no abdominal tenderness. There is no guarding.   Musculoskeletal:         General: No swelling or tenderness. Normal range of motion.      Cervical back: Normal range of motion and neck supple.      Right lower leg: Edema present.      Left lower leg: No edema.      Left Lower Extremity: Left leg is amputated below knee.   Feet:      Right foot:      Skin integrity: Skin integrity normal.      Left foot:      Skin integrity: Blister present.      Comments: Incision is clean, dry and intact.  Approximated with sutures and staples.  A few healing blisters around the suture line.  Skin:     General: Skin is warm and dry.      Findings: No erythema or rash.      Comments: Minimal erythema to right lower extremity   Neurological:      General: No focal deficit present.      Mental Status: She is alert and oriented to person, place, and time. Mental status is at baseline.      Cranial Nerves: No cranial nerve deficit.      Gait: Gait normal.   Psychiatric:         Attention and Perception: Attention normal. "         Mood and Affect: Mood normal.         Behavior: Behavior normal.         Thought Content: Thought content normal.         Judgment: Judgment normal.       Patient Active Problem List   Diagnosis    Sleep apnea    Essential hypertension    Morbid obesity due to excess calories    Hx of osteomyelitis    Lymphedema    Neuropathy    Onychomycosis    Polyneuropathy due to type 2 diabetes mellitus    Tobacco abuse    Acute osteomyelitis of left foot    Ulcer of foot due to type 2 diabetes mellitus    Diabetic ulcer of left midfoot associated with type 2 diabetes mellitus, with fat layer exposed    Preoperative testing    Charcot foot due to diabetes mellitus    Osteomyelitis    Acquired hallux valgus of left foot    Cellulitis of left foot    Chronic depression    Chronic obstructive pulmonary disease    Gastroesophageal reflux disease    Heart failure with normal ejection fraction    History of partial hysterectomy    H/O carpal tunnel repair    Hx of cholecystectomy    H/O foot surgery    Hypothyroidism    Thyroid nodule    Insomnia    Osteoarthritis    Noncompliance with CPAP treatment    Pustular psoriasis of palms and soles    S/P BKA (below knee amputation) unilateral, left    DEBRA on CPAP    Vitamin D deficiency    Type II diabetes mellitus         ICD-10-CM ICD-9-CM   1. S/P BKA (below knee amputation) unilateral, left  Z89.512 V49.75   2. Lymphedema  I89.0 457.1   3. Essential hypertension  I10 401.9   4. Type 2 diabetes mellitus with foot ulcer, unspecified whether long term insulin use  E11.621 250.80    L97.509 707.15   5. Morbid obesity due to excess calories  E66.01 278.01           Plan: After thoroughly evaluating Donna Swain, I believe the best course of action is to remain conservative from vascular surgery standpoint.  We will see her back in 1 month for recheck.  Overall she is doing well, the incision is healing well.  I would like her to clean it daily with soap and water, pat it dry.  She  should paint it with Betadine 2 times daily.  Covering it with a dressing.  The stump  should be in place at all times unless she is bathing.  The stump protector should be in place during transfers, to ensure that she does not injure the incision.  I would also like her to see Shay richie Kurtz to discuss home lymphedema pumps, he is here on Wednesday afternoon from 12 PM to 4 PM.  She has the appearance of lymphedema to her right lower extremity.  I have provided her with a guideline for compression stocking for her right lower extremity in the range of 20 to 30 mmHg.  I have also instructed her to apply them in the morning, removing them in the evening daily.  She should utilize leg elevation as well.  The patient can continue taking their current medication regimen as previously planned. I did discuss vascular risk factors as they pertain to the progression of vascular disease including controlling her hypertension, and diabetes.  Her blood pressure stable today in office.  Her hemoglobin A1c 10 days ago was controlled at 5.9%.  The patient has quit smoking.  Body mass index is 45.78 kg/m².       This was all discussed in full with complete understanding.    Thank you for allowing me to participate in the care of your patient.  Please do not hesitate with any questions or concerns.  I will keep you aware of any further encounters with Donna Swain.        Sincerely yours,         Giancarlo Demarco MD

## 2025-03-11 ENCOUNTER — TELEPHONE (OUTPATIENT)
Dept: VASCULAR SURGERY | Facility: CLINIC | Age: 50
End: 2025-03-11
Payer: MEDICARE

## 2025-03-12 ENCOUNTER — OFFICE VISIT (OUTPATIENT)
Dept: VASCULAR SURGERY | Facility: CLINIC | Age: 50
End: 2025-03-12
Payer: MEDICARE

## 2025-03-12 VITALS
OXYGEN SATURATION: 98 % | HEART RATE: 87 BPM | BODY MASS INDEX: 43.4 KG/M2 | SYSTOLIC BLOOD PRESSURE: 138 MMHG | HEIGHT: 69 IN | WEIGHT: 293 LBS | DIASTOLIC BLOOD PRESSURE: 80 MMHG

## 2025-03-12 DIAGNOSIS — E11.621 TYPE 2 DIABETES MELLITUS WITH FOOT ULCER, UNSPECIFIED WHETHER LONG TERM INSULIN USE: ICD-10-CM

## 2025-03-12 DIAGNOSIS — L97.509 TYPE 2 DIABETES MELLITUS WITH FOOT ULCER, UNSPECIFIED WHETHER LONG TERM INSULIN USE: ICD-10-CM

## 2025-03-12 DIAGNOSIS — Z89.512 S/P BKA (BELOW KNEE AMPUTATION) UNILATERAL, LEFT: Primary | ICD-10-CM

## 2025-03-12 DIAGNOSIS — E66.01 MORBID OBESITY DUE TO EXCESS CALORIES: ICD-10-CM

## 2025-03-12 DIAGNOSIS — I10 ESSENTIAL HYPERTENSION: ICD-10-CM

## 2025-03-12 DIAGNOSIS — I89.0 LYMPHEDEMA: ICD-10-CM

## 2025-03-12 RX ORDER — CYCLOBENZAPRINE HCL 10 MG
10 TABLET ORAL DAILY
COMMUNITY
Start: 2025-02-21

## 2025-03-12 NOTE — PROGRESS NOTES
02/14/2025      Benjamin Kelsey MD  [address] [place] [address] [place] [address]      Donna Swain  1975    Chief Complaint   Patient presents with    Follow-up     1 month follow up after left BKA. Still has linette.        Dear Benjamin Kelsey MD        HPI  I had the pleasure of seeing your patient Donna Swain in the office today.  As you recall, Donna Swain is a 49 y.o.  female who we are following for left BKA.  Patient had a diabetic foot wound with osteo in the left foot.  After discussion between her and podiatry she opted for a left below-knee amputation.  This was performed approximately 6 weeks ago.  She is here for follow-up of the wound.  She is doing well today without complaints.    Past Medical History:   Diagnosis Date    Acute bronchitis 04/08/2017    Acute cystitis without hematuria 02/11/2025    Acute respiratory failure with hypoxemia 04/08/2017    Ankle swelling     Back pain     Charcot's joint of ankle, left     Chest pain 02/14/2025    Closed fracture of base of fifth metacarpal bone 10/25/2016    Constipation 12/18/2019    Diabetes     Fibroid uterus 12/26/2023    GERD (gastroesophageal reflux disease)     Hemorrhoids     History of stomach ulcers     Hypertension     Hypertensive disorder 09/18/2020    Hypertension      Hyperthyroidism     Hypoxia 02/14/2025    Intramural leiomyoma of uterus 12/26/2023    Lesion of cervix 12/21/2023    Lesion of eyelid 06/10/2022    Molluscum contagiosum infection 06/08/2022    Molluscum contagiosum infection      MRSA infection     Neuropathy     Nicotine dependence 10/25/2016    Pain in limb 07/14/2015    Pain in toe 07/20/2017          Pneumonia 01/03/2023    Rash 05/30/2023    Sleep apnea     doesn't use c pap. feels like she is suffocating    Therapy failure due to antibiotic resistance 12/27/2022    Thyroid goiter     UTI (urinary tract infection) 02/14/2025    Vaginitis 12/24/2024       Past Surgical History:   Procedure  Laterality Date    BELOW KNEE AMPUTATION Left 1/27/2025    Procedure: LEFT BELOW KNEE AMPUTATION;  Surgeon: Giancarlo Demarco MD;  Location:  [facility];  Service: Vascular;  Laterality: Left;    BONE SPUR LEG Left 10/2/2024    Procedure: Plantar Exostectomy;  Surgeon: Ciro Mckee DPM;  Location:  [facility];  Service: Podiatry;  Laterality: Left;    CHOLECYSTECTOMY      lap    COLONOSCOPY      ENDOSCOPY      FOOT HARDWARE REMOVAL Left     HYSTERECTOMY      LEG DEBRIDEMENT Left 10/2/2024    Procedure: Debridement of Bone/Plantar Exostectomy - Left Foot;  Surgeon: Ciro Mckee DPM;  Location:  [facility];  Service: Podiatry;  Laterality: Left;    MULTIPLE TOOTH EXTRACTIONS      OVARY SURGERY      TOE OSTEOTOMY Left 03/07/2023    Procedure: PARTIAL REMOVAL OF BONE, FIFTH METATARSAL, LEFT FOOT;  Surgeon: Tee Dacosta DPM;  Location:  [facility];  Service: Podiatry;  Laterality: Left;    TUBAL ABDOMINAL LIGATION         Family History   Problem Relation Age of Onset    Cancer Mother     Cancer Father     COPD Father     Diabetes Maternal Grandfather        Social History     Socioeconomic History    Marital status: Single   Tobacco Use    Smoking status: Former     Current packs/day: 0.50     Average packs/day: 0.5 packs/day for 1.2 years (0.6 ttl pk-yrs)     Types: Cigarettes     Start date: 2024     Passive exposure: Current    Smokeless tobacco: Never    Tobacco comments:     Quit Dec 31, 2024   Vaping Use    Vaping status: Never Used   Substance and Sexual Activity    Alcohol use: Not Currently    Drug use: Not Currently    Sexual activity: Defer       Allergies   Allergen Reactions    Vancomycin Hives, Itching and Rash       Current Outpatient Medications   Medication Instructions    acitretin (SORIATANE) 25 mg, Every Morning Before Breakfast    amitriptyline (ELAVIL) 10 mg, Nightly    buPROPion XL (WELLBUTRIN XL) 150 mg, Daily    cyclobenzaprine (FLEXERIL) 10 mg, Oral, Daily    fluticasone (FLONASE) 50  "MCG/ACT nasal spray 1 spray, Daily    furosemide (LASIX) 40 mg, Daily    HYDROcodone-acetaminophen (NORCO)  MG per tablet 1 tablet, Every 4 Hours PRN    ibuprofen (ADVIL,MOTRIN) 800 mg, Every 8 Hours PRN    insulin NPH (HUMULIN N,NOVOLIN N) 18 Units, Every 12 Hours Scheduled    Magnesium Oxide -Mg Supplement 400 (240 Mg) MG tablet 1 tablet, Nightly    mupirocin (BACTROBAN) 2 % ointment 1 Application, Daily    Ozempic (1 MG/DOSE) 1 mg, Weekly    pregabalin (LYRICA) 200 mg, 3 times daily             Review of Systems   Constitutional: Negative.  Negative for diaphoresis and fever.   HENT: Negative.     Eyes: Negative.    Respiratory: Negative.  Negative for shortness of breath and wheezing.    Cardiovascular: Negative.  Negative for leg swelling.   Gastrointestinal: Negative.  Negative for abdominal pain.   Endocrine: Negative.    Genitourinary: Negative.    Musculoskeletal: Negative.  Negative for gait problem.   Skin: Negative.    Allergic/Immunologic: Negative.    Neurological: Negative.  Negative for dizziness and numbness.        Phantom pain to left BKA   Hematological: Negative.    Psychiatric/Behavioral: Negative.         /80   Pulse 87   Ht 175.3 cm (69\")   Wt (!) 140 kg (308 lb)   SpO2 98%   BMI 45.48 kg/m²       Physical Exam  Constitutional:       Appearance: Normal appearance.   HENT:      Nose: Nose normal.   Cardiovascular:      Rate and Rhythm: Normal rate.   Pulmonary:      Effort: Pulmonary effort is normal. No respiratory distress.   Skin:     Comments: Left BKA incision well-healed without evidence of infection.   Neurological:      General: No focal deficit present.      Mental Status: She is alert. Mental status is at baseline.       Patient Active Problem List   Diagnosis    Sleep apnea    Essential hypertension    Morbid obesity due to excess calories    Hx of osteomyelitis    Lymphedema    Neuropathy    Onychomycosis    Polyneuropathy due to type 2 diabetes mellitus    Tobacco " abuse    Acute osteomyelitis of left foot    Ulcer of foot due to type 2 diabetes mellitus    Diabetic ulcer of left midfoot associated with type 2 diabetes mellitus, with fat layer exposed    Preoperative testing    Charcot foot due to diabetes mellitus    Osteomyelitis    Acquired hallux valgus of left foot    Cellulitis of left foot    Chronic depression    Chronic obstructive pulmonary disease    Gastroesophageal reflux disease    Heart failure with normal ejection fraction    History of partial hysterectomy    H/O carpal tunnel repair    Hx of cholecystectomy    H/O foot surgery    Hypothyroidism    Thyroid nodule    Insomnia    Osteoarthritis    Noncompliance with CPAP treatment    Pustular psoriasis of palms and soles    S/P BKA (below knee amputation) unilateral, left    DEBRA on CPAP    Vitamin D deficiency    Type II diabetes mellitus         ICD-10-CM ICD-9-CM   1. S/P BKA (below knee amputation) unilateral, left  Z89.512 V49.75   2. Lymphedema  I89.0 457.1   3. Type 2 diabetes mellitus with foot ulcer, unspecified whether long term insulin use  E11.621 250.80    L97.509 707.15   4. Morbid obesity due to excess calories  E66.01 278.01   5. Essential hypertension  I10 401.9           Plan: After thoroughly evaluating Donna Swain, I believe the best course of action is to remain conservative from vascular surgery standpoint.  Patient's left BKA incision has healed without issues.  Staples and sutures removed.  Okay to be fitted for prosthetic.  No further vascular needs at this time.  Patient may follow-up as needed.  I did discuss vascular risk factors as they pertain to the progression of vascular disease including controlling her hypertension, and diabetes.  Her blood pressure stable today in office.  Her hemoglobin A1c 10 days ago was controlled at 5.9%.  The patient has quit smoking.  Body mass index is 45.48 kg/m².       This was all discussed in full with complete understanding.    Thank you for  allowing me to participate in the care of your patient.  Please do not hesitate with any questions or concerns.  I will keep you aware of any further encounters with Donna Swain.        Sincerely yours,         Giancarlo Demarco MD

## 2025-05-27 RX ORDER — BUPROPION HYDROCHLORIDE 150 MG/1
150 TABLET ORAL DAILY
Qty: 30 TABLET | Refills: 5 | Status: SHIPPED | OUTPATIENT
Start: 2025-05-27

## 2025-05-27 NOTE — TELEPHONE ENCOUNTER
Bárbara Ambrocio has requested a refill on her medication.      Last office visit : Hospital patient only  Next office visit : Visit date not found   Last medication refill : 02/13/2025 R3        Requested Prescriptions     Pending Prescriptions Disp Refills    buPROPion (WELLBUTRIN XL) 150 MG extended release tablet [Pharmacy Med Name: BUPROPION HCL ER (XL) 150 M 150 Tablet] 30 tablet 0     Sig: TAKE 1 TABLET BY MOUTH EVERY DAY    amitriptyline (ELAVIL) 25 MG tablet [Pharmacy Med Name: AMITRIPTYLINE HCL 25 MG TAB 25 Tablet] 30 tablet 0     Sig: TAKE 1 TABLET BY MOUTH EVERY NIGHT AT BEDTIME              Called patient and left message on answering machine to call back

## 2025-06-18 ENCOUNTER — HOSPITAL ENCOUNTER (OUTPATIENT)
Dept: PHYSICAL THERAPY | Age: 50
Setting detail: THERAPIES SERIES
Discharge: HOME | End: 2025-06-18
Payer: MEDICARE

## 2025-06-18 PROCEDURE — 97162 PT EVAL MOD COMPLEX 30 MIN: CPT

## 2025-06-18 ASSESSMENT — PAIN SCALES - GENERAL: PAINLEVEL_OUTOF10: 0

## 2025-06-18 NOTE — PROGRESS NOTES
I certify that the above Therapy Services are being furnished while the patient is under my care. I agree with the treatment plan and certify that this therapy is necessary.      Physician's Signature:  ___________________________   Date:_______                                                                   Rigo Nur MD        Physician Comments: _______________________________________________    Please sign and return to [facility].  Please fax to the location listed below. THANK YOU for this referral!    [facility]  [facility]  [address] [place] [id number]  Dept: [phone]  Dept Fax: [phone]  Loc: [phone]       POC NOTE

## 2025-06-24 ENCOUNTER — HOSPITAL ENCOUNTER (OUTPATIENT)
Dept: PHYSICAL THERAPY | Age: 50
Setting detail: THERAPIES SERIES
Discharge: HOME | End: 2025-06-24
Payer: MEDICARE

## 2025-06-24 PROCEDURE — 97110 THERAPEUTIC EXERCISES: CPT

## 2025-06-24 NOTE — PROGRESS NOTES
Physical Therapy  Daily Treatment Note  Date: 2025  Patient Name: Bárbara Ambrocio  MRN: [id number]     :   1975    Referring Physician: Rigo Nur MD Christopher Dobrose, MD ([place])   PCP: Rigo Nur MD    Medical Diagnosis: Acquired absence of limb, unspecified [Z89.9]    Treatment Diagnosis: INTERFERENCE WITH ACTIVITY      Insurance: Payor: [employer] MEDICARE / Plan: AET MEDICARE-ADVANTAGE PPO / Product Type: Medicare /   Insurance ID: [id number] - (Medicare Managed)    Subjective:  General  Referring Provider (secondary): Rigo Nur MD ([place])  PT Visit Information  PT Insurance Information: [employer] Medicare (no auth,$30 copay), [place] medicaid (auth after 20 visits)  Total # of Visits to Date: 2  Progress Note Due Date: 25  Referring Provider (secondary): Rigo Nur MD ([place])  Subjective  Subjective: pt reports no pain  at this time       Treatment Activities:  Exercises:      Treatment Reasoning    Exercise 1: ++ with prosthesis off++   supine left knee stretch into extension--8 mins, weight as tolerated over knee, rolled towel at end of prosthesis (or stump)  Exercise 2: prone lying --->prone on elbows--5 mins (feet off mat)  Exercise 3: right sidelying left hip stretch into extension  Exercise 4: right sidelying left hip extension with t-band--3/15 reps  Exercise 5: bilateral quad sets, rolled towels under knees--20 reps, 5 sec hold  Exercise 6: left SAQ's with weight--3/15 reps   3 #   2 sets ttoday  6-24  Exercise 7: supine hip abduction with t-band, green to start--3/15 reps  Exercise 8: sitting left LAQ's with weight as tolerated--3/15 reps  Exercise 9: hamstrings curls with t-band (green to start)--3/15 reps  Exercise 10: ++with prosthesis on ++  Exercise 11: sit to stand, mat-->standard chair (focus on improving recruitment of left leg to stand)   x 10  Exercise 12: ambulation (up to 6 mins, roller walker-->cane-->no device)--230' in 3' with rolling

## 2025-06-26 ENCOUNTER — HOSPITAL ENCOUNTER (OUTPATIENT)
Dept: PHYSICAL THERAPY | Age: 50
Setting detail: THERAPIES SERIES
Discharge: HOME | End: 2025-06-26
Payer: MEDICARE

## 2025-06-26 PROCEDURE — 97110 THERAPEUTIC EXERCISES: CPT

## 2025-06-26 NOTE — PROGRESS NOTES
Physical Therapy: Daily Note   Patient: Bárbara Ambrocio (50 y.o. female)   Examination Date: 2025  Plan of Care/Certification Expiration Date:  (auth after 20 visits for [place] Medicaid)    No data recorded   :  1975 # of Visits since SOC:   3   MRN: [id number]  CSN: [id number] Start of Care Date:   2025   Insurance: Payor: T MEDICARE / Plan: [employer] MEDICARE-ADVANTAGE PPO / Product Type: Medicare /   Insurance ID: [id number] - (Medicare Managed) Secondary Insurance (if applicable): MEDICAID [place]   Referring Physician: Rigo Nur MD Christopher Dobrose, MD ([place])   PCP: Rigo Nur MD Visits to Date/Visits Approved: 3 /      No Show/Cancelled Appts:   /       Medical Diagnosis: Acquired absence of limb, 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 She is checking her stump and there does not appear any lingering redness or signs of breakdown. Phantom pain not too bad at the moment, \"neutral\". She had her prosthetist work on the foot and relates she feels the device is fitting better. She is wearing 1 ply sock today, continues to wear her prosthesis daily.    HEP Compliance:         OBJECTIVE EXAMINATION   Restrictions:  Restrictions/Precautions: Fall Risk; Weight Bearing   Required Braces or Orthoses?: Yes   No data recorded      TREATMENT     Exercises:      Treatment Reasoning    Exercise 1: do ex 11-19 first                                                           ++ with prosthesis off++   supine left knee stretch into extension--5-8 mins, weight as tolerated over knee, rolled towel at end of prosthesis (or stump)--3# weight  Exercise 2: prone lying --->prone on elbows--3 mins (right foot off mat)  Exercise 3: right sidelying left hip stretch into extension--4 reps, 15\" holds  Exercise 4: right

## 2025-07-01 ENCOUNTER — HOSPITAL ENCOUNTER (OUTPATIENT)
Dept: PHYSICAL THERAPY | Age: 50
Setting detail: THERAPIES SERIES
End: 2025-07-01
Payer: MEDICARE

## 2025-07-08 ENCOUNTER — HOSPITAL ENCOUNTER (OUTPATIENT)
Dept: PHYSICAL THERAPY | Age: 50
Setting detail: THERAPIES SERIES
Discharge: HOME | End: 2025-07-08
Payer: MEDICARE

## 2025-07-08 PROCEDURE — 97110 THERAPEUTIC EXERCISES: CPT

## 2025-07-08 NOTE — PROGRESS NOTES
with progressive wearing of prosthesis >= 8 hours daily.  Long Term Goals  Time Frame for Long Term Goals : 8-12 weeks  Long Term Goal 1: Patient able to consistently combine proper sock combinations for proper fit of prosthesis.  Long Term Goal 2: Patient to be routinely checking residual limb and right foot for signs of breakdown or development of pressure sores.  Long Term Goal 3: Patient to score >= 40/80 on the Lower Extremity Functional Scale.  Long Term Goal 4: Patient able to ambulate >= 700' with roller walker or cane.  Patient Goals   Patient Goals : Be able to walk better with her prosthesis, possibly walk with cane or no device.    Plan:  Physical Therapy Plan  Plan weeks: 4-6 weeks  Current Treatment Recommendations: Strengthening, ROM, Pain management, Home exercise program, Patient/Caregiver education & training, Equipment evaluation, education, & procurement, Balance training, Functional mobility training, Transfer training, Endurance training, Gait training, Stair training, Neuromuscular re-education       Therapy Time:   Individual Concurrent Group Co-treatment   Time In 1403         Time Out 1505         Minutes 62                 Luis Garcia PTA   Electronically signed by Luis Garcia PTA on 7/8/2025 at 3:15 PM

## 2025-07-10 ENCOUNTER — HOSPITAL ENCOUNTER (OUTPATIENT)
Dept: PHYSICAL THERAPY | Age: 50
Setting detail: THERAPIES SERIES
Discharge: HOME | End: 2025-07-10
Payer: MEDICARE

## 2025-07-10 PROCEDURE — 97110 THERAPEUTIC EXERCISES: CPT

## 2025-07-14 ENCOUNTER — HOSPITAL ENCOUNTER (OUTPATIENT)
Dept: PHYSICAL THERAPY | Age: 50
Setting detail: THERAPIES SERIES
Discharge: HOME | End: 2025-07-14
Payer: MEDICARE

## 2025-07-14 PROCEDURE — 97110 THERAPEUTIC EXERCISES: CPT

## 2025-07-14 NOTE — PROGRESS NOTES
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] acquired absence of limb, unspecified (Z89.9)  Treatment Diagnosis: INTERFERENCE WITH ACTIVITY      Insurance: Payor: [facility] MEDICARE / Plan: [facility] MEDICARE-ADVANTAGE PPO / Product Type: Medicare /   Insurance ID: [id number] - (Medicare Managed)    Subjective:  General  Diagnosis: acquired absence of limb, unspecified (Z89.9)  Referring Provider (secondary): Rigo Nur MD ([place])  PT Insurance Information: [facility] Medicare (no auth,$30 copay), [place] medicaid (auth after 20 visits)  Total # of Visits Approved: 30  Total # of Visits to Date: 6  Progress Note Due Date: 25  Referring Provider (secondary): Rigo Nur MD ([place])  Subjective: wearing 5 ply but a brought a 1-ply to add later  Patient Currently in Pain: No       Treatment Activities:  Exercises:      Treatment Reasoning    Exercise 1: do ex  first                               ----        ++ with prosthesis off++   supine left knee stretch into extension--5-8 mins, weight as tolerated over knee, rolled towel at end of prosthesis (or stump)--3# weight  5'  Exercise 2: prone lying --->prone on elbows--3 mins (right foot off mat)    --- 7/10/25 (for right sided low back pain) -----axial traction to right LE, 4 reps, 15 sec hold --- isometric resisted right hip extension--5 reps, 5\" hold--  right quadratus stretch  10\" x 3  Exercise 3: right sidelying left hip stretch into extension--4 reps, 15\" holds  Exercise 4: right sidelying left hip extension with t-band--2/20 reps  red  Exercise 5: bilateral quad sets, rolled towels under knees--20 reps, 5 sec hold  Exercise 6: left SAQ's with weight--2/20 reps   3 #  Exercise 7: supine hip abduction with t-band,

## 2025-07-17 ENCOUNTER — HOSPITAL ENCOUNTER (OUTPATIENT)
Dept: PHYSICAL THERAPY | Age: 50
Setting detail: THERAPIES SERIES
Discharge: HOME | End: 2025-07-17
Payer: MEDICARE

## 2025-07-17 PROCEDURE — 97110 THERAPEUTIC EXERCISES: CPT

## 2025-07-17 NOTE — PROGRESS NOTES
Physical Therapy: Daily Note/Reassessment   Patient: Bárbara Ambrocio (50 y.o. female)   Examination Date: 2025  Plan of Care/Certification Expiration Date:  (auth after 20 visits for [place] Medicaid)    No data recorded   :  1975 # of Visits since SOC:   7   MRN: [id number]  CSN: [id number] Start of Care Date:   2025   Insurance: Payor: [employer]ET MEDICARE / Plan: [employer] MEDICARE-ADVANTAGE PPO / Product Type: Medicare /   Insurance ID: [id number] - (Medicare Managed) Secondary Insurance (if applicable): MEDICAID [place]   Referring Physician: Rigo Nur MD Christopher Dobrose, MD ([place])   PCP: Rigo Nur MD Visits to Date/Visits Approved:     No Show/Cancelled Appts:   /       Medical Diagnosis: Acquired absence of limb, 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 She is wearing her prosthesis between 8-10 hours daily, she is wearing 6-ply socks today (5+1), she is wearing her stump  when not wearing her prosthesis. She is having some phantom pains, mainly at night. She states she walked with a cane yesterday, walked from living room to bedroom. She has taken a few steps without a walker at home when she has space restrictions. She has been routinely checking her stump for signs of breakdown, has sweating but not excessive. No falls reported since starting therapy. She has been shopping, but generally using electric scooter. She is walking a great deal at home with normal activities, assisting with laundry, some cooking (mostly sitting), stands for a little while to wash dishes.    HEP Compliance:         OBJECTIVE EXAMINATION   Restrictions:  Restrictions/Precautions: Fall Risk; Weight Bearing   Required

## 2025-07-22 ENCOUNTER — HOSPITAL ENCOUNTER (OUTPATIENT)
Dept: PHYSICAL THERAPY | Age: 50
Setting detail: THERAPIES SERIES
Discharge: HOME | End: 2025-07-22
Payer: MEDICARE

## 2025-07-22 PROCEDURE — 97110 THERAPEUTIC EXERCISES: CPT

## 2025-07-22 ASSESSMENT — PAIN SCALES - GENERAL: PAINLEVEL_OUTOF10: 5

## 2025-07-22 NOTE — PROGRESS NOTES
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] acquired absence of limb, unspecified (Z89.9)  Treatment Diagnosis: INTERFERENCE WITH ACTIVITY      Insurance: [facility] / Plan: T MEDICARE-ADVANTAGE PPO / Product Type: Medicare /   Insurance ID: [id number] - (Medicare Managed)    Subjective:  General  Diagnosis: acquired absence of limb, unspecified (Z89.9)  Referring Provider (secondary): Rigo Nur MD ([place])  PT Insurance Information: [facility] Medicare (no auth,$30 copay), [place] medicaid (auth after 20 visits)  Total # of Visits Approved: 30  Total # of Visits to Date: 8  Plan of Care/Certification Expiration Date:  (auth after 20 [place] medicaid)  Progress Note Due Date: 25  Referring Provider (secondary): Rigo Nur MD ([place])  Subjective: My R hip is hurting so i don't know how much walking i will be able to do, wearing 6-ply  Patient Currently in Pain: Yes  Pain Level: 5       Treatment Activities:  Exercises:      Treatment Reasoning    Exercise 1: do ex  first                               ----        ++ with prosthesis off++   supine left knee stretch into extension--5-8 mins, weight as tolerated over knee, rolled towel at end of prosthesis (or stump)--4# weight  5'  Exercise 2: prone lying --->prone on elbows--3 mins (right foot off mat)    --- 7/10/25 (for right sided low back pain) -----axial traction to right LE, 4 reps, 15 sec hold --- isometric resisted right hip extension--5 reps, 5\" hold--  right quadratus stretch  10\" x 3  Exercise 3: right sidelying left hip stretch into extension--4 reps, 15\" holds  Exercise 4: right sidelying left hip extension with t-band--2/20 reps  red  Exercise 5: bilateral quad sets, rolled towels

## 2025-07-24 ENCOUNTER — HOSPITAL ENCOUNTER (OUTPATIENT)
Dept: PHYSICAL THERAPY | Age: 50
Setting detail: THERAPIES SERIES
Discharge: HOME | End: 2025-07-24
Payer: MEDICARE

## 2025-07-24 PROCEDURE — 97110 THERAPEUTIC EXERCISES: CPT

## 2025-07-28 ENCOUNTER — HOSPITAL ENCOUNTER (OUTPATIENT)
Dept: PHYSICAL THERAPY | Age: 50
Setting detail: THERAPIES SERIES
Discharge: HOME | End: 2025-07-28
Payer: MEDICARE

## 2025-07-28 PROCEDURE — 97110 THERAPEUTIC EXERCISES: CPT

## 2025-07-28 ASSESSMENT — PAIN DESCRIPTION - ORIENTATION: ORIENTATION: RIGHT

## 2025-07-28 ASSESSMENT — PAIN DESCRIPTION - PAIN TYPE: TYPE: ACUTE PAIN

## 2025-07-28 ASSESSMENT — PAIN SCALES - GENERAL: PAINLEVEL_OUTOF10: 3

## 2025-07-28 ASSESSMENT — PAIN DESCRIPTION - LOCATION: LOCATION: HIP

## 2025-07-28 ASSESSMENT — PAIN DESCRIPTION - DESCRIPTORS: DESCRIPTORS: ACHING;SPASM

## 2025-08-01 ENCOUNTER — APPOINTMENT (OUTPATIENT)
Dept: PHYSICAL THERAPY | Age: 50
End: 2025-08-01
Payer: MEDICARE

## 2025-08-04 ENCOUNTER — HOSPITAL ENCOUNTER (OUTPATIENT)
Dept: PHYSICAL THERAPY | Age: 50
Setting detail: THERAPIES SERIES
Discharge: HOME | End: 2025-08-04
Payer: MEDICARE

## 2025-08-04 PROCEDURE — 97110 THERAPEUTIC EXERCISES: CPT

## 2025-08-07 ENCOUNTER — HOSPITAL ENCOUNTER (OUTPATIENT)
Dept: PHYSICAL THERAPY | Age: 50
Setting detail: THERAPIES SERIES
Discharge: HOME | End: 2025-08-07
Payer: MEDICARE

## 2025-08-07 PROCEDURE — 97110 THERAPEUTIC EXERCISES: CPT

## 2025-08-11 ENCOUNTER — APPOINTMENT (OUTPATIENT)
Dept: PHYSICAL THERAPY | Age: 50
End: 2025-08-11
Payer: MEDICARE

## 2025-08-12 ENCOUNTER — HOSPITAL ENCOUNTER (OUTPATIENT)
Dept: PHYSICAL THERAPY | Age: 50
Setting detail: THERAPIES SERIES
Discharge: HOME | End: 2025-08-12
Payer: MEDICARE

## 2025-08-12 PROCEDURE — 97110 THERAPEUTIC EXERCISES: CPT

## 2025-08-14 ENCOUNTER — HOSPITAL ENCOUNTER (OUTPATIENT)
Dept: PHYSICAL THERAPY | Age: 50
Setting detail: THERAPIES SERIES
Discharge: HOME | End: 2025-08-14
Payer: MEDICARE

## 2025-08-14 PROCEDURE — 97110 THERAPEUTIC EXERCISES: CPT

## (undated) DEVICE — PK EXTRM 30

## (undated) DEVICE — PATIENT RETURN ELECTRODE, SINGLE-USE, CONTACT QUALITY MONITORING, ADULT, WITH 9FT CORD, FOR PATIENTS WEIGING OVER 33LBS. (15KG): Brand: MEGADYNE

## (undated) DEVICE — CVR BRD ARM 13X30

## (undated) DEVICE — DRESSING,GAUZE,XEROFORM,CURAD,5"X9",ST: Brand: CURAD

## (undated) DEVICE — SUT SILK 3/0 SUTUPAK TIES 24IN SA74H

## (undated) DEVICE — BAPTIST TURNOVER KIT: Brand: MEDLINE INDUSTRIES, INC.

## (undated) DEVICE — FRAZIER SUCTION INSTRUMENT 10 FR W/CONTROL VENT & OBTURATOR: Brand: FRAZIER

## (undated) DEVICE — ANTIBACTERIAL UNDYED BRAIDED (POLYGLACTIN 910), SYNTHETIC ABSORBABLE SUTURE: Brand: COATED VICRYL

## (undated) DEVICE — PROXIMATE RH ROTATING HEAD SKIN STAPLERS (35 WIDE) CONTAINS 35 STAINLESS STEEL STAPLES: Brand: PROXIMATE

## (undated) DEVICE — SUT ETHLN 3/0 FS1 30IN 669H

## (undated) DEVICE — SUT SILK 2/0 SUTUPAK TIES 24IN SA75H

## (undated) DEVICE — SPNG GZ WOVN 4X4IN 12PLY 10/BX STRL

## (undated) DEVICE — SHEET,DRAPE,53X77,STERILE: Brand: MEDLINE

## (undated) DEVICE — BNDG ELAS CO-FLEX SLF ADHR 4IN5YD LF STRL

## (undated) DEVICE — IMMOB KN 3PNL DLX CANVS 22IN BLU

## (undated) DEVICE — 4-PORT MANIFOLD: Brand: NEPTUNE 2

## (undated) DEVICE — GLV SURG SENSICARE PI ORTHO SZ8 LF STRL

## (undated) DEVICE — SUT SILK 3/0 SH 30IN K832H

## (undated) DEVICE — DRSNG WND GZ CURAD OIL EMULSION 3X8IN STRL PK/3

## (undated) DEVICE — TRAP FLD MINIVAC MEGADYNE 100ML

## (undated) DEVICE — BANDAGE,GAUZE,BULKEE II,4.5"X4.1YD,STRL: Brand: MEDLINE

## (undated) DEVICE — BNDG ESMARK STRL 6INX12FT LF

## (undated) DEVICE — APPL CHLORAPREP HI/LITE 26ML ORNG

## (undated) DEVICE — SPNG LAP PREWSH SFTPK 18X18IN STRL PK/5

## (undated) DEVICE — INTENDED FOR TISSUE SEPARATION, AND OTHER PROCEDURES THAT REQUIRE A SHARP SURGICAL BLADE TO PUNCTURE OR CUT.: Brand: BARD-PARKER ® STAINLESS STEEL BLADES

## (undated) DEVICE — DISPOSABLE TOURNIQUET CUFF SINGLE BLADDER, SINGLE PORT AND QUICK CONNECT CONNECTOR: Brand: COLOR CUFF

## (undated) DEVICE — SKIN PREP TRAY W/CHG: Brand: MEDLINE INDUSTRIES, INC.

## (undated) DEVICE — 4.0MM EGG BUR

## (undated) DEVICE — BNDG CURAD ADHS 4IN WHT

## (undated) DEVICE — APPL DURAPREP IODOPHOR APL 26ML

## (undated) DEVICE — BNDG ELAS ECONO 4IN 5YD LF TN

## (undated) DEVICE — GLV SURG SENSICARE PI ORTHO SZ7.5 LF STRL

## (undated) DEVICE — HANDPIECE SET WITH HIGH FLOW TIP AND SUCTION TUBE: Brand: INTERPULSE

## (undated) DEVICE — UNDERCAST PADDING: Brand: DEROYAL

## (undated) DEVICE — INTENDED FOR TISSUE SEPARATION, AND OTHER PROCEDURES THAT REQUIRE A SHARP SURGICAL BLADE TO PUNCTURE OR CUT.: Brand: BARD-PARKER ®  SAFETY SCALPED

## (undated) DEVICE — 2108 SERIES SAGITTAL BLADE FLARED (48.1 X 0.64 X 61.7MM)

## (undated) DEVICE — DRSNG GZ CURAD XEROFORM NONADHS 5X9IN STRL

## (undated) DEVICE — DISPOSABLE TOURNIQUET CUFF 24"X4", 1-LINE, YELLOW, STERILE, 1EA/PK, 10PK/CS: Brand: ASP MEDICAL

## (undated) DEVICE — BNDG ELAS W/CLIP 6IN 10YD LF STRL

## (undated) DEVICE — PRECISION THIN (9.0 X 0.38 X 25.0MM)

## (undated) DEVICE — STOCKINETTE,IMPERVIOUS,12X48,STERILE: Brand: MEDLINE

## (undated) DEVICE — SUT SILK 2/0 SH 30IN K833H

## (undated) DEVICE — SYR LUERLOK 20CC BX/50

## (undated) DEVICE — DISPOSABLE TOURNIQUET CUFF 34"X4", 1-LINE, BLUE, STERILE, 1EA/PK, 10PK/CS: Brand: ASP MEDICAL

## (undated) DEVICE — NEEDLE,22GX1.5",REG,BEVEL: Brand: MEDLINE

## (undated) DEVICE — PREP SOL POVIDONE/IODINE BT 4OZ

## (undated) DEVICE — CVR UNIV C/ARM

## (undated) DEVICE — PAD MAJOR: Brand: MEDLINE INDUSTRIES, INC.

## (undated) DEVICE — TOWL OR PREWSH 36X36IN XL BLU DISP STRL

## (undated) DEVICE — GAUZE,SPONGE,FLUFF,6"X6.75",STRL,10/TRAY: Brand: MEDLINE

## (undated) DEVICE — PAD,PREPPING,CUFFED,24X48,7",NONSTERILE: Brand: MEDLINE

## (undated) DEVICE — DRSNG WND GZ CURAD OIL EMULSION 3X3IN STRL